# Patient Record
Sex: FEMALE | Race: WHITE | NOT HISPANIC OR LATINO | Employment: OTHER | URBAN - METROPOLITAN AREA
[De-identification: names, ages, dates, MRNs, and addresses within clinical notes are randomized per-mention and may not be internally consistent; named-entity substitution may affect disease eponyms.]

---

## 2017-01-20 ENCOUNTER — OFFICE VISIT (OUTPATIENT)
Dept: TRANSPLANT | Facility: CLINIC | Age: 67
End: 2017-01-20
Payer: MEDICARE

## 2017-01-20 VITALS
OXYGEN SATURATION: 98 % | DIASTOLIC BLOOD PRESSURE: 77 MMHG | HEIGHT: 65 IN | TEMPERATURE: 98 F | SYSTOLIC BLOOD PRESSURE: 152 MMHG | HEART RATE: 86 BPM | RESPIRATION RATE: 18 BRPM | WEIGHT: 227.5 LBS | BODY MASS INDEX: 37.9 KG/M2

## 2017-01-20 DIAGNOSIS — L98.9 LESION OF NECK: ICD-10-CM

## 2017-01-20 DIAGNOSIS — I12.9 HYPERTENSION, RENAL, STAGE 1-4 OR UNSPECIFIED CHRONIC KIDNEY DISEASE: ICD-10-CM

## 2017-01-20 DIAGNOSIS — E66.9 OBESITY, UNSPECIFIED OBESITY SEVERITY, UNSPECIFIED OBESITY TYPE: Chronic | ICD-10-CM

## 2017-01-20 DIAGNOSIS — Z94.0 S/P LIVING-DONOR KIDNEY TRANSPLANTATION: Primary | Chronic | ICD-10-CM

## 2017-01-20 DIAGNOSIS — N18.30 CKD (CHRONIC KIDNEY DISEASE) STAGE 3, GFR 30-59 ML/MIN: Chronic | ICD-10-CM

## 2017-01-20 DIAGNOSIS — Z29.89 NEED FOR PROPHYLACTIC IMMUNOTHERAPY: Chronic | ICD-10-CM

## 2017-01-20 PROCEDURE — 99215 OFFICE O/P EST HI 40 MIN: CPT | Mod: S$PBB,,, | Performed by: NURSE PRACTITIONER

## 2017-01-20 PROCEDURE — 99215 OFFICE O/P EST HI 40 MIN: CPT | Mod: PBBFAC | Performed by: NURSE PRACTITIONER

## 2017-01-20 PROCEDURE — 99999 PR PBB SHADOW E&M-EST. PATIENT-LVL V: CPT | Mod: PBBFAC,,, | Performed by: NURSE PRACTITIONER

## 2017-01-20 RX ORDER — SERTRALINE HYDROCHLORIDE 25 MG/1
25 TABLET, FILM COATED ORAL DAILY
COMMUNITY

## 2017-01-20 RX ORDER — PARICALCITOL 2 UG/1
2 CAPSULE, LIQUID FILLED ORAL DAILY
COMMUNITY

## 2017-01-20 RX ORDER — PANTOPRAZOLE SODIUM 40 MG/1
40 TABLET, DELAYED RELEASE ORAL DAILY
COMMUNITY
End: 2024-03-27

## 2017-01-20 RX ORDER — NIFEDIPINE 60 MG/1
60 TABLET, EXTENDED RELEASE ORAL DAILY
COMMUNITY

## 2017-01-20 NOTE — PROGRESS NOTES
Kidney Post-Transplant Assessment    Referring Physician: Jakub Bañuelos  Current Nephrologist: Christ Barnhart    ORGAN: RIGHT KIDNEY  Donor Type: living  PHS Increased Risk: no  Cold Ischemia: 34.2 mins  Induction Medications: steroids (prednisone,methylprednisolone,solumedrol,medrol,decadron), campath - alemtuzumab (anti-cd52)    Subjective:     CC:  Reassessment of renal allograft function and management of chronic immunosuppression.    HPI:  Ms. Philippe is a 66 y.o. year old White female who received a living kidney transplant on 12/13/11.  She has CKD stage 3 - GFR 30-59 and her baseline creatinine is between  0.94-1.3. She takes mycophenolate mofetil and tacrolimus for maintenance immunosuppression. Recent hospitalizations or ER visits since her previous clinic visit.    ED: Flu and pneumonia 12/2016--states she is better after 2 rounds of antibiotics but is still somewhat weak  Is followed by her PCP DR.Thomas Land    Bilateral cataract surgery 4/2016  Left knee replacement 10/2014    Sciatic nerve and back Pain--> is scheduled to have an epidural steroid injection next week   Uses a cane when walking long distances incase her sciatica acts up.      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    Amyloidosis, unspecified 1998     s/p chemotherapy x 4 occassions  (IV x3, po x1)    Benign hypertension with end-stage renal disease     Candida vaginitis 1/16/2015    Chronic asthma     CKD (chronic kidney disease) stage 3, GFR 30-59 ml/min 9/14/2012    GERD (gastroesophageal reflux disease)     Hyperlipidemia     Need for prophylactic immunotherapy     Obesity     Osteoarthritis of left knee s/p total knee replacement 1/16/2015     Surgery October 23, 2014    Renal disease due to hypertension 12/14/2012    Secondary hyperparathyroidism of renal origin     Vertigo        Review of Systems   Constitutional: Positive for fatigue. Negative for activity change, appetite  "change, chills, fever and unexpected weight change.        Fatigues easily since pneumonia   HENT: Negative for congestion, facial swelling, postnasal drip, rhinorrhea, sinus pressure, sore throat and trouble swallowing.    Eyes: Negative for pain, redness and visual disturbance.   Respiratory: Negative for cough, chest tightness, shortness of breath and wheezing.    Cardiovascular: Negative.  Negative for chest pain, palpitations and leg swelling.   Gastrointestinal: Negative for abdominal pain, diarrhea, nausea and vomiting.   Genitourinary: Negative for dysuria, flank pain and urgency.   Musculoskeletal: Negative for gait problem, neck pain and neck stiffness.   Skin: Negative for rash.        Concerned about a cyst to the mid anterior neck.  Sates the cyst appeared 1 1/2 years ago.   It was evaluated by her nephrologist and PCP with conflicting opinions about whether to remove it.    Allergic/Immunologic: Positive for immunocompromised state. Negative for environmental allergies and food allergies.   Neurological: Negative for dizziness, weakness, light-headedness and headaches.   Psychiatric/Behavioral: Negative for agitation and confusion. The patient is not nervous/anxious.        Objective:   Blood pressure (!) 152/77, pulse 86, temperature 97.8 °F (36.6 °C), temperature source Oral, resp. rate 18, height 5' 5" (1.651 m), weight 103.2 kg (227 lb 8.2 oz), SpO2 98 %.body mass index is 37.86 kg/(m^2).    Physical Exam   Constitutional: She is oriented to person, place, and time. She appears well-developed and well-nourished.   HENT:   Head: Normocephalic.       Mouth/Throat: Oropharynx is clear and moist. No oropharyngeal exudate.   Eyes: Conjunctivae and EOM are normal. Pupils are equal, round, and reactive to light. No scleral icterus.   Neck: Normal range of motion. Neck supple.   Cardiovascular: Normal rate, regular rhythm and normal heart sounds.    Pulmonary/Chest: Effort normal and breath sounds normal. "   Abdominal: Soft. Normal appearance and bowel sounds are normal. She exhibits no distension and no mass. There is no splenomegaly or hepatomegaly. There is no tenderness. There is no rebound, no guarding, no CVA tenderness, no tenderness at McBurney's point and negative Ballard's sign.       Musculoskeletal: Normal range of motion. She exhibits no edema.        Arms:  Lymphadenopathy:     She has no cervical adenopathy.   Neurological: She is alert and oriented to person, place, and time. She exhibits normal muscle tone. Coordination normal.   Skin: Skin is warm and dry.   Psychiatric: She has a normal mood and affect. Her behavior is normal.   Vitals reviewed.      Labs:      Lab Results   Component Value Date    EXTANC  12/09/2016      Comment:      repeat Prograf level, last not true trough    EXTWBC 10.82 11/30/2016    EXTSEGS 73.0 11/30/2016    EXTPLATELETS 197 11/30/2016    EXTHEMOGLOBI 13.0 11/30/2016    EXTHEMATOCRI 39.9 11/30/2016    EXTCREATININ 1.26 12/09/2016    EXTSODIUM 143 12/09/2016    EXTPOTASSIUM 4.2 12/09/2016    EXTBUN 16 12/09/2016    EXTCO2 30.7 12/09/2016    EXTCALCIUM 9.1 12/09/2016    EXTPHOSPHORU 2.5 12/28/2015    EXTGLUCOSE 106 12/09/2016    EXTALBUMIN 3.20 11/30/2016    EXTAST 19 11/30/2016    EXTALT 35 11/30/2016    EXTBILITOTAL 0.28 11/30/2016       Lab Results   Component Value Date    EXTTACROLVL 6.4 12/09/2016    EXTPROTCRE 0.40 12/09/2016    EXTPTHINTACT 153.3 11/10/2015    EXTPROTEINUA 1+ 11/30/2016    EXTWBCUA none seen 11/30/2016    EXTRBCUA none seen 11/30/2016       Labs were reviewed with the patient.    Assessment:     1. S/P living-donor kidney transplantation - 12/13/11    2. Chronic immunosuppression with Prograf and Cellcept    3. Hypertension, renal, stage 1-4 or unspecified chronic kidney disease    4. Obesity, unspecified obesity severity, unspecified obesity type    5. Lesion of neck    6. CKD (chronic kidney disease) stage 3, GFR 30-59 ml/min        Plan:        Ext.  Dermatology consult--lesion neck     Follow-up:   1. CKD stage: 3stable    2. Immunosuppression: last  Prograf Trough 6.4 from 12/9/2016  which is therapeutic--target 4-6. Continue Prograf 3/3,  mg BID    Will continue to monitor for drug toxicities.     3. Allograft Function: Stable.  Continue good po hydration .   Lab Results   Component Value Date    CREATININE 1.0 01/05/2012       4. Hypertension management: stable , advise low salt diet and home BP monitoring     continue nifedipine 60 mg QD, labetalol 200 mg BID and clonidine 0.2 mg at bed time as prescribed     5. Metabolic Bone Disease/Secondary Hyperparathyroidism:stable.    6. Electrolytes: Normal calcium. Bicarbonate is normal  EXTSODIUM 143 12/09/2016   EXTPOTASSIUM 4.2 12/09/2016   EXTBUN 16 12/09/2016   EXTCO2 30.7 12/09/2016   EXTCALCIUM 9.1 12/09/2016   EXTPHOSPHORU 2.5 12/28/2015     7. Anemia: stable. No need for intervention     11/30/2016  EXT WBC  10.82   EXT SEGS%  73.0   EXT Lymph%  17.3   EXT MONOCYTES%  8.9   EXT Eosinophil%  0.6   EXT Platelets  197   EXT Hemoglobin  13.0   EXT Hematocrit  39.9       8.  Cytopenias: no significant cytopenias will monitor as per our guidelines. Medicine list reviewed including potential causes of drug-induced cytopenias    9.Proteinuria: continue p/c ratio as per guidelines       EXTPROTCRE 0.40 12/09/2016       10. BK virus infection screening: will continue to monitor/ guidelines    11. Weight education: provided during the clinic visit   body mass index is 37.86 kg/(m^2).     12.Patient safety education regarding immunosuppression including prophylaxis posttransplant for CMV, PCP : Education provided about vaccination and prevention of infections       Follow-up:   Annual follow-up with kidney transplant clinic with repeat labs, including renal function panel with UA, urine protein/creatinine ratio, and drug trough level q3 months.  Patient also reminded to follow-up with general  nephrologist.    Sharona Navas NP       Education:   Material provided to the patient.  Patient reminded to call with any health changes since these can be early signs of significant complications.  Also, I advised the patient to be sure any new medications or changes of old medications are discussed with either a pharmacist or physician knowledgeable with transplant to avoid rejection/drug toxicity related to significant drug interactions.    UNOS Patient Status  Functional Status: 80% - Normal activity with effort: some symptoms of disease  Physical Capacity: No Limitations

## 2017-01-20 NOTE — MR AVS SNAPSHOT
[facility]  [address]  [facility] [address]  Phone: [phone]                  Jing Philippe   2017 10:30 AM   Office Visit    Description:  Female : 1950   Provider:  Sharona Navas NP   Department:  [facility]           Reason for Visit     Kidney Transplant Evaluation           Diagnoses this Visit        Comments    S/P living-donor kidney transplantation    -  Primary     Need for prophylactic immunotherapy         Hypertension, renal, stage 1-4 or unspecified chronic kidney disease         Obesity, unspecified obesity severity, unspecified obesity type         Lesion of neck                To Do List           Goals (5 Years of Data)     None      Follow-Up and Disposition     Return in about 1 year (around 2018).      [facility]     [facility] -  Assistance  Registered nurses in the [facility] provide clinical advisement, health education, appointment booking, and other advisory services.  Call for this free service at [phone].             Medications           Message regarding Medications     Verify the changes and/or additions to your medication regime listed below are the same as discussed with your clinician today.  If any of these changes or additions are incorrect, please notify your healthcare provider.             Verify that the below list of medications is an accurate representation of the medications you are currently taking.  If none reported, the list may be blank. If incorrect, please contact your healthcare provider. Carry this list with you in case of emergency.           Current Medications     albuterol (PROVENTIL HFA/VENTOLIN HFA) 200 puff inhaler Inhale 1 puff into the lungs PRN.    aspirin 81 MG chewable tablet Take 1 tablet by mouth Daily.    BIOTIN ORAL Take 1 tablet by mouth once daily.    calcium carbonate-vitamin D3 600 mg (1,500 mg)-800 unit Chew Take 1 tablet by mouth  "once daily.    cinacalcet (SENSIPAR) 30 MG tablet Take 1 tablet by mouth Daily. Total dose 90 mg    clonidine (CATAPRES) 0.2 MG tablet Take 1 tablet by mouth At bedtime.    docusate sodium (COLACE) 100 MG capsule Take 100 mg by mouth Twice daily as needed.    k phos di & mono-sod phos mono (PHOSPHA 250 NEUTRAL) 250 mg Tab Take 2 tablets by mouth Three times a day.    L.ACID/L.CASEI/B.BIF/B.TREVON/FOS (PROBIOTIC BLEND ORAL) Take 1 tablet by mouth once daily.    labetalol (NORMODYNE) 200 MG tablet Take 100 mg by mouth Twice daily.     lamivudine (EPIVIR) 100 MG Tab TAKE 1 TABLET DAILY.    meclizine (ANTIVERT) 25 mg tablet Take 1 tablet by mouth Daily.    multivitamin (THERAGRAN) per tablet Take 1 tablet by mouth Daily.    mycophenolate (CELLCEPT) 250 mg Cap TAKE (2) CAPSULES TWICE DAILY.    nifedipine (PROCARDIA-XL) 60 MG (OSM) 24 hr tablet Take 60 mg by mouth once daily.    pantoprazole (PROTONIX) 40 MG tablet Take 40 mg by mouth once daily.    paricalcitol (ZEMPLAR) 2 MCG capsule Take 2 mcg by mouth once daily.    pregabalin (LYRICA) 75 MG capsule Take 75 mg by mouth Three times a day.    PREMARIN vaginal cream     sertraline (ZOLOFT) 25 MG tablet Take 25 mg by mouth once daily.    tacrolimus (PROGRAF) 1 MG Cap TAKE 3 CAPSULES TWICE DAILY    tramadol (ULTRAM) 50 mg tablet Take 50 mg by mouth 2 (two) times daily.     atorvastatin (LIPITOR) 20 MG tablet Take 1 tablet by mouth At bedtime.    SENSIPAR 60 mg Tab TAKE 1 TABLET DAILY.    simethicone (MYLICON) 80 MG chewable tablet Take 80 mg by mouth.             Clinical Reference Information           Vital Signs - Last Recorded  Most recent update: 1/20/2017 10:25 AM by Adilson Livingston RN    BP Pulse Temp Resp Ht Wt    (!) 152/77 (BP Location: Left arm, Patient Position: Sitting, BP Method: Automatic) 86 97.8 °F (36.6 °C) (Oral) 18 5' 5" (1.651 m) 103.2 kg (227 lb 8.2 oz)    SpO2 BMI             98% 37.86 kg/m2         Blood Pressure          Most Recent Value    BP  " (!)  152/77      Allergies as of 1/20/2017     Codeine    Hydrocodone      Immunizations Administered on Date of Encounter - 1/20/2017     None      Orders Placed During Today's Visit      Normal Orders This Visit    Ambulatory consult to Dermatology       Maintenance Dialysis History     Patient has no recorded history of maintenance dialysis.      Transplant Information        Txp Date Organ Coordinator Care Team    12/13/2011 Kidney Cathy Killian RN Surgeon:  Christiano Cornejo MD   Referring Physician:  Jakub Bañuelos MD   Current Nephrologist:  Christ Barnhart MD         [facility] Sign-Up     Activating your [facility] account is as easy as 1-2-3!     1) Visit [email or web address], select Sign Up Now, enter this activation code and your date of birth, then select Next.  [email or web address]  Expires: 3/6/2017 11:07 AM      2) Create a username and password to use when you visit [facility] in the future and select a security question in case you lose your password and select Next.    3) Enter your e-mail address and click Sign Up!    Additional Information  If you have questions, please e-mail [email or web address] or call [phone] to talk to our [facility] staff. Remember, [facility] is NOT to be used for urgent needs. For medical emergencies, dial 911.

## 2017-01-20 NOTE — LETTER
January 20, 2017        Christ Barnhart  [address] [place] [place] [address]  Phone: [phone]  Fax: [phone]             [facility]  [address]  [facility] [address]  Phone: [phone]   Patient: Jing Philippe   MR Number: [id number]   YOB: 1950   Date of Visit: 1/20/2017       Dear Dr. Christ Barnhart    Thank you for referring Jing Philippe to me for evaluation. Attached you will find relevant portions of my assessment and plan of care.    If you have questions, please do not hesitate to call me. I look forward to following Jing Philippe along with you.    Sincerely,    Sharona Navas, NP    Enclosure    If you would like to receive this communication electronically, please contact [email or web address] or [phone] to request [employer] access.    [employer] Link is a tool which provides read-only access to select patient information with whom you have a relationship. Its easy to use and provides real time access to review your patients record including encounter summaries, notes, results, and demographic information.    If you feel you have received this communication in error or would no longer like to receive these types of communications, please e-mail [email or web address]

## 2017-11-02 ENCOUNTER — TELEPHONE (OUTPATIENT)
Dept: TRANSPLANT | Facility: CLINIC | Age: 67
End: 2017-11-02

## 2017-11-02 NOTE — TELEPHONE ENCOUNTER
Call placed to patient. Informed by  that while scheduling annual transplant appointment, patient reported that she has cancer and is on Chemo. Message left on patient voicemail, contact info provided.

## 2017-12-11 ENCOUNTER — DOCUMENTATION ONLY (OUTPATIENT)
Dept: TRANSPLANT | Facility: CLINIC | Age: 67
End: 2017-12-11

## 2017-12-11 LAB
EXT ALBUMIN: 3.2
EXT ALKALINE PHOSPHATASE: 111
EXT ALT: 22
EXT AST: 13
EXT BANDS%: 76.1
EXT BILIRUBIN DIRECT: <0.05 MG/DL
EXT BILIRUBIN TOTAL: 0.31
EXT BUN: 15
EXT CALCIUM: 8.6
EXT CHLORIDE: 105
EXT CO2: 29.1
EXT CREATININE: 1.05 MG/DL
EXT EOSINOPHIL%: 1.3
EXT GFR MDRD AF AMER: >60
EXT GLUCOSE: 119
EXT HEMATOCRIT: 37.5
EXT HEMOGLOBIN: 11.9
EXT LYMPH%: 13.3
EXT MONOCYTES%: 8.5
EXT PHOSPHORUS: 3.2
EXT PLATELETS: 211
EXT POTASSIUM: 4.4
EXT PROT/CREAT RATIO UR: 0.84
EXT PROTEIN TOTAL: 6
EXT SODIUM: 142 MMOL/L
EXT TACROLIMUS LVL: 4.5
EXT WBC: 7.99

## 2018-01-02 DIAGNOSIS — Z94.0 KIDNEY REPLACED BY TRANSPLANT: Primary | ICD-10-CM

## 2018-01-26 ENCOUNTER — OFFICE VISIT (OUTPATIENT)
Dept: TRANSPLANT | Facility: CLINIC | Age: 68
End: 2018-01-26
Payer: MEDICARE

## 2018-01-26 ENCOUNTER — LAB VISIT (OUTPATIENT)
Dept: LAB | Facility: HOSPITAL | Age: 68
End: 2018-01-26
Attending: INTERNAL MEDICINE
Payer: MEDICARE

## 2018-01-26 ENCOUNTER — TELEPHONE (OUTPATIENT)
Dept: TRANSPLANT | Facility: CLINIC | Age: 68
End: 2018-01-26

## 2018-01-26 VITALS
TEMPERATURE: 98 F | SYSTOLIC BLOOD PRESSURE: 137 MMHG | OXYGEN SATURATION: 98 % | DIASTOLIC BLOOD PRESSURE: 67 MMHG | WEIGHT: 240.31 LBS | RESPIRATION RATE: 20 BRPM | HEIGHT: 65 IN | BODY MASS INDEX: 40.04 KG/M2 | HEART RATE: 94 BPM

## 2018-01-26 DIAGNOSIS — I12.9 HYPERTENSION, RENAL: ICD-10-CM

## 2018-01-26 DIAGNOSIS — N18.30 CKD (CHRONIC KIDNEY DISEASE) STAGE 3, GFR 30-59 ML/MIN: Chronic | ICD-10-CM

## 2018-01-26 DIAGNOSIS — E83.39 HYPOPHOSPHATEMIA: Chronic | ICD-10-CM

## 2018-01-26 DIAGNOSIS — Z29.89 NEED FOR PROPHYLACTIC IMMUNOTHERAPY: Chronic | ICD-10-CM

## 2018-01-26 DIAGNOSIS — Z94.0 KIDNEY REPLACED BY TRANSPLANT: ICD-10-CM

## 2018-01-26 DIAGNOSIS — Z94.0 S/P LIVING-DONOR KIDNEY TRANSPLANTATION: Primary | Chronic | ICD-10-CM

## 2018-01-26 DIAGNOSIS — C90.00 MULTIPLE MYELOMA NOT HAVING ACHIEVED REMISSION: Chronic | ICD-10-CM

## 2018-01-26 LAB
ALBUMIN SERPL BCP-MCNC: 3.5 G/DL
ALBUMIN SERPL BCP-MCNC: 3.5 G/DL
ALP SERPL-CCNC: 81 U/L
ALT SERPL W/O P-5'-P-CCNC: 20 U/L
ANION GAP SERPL CALC-SCNC: 9 MMOL/L
AST SERPL-CCNC: 17 U/L
BASOPHILS # BLD AUTO: 0.02 K/UL
BASOPHILS NFR BLD: 0.2 %
BILIRUB DIRECT SERPL-MCNC: 0.1 MG/DL
BILIRUB SERPL-MCNC: 0.4 MG/DL
BUN SERPL-MCNC: 19 MG/DL
CALCIUM SERPL-MCNC: 8.6 MG/DL
CHLORIDE SERPL-SCNC: 105 MMOL/L
CO2 SERPL-SCNC: 27 MMOL/L
CREAT SERPL-MCNC: 1.2 MG/DL
DIFFERENTIAL METHOD: ABNORMAL
EOSINOPHIL # BLD AUTO: 0.1 K/UL
EOSINOPHIL NFR BLD: 0.7 %
ERYTHROCYTE [DISTWIDTH] IN BLOOD BY AUTOMATED COUNT: 13.7 %
EST. GFR  (AFRICAN AMERICAN): 53.3 ML/MIN/1.73 M^2
EST. GFR  (NON AFRICAN AMERICAN): 46.2 ML/MIN/1.73 M^2
GLUCOSE SERPL-MCNC: 113 MG/DL
HCT VFR BLD AUTO: 38.2 %
HGB BLD-MCNC: 12.3 G/DL
IMM GRANULOCYTES # BLD AUTO: 0.06 K/UL
IMM GRANULOCYTES NFR BLD AUTO: 0.6 %
LYMPHOCYTES # BLD AUTO: 1.5 K/UL
LYMPHOCYTES NFR BLD: 15.5 %
MAGNESIUM SERPL-MCNC: 1.4 MG/DL
MCH RBC QN AUTO: 29.2 PG
MCHC RBC AUTO-ENTMCNC: 32.2 G/DL
MCV RBC AUTO: 91 FL
MONOCYTES # BLD AUTO: 1.1 K/UL
MONOCYTES NFR BLD: 11.7 %
NEUTROPHILS # BLD AUTO: 6.7 K/UL
NEUTROPHILS NFR BLD: 71.3 %
NRBC BLD-RTO: 0 /100 WBC
PHOSPHATE SERPL-MCNC: 2.9 MG/DL
PLATELET # BLD AUTO: 176 K/UL
PMV BLD AUTO: 11.4 FL
POTASSIUM SERPL-SCNC: 4 MMOL/L
PROT SERPL-MCNC: 6.5 G/DL
RBC # BLD AUTO: 4.21 M/UL
SODIUM SERPL-SCNC: 141 MMOL/L
TACROLIMUS BLD-MCNC: 9.5 NG/ML
WBC # BLD AUTO: 9.44 K/UL

## 2018-01-26 PROCEDURE — 84075 ASSAY ALKALINE PHOSPHATASE: CPT

## 2018-01-26 PROCEDURE — 82247 BILIRUBIN TOTAL: CPT

## 2018-01-26 PROCEDURE — 85025 COMPLETE CBC W/AUTO DIFF WBC: CPT

## 2018-01-26 PROCEDURE — 80069 RENAL FUNCTION PANEL: CPT

## 2018-01-26 PROCEDURE — 36415 COLL VENOUS BLD VENIPUNCTURE: CPT

## 2018-01-26 PROCEDURE — 99999 PR PBB SHADOW E&M-EST. PATIENT-LVL V: CPT | Mod: PBBFAC,,, | Performed by: NURSE PRACTITIONER

## 2018-01-26 PROCEDURE — 80197 ASSAY OF TACROLIMUS: CPT

## 2018-01-26 PROCEDURE — 86352 CELL FUNCTION ASSAY W/STIM: CPT

## 2018-01-26 PROCEDURE — 83735 ASSAY OF MAGNESIUM: CPT

## 2018-01-26 PROCEDURE — 99215 OFFICE O/P EST HI 40 MIN: CPT | Mod: PBBFAC | Performed by: NURSE PRACTITIONER

## 2018-01-26 PROCEDURE — 99214 OFFICE O/P EST MOD 30 MIN: CPT | Mod: S$PBB,,, | Performed by: NURSE PRACTITIONER

## 2018-01-26 RX ORDER — ZOLPIDEM TARTRATE 5 MG/1
5 TABLET ORAL NIGHTLY PRN
COMMUNITY

## 2018-01-26 RX ORDER — PROCHLORPERAZINE MALEATE 10 MG
10 TABLET ORAL EVERY 6 HOURS PRN
COMMUNITY
End: 2024-03-27

## 2018-01-26 RX ORDER — DEXAMETHASONE 4 MG/1
20 TABLET ORAL SEE ADMIN INSTRUCTIONS
Status: ON HOLD | COMMUNITY
End: 2019-11-25

## 2018-01-26 NOTE — LETTER
January 26, 2018        Christ Barnhart  [address] [place] [address]  Phone: [phone]  Fax: [phone]             [facility]  [address]  [facility] [address]  Phone: [phone]   Patient: Jing Philippe   MR Number: [id number]   YOB: 1950   Date of Visit: 1/26/2018       Dear Dr. Christ Barnhart    Thank you for referring Jing Philippe to me for evaluation. Attached you will find relevant portions of my assessment and plan of care.    If you have questions, please do not hesitate to call me. I look forward to following Jing Philippe along with you.    Sincerely,    Sharona Navas, NP    Enclosure    If you would like to receive this communication electronically, please contact [email or web address] or [phone] to request [employer] access.    [employer] is a tool which provides read-only access to select patient information with whom you have a relationship. Its easy to use and provides real time access to review your patients record including encounter summaries, notes, results, and demographic information.    If you feel you have received this communication in error or would no longer like to receive these types of communications, please e-mail [email or web address]

## 2018-01-26 NOTE — PROGRESS NOTES
Please encourage patient magnesium rich meals, patient advise patient to follow up with her nephrologist about this magnesium level.

## 2018-01-26 NOTE — PROGRESS NOTES
Kidney Post-Transplant Assessment    Referring Physician: Jakub Bañuelos  Current Nephrologist: Christ Barnhart    ORGAN: RIGHT KIDNEY  Donor Type: living  PHS Increased Risk: no  Cold Ischemia: 34.2 mins  Induction Medications: steroids (prednisone,methylprednisolone,solumedrol,medrol,decadron), campath - alemtuzumab (anti-cd52)    Subjective:     CC:  Reassessment of renal allograft function and management of chronic immunosuppression.    HPI:  Ms. Philippe is a 67 y.o. 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  She has CKD stage 3 - GFR 30-59 and her baseline creatinine is between 0.94-1.3. She takes mycophenolate mofetil and tacrolimus for maintenance immunosuppression. She denies any recent hospitalizations or ER visits since her previous clinic visit.       Multiple myeloma diagnosed 3/2017. She underwent a bone marrow bx in 2/2017. She is currently taking Velcade SubQ nightly. She is also getting chemo tx every 3 weeks , and then off for 1 week. The patient did mention the possibility of needing a bone marrow txp. She is treated by Dr ELIZABETH Camp/ [facility] in [place], [place]   Overall feeling well, but reports weight gain since starting chemo txs.    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    Amyloidosis, unspecified 1998    s/p chemotherapy x 4 occassions  (IV x3, po x1)    Benign hypertension with end-stage renal disease     Candida vaginitis 1/16/2015    Chronic asthma     CKD (chronic kidney disease) stage 3, GFR 30-59 ml/min 9/14/2012    GERD (gastroesophageal reflux disease)     Hyperlipidemia     Need for prophylactic immunotherapy     Obesity     Osteoarthritis of left knee s/p total knee replacement 1/16/2015    Surgery October 23, 2014    Renal disease due to hypertension 12/14/2012    Secondary hyperparathyroidism of renal origin     Vertigo        Review of Systems  "  Constitutional: Positive for unexpected weight change. Negative for activity change, appetite change, chills, fatigue and fever.   HENT: Negative for congestion, facial swelling, postnasal drip, rhinorrhea, sinus pressure, sore throat and trouble swallowing.    Eyes: Negative for pain, redness and visual disturbance.   Respiratory: Negative for cough, chest tightness, shortness of breath and wheezing.    Cardiovascular: Negative.  Negative for chest pain, palpitations and leg swelling.   Gastrointestinal: Negative for abdominal pain, diarrhea, nausea and vomiting.        Obese abdomen    Genitourinary: Negative for dysuria, flank pain and urgency.   Musculoskeletal: Negative for gait problem, neck pain and neck stiffness.   Skin: Negative for rash.   Allergic/Immunologic: Positive for immunocompromised state. Negative for environmental allergies and food allergies.   Neurological: Negative for dizziness, weakness, light-headedness and headaches.   Psychiatric/Behavioral: Negative for agitation and confusion. The patient is not nervous/anxious.        Objective:   Blood pressure 137/67, pulse 94, temperature 98.4 °F (36.9 °C), temperature source Oral, resp. rate 20, height 5' 5" (1.651 m), weight 109 kg (240 lb 4.8 oz), SpO2 98 %.body mass index is 39.99 kg/m².    Physical Exam   Constitutional: She is oriented to person, place, and time. She appears well-developed and well-nourished.   HENT:   Head: Normocephalic.   Mouth/Throat: Oropharynx is clear and moist. No oropharyngeal exudate.   Eyes: Conjunctivae and EOM are normal. Pupils are equal, round, and reactive to light. No scleral icterus.   Neck: Normal range of motion. Neck supple.   Cardiovascular: Normal rate, regular rhythm and normal heart sounds.    Pulmonary/Chest: Effort normal and breath sounds normal.   Abdominal: Soft. Normal appearance and bowel sounds are normal. She exhibits no distension and no mass. There is no splenomegaly or hepatomegaly. " There is no tenderness. There is no rebound, no guarding, no CVA tenderness, no tenderness at McBurney's point and negative Ballard's sign.   Musculoskeletal: Normal range of motion. She exhibits no edema.        Arms:  Lymphadenopathy:     She has no cervical adenopathy.   Neurological: She is alert and oriented to person, place, and time. She exhibits normal muscle tone. Coordination normal.   Skin: Skin is warm and dry.   Psychiatric: She has a normal mood and affect. Her behavior is normal.   Vitals reviewed.      Labs:  Lab Results   Component Value Date    WBC 9.44 01/26/2018    HGB 12.3 01/26/2018    HCT 38.2 01/26/2018     01/26/2018    K 4.0 01/26/2018     01/26/2018    CO2 27 01/26/2018    BUN 19 01/26/2018    CREATININE 1.2 01/26/2018    EGFRNONAA 46.2 (A) 01/26/2018    CALCIUM 8.6 (L) 01/26/2018    PHOS 2.9 01/26/2018    MG 1.4 (L) 01/26/2018    ALBUMIN 3.5 01/26/2018    ALBUMIN 3.5 01/26/2018    AST 17 01/26/2018    ALT 20 01/26/2018       Lab Results   Component Value Date    EXTANC  12/09/2016      Comment:      repeat Prograf level, last not true trough    EXTWBC 7.99 12/04/2017    EXTSEGS 73.0 11/30/2016    EXTPLATELETS 211 12/04/2017    EXTHEMOGLOBI 11.9 12/04/2017    EXTHEMATOCRI 37.5 12/04/2017    EXTCREATININ 1.05 12/04/2017    EXTSODIUM 142 12/04/2017    EXTPOTASSIUM 4.4 12/04/2017    EXTBUN 15 12/04/2017    EXTCO2 29.1 12/04/2017    EXTCALCIUM 8.6 12/04/2017    EXTPHOSPHORU 3.2 12/04/2017    EXTGLUCOSE 119 12/04/2017    EXTALBUMIN 3.20 12/04/2017    EXTAST 13 12/04/2017    EXTALT 22 12/04/2017    EXTBILITOTAL 0.31 12/04/2017       Lab Results   Component Value Date    EXTTACROLVL 4.5 12/04/2017    EXTPROTCRE 0.84 12/04/2017    EXTPTHINTACT 153.3 11/10/2015    EXTPROTEINUA 1+ 11/30/2016    EXTWBCUA none seen 11/30/2016    EXTRBCUA none seen 11/30/2016       Labs were reviewed with the patient.    Assessment:     1. S/P living-donor kidney transplantation - 12/13/11    2. Chronic  immunosuppression with Prograf and Cellcept    3. CKD (chronic kidney disease) stage 3, GFR 30-59 ml/min    4. Hypertension, renal    5. Hypophosphatemia    6. Multiple myeloma not having achieved remission        Plan:   High Mg diet encouraged -->MGMT deferred to general nephrology  Stop MMF d/t active multiple myeloma    possibility of needing a bone marrow txp. She is treated by Dr ELIZABETH Camp/ [facility]  Patient expressed [facility] doctor may want to discuss treatment with one of the TXP nephrologist. If needed, Encouraged patient to have Dr Camp call the coordinator to set up a time for her to speak with a txp nephrologist.     Follow-up:   1. CKD stage: 3 stable    2. Immunosuppression:   Prograf trough 4.5, which is  therapeutic target 4-6. Continue Prograf 3/2    Will continue to monitor for drug toxicities. She continues Epivir prophylaxis     3. Allograft Function: Stable. Continue good po hydration.        Lab Results   Component Value Date    CREATININE 1.2 01/26/2018     EGFRNONAA 46.2 (A) 01/26/2018       4. Hypertension management: stable, advise low salt diet and home BP monitoring    Nifedipine 60 mg QD, Clonidine 0.2 mg QD,     5. Metabolic Bone Disease/Secondary Hyperparathyroidism:stable  Will monitor PTH, CA and Vit D/guidelines,    High Mg diet encouraged -->MGMT deferred to general nephrology    MG 1.4 (L) 01/26/2018     Lab Results   Component Value Date           CALCIUM 8.6 (L) 01/26/2018    CAION 1.49 (H) 12/16/2011    PHOS 2.9 01/26/2018   Sensipar and zemplar as prescribed , calcium carbonate as prescribed , KPN  500  mg TID  -->MGMT deferred to general nephrology    6. Electrolytes:  Will monitor /guidelines  Lab Results   Component Value Date     01/26/2018    K 4.0 01/26/2018     01/26/2018    CO2 27 01/26/2018       7. Anemia: stable. No need for intervention    Will monitor /guidelines  Lab Results   Component Value Date    WBC 9.44 01/26/2018    HGB 12.3 01/26/2018    HCT 38.2  01/26/2018    MCV 91 01/26/2018     01/26/2018       8.  Cytopenias: no significant cytopenias will monitor as per our guidelines. Medicine list reviewed including potential causes of drug-induced cytopenias    9.Proteinuria: continue p/c ratio as per guidelines    EXTPROTCRE 0.84 12/04/2017     10. BK virus infection screening:  will continue to monitor/ guidelines    11. Weight education: provided during the clinic visit   Body mass index is 39.99 kg/m².       12.Patient safety education regarding immunosuppression including prophylaxis posttransplant for CMV, PCP : Education provided about vaccination and prevention of infections       Follow-up:   Annual follow-up with kidney transplant clinic with repeat labs, including renal function panel with UA, urine protein/creatinine ratio, and drug trough level q3 months.  Patient also reminded to follow-up with general nephrologist.    Sharona Navas NP       Education:   Material provided to the patient.  Patient reminded to call with any health changes since these can be early signs of significant complications.  Also, I advised the patient to be sure any new medications or changes of old medications are discussed with either a pharmacist or physician knowledgeable with transplant to avoid rejection/drug toxicity related to significant drug interactions.    UNOS Patient Status  Functional Status: 80% - Normal activity with effort: some symptoms of disease  Physical Capacity: No Limitations

## 2018-01-26 NOTE — TELEPHONE ENCOUNTER
----- Message from Casimiro Null MD sent at 1/26/2018 10:35 AM CST -----  Please encourage patient magnesium rich meals, patient advise patient to follow up with her nephrologist about this magnesium level.

## 2018-01-29 LAB — IMMUNKNOW (STIMULATED): 404 NG/ML

## 2018-10-23 ENCOUNTER — TELEPHONE (OUTPATIENT)
Dept: TRANSPLANT | Facility: CLINIC | Age: 68
End: 2018-10-23

## 2018-10-23 NOTE — TELEPHONE ENCOUNTER
Annual transplant follow up due 12/2018. Patient declines appointment with . Follow up call placed to patient by Nurse Coordinator X3 unsuccessfully. Letter to be sent.

## 2018-10-24 ENCOUNTER — TELEPHONE (OUTPATIENT)
Dept: TRANSPLANT | Facility: CLINIC | Age: 68
End: 2018-10-24

## 2018-10-24 NOTE — TELEPHONE ENCOUNTER
Spoke to patient today regarding annual transplant follow up due in December 2018. Patient does not wish to make appointment today, wishes to get only labs done and come to [facility] in January 2019. Will schedule lab appointment.

## 2018-10-24 NOTE — TELEPHONE ENCOUNTER
----- Message from Peace Ryan sent at 10/24/2018  8:30 AM CDT -----  Contact: Patient  Needs Advice    Reason for call: Returning Cathy's call. Patient stated her phone cut off mid conversation        Communication Preference: [phone]    Additional Information: n/a

## 2018-12-06 ENCOUNTER — TELEPHONE (OUTPATIENT)
Dept: TRANSPLANT | Facility: CLINIC | Age: 68
End: 2018-12-06

## 2018-12-07 NOTE — TELEPHONE ENCOUNTER
Call received, critical lab from [facility]. Mag 1.1.  Patient called, inquiry related to Mag supplement reviewed. Patient reports chronic problem with low Mag level over last 6 months requiring Mag Sulfate infusions in addition to Mag Ox 800 tid and slow mag 2 tabs bid. Patient denies any cramping or abnormal symptoms. Call placed to local Hem/Onc Dr. Camp whom has been treating patient. Call returned from Nurse. Patient currently being managed for Amyloid and chronic low serum Mag level. Patient will be seen on 12/6/18 and will received IV Mag 3GM. Reviewed with Dr. Fatima, will defer to Hem/Onc to manage. Patient advised to take Mag and KPN 2hrs apart.

## 2019-01-14 ENCOUNTER — TELEPHONE (OUTPATIENT)
Dept: TRANSPLANT | Facility: CLINIC | Age: 69
End: 2019-01-14

## 2019-01-14 NOTE — TELEPHONE ENCOUNTER
I called pt and advised to go to local ER for critically low Mg level.  Pt declined, says she stays low on Mg and will be seeing Oncologist ( Dr Awan) tomorrow to get IV Mg.

## 2019-04-01 ENCOUNTER — OFFICE VISIT (OUTPATIENT)
Dept: FAMILY MEDICINE | Facility: CLINIC | Age: 69
End: 2019-04-01
Payer: MEDICARE

## 2019-04-01 VITALS
HEART RATE: 85 BPM | OXYGEN SATURATION: 99 % | WEIGHT: 216 LBS | HEIGHT: 65 IN | RESPIRATION RATE: 16 BRPM | TEMPERATURE: 96 F | SYSTOLIC BLOOD PRESSURE: 130 MMHG | DIASTOLIC BLOOD PRESSURE: 60 MMHG | BODY MASS INDEX: 35.99 KG/M2

## 2019-04-01 DIAGNOSIS — Z94.0 S/P LIVING-DONOR KIDNEY TRANSPLANTATION: Chronic | ICD-10-CM

## 2019-04-01 DIAGNOSIS — M86.179 ACUTE OSTEOMYELITIS OF ANKLE OR FOOT, UNSPECIFIED LATERALITY: Primary | ICD-10-CM

## 2019-04-01 DIAGNOSIS — C90.00 MULTIPLE MYELOMA NOT HAVING ACHIEVED REMISSION: Chronic | ICD-10-CM

## 2019-04-01 PROCEDURE — 99204 PR OFFICE/OUTPT VISIT, NEW, LEVL IV, 45-59 MIN: ICD-10-PCS | Mod: S$GLB,,, | Performed by: INTERNAL MEDICINE

## 2019-04-01 PROCEDURE — 99204 OFFICE O/P NEW MOD 45 MIN: CPT | Mod: S$GLB,,, | Performed by: INTERNAL MEDICINE

## 2019-04-01 RX ORDER — GABAPENTIN 100 MG/1
CAPSULE ORAL
COMMUNITY
Start: 2019-03-13 | End: 2024-03-27

## 2019-04-01 NOTE — ASSESSMENT & PLAN NOTE
On chronic immunosuppression which can make infection worse.  Need to watch renal function closely on antibiotics.

## 2019-04-01 NOTE — ASSESSMENT & PLAN NOTE
MRI shows evidence of osteomyelitis, also has erythema, pain, and elevated ESR.  IR declined to do bone biopsy so we don't have culture data.  Will plan to start with gram + coverage with daptomycin.  Needs PICC line ordered.  Will need to monitor renal function closely given history.  Will try and set up home infusion but may need to utilize ATC if insurance won't cover.

## 2019-04-01 NOTE — PROGRESS NOTES
Subjective:       Patient ID: Jing Philippe is a 69 y.o. female.    Chief Complaint: Osteomyelitis LLE (Lymphedema to L leg x4 mnths. Throbbing px at night mostly. Ref. NP No Ramsey. Pt given PICC order as she wants to bring it to [facility]. [facility])    Here for evaluation of chronic left foot pain and swelling.  Has been going on for 4 months.  Pain is worst at the lateral aspect of the left foot.  Described as throbbing, constant, 8/10.  Worse with walking, no alleviating factors, has tried cold therapy, heat therapy, elevation without improvement.  No associated fever/chills, no associated wound or drainage.  Has been seen by multiple physicians and was hospitalized recently for several days with IV antibiotics.  Mild improvement with those.  Had MRI done that showed suspected osteomyelitis.  Was supposed to get bone biopsy but IR declined.      Does have history of ESRD due to amyloidosis, received kidney from her sister back in 2011, has been doing well, saw nephrology last week.  Also has history of multiple myeloma and is on revlimid for that and reports she is doing well.      Review of Systems   Constitutional: Negative for chills and fever.   HENT: Negative for ear pain, rhinorrhea and sore throat.    Eyes: Negative for pain and visual disturbance.   Respiratory: Negative for cough, shortness of breath and wheezing.    Cardiovascular: Negative for chest pain and palpitations.   Gastrointestinal: Negative for abdominal pain, constipation, diarrhea, nausea and vomiting.   Endocrine: Negative for cold intolerance and heat intolerance.   Genitourinary: Negative for difficulty urinating, dysuria and hematuria.   Musculoskeletal: Positive for joint swelling. Negative for back pain and myalgias.   Skin: Positive for rash. Negative for wound.   Neurological: Negative for dizziness, weakness and headaches.   Psychiatric/Behavioral: Negative for agitation and confusion.       Objective:      Physical Exam    Constitutional: She is oriented to person, place, and time. She appears well-developed and well-nourished.   HENT:   Head: Normocephalic and atraumatic.   Eyes: Pupils are equal, round, and reactive to light. No scleral icterus.   Neck: Neck supple. No tracheal deviation present.   Cardiovascular: Normal rate, regular rhythm and normal heart sounds.   No murmur heard.  Pulmonary/Chest: Effort normal and breath sounds normal. No respiratory distress. She has no wheezes.   Abdominal: Soft. Bowel sounds are normal. She exhibits no distension. There is no tenderness. There is no guarding.   Musculoskeletal: She exhibits edema and tenderness.        Left foot: There is decreased range of motion.   Feet:   Left Foot:   Skin Integrity: Positive for erythema and warmth.   Neurological: She is alert and oriented to person, place, and time.   Skin: Skin is warm and dry. No rash noted. There is erythema.   Psychiatric: She has a normal mood and affect. Her behavior is normal.   Nursing note and vitals reviewed.      Assessment:       1. Acute osteomyelitis of ankle or foot, unspecified laterality    2. Multiple myeloma not having achieved remission    3. S/P living-donor kidney transplantation - 12/13/11        Plan:       *  Problem List Items Addressed This Visit        Renal/    S/P living-donor kidney transplantation - 12/13/11 (Chronic)     On chronic immunosuppression which can make infection worse.  Need to watch renal function closely on antibiotics.            ID    Acute osteomyelitis of ankle or foot - Primary     MRI shows evidence of osteomyelitis, also has erythema, pain, and elevated ESR.  IR declined to do bone biopsy so we don't have culture data.  Will plan to start with gram + coverage with daptomycin.  Needs PICC line ordered.  Will need to monitor renal function closely given history.  Will try and set up home infusion but may need to utilize ATC if insurance won't cover.         Relevant Orders     Outpatient PICC Insertion    X-Ray Chest 1 View for PICC_Central line    PICC catheter may be used for therapy after catheter placements and confirmed by CXR    Anesthesia US Guide Vascular Access       Oncology    Multiple myeloma not having achieved remission (Chronic)     Currently on revlimid, does cause some immunosuppression, continue follow up with hematology.             **

## 2019-04-05 ENCOUNTER — TELEPHONE (OUTPATIENT)
Dept: FAMILY MEDICINE | Facility: CLINIC | Age: 69
End: 2019-04-05

## 2019-04-05 NOTE — TELEPHONE ENCOUNTER
Per [facility] 2000 pt got 1st dose 4/3 7 they're ready to set up wkly labs/dressing changes. [facility] doesn't order IV dapto, so where can pt get med ordered?

## 2019-04-08 DIAGNOSIS — M86.179 ACUTE OSTEOMYELITIS OF ANKLE OR FOOT, UNSPECIFIED LATERALITY: Primary | ICD-10-CM

## 2019-04-22 ENCOUNTER — OFFICE VISIT (OUTPATIENT)
Dept: FAMILY MEDICINE | Facility: CLINIC | Age: 69
End: 2019-04-22
Payer: MEDICARE

## 2019-04-22 VITALS
HEART RATE: 78 BPM | SYSTOLIC BLOOD PRESSURE: 133 MMHG | HEIGHT: 63 IN | OXYGEN SATURATION: 99 % | BODY MASS INDEX: 37.74 KG/M2 | TEMPERATURE: 98 F | DIASTOLIC BLOOD PRESSURE: 81 MMHG | RESPIRATION RATE: 16 BRPM | WEIGHT: 213 LBS

## 2019-04-22 DIAGNOSIS — M86.172 ACUTE OSTEOMYELITIS OF LEFT ANKLE OR FOOT: Primary | ICD-10-CM

## 2019-04-22 DIAGNOSIS — N18.30 CKD (CHRONIC KIDNEY DISEASE) STAGE 3, GFR 30-59 ML/MIN: Chronic | ICD-10-CM

## 2019-04-22 PROCEDURE — 99213 OFFICE O/P EST LOW 20 MIN: CPT | Mod: S$GLB,,, | Performed by: INTERNAL MEDICINE

## 2019-04-22 PROCEDURE — 99213 PR OFFICE/OUTPT VISIT, EST, LEVL III, 20-29 MIN: ICD-10-PCS | Mod: S$GLB,,, | Performed by: INTERNAL MEDICINE

## 2019-04-23 NOTE — PROGRESS NOTES
Subjective:       Patient ID: Jing Philippe is a 69 y.o. female.    Chief Complaint: Acute Osteomyelitis (3wk f/u LLE)    Patient is here for follow up on IV antibiotics.  She reports she is doing well at this time, has noticed less swelling in her left foot.  Is having some wrinkling of the skin there as well.  Is trying to elevate it as much as possible.  She is tolerating antibiotics without complication, has been on them almost 2 weeks.  No issues with PICC line.  Labs are reviewed and discussed, show significant drop in ESR and CRP.  Kidney function is stable.    Review of Systems   Constitutional: Negative for chills and fever.   HENT: Negative for sore throat.    Respiratory: Negative for cough and shortness of breath.    Cardiovascular: Negative for chest pain and palpitations.   Gastrointestinal: Negative for diarrhea, nausea and vomiting.   Musculoskeletal: Positive for joint swelling (improved).   Skin: Positive for rash. Negative for wound.       Objective:      Physical Exam   Constitutional: She appears well-developed and well-nourished.   Cardiovascular: Normal rate and regular rhythm.   No murmur heard.  Pulmonary/Chest: Effort normal and breath sounds normal. No respiratory distress. She has no wheezes.   Abdominal: Soft. Bowel sounds are normal. There is no tenderness.   Musculoskeletal: She exhibits edema (appears decreased from last visit).   Skin: Skin is warm and dry. Rash noted.   Nursing note and vitals reviewed.      Assessment:       1. Acute osteomyelitis of left ankle or foot    2. CKD (chronic kidney disease) stage 3, GFR 30-59 ml/min        Plan:       Problem List Items Addressed This Visit        Renal/    CKD (chronic kidney disease) stage 3, GFR 30-59 ml/min (Chronic)     Stable, continue to watch renal function while on antibiotics.            ID    Acute osteomyelitis of ankle or foot - Primary     Improved based on exam findings and labs, continue current antibiotics,  reassess in 3 weeks.

## 2019-06-13 ENCOUNTER — TELEPHONE (OUTPATIENT)
Dept: TRANSPLANT | Facility: CLINIC | Age: 69
End: 2019-06-13

## 2019-06-13 NOTE — TELEPHONE ENCOUNTER
----- Message from Estelle Choe sent at 6/13/2019  1:08 PM CDT -----  Contact: pt daughter/  Please call  [phone]    Patient is having some serious medical problems to be discussed with nurse    Patient is currently in the nursing home    Thank you

## 2019-06-13 NOTE — TELEPHONE ENCOUNTER
Patient daughter Belinda Dhillon returned call.   Patient currently living in a Nursing Home- [facility], [place], [place]  Current Nephrologist Dr. Jessica in [place]. Patient has an appointment to scheduled 6/26. Will mail a lab slip to Ms. Dhillon to coordinate labs with Nephrologist labs.     Belinda Dhillon  [phone]  [place] [phone]

## 2019-06-25 ENCOUNTER — DOCUMENTATION ONLY (OUTPATIENT)
Dept: TRANSPLANT | Facility: CLINIC | Age: 69
End: 2019-06-25

## 2019-06-25 LAB
EXT ALBUMIN: 2.4 (ref 3.4–5)
EXT ALKALINE PHOSPHATASE: 98 (ref 46–116)
EXT ALT: 42 (ref 14–59)
EXT AST: 11 (ref 15–37)
EXT BILIRUBIN DIRECT: 0.06 MG/DL (ref 0–0.3)
EXT BILIRUBIN TOTAL: 0.31 (ref 0.2–1)
EXT BUN: 22 (ref 7–18)
EXT CALCIUM: 10.4 (ref 8.5–10.1)
EXT CHLORIDE: 105 (ref 98–107)
EXT CO2: 22.9 (ref 21–32)
EXT CREATININE: 1.03 MG/DL (ref 0.55–1.02)
EXT EOSINOPHIL%: 0.2 (ref 0–5)
EXT GFR MDRD NON AF AMER: 56
EXT HEMATOCRIT: 29.1 (ref 37–47)
EXT HEMOGLOBIN: 9.2 (ref 12–16)
EXT LYMPH%: 22.4 (ref 15–41)
EXT MAGNESIUM: 1.5 (ref 1.8–2.4)
EXT MONOCYTES%: 5.7 (ref 5–11)
EXT PHOSPHORUS: 2.1 (ref 2.5–4.9)
EXT PLATELETS: 150 (ref 130–400)
EXT POTASSIUM: 3.7 (ref 3.5–5.1)
EXT PROTEIN TOTAL: 5.7 (ref 6.4–8.2)
EXT SEGS%: 70.1 (ref 42–75)
EXT SODIUM: 141 MMOL/L (ref 136–145)
EXT TACROLIMUS LVL: 6.1
EXT WBC: 10.11 (ref 4.8–10.8)

## 2019-06-26 NOTE — PROGRESS NOTES
Her GFR is stable, p[rograf level looks accepatble she had several electrolytes abnormalities. Please send this reports to her nephrologist to address hypercalcemia hypophosphatemia and hypoalbuminemia. Please Order a pr/cr ratio. Does she have any acute illness or recent admission to a hospital.? I understand she has multiple myeloma and likely following with hematology. Please let me know. Thanks

## 2019-07-26 ENCOUNTER — OFFICE VISIT (OUTPATIENT)
Dept: TRANSPLANT | Facility: CLINIC | Age: 69
End: 2019-07-26
Payer: MEDICARE

## 2019-07-26 VITALS
BODY MASS INDEX: 31.48 KG/M2 | HEART RATE: 88 BPM | DIASTOLIC BLOOD PRESSURE: 86 MMHG | WEIGHT: 188.94 LBS | OXYGEN SATURATION: 99 % | TEMPERATURE: 98 F | SYSTOLIC BLOOD PRESSURE: 127 MMHG | HEIGHT: 65 IN | RESPIRATION RATE: 18 BRPM

## 2019-07-26 DIAGNOSIS — N25.81 SECONDARY HYPERPARATHYROIDISM OF RENAL ORIGIN: Chronic | ICD-10-CM

## 2019-07-26 DIAGNOSIS — E85.4 RENAL AMYLOIDOSIS: ICD-10-CM

## 2019-07-26 DIAGNOSIS — N18.30 CKD (CHRONIC KIDNEY DISEASE) STAGE 3, GFR 30-59 ML/MIN: Chronic | ICD-10-CM

## 2019-07-26 DIAGNOSIS — C90.00 MULTIPLE MYELOMA NOT HAVING ACHIEVED REMISSION: Chronic | ICD-10-CM

## 2019-07-26 DIAGNOSIS — Z94.0 S/P LIVING-DONOR KIDNEY TRANSPLANTATION: Primary | Chronic | ICD-10-CM

## 2019-07-26 DIAGNOSIS — N08 RENAL AMYLOIDOSIS: ICD-10-CM

## 2019-07-26 DIAGNOSIS — I12.9 HYPERTENSION, RENAL: ICD-10-CM

## 2019-07-26 DIAGNOSIS — Z29.89 NEED FOR PROPHYLACTIC IMMUNOTHERAPY: Chronic | ICD-10-CM

## 2019-07-26 LAB
CREAT UR-MCNC: 94 MG/DL (ref 15–325)
PROT UR-MCNC: 75 MG/DL (ref 0–15)
PROT/CREAT UR: 0.8 MG/G{CREAT} (ref 0–0.2)

## 2019-07-26 PROCEDURE — 3079F DIAST BP 80-89 MM HG: CPT | Mod: CPTII,S$GLB,, | Performed by: NURSE PRACTITIONER

## 2019-07-26 PROCEDURE — 3074F SYST BP LT 130 MM HG: CPT | Mod: CPTII,S$GLB,, | Performed by: NURSE PRACTITIONER

## 2019-07-26 PROCEDURE — 1101F PR PT FALLS ASSESS DOC 0-1 FALLS W/OUT INJ PAST YR: ICD-10-PCS | Mod: CPTII,S$GLB,, | Performed by: NURSE PRACTITIONER

## 2019-07-26 PROCEDURE — 3074F PR MOST RECENT SYSTOLIC BLOOD PRESSURE < 130 MM HG: ICD-10-PCS | Mod: CPTII,S$GLB,, | Performed by: NURSE PRACTITIONER

## 2019-07-26 PROCEDURE — 3079F PR MOST RECENT DIASTOLIC BLOOD PRESSURE 80-89 MM HG: ICD-10-PCS | Mod: CPTII,S$GLB,, | Performed by: NURSE PRACTITIONER

## 2019-07-26 PROCEDURE — 99999 PR PBB SHADOW E&M-EST. PATIENT-LVL V: ICD-10-PCS | Mod: PBBFAC,,, | Performed by: NURSE PRACTITIONER

## 2019-07-26 PROCEDURE — 99215 OFFICE O/P EST HI 40 MIN: CPT | Mod: S$GLB,,, | Performed by: NURSE PRACTITIONER

## 2019-07-26 PROCEDURE — 99215 PR OFFICE/OUTPT VISIT, EST, LEVL V, 40-54 MIN: ICD-10-PCS | Mod: S$GLB,,, | Performed by: NURSE PRACTITIONER

## 2019-07-26 PROCEDURE — 99999 PR PBB SHADOW E&M-EST. PATIENT-LVL V: CPT | Mod: PBBFAC,,, | Performed by: NURSE PRACTITIONER

## 2019-07-26 PROCEDURE — 82570 ASSAY OF URINE CREATININE: CPT

## 2019-07-26 PROCEDURE — 1101F PT FALLS ASSESS-DOCD LE1/YR: CPT | Mod: CPTII,S$GLB,, | Performed by: NURSE PRACTITIONER

## 2019-07-26 NOTE — PROGRESS NOTES
Kidney Post-Transplant Assessment    Referring Physician: Jakub Bañuelos  Current Nephrologist: Christ Barnhart    ORGAN: RIGHT KIDNEY  Donor Type: living  PHS Increased Risk: no  Cold Ischemia: 34.2 mins  Induction Medications: steroids (prednisone,methylprednisolone,solumedrol,medrol,decadron), campath - alemtuzumab (anti-cd52)    Subjective:     CC:  Reassessment of renal allograft function and management of chronic immunosuppression.    HPI:  Ms. Philippe is a 69 y.o. 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  She has CKD stage 3 - GFR 30-59 and her baseline creatinine is between 0.94-1.3. She takes mycophenolate mofetil and tacrolimus for maintenance immunosuppression. She denies any recent hospitalizations or ER visits since her previous clinic visit.     Interval HX:    Multiple myeloma diagnosed 3/2017. She underwent a bone marrow bx in 2/2017. She  Has been on  Velcade SubQ nightly with chemo tx every 3 weeks , and then off for 1 week.   Treated by Dr ELIZABETH Camp/ [facility] in [place], [place]. Treatments were stopped recently due to osteomyelitis infection of the foot. She has completed antibiotics and infection resolved. She reports becoming sick again and hospitalized at [place] [facility] in 5/2019, d/t  problems with the bladder emptying. A FTG was inserted.  She d/c'd from [facility] to a  nursing home, and  recently d/c'd home with home health. She continues to have a FTG and states it was changed out last week. The patient is unclear as to the problems causing the bladder emptying problem. She is being managed  by Dr Clayton / urologist, who would like to consult with the transplant team c/o her care. She also f/u with her nephrologist in [place], Dr Jessica.       Today she is good spirits and Kidney fx remains stable.     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  "   Amyloidosis, unspecified 1998    s/p chemotherapy x 4 occassions  (IV x3, po x1)    Benign hypertension with end-stage renal disease     Candida vaginitis 1/16/2015    Chronic asthma     CKD (chronic kidney disease) stage 3, GFR 30-59 ml/min 9/14/2012    GERD (gastroesophageal reflux disease)     Hyperlipidemia     Lymphedema     LLE    Need for prophylactic immunotherapy     Obesity     Osteoarthritis of left knee s/p total knee replacement 1/16/2015    Surgery October 23, 2014    Renal disease due to hypertension 12/14/2012    Secondary hyperparathyroidism of renal origin     Vertigo        Review of Systems   Constitutional: Negative for activity change, appetite change, chills, fatigue and fever.   HENT: Negative for congestion, facial swelling, postnasal drip, rhinorrhea, sinus pressure, sore throat and trouble swallowing.    Eyes: Negative for pain, redness and visual disturbance.   Respiratory: Negative for cough, chest tightness, shortness of breath and wheezing.    Cardiovascular: Positive for leg swelling. Negative for chest pain and palpitations.   Gastrointestinal: Negative for abdominal pain, diarrhea, nausea and vomiting.        Obese abdomen    Genitourinary: Negative for dysuria, flank pain and urgency.        FTG   Musculoskeletal: Negative for gait problem, neck pain and neck stiffness.   Skin: Negative for rash.   Allergic/Immunologic: Positive for immunocompromised state. Negative for environmental allergies and food allergies.   Neurological: Positive for weakness. Negative for dizziness, light-headedness and headaches.        Lower extremity    Psychiatric/Behavioral: Negative for agitation and confusion. The patient is not nervous/anxious.        Objective:   Blood pressure 127/86, pulse 88, temperature 98 °F (36.7 °C), temperature source Oral, resp. rate 18, height 5' 5" (1.651 m), weight 85.7 kg (188 lb 15 oz), SpO2 99 %.body mass index is 31.44 kg/m².    Physical Exam "   Constitutional: She is oriented to person, place, and time. She appears well-developed and well-nourished.   HENT:   Head: Normocephalic.   Mouth/Throat: Oropharynx is clear and moist. No oropharyngeal exudate.   Eyes: Pupils are equal, round, and reactive to light. Conjunctivae and EOM are normal. No scleral icterus.   Neck: Normal range of motion. Neck supple.   Cardiovascular: Normal rate, regular rhythm and normal heart sounds.   Pulmonary/Chest: Effort normal and breath sounds normal.   Abdominal: Soft. Normal appearance and bowel sounds are normal. She exhibits no distension and no mass. There is no splenomegaly or hepatomegaly. There is no tenderness. There is no rebound, no guarding, no CVA tenderness, no tenderness at McBurney's point and negative Ballard's sign.       Musculoskeletal: Normal range of motion. She exhibits edema.        Arms:  Bilateral peripheral edema +2    Lymphadenopathy:     She has no cervical adenopathy.   Neurological: She is alert and oriented to person, place, and time. She exhibits normal muscle tone. Coordination normal.   Skin: Skin is warm and dry.   Psychiatric: She has a normal mood and affect. Her behavior is normal.   Vitals reviewed.      Labs:  Lab Results   Component Value Date    WBC 9.44 01/26/2018    HGB 12.3 01/26/2018    HCT 38.2 01/26/2018     01/26/2018    K 4.0 01/26/2018     01/26/2018    CO2 27 01/26/2018    BUN 19 01/26/2018    CREATININE 1.2 01/26/2018    EGFRNONAA 46.2 (A) 01/26/2018    CALCIUM 8.6 (L) 01/26/2018    PHOS 2.9 01/26/2018    MG 1.4 (L) 01/26/2018    ALBUMIN 3.5 01/26/2018    ALBUMIN 3.5 01/26/2018    AST 17 01/26/2018    ALT 20 01/26/2018       Lab Results   Component Value Date    EXTANC  12/09/2016      Comment:      repeat Prograf level, last not true trough    EXTWBC 10.11 06/18/2019    EXTSEGS 70.1 06/18/2019    EXTPLATELETS 150 06/18/2019    EXTHEMOGLOBI 9.2 (A) 06/18/2019    EXTHEMATOCRI 29.1 (A) 06/18/2019    EXTCREATININ  1.03 (A) 06/18/2019    EXTSODIUM 141 06/18/2019    EXTPOTASSIUM 3.7 06/18/2019    EXTBUN 22 (A) 06/18/2019    EXTCO2 22.9 06/18/2019    EXTCALCIUM 10.4 (A) 06/18/2019    EXTPHOSPHORU 2.1 (A) 06/18/2019    EXTGLUCOSE 119 12/04/2017    EXTALBUMIN 2.40 (A) 06/18/2019    EXTAST 11 (A) 06/18/2019    EXTALT 42 06/18/2019    EXTBILITOTAL 0.31 06/18/2019       Lab Results   Component Value Date    EXTTACROLVL 4.5 12/04/2017    EXTPROTCRE 0.84 12/04/2017    EXTPTHINTACT 153.3 11/10/2015    EXTPROTEINUA 1+ 11/30/2016    EXTWBCUA none seen 11/30/2016    EXTRBCUA none seen 11/30/2016       Labs were reviewed with the patient.    Assessment:     1. S/P living-donor kidney transplantation - 12/13/11    2. Hypertension, renal    3. CKD (chronic kidney disease) stage 3, GFR 30-59 ml/min    4. Chronic immunosuppression with Prograf and Cellcept    5. Secondary hyperparathyroidism of renal origin    6. Renal amyloidosis    7. Multiple myeloma not having achieved remission        Plan:     F/u with hemon/ Dr Camp for mgmt Multiple myeloma [phone]   Nephrologist , Dr Jessica [phone]  Urologist , Dr Clayton [phone]    Pt was seen with Dr Young, with plans to reach out to the patient's urologist to get more information c/o bladder emptying issue and address any of his concerns with treating the patient. Due to travel distance-->If urology consult is needed at [facility],  Pt would like to have the appointment  in [place] if available.  Next closest option [place].   Get records from [facility] hospital stay and urologist , as well as diagnostic test results       Follow-up:   1. CKD stage: 3 stable    2. Immunosuppression:   Prograf trough 6.1, which is  therapeutic target 4-6. Continue Prograf 3/2    Will continue to monitor for drug toxicities. She continues Epivir prophylaxis     3. Allograft Function: Stable. Continue good po hydration.   6/18/2019             4. Hypertension management: stable, advise low salt diet  and home BP monitoring    Nifedipine 60 mg QD, Clonidine 0.2 mg QD,     5. Metabolic Bone Disease/Secondary Hyperparathyroidism:stable  Will monitor PTH, CA and Vit D/guidelines,    High Mg diet encouraged -->MGMT deferred to general nephrology   Sensipar and zemplar as prescribed , calcium carbonate as prescribed , KPN  500  mg TID  -->MGMT deferred to general nephrology  6/18/2019    6. Electrolytes:  Will monitor /guidelines   6/18/2019        7. Anemia: stable. No need for intervention    Will monitor /guidelines   6/18/2019      8.  Cytopenias: no significant cytopenias will monitor as per our guidelines. Medicine list reviewed including potential causes of drug-induced cytopenias    9.Proteinuria: continue p/c ratio as per guidelines     Add UPC today     10. BK virus infection screening:  will continue to monitor/ guidelines    11. Weight education: provided during the clinic visit   Body mass index is 31.44 kg/m².       12.Patient safety education regarding immunosuppression including prophylaxis posttransplant for CMV, PCP : Education provided about vaccination and prevention of infections       Follow-up:   Annual follow-up with kidney transplant clinic with repeat labs, including renal function panel with UA, urine protein/creatinine ratio, and drug trough level q3 months.  Patient also reminded to follow-up with general nephrologist.    Sharona Navas NP       Education:   Material provided to the patient.  Patient reminded to call with any health changes since these can be early signs of significant complications.  Also, I advised the patient to be sure any new medications or changes of old medications are discussed with either a pharmacist or physician knowledgeable with transplant to avoid rejection/drug toxicity related to significant drug interactions.    UNOS Patient Status  Functional Status: 80% - Normal activity with effort: some symptoms of disease  Physical Capacity: No Limitations

## 2019-07-26 NOTE — LETTER
July 26, 2019        Christ Barnhart  [address] [address]  Phone: [phone]  Fax: [phone]             [facility]  [address]  [facility] [address]  Phone: [phone]   Patient: Jing Philippe   MR Number: [id number]   YOB: 1950   Date of Visit: 7/26/2019       Dear Dr. Christ Barnhart    Thank you for referring Jing Philippe to me for evaluation. Attached you will find relevant portions of my assessment and plan of care.    If you have questions, please do not hesitate to call me. I look forward to following Jing Philippe along with you.    Sincerely,    Sharona Navas, NP    Enclosure    If you would like to receive this communication electronically, please contact [email or web address] or [phone] to request [employer] Link access.    [employer] Link is a tool which provides read-only access to select patient information with whom you have a relationship. Its easy to use and provides real time access to review your patients record including encounter summaries, notes, results, and demographic information.    If you feel you have received this communication in error or would no longer like to receive these types of communications, please e-mail [email or web address]

## 2019-07-30 ENCOUNTER — TELEPHONE (OUTPATIENT)
Dept: TRANSPLANT | Facility: CLINIC | Age: 69
End: 2019-07-30

## 2019-07-30 NOTE — TELEPHONE ENCOUNTER
I spoke with Dr. Clayton [phone], Urology, regarding Jing's urinary retention & chronic baeza use.  He noted that he saw her in clinic after a 3 week hospitalization in May at which time she was diagnosed with mild hydronephrosis and urinary retention with associated LUCIA.  She has had an indwelling Baeza since then.  It appears that is being changed regularly, but she has not yet had evaluation of bladder emptying.  Dr. Clayton has recommended she undergo video urodynamics, which he cannot do in his office.   She is reluctant to come to [place], and we discussed options for her to have urologist evaluate her closer to home.  Dr. Clayton will try to coordinate with a urologist closer to her home.  He is also willing to follow her in the event that she needs chronic catheterizations.  Please cc are recent clinic visit to his office:  Fax [phone].

## 2019-08-29 ENCOUNTER — OFFICE VISIT (OUTPATIENT)
Dept: FAMILY MEDICINE | Facility: CLINIC | Age: 69
End: 2019-08-29
Payer: MEDICARE

## 2019-08-29 VITALS
HEIGHT: 65 IN | WEIGHT: 240 LBS | OXYGEN SATURATION: 98 % | TEMPERATURE: 98 F | SYSTOLIC BLOOD PRESSURE: 122 MMHG | BODY MASS INDEX: 39.99 KG/M2 | RESPIRATION RATE: 16 BRPM | HEART RATE: 84 BPM | DIASTOLIC BLOOD PRESSURE: 61 MMHG

## 2019-08-29 DIAGNOSIS — L03.116 CELLULITIS OF LEFT LEG: Primary | ICD-10-CM

## 2019-08-29 PROCEDURE — 99213 OFFICE O/P EST LOW 20 MIN: CPT | Mod: S$GLB,,, | Performed by: INTERNAL MEDICINE

## 2019-08-29 PROCEDURE — 99213 PR OFFICE/OUTPT VISIT, EST, LEVL III, 20-29 MIN: ICD-10-PCS | Mod: S$GLB,,, | Performed by: INTERNAL MEDICINE

## 2019-08-29 NOTE — PROGRESS NOTES
Subjective:       Patient ID: Jing Philippe is a 69 y.o. female.    Chief Complaint: Leg Swelling (Pt states it's getting worst - can hardly walk on it.)    Patient is here for evaluation of pain and swelling to the left lower leg.  She has a complicated medical history and was previously treated for osteomyelitis of the left foot.  She reports that for the past 2-3 weeks she has been having worsening pain of the left foot along with edema.  This waxes and wanes, no known aggravating or alleviating factors.  No tenderness to palpation.  No abscess or wound.  No significant redness.  No associated fever or chills.    Review of Systems   Constitutional: Negative for chills and fever.   HENT: Negative for sore throat.    Respiratory: Negative for cough and shortness of breath.    Cardiovascular: Negative for chest pain and palpitations.   Gastrointestinal: Negative for diarrhea, nausea and vomiting.   Skin: Negative for rash and wound.       Objective:      Physical Exam   Constitutional: She appears well-developed and well-nourished.   Cardiovascular: Normal rate and regular rhythm.   No murmur heard.  Pulmonary/Chest: Effort normal and breath sounds normal. No respiratory distress. She has no wheezes.   Abdominal: Soft. Bowel sounds are normal. There is no tenderness.   Genitourinary:   Genitourinary Comments: Polo catheter in place   Musculoskeletal:   Has AV fistula in place in the right upper extremity, left lower leg with 1+ edema, no erythema, no tenderness to palpation   Skin: Skin is warm and dry. No rash noted.   Nursing note and vitals reviewed.      Assessment:       1. Cellulitis of left leg        Plan:       Problem List Items Addressed This Visit        ID    Cellulitis of left leg - Primary     Recurrent issue, will need to get lab work to ensure that this is not osteomyelitis.  If inflammatory markers are significantly elevated, will need to get imaging such as MRI.  Given that she does not have  fever, erythema, or increased warmth will not start empiric antibiotics at this time.    Inflammation may be secondary to other causes given multiple comorbidities, may also be other issues such as gout.

## 2019-08-29 NOTE — ASSESSMENT & PLAN NOTE
Recurrent issue, will need to get lab work to ensure that this is not osteomyelitis.  If inflammatory markers are significantly elevated, will need to get imaging such as MRI.  Given that she does not have fever, erythema, or increased warmth will not start empiric antibiotics at this time.    Inflammation may be secondary to other causes given multiple comorbidities, may also be other issues such as gout.

## 2019-09-30 ENCOUNTER — TELEPHONE (OUTPATIENT)
Dept: FAMILY MEDICINE | Facility: CLINIC | Age: 69
End: 2019-09-30

## 2019-09-30 NOTE — TELEPHONE ENCOUNTER
Spoke w/ STAT  (Bernadette) [phone] instructed them to draw CRP, ESR at  visit on tomorrow. Tried calling pt to keep her updated. Phone only rang.

## 2019-10-09 ENCOUNTER — TELEPHONE (OUTPATIENT)
Dept: TRANSPLANT | Facility: CLINIC | Age: 69
End: 2019-10-09

## 2019-10-09 DIAGNOSIS — R33.9 INCOMPLETE EMPTYING OF BLADDER: Primary | ICD-10-CM

## 2019-10-09 NOTE — TELEPHONE ENCOUNTER
Call returned to Dr. Clayton office. Patient unable to get video urodynamic studies locally, wishes to have completed here at [facility]. Call transferred to Urology for scheduling.

## 2019-10-11 ENCOUNTER — TELEPHONE (OUTPATIENT)
Dept: TRANSPLANT | Facility: CLINIC | Age: 69
End: 2019-10-11

## 2019-10-11 NOTE — TELEPHONE ENCOUNTER
----- Message from Yoana Payton sent at 10/11/2019  9:42 AM CDT -----  Contact:    Clarisse  [phone]  Option #2  [facility]  Dr. Clayton/    Pt  Has some urinary reflux due to kidney transplant,  Additional assistant is needed on the pt ..  Call back to further discuss ...

## 2019-10-17 ENCOUNTER — TELEPHONE (OUTPATIENT)
Dept: FAMILY MEDICINE | Facility: CLINIC | Age: 69
End: 2019-10-17

## 2019-10-21 ENCOUNTER — TELEPHONE (OUTPATIENT)
Dept: TRANSPLANT | Facility: CLINIC | Age: 69
End: 2019-10-21

## 2019-10-21 DIAGNOSIS — N31.9 BLADDER DYSFUNCTION: Primary | ICD-10-CM

## 2019-10-21 NOTE — TELEPHONE ENCOUNTER
----- Message from Vy Cabrera sent at 10/21/2019 11:25 AM CDT -----  Contact: Clarisse espinal/Dr Matilde Robb is calling to speak with Cathy about pt needed video ureodynamic done and there isnt a Dr in her area to have this procedure done and wants to know if she can come here to have the procedure    Clarisse [phone] press option 2 ask to speak with Clarisse

## 2019-10-24 ENCOUNTER — TELEPHONE (OUTPATIENT)
Dept: UROLOGY | Facility: CLINIC | Age: 69
End: 2019-10-24

## 2019-10-24 ENCOUNTER — OFFICE VISIT (OUTPATIENT)
Dept: UROLOGY | Facility: CLINIC | Age: 69
End: 2019-10-24
Payer: MEDICARE

## 2019-10-24 VITALS
HEIGHT: 65 IN | SYSTOLIC BLOOD PRESSURE: 117 MMHG | HEART RATE: 68 BPM | DIASTOLIC BLOOD PRESSURE: 65 MMHG | BODY MASS INDEX: 39.94 KG/M2

## 2019-10-24 DIAGNOSIS — R33.9 URINARY RETENTION: Primary | ICD-10-CM

## 2019-10-24 PROCEDURE — 99204 OFFICE O/P NEW MOD 45 MIN: CPT | Mod: S$GLB,,, | Performed by: UROLOGY

## 2019-10-24 PROCEDURE — 99999 PR PBB SHADOW E&M-EST. PATIENT-LVL III: ICD-10-PCS | Mod: PBBFAC,,, | Performed by: UROLOGY

## 2019-10-24 PROCEDURE — 1101F PT FALLS ASSESS-DOCD LE1/YR: CPT | Mod: CPTII,S$GLB,, | Performed by: UROLOGY

## 2019-10-24 PROCEDURE — 3074F PR MOST RECENT SYSTOLIC BLOOD PRESSURE < 130 MM HG: ICD-10-PCS | Mod: CPTII,S$GLB,, | Performed by: UROLOGY

## 2019-10-24 PROCEDURE — 99999 PR PBB SHADOW E&M-EST. PATIENT-LVL III: CPT | Mod: PBBFAC,,, | Performed by: UROLOGY

## 2019-10-24 PROCEDURE — 99204 PR OFFICE/OUTPT VISIT, NEW, LEVL IV, 45-59 MIN: ICD-10-PCS | Mod: S$GLB,,, | Performed by: UROLOGY

## 2019-10-24 PROCEDURE — 3078F DIAST BP <80 MM HG: CPT | Mod: CPTII,S$GLB,, | Performed by: UROLOGY

## 2019-10-24 PROCEDURE — 1101F PR PT FALLS ASSESS DOC 0-1 FALLS W/OUT INJ PAST YR: ICD-10-PCS | Mod: CPTII,S$GLB,, | Performed by: UROLOGY

## 2019-10-24 PROCEDURE — 3074F SYST BP LT 130 MM HG: CPT | Mod: CPTII,S$GLB,, | Performed by: UROLOGY

## 2019-10-24 PROCEDURE — 3078F PR MOST RECENT DIASTOLIC BLOOD PRESSURE < 80 MM HG: ICD-10-PCS | Mod: CPTII,S$GLB,, | Performed by: UROLOGY

## 2019-10-24 NOTE — TELEPHONE ENCOUNTER
----- Message from Karina Cabrera MA sent at 10/24/2019  1:39 PM CDT -----  Contact: patient  Ms Garlington called and said she couldn't remember what was told  to her to use for cleaning herself, can you give her a call please.  Thanks

## 2019-10-24 NOTE — LETTER
October 24, 2019      Beatrice Velazquez MD  [address]  [facility] [address]           [facility] - Urology 4th Floor  [address]  [facility] [address]  Phone: [phone]          Patient: Jing Philippe   MR Number: [id number]   YOB: 1950   Date of Visit: 10/24/2019       Dear Dr. Beatrice Velazquez:    Thank you for referring Jing Philippe to me for evaluation. Attached you will find relevant portions of my assessment and plan of care.    If you have questions, please do not hesitate to call me. I look forward to following Jing Philippe along with you.    Sincerely,    Misael Browning MD    Enclosure  CC:  No Recipients    If you would like to receive this communication electronically, please contact [email or web address] or [phone] to request more information on [employer] Link access.    For providers and/or their staff who would like to refer a patient to [facility], please contact us through our one-stop-shop provider referral line, [facility], at [phone].    If you feel you have received this communication in error or would no longer like to receive these types of communications, please e-mail [email or web address]

## 2019-10-24 NOTE — PROGRESS NOTES
[facility] Department of Urology      New Urinary Retention Note    10/24/2019    Referred by:  Beatrice Velazquez*    HPI: Jing Philippe is a very pleasant 69 y.o. female referred for evaluation of chronic urinary retention of 5 months duration. She currently has a chronic indwelling urethral catheter for 5 month(s). There is no clear underlying etiology. 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 She reports no neurologic symptoms or diagnoses. Prior to this episode she denies symptoms of voiding difficulty including decreased stream, hesitancy, intermittency, post void dribbling and sense of incomplete emptying. No PVR was obtained today; patient has an indwelling catheter. Prior to this episode, she denies symptoms of irritative voiding including dysuria, frequency, urgency and incontinence.     In May, she was hospitalized with infection.  She was suspected to have a urinary tract pathogen as an etiology.  She also has a history of osteomyelitis and her foot.  She had an indwelling catheter placed at that time.  She does report feeling at least 1 voiding trial subsequent to that.  An indwelling catheter has been in place since May.    A voiding trial was not performed today. She was taught clean intermittent catheterization and seems very capable of continuing this.     Relevant Medications:   No current medications that would be anticipated to impair voiding    Prior Therapies:   None    She does not report prior urodynamic evaluation. She does not reports prior cystoscopic evaluation.     A review of 10+ systems was conducted with pertinent positive and negative findings documented in HPI with all other systems reviewed and negative.    Past medical, family, surgical and social history reviewed as documented in chart with pertinent positive medical, family, surgical and social history detailed in HPI.    Exam Findings:    Const: no acute distress,  conversant and alert  Eyes: anicteric, extraocular muscles intact  ENMT: normocephalic, Nl oral membranes  Cardio: no cyanosis, nl cap refill  Pulm: no tachypnea; no resp distress  Abd: soft, no tenderness  Musc: no laceration, no tenderness  Neuro: alert; oriented x 3  Skin: warm, dry; no petichiae  Psych: no anxiety; normal speech          Assessment/Plan:    Chronic Urinary Retention (new, addt'l workup): The patient has evidence of chronic urinary retention requiring catheterization.  This is most likely associated at least partially with unknown. Given the possibility of anatomic obstruction versus hypocontractility as etiology, I have recommended urodynamic evaluation as well as possible cystourethroscopy. The need for catheterization was assessed and the patient is likely to benefit from a regimen of clean intermittent catheterization. The patient was instructed in clean intermittent catheterization today and appears comfortable with performing this alone. The etiology for urinary retention in this patient is thought to be uncertain etiology. The anticipated duration of need is estimated to be indefinite. The patient will not require insertion supplies. The patient will not require a coude-tip catheter. The patient will need to catheterize 5x daily and will require a total of 150 catheters per month.     1. Patient is scheduled for video urodynamic evaluation  2. Patient is scheduled for cystourethroscopy in the office  3. Patient has been prescribed a regimen of clean intermittent catheterization as above       Clinical tests reviewed/ordered today: urinalysis (10/24/2019)   Radiology ordered/reviewed: none   Medical tests ordered/reviewed: cystourethroscopy urodynamic evaluation   Radiology independently reviewed: none   Previous records: reviewed today and showing, in summary - Patient with transplant and urinary retention as above.

## 2019-10-28 ENCOUNTER — TELEPHONE (OUTPATIENT)
Dept: TRANSPLANT | Facility: CLINIC | Age: 69
End: 2019-10-28

## 2019-10-28 DIAGNOSIS — N13.729 REFLUX NEPHROPATHY: Primary | ICD-10-CM

## 2019-10-28 DIAGNOSIS — Z94.0 KIDNEY REPLACED BY TRANSPLANT: ICD-10-CM

## 2019-10-28 NOTE — TELEPHONE ENCOUNTER
----- Message from Grecia Treadwell sent at 10/28/2019  3:35 PM CDT -----  Contact: Clarisse With  office   Clarisse With  office called to speak with Cathy Killian have some questions   Callback#[id number] option 2 and ask to speak with clarisse   Thank You  KE Treadwell

## 2019-11-15 ENCOUNTER — TELEPHONE (OUTPATIENT)
Dept: UROLOGY | Facility: CLINIC | Age: 69
End: 2019-11-15

## 2019-11-25 ENCOUNTER — HOSPITAL ENCOUNTER (OUTPATIENT)
Facility: HOSPITAL | Age: 69
Discharge: HOME | End: 2019-11-25
Attending: UROLOGY | Admitting: UROLOGY
Payer: MEDICARE

## 2019-11-25 ENCOUNTER — OFFICE VISIT (OUTPATIENT)
Dept: UROLOGY | Facility: CLINIC | Age: 69
End: 2019-11-25
Payer: MEDICARE

## 2019-11-25 VITALS
TEMPERATURE: 98 F | DIASTOLIC BLOOD PRESSURE: 77 MMHG | HEART RATE: 66 BPM | SYSTOLIC BLOOD PRESSURE: 171 MMHG | OXYGEN SATURATION: 99 % | RESPIRATION RATE: 18 BRPM

## 2019-11-25 DIAGNOSIS — N39.41 URGENCY INCONTINENCE: Primary | ICD-10-CM

## 2019-11-25 DIAGNOSIS — N39.8 VOIDING DYSFUNCTION: ICD-10-CM

## 2019-11-25 PROBLEM — R33.9 URINARY RETENTION: Status: ACTIVE | Noted: 2019-11-25

## 2019-11-25 PROCEDURE — 99499 NO LOS: ICD-10-PCS | Mod: S$GLB,,, | Performed by: UROLOGY

## 2019-11-25 PROCEDURE — 87081 CULTURE SCREEN ONLY: CPT

## 2019-11-25 PROCEDURE — 99499 UNLISTED E&M SERVICE: CPT | Mod: S$GLB,,, | Performed by: UROLOGY

## 2019-11-25 NOTE — PROGRESS NOTES
Patient had nose swab and was given DERICK wipes.     We are proceeding with SNM staged testing of non-obstructive urinary retention. She continues to catheterize 4x daily.

## 2019-11-25 NOTE — H&P (VIEW-ONLY)
[facility]        New History and Physical     11/25/19     Referred by:  Beatrice Velazquez*     HPI: Jing Philippe is a very pleasant 69 y.o. female referred for evaluation of chronic urinary retention of 5 months duration. She currently has a chronic indwelling urethral catheter for 5 month(s). There is no clear underlying etiology. 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 She reports no neurologic symptoms or diagnoses. Prior to this episode she denies symptoms of voiding difficulty including decreased stream, hesitancy, intermittency, post void dribbling and sense of incomplete emptying. No PVR was obtained today; patient has an indwelling catheter. Prior to this episode, she denies symptoms of irritative voiding including dysuria, frequency, urgency and incontinence.      In May, she was hospitalized with infection.  She was suspected to have a urinary tract pathogen as an etiology.  She also has a history of osteomyelitis and her foot.  She had an indwelling catheter placed at that time.  She does report feeling at least 1 voiding trial subsequent to that.  An indwelling catheter has been in place since May.     Relevant Medications:  · No current medications that would be anticipated to impair voiding     Prior Therapies:  · None     She does not report prior urodynamic evaluation. She does not reports prior cystoscopic evaluation.      A review of 10+ systems was conducted with pertinent positive and negative findings documented in HPI with all other systems reviewed and negative.     Past medical, family, surgical and social history reviewed as documented in chart with pertinent positive medical, family, surgical and social history detailed in HPI.     Exam Findings:     Const: no acute distress, conversant and alert  Eyes: anicteric, extraocular muscles intact  ENMT: normocephalic, Nl oral membranes  Cardio: no cyanosis,  nl cap refill  Pulm: no tachypnea; no resp distress  Abd: soft, no tenderness  Musc: no laceration, no tenderness  Neuro: alert; oriented x 3  Skin: warm, dry; no petichiae  Psych: no anxiety; normal speech           Assessment/Plan:     Chronic Urinary Retention (new, addt'l workup): The patient has evidence of chronic urinary retention requiring catheterization.  This is most likely associated at least partially with unknown. Given the possibility of anatomic obstruction versus hypocontractility as etiology, I have recommended urodynamic evaluation as well as possible cystourethroscopy. The need for catheterization was assessed and the patient is likely to benefit from a regimen of clean intermittent catheterization. The patient was instructed in clean intermittent catheterization today and appears comfortable with performing this alone. The etiology for urinary retention in this patient is thought to be uncertain etiology. The anticipated duration of need is estimated to be indefinite. The patient will not require insertion supplies. The patient will not require a coude-tip catheter. The patient will need to catheterize 5x daily and will require a total of 150 catheters per month.      1. Proceed with urodynamic evaluation today

## 2019-11-26 ENCOUNTER — TELEPHONE (OUTPATIENT)
Dept: UROLOGY | Facility: CLINIC | Age: 69
End: 2019-11-26

## 2019-11-26 DIAGNOSIS — R33.9 URINARY RETENTION: Primary | ICD-10-CM

## 2019-11-27 LAB — MRSA SPEC QL CULT: NORMAL

## 2019-12-03 NOTE — PRE-PROCEDURE INSTRUCTIONS
MD Cristina Monteiro, ANA PAULA   Cc: Cathy Killian, RN             From the transplant point of view based on labs from June 2019 I do not see any contra-indication. We can get a new panel now.   Her albumin was low, but I will defer this to her nephrologist and PCP. Advise nutritional support.     Casimiro    Previous Messages      ----- Message -----   From: Cristina Gasca RN   Sent: 12/3/2019   3:05 PM CST   To: Casimiro Null MD, aBrb Hobson, RN, *     Pt. Is scheduled for insertion neurostimulator temporary sacral by Dr. Browning 12/10 and poss. Permanent in 2 weeks. Do you feel pt is optimal to proceed?         HILARIO Gasca RN BC   Pre-op anesthesia

## 2019-12-04 NOTE — ANESTHESIA PAT ROS NOTE
12/04/2019  Jing Philippe is a 69 y.o., female.      Pre-op Assessment         Review of Systems  Anesthesia Hx:  Hx of Anesthetic complications HX of PONV History of prior surgery of interest to airway management or planning: Previous anesthesia: General kidney transplant  2011 with general anesthesia.    Social:  Non-Smoker rare   Hematology/Oncology:  Hematology Normal      Oncology Comments: Multiple myelomas      EENT/Dental:EENT/Dental Normal   Cardiovascular:   Exercise tolerance: poor Hypertension hyperlipidemia    Pulmonary:   Asthma mild Uses inhaler instructed to bring   Renal/:   Chronic Renal Disease HX CKD3  Kidney transplant 2011  Urinary retention  Renal amyloidosis   Hepatic/GI:   GERD    Musculoskeletal:   Arthritis  Uses cane/ walker/ wheelchair  Acute osteomyelitis  Left leg swollen / ? Cellulitis  Total knee surgery   Neurological:   Peripheral Neuropathy    Endocrine:  Endocrine Normal    Psych:  Psychiatric Normal              Anesthesia Assessment: Preoperative EQUATION    Planned Procedure: Procedure(s) (LRB):  INSERTION, NEUROSTIMULATOR, TEMPORARY, SACRAL (N/A)  Requested Anesthesia Type:General  Surgeon: Misael Browning MD  Service: Urology  Known or anticipated Date of Surgery:12/10/2019    Surgeon notes: reviewed    Electronic QUestionnaire Assessment completed via nurse interview with patient.        Triage considerations:   PONV      Previous anesthesia records:Not available    Last PCP note: outside [facility]    Subspecialty notes: Kidney Transplant    Other important co-morbidities: GERD, HLD, HTN and CKD 3-- KIDNEY TRANSPLANT, chronic asthma, lymphedema, osteomyelitis      Tests already available:  No recent tests.             Instructions given. (See in Nurse's note)    Optimization:          Sub-specialist consult indicated:   Kidney Transplant     MD Cristina Monteiro, RN    Cc: Cathy Killian, RN               From the transplant point of view based on labs from June 2019 I do not see any contra-indication. We can get a new panel now.   Her albumin was low, but I will defer this to her nephrologist and PCP. Advise nutritional support.     Casimiro         Plan:    Testing:  per transplant   Pre-anesthesia  visit       Visit focus: none-- pt lives near [place] 4[place]5 hours away     Consultation:messaging pcp for any labs /notes      Navigation:              Consults scheduled.             Results will be tracked by Preop Clinic.

## 2019-12-09 ENCOUNTER — TELEPHONE (OUTPATIENT)
Dept: UROLOGY | Facility: CLINIC | Age: 69
End: 2019-12-09

## 2019-12-09 NOTE — TELEPHONE ENCOUNTER
----- Message from Karina Cabrera MA sent at 11/26/2019 10:55 AM CST -----  Regarding: nausea meds  Spoke with the patient to schedule her stage 1 interstim. She said that she's allergic to the anesthesia and she would need patches behind her ear and a shot for nausea. She's scheduled on 12-10

## 2019-12-09 NOTE — TELEPHONE ENCOUNTER
Called pt to confirm arrival time of 1045am for procedure on 12-10-19. Gave pt NPO instructions and gave pt opportunity to ask questions. Pt verbalized understanding.

## 2019-12-09 NOTE — TELEPHONE ENCOUNTER
Informed patient to notify the anesthesiologist regarding her concerns. Patient verbalized understanding.

## 2019-12-10 ENCOUNTER — HOSPITAL ENCOUNTER (OUTPATIENT)
Facility: HOSPITAL | Age: 69
Discharge: HOME | End: 2019-12-10
Attending: UROLOGY | Admitting: UROLOGY
Payer: MEDICARE

## 2019-12-10 ENCOUNTER — ANESTHESIA EVENT (OUTPATIENT)
Dept: SURGERY | Facility: HOSPITAL | Age: 69
End: 2019-12-10
Payer: MEDICARE

## 2019-12-10 ENCOUNTER — ANESTHESIA (OUTPATIENT)
Dept: SURGERY | Facility: HOSPITAL | Age: 69
End: 2019-12-10
Payer: MEDICARE

## 2019-12-10 DIAGNOSIS — R33.9 URINARY RETENTION: ICD-10-CM

## 2019-12-10 PROCEDURE — D9220A PRA ANESTHESIA: Mod: ANES,,, | Performed by: ANESTHESIOLOGY

## 2019-12-10 PROCEDURE — D9220A PRA ANESTHESIA: ICD-10-PCS | Mod: CRNA,,, | Performed by: NURSE ANESTHETIST, CERTIFIED REGISTERED

## 2019-12-10 PROCEDURE — C1894 INTRO/SHEATH, NON-LASER: HCPCS | Performed by: UROLOGY

## 2019-12-10 PROCEDURE — 36000709 HC OR TIME LEV III EA ADD 15 MIN: Performed by: UROLOGY

## 2019-12-10 PROCEDURE — 71000033 HC RECOVERY, INTIAL HOUR: Performed by: UROLOGY

## 2019-12-10 PROCEDURE — 25000003 PHARM REV CODE 250: Performed by: NURSE ANESTHETIST, CERTIFIED REGISTERED

## 2019-12-10 PROCEDURE — 64581 PR IMPLANTATION, NEUROSTIM ELECT ARRAY, OPEN, SACRAL NERVE: ICD-10-PCS | Mod: ,,, | Performed by: UROLOGY

## 2019-12-10 PROCEDURE — 36000708 HC OR TIME LEV III 1ST 15 MIN: Performed by: UROLOGY

## 2019-12-10 PROCEDURE — D9220A PRA ANESTHESIA: ICD-10-PCS | Mod: ANES,,, | Performed by: ANESTHESIOLOGY

## 2019-12-10 PROCEDURE — 95972 ALYS CPLX SP/PN NPGT W/PRGRM: CPT | Mod: 59,,, | Performed by: UROLOGY

## 2019-12-10 PROCEDURE — 27201423 OPTIME MED/SURG SUP & DEVICES STERILE SUPPLY: Performed by: UROLOGY

## 2019-12-10 PROCEDURE — 76000 FLUOROSCOPY <1 HR PHYS/QHP: CPT | Mod: 26,,, | Performed by: UROLOGY

## 2019-12-10 PROCEDURE — 25000003 PHARM REV CODE 250: Performed by: UROLOGY

## 2019-12-10 PROCEDURE — 64581 OPN IMPLTJ NEA SACRAL NERVE: CPT | Mod: ,,, | Performed by: UROLOGY

## 2019-12-10 PROCEDURE — 71000015 HC POSTOP RECOV 1ST HR: Performed by: UROLOGY

## 2019-12-10 PROCEDURE — 63600175 PHARM REV CODE 636 W HCPCS: Performed by: STUDENT IN AN ORGANIZED HEALTH CARE EDUCATION/TRAINING PROGRAM

## 2019-12-10 PROCEDURE — C1767 GENERATOR, NEURO NON-RECHARG: HCPCS | Performed by: UROLOGY

## 2019-12-10 PROCEDURE — 37000008 HC ANESTHESIA 1ST 15 MINUTES: Performed by: UROLOGY

## 2019-12-10 PROCEDURE — 37000009 HC ANESTHESIA EA ADD 15 MINS: Performed by: UROLOGY

## 2019-12-10 PROCEDURE — D9220A PRA ANESTHESIA: Mod: CRNA,,, | Performed by: NURSE ANESTHETIST, CERTIFIED REGISTERED

## 2019-12-10 PROCEDURE — 95972 PR PRG SPNL GEN CMPLX: ICD-10-PCS | Mod: 59,,, | Performed by: UROLOGY

## 2019-12-10 PROCEDURE — C1778 LEAD, NEUROSTIMULATOR: HCPCS | Performed by: UROLOGY

## 2019-12-10 PROCEDURE — 63600175 PHARM REV CODE 636 W HCPCS: Performed by: ANESTHESIOLOGY

## 2019-12-10 PROCEDURE — 76000 PR  FLUOROSCOPE EXAMINATION: ICD-10-PCS | Mod: 26,,, | Performed by: UROLOGY

## 2019-12-10 PROCEDURE — 63600175 PHARM REV CODE 636 W HCPCS: Performed by: NURSE ANESTHETIST, CERTIFIED REGISTERED

## 2019-12-10 DEVICE — LEAD KIT TINED: Type: IMPLANTABLE DEVICE | Site: SACRUM | Status: FUNCTIONAL

## 2019-12-10 RX ORDER — BUPIVACAINE HYDROCHLORIDE 2.5 MG/ML
INJECTION, SOLUTION EPIDURAL; INFILTRATION; INTRACAUDAL
Status: DISCONTINUED | OUTPATIENT
Start: 2019-12-10 | End: 2019-12-10 | Stop reason: HOSPADM

## 2019-12-10 RX ORDER — ONDANSETRON 4 MG/1
4 TABLET, ORALLY DISINTEGRATING ORAL EVERY 8 HOURS PRN
Qty: 1 TABLET | Refills: 0 | Status: SHIPPED | OUTPATIENT
Start: 2019-12-10 | End: 2024-03-27

## 2019-12-10 RX ORDER — TRAMADOL HYDROCHLORIDE 50 MG/1
50 TABLET ORAL EVERY 6 HOURS PRN
Qty: 5 TABLET | Refills: 0 | Status: SHIPPED | OUTPATIENT
Start: 2019-12-10 | End: 2024-03-27

## 2019-12-10 RX ORDER — MIDAZOLAM HYDROCHLORIDE 1 MG/ML
INJECTION, SOLUTION INTRAMUSCULAR; INTRAVENOUS
Status: DISCONTINUED | OUTPATIENT
Start: 2019-12-10 | End: 2019-12-10

## 2019-12-10 RX ORDER — PROPOFOL 10 MG/ML
VIAL (ML) INTRAVENOUS CONTINUOUS PRN
Status: DISCONTINUED | OUTPATIENT
Start: 2019-12-10 | End: 2019-12-10

## 2019-12-10 RX ORDER — SUCCINYLCHOLINE CHLORIDE 20 MG/ML
INJECTION INTRAMUSCULAR; INTRAVENOUS
Status: DISCONTINUED | OUTPATIENT
Start: 2019-12-10 | End: 2019-12-10

## 2019-12-10 RX ORDER — DEXAMETHASONE SODIUM PHOSPHATE 4 MG/ML
INJECTION, SOLUTION INTRA-ARTICULAR; INTRALESIONAL; INTRAMUSCULAR; INTRAVENOUS; SOFT TISSUE
Status: DISCONTINUED | OUTPATIENT
Start: 2019-12-10 | End: 2019-12-10

## 2019-12-10 RX ORDER — ONDANSETRON 2 MG/ML
INJECTION INTRAMUSCULAR; INTRAVENOUS
Status: DISCONTINUED | OUTPATIENT
Start: 2019-12-10 | End: 2019-12-10

## 2019-12-10 RX ORDER — ONDANSETRON 2 MG/ML
4 INJECTION INTRAMUSCULAR; INTRAVENOUS ONCE
Status: COMPLETED | OUTPATIENT
Start: 2019-12-10 | End: 2019-12-10

## 2019-12-10 RX ORDER — DEXMEDETOMIDINE HYDROCHLORIDE 100 UG/ML
INJECTION, SOLUTION INTRAVENOUS
Status: DISCONTINUED | OUTPATIENT
Start: 2019-12-10 | End: 2019-12-10

## 2019-12-10 RX ORDER — KETAMINE HCL IN 0.9 % NACL 50 MG/5 ML
SYRINGE (ML) INTRAVENOUS
Status: DISCONTINUED | OUTPATIENT
Start: 2019-12-10 | End: 2019-12-10

## 2019-12-10 RX ORDER — SCOLOPAMINE TRANSDERMAL SYSTEM 1 MG/1
1 PATCH, EXTENDED RELEASE TRANSDERMAL
Qty: 1 PATCH | Refills: 0 | Status: SHIPPED | OUTPATIENT
Start: 2019-12-10 | End: 2024-03-27

## 2019-12-10 RX ORDER — SCOLOPAMINE TRANSDERMAL SYSTEM 1 MG/1
PATCH, EXTENDED RELEASE TRANSDERMAL
Status: DISCONTINUED | OUTPATIENT
Start: 2019-12-10 | End: 2019-12-10

## 2019-12-10 RX ORDER — GLYCOPYRROLATE 0.2 MG/ML
INJECTION INTRAMUSCULAR; INTRAVENOUS
Status: DISCONTINUED | OUTPATIENT
Start: 2019-12-10 | End: 2019-12-10

## 2019-12-10 RX ORDER — VANCOMYCIN 1.75 GRAM/500 ML IN 0.9 % SODIUM CHLORIDE INTRAVENOUS
15
Status: COMPLETED | OUTPATIENT
Start: 2019-12-10 | End: 2019-12-10

## 2019-12-10 RX ORDER — HYDROCODONE BITARTRATE AND ACETAMINOPHEN 5; 325 MG/1; MG/1
1 TABLET ORAL EVERY 4 HOURS PRN
Status: DISCONTINUED | OUTPATIENT
Start: 2019-12-10 | End: 2019-12-10 | Stop reason: HOSPADM

## 2019-12-10 RX ORDER — SODIUM CHLORIDE 9 MG/ML
INJECTION, SOLUTION INTRAVENOUS CONTINUOUS
Status: DISCONTINUED | OUTPATIENT
Start: 2019-12-10 | End: 2019-12-10 | Stop reason: HOSPADM

## 2019-12-10 RX ORDER — LIDOCAINE HCL/PF 100 MG/5ML
SYRINGE (ML) INTRAVENOUS
Status: DISCONTINUED | OUTPATIENT
Start: 2019-12-10 | End: 2019-12-10

## 2019-12-10 RX ORDER — PROPOFOL 10 MG/ML
VIAL (ML) INTRAVENOUS
Status: DISCONTINUED | OUTPATIENT
Start: 2019-12-10 | End: 2019-12-10

## 2019-12-10 RX ORDER — DIPHENHYDRAMINE HYDROCHLORIDE 50 MG/ML
25 INJECTION INTRAMUSCULAR; INTRAVENOUS EVERY 6 HOURS PRN
Status: DISCONTINUED | OUTPATIENT
Start: 2019-12-10 | End: 2019-12-10 | Stop reason: HOSPADM

## 2019-12-10 RX ORDER — TRAMADOL HYDROCHLORIDE 50 MG/1
50 TABLET ORAL EVERY 4 HOURS PRN
Status: DISCONTINUED | OUTPATIENT
Start: 2019-12-10 | End: 2019-12-10 | Stop reason: HOSPADM

## 2019-12-10 RX ADMIN — SODIUM CHLORIDE: 0.9 INJECTION, SOLUTION INTRAVENOUS at 10:12

## 2019-12-10 RX ADMIN — DEXMEDETOMIDINE HYDROCHLORIDE 8 MCG: 100 INJECTION, SOLUTION, CONCENTRATE INTRAVENOUS at 01:12

## 2019-12-10 RX ADMIN — ONDANSETRON 4 MG: 2 INJECTION INTRAMUSCULAR; INTRAVENOUS at 01:12

## 2019-12-10 RX ADMIN — VANCOMYCIN HYDROCHLORIDE 1750 MG: 100 INJECTION, POWDER, LYOPHILIZED, FOR SOLUTION INTRAVENOUS at 11:12

## 2019-12-10 RX ADMIN — Medication 10 MG: at 01:12

## 2019-12-10 RX ADMIN — MIDAZOLAM HYDROCHLORIDE 2 MG: 1 INJECTION, SOLUTION INTRAMUSCULAR; INTRAVENOUS at 11:12

## 2019-12-10 RX ADMIN — Medication 20 MG: at 11:12

## 2019-12-10 RX ADMIN — GLYCOPYRROLATE 0.1 MG: 0.2 INJECTION, SOLUTION INTRAMUSCULAR; INTRAVENOUS at 12:12

## 2019-12-10 RX ADMIN — LIDOCAINE HYDROCHLORIDE 50 MG: 20 INJECTION, SOLUTION INTRAVENOUS at 11:12

## 2019-12-10 RX ADMIN — PROPOFOL 150 MCG/KG/MIN: 10 INJECTION, EMULSION INTRAVENOUS at 11:12

## 2019-12-10 RX ADMIN — ONDANSETRON 4 MG: 2 INJECTION INTRAMUSCULAR; INTRAVENOUS at 02:12

## 2019-12-10 RX ADMIN — SCOPALAMINE 1 PATCH: 1 PATCH, EXTENDED RELEASE TRANSDERMAL at 11:12

## 2019-12-10 RX ADMIN — SODIUM CHLORIDE, SODIUM GLUCONATE, SODIUM ACETATE, POTASSIUM CHLORIDE, MAGNESIUM CHLORIDE, SODIUM PHOSPHATE, DIBASIC, AND POTASSIUM PHOSPHATE: .53; .5; .37; .037; .03; .012; .00082 INJECTION, SOLUTION INTRAVENOUS at 12:12

## 2019-12-10 RX ADMIN — SUCCINYLCHOLINE CHLORIDE 120 MG: 20 INJECTION, SOLUTION INTRAMUSCULAR; INTRAVENOUS at 11:12

## 2019-12-10 RX ADMIN — Medication 10 MG: at 12:12

## 2019-12-10 RX ADMIN — PROPOFOL 150 MG: 10 INJECTION, EMULSION INTRAVENOUS at 11:12

## 2019-12-10 RX ADMIN — DEXMEDETOMIDINE HYDROCHLORIDE 8 MCG: 100 INJECTION, SOLUTION, CONCENTRATE INTRAVENOUS at 12:12

## 2019-12-10 RX ADMIN — DEXMEDETOMIDINE HYDROCHLORIDE 12 MCG: 100 INJECTION, SOLUTION, CONCENTRATE INTRAVENOUS at 11:12

## 2019-12-10 RX ADMIN — DEXAMETHASONE SODIUM PHOSPHATE 4 MG: 4 INJECTION, SOLUTION INTRAMUSCULAR; INTRAVENOUS at 12:12

## 2019-12-10 RX ADMIN — PROPOFOL 50 MG: 10 INJECTION, EMULSION INTRAVENOUS at 11:12

## 2019-12-10 NOTE — ANESTHESIA POSTPROCEDURE EVALUATION
Anesthesia Post Evaluation    Patient: Jing Philippe    Procedure(s) Performed: Procedure(s) (LRB):  INSERTION, NEUROSTIMULATOR, TEMPORARY, SACRAL (N/A)    Final Anesthesia Type: general    Patient location during evaluation: PACU  Patient participation: Yes- Able to Participate  Level of consciousness: awake and alert  Post-procedure vital signs: reviewed and stable  Pain management: adequate  Airway patency: patent    PONV status at discharge: No PONV  Anesthetic complications: no      Cardiovascular status: hemodynamically stable  Respiratory status: unassisted  Hydration status: euvolemic  Follow-up not needed.          Vitals Value Taken Time   /65 12/10/2019  2:47 PM   Temp 36.4 °C (97.6 °F) 12/10/2019  2:15 PM   Pulse 84 12/10/2019  2:51 PM   Resp 13 12/10/2019  2:33 PM   SpO2 100 % 12/10/2019  2:51 PM   Vitals shown include unvalidated device data.      Event Time     Out of Recovery 14:00:00          Pain/Jeyson Score: Jeyson Score: 10 (12/10/2019  2:35 PM)

## 2019-12-10 NOTE — PROGRESS NOTES
Patient assigned to this writer by charge nurse. The patient was  escorted to [facility] room 19. The patient is currently  changing into a hospital gown. This writer is completing a chart review and reviewing MD orders.

## 2019-12-10 NOTE — DISCHARGE SUMMARY
[facility]  Discharge Note  Short Stay    Admit Date: 12/10/2019    Discharge Date and Time: 12/10/2019 2:05 PM      Attending Physician: Misael Browning MD     Discharge Provider: Al Shook    Diagnoses:  Active Hospital Problems    Diagnosis  POA    *Urinary retention [R33.9]  Yes      Resolved Hospital Problems   No resolved problems to display.       Discharged Condition: good    Hospital Course: Patient was admitted for stage 1 Interstim and tolerated the procedure well with no complications. The patient was discharged home in good condition on the same day. She will follow up in 2 weeks for stage 2 Interstim or removal of leads, depending on how her symptoms respond following Stage 1 lead placement.      Final Diagnoses: Same as principal problem.    Disposition: Home or Self Care    Follow up/Patient Instructions:    Medications:  Reconciled Home Medications:   Current Discharge Medication List      START taking these medications    Details   ondansetron (ZOFRAN-ODT) 4 MG TbDL Take 1 tablet (4 mg total) by mouth every 8 (eight) hours as needed (nausea).  Qty: 1 tablet, Refills: 0      scopolamine (TRANSDERM-SCOP) 1.3-1.5 mg (1 mg over 3 days) Place 1 patch onto the skin every 72 hours.  Qty: 1 patch, Refills: 0      !! traMADol (ULTRAM) 50 mg tablet Take 1 tablet (50 mg total) by mouth every 6 (six) hours as needed for Pain.  Qty: 5 tablet, Refills: 0       !! - Potential duplicate medications found. Please discuss with provider.      CONTINUE these medications which have NOT CHANGED    Details   albuterol (PROVENTIL HFA/VENTOLIN HFA) 200 puff inhaler Inhale 1 puff into the lungs PRN.      aspirin 81 MG chewable tablet Take 1 tablet by mouth Daily.      BIOTIN ORAL Take 1 tablet by mouth once daily.      calcium carbonate-vitamin D3 600 mg (1,500 mg)-800 unit Chew Take 1 tablet by mouth once daily.      clonidine (CATAPRES) 0.2 MG tablet Take 1 tablet by mouth At bedtime.      gabapentin  (NEURONTIN) 100 MG capsule       k phos di & mono-sod phos mono (PHOSPHA 250 NEUTRAL) 250 mg Tab Take 2 tablets by mouth Three times a day.      multivitamin (THERAGRAN) per tablet Take 1 tablet by mouth Daily.      nifedipine (PROCARDIA-XL) 60 MG (OSM) 24 hr tablet Take 60 mg by mouth once daily.      paricalcitol (ZEMPLAR) 2 MCG capsule Take 2 mcg by mouth once daily.      tacrolimus (PROGRAF) 1 MG Cap TAKE 3 CAPSULES TWICE DAILY  Qty: 180 capsule, Refills: 11      !! tramadol (ULTRAM) 50 mg tablet Take 50 mg by mouth 2 (two) times daily.       lamivudine (EPIVIR) 100 MG Tab TAKE 1 TABLET DAILY.  Qty: 30 tablet, Refills: 11      meclizine (ANTIVERT) 25 mg tablet Take 1 tablet by mouth Daily.      pantoprazole (PROTONIX) 40 MG tablet Take 40 mg by mouth once daily.      pregabalin (LYRICA) 75 MG capsule Take 75 mg by mouth Three times a day.      prochlorperazine (COMPAZINE) 10 MG tablet Take 10 mg by mouth every 6 (six) hours as needed.      sertraline (ZOLOFT) 25 MG tablet Take 25 mg by mouth once daily.      zolpidem (AMBIEN) 5 MG Tab Take 5 mg by mouth nightly as needed.       !! - Potential duplicate medications found. Please discuss with provider.        Discharge Procedure Orders   Diet general     Call MD for:  extreme fatigue     Call MD for:  persistent dizziness or light-headedness     Call MD for:  hives     Call MD for:  redness, tenderness, or signs of infection (pain, swelling, redness, odor or green/yellow discharge around incision site)     Call MD for:  difficulty breathing, headache or visual disturbances     Call MD for:  severe uncontrolled pain     Call MD for:  persistent nausea and vomiting     Call MD for:  temperature >100.4

## 2019-12-10 NOTE — PLAN OF CARE
Patient and family state they are ready to be discharged. Instructions and prescription (delivered to bedside) given to patient and family. Both verbalize understanding. Patient tolerating po liquids with no difficulty. Patient states pain is at a tolerable level for them. Anesthesia consent and surgical consent in chart upon patient's discharge from [facility].

## 2019-12-10 NOTE — TRANSFER OF CARE
"Anesthesia Transfer of Care Note    Patient: Jing Philippe    Procedure(s) Performed: Procedure(s) (LRB):  INSERTION, NEUROSTIMULATOR, TEMPORARY, SACRAL (N/A)    Patient location: PACU    Anesthesia Type: general    Transport from OR: Transported from OR on 6-10 L/min O2 by face mask with adequate spontaneous ventilation    Post pain: adequate analgesia    Post assessment: no apparent anesthetic complications and tolerated procedure well    Post vital signs: stable    Level of consciousness: awake and responds to stimulation    Nausea/Vomiting: no nausea/vomiting    Complications: none    Transfer of care protocol was followed      Last vitals:   Visit Vitals  BP (!) 117/56 (BP Location: Left arm, Patient Position: Lying)   Pulse 87   Temp 36.3 °C (97.4 °F) (Temporal)   Resp 18   Ht 5' 5" (1.651 m)   Wt 108.9 kg (240 lb)   SpO2 99%   Breastfeeding? No   BMI 39.94 kg/m²     "

## 2019-12-10 NOTE — INTERVAL H&P NOTE
The patient has been examined and the H&P has been reviewed:    I concur with the findings and no changes have occurred since H&P was written.   Attempted Urodynamics aborted due to equipment malfunction.    Anesthesia/Surgery risks, benefits and alternative options discussed and understood by patient/family.          Active Hospital Problems    Diagnosis  POA    Urinary retention [R33.9]  Yes      Resolved Hospital Problems   No resolved problems to display.

## 2019-12-10 NOTE — PROGRESS NOTES
AA&Ox4 ,VSS,resp unlabored, denies c/o pain, neuro stimulator/belt intact, [employer] rep to bedside to program, gale po fluids,stable at present.

## 2019-12-10 NOTE — ANESTHESIA PROCEDURE NOTES
Intubation  Performed by: Celestina Ayala CRNA  Authorized by: J Carlos Reeves MD     Intubation:     Induction:  Intravenous    Intubated:  Postinduction    Mask Ventilation:  Easy mask    Attempts:  1    Attempted By:  CRNA    Method of Intubation:  Direct    Blade:  [place] 2    Laryngeal View Grade: Grade I - full view of chords      Difficult Airway Encountered?: No      Complications:  None    Airway Device:  Oral endotracheal tube    Airway Device Size:  7.0    Style/Cuff Inflation:  Cuffed    Inflation Amount (mL):  6    Tube secured:  21    Secured at:  The lips    Placement Verified By:  Capnometry    Complicating Factors:  None    Findings Post-Intubation:  BS equal bilateral

## 2019-12-10 NOTE — ANESTHESIA PREPROCEDURE EVALUATION
12/10/2019  Jing Philippe is a 69 y.o., female.  Patient Active Problem List   Diagnosis    S/P living-donor kidney transplantation - 12/13/11    Secondary hyperparathyroidism of renal origin    Hyperlipidemia    Neuropathy    GERD (gastroesophageal reflux disease)    Chronic asthma    Chronic immunosuppression with Prograf and Cellcept    Amyloidosis, unspecified    Obesity    Hypophosphatemia    Hypertension, renal    Renal amyloidosis    Osteoarthritis of left knee s/p total knee replacement    Candida vaginitis    CKD (chronic kidney disease) stage 3, GFR 30-59 ml/min    Multiple myeloma not having achieved remission    Acute osteomyelitis of ankle or foot    Cellulitis of left leg    Urinary retention         Anesthesia Evaluation         Review of Systems      Physical Exam  General:  Well nourished    Airway/Jaw/Neck:  Airway Findings: Mouth Opening: Normal Tongue: Normal  General Airway Assessment: Adult  Mallampati: II  Improves to II with phonation.  TM Distance: Normal, at least 6 cm      Dental:  Dental Findings: Periodontal disease, Mild    Chest/Lungs:  Chest/Lungs Findings: Clear to auscultation     Heart/Vascular:  Heart Findings: Rate: Normal  Rhythm: Regular Rhythm  Sounds: Normal        Mental Status:  Mental Status Findings:  Cooperative, Alert and Oriented         Anesthesia Plan  Type of Anesthesia, risks & benefits discussed:  Anesthesia Type:  general  Patient's Preference: General  Intra-op Monitoring Plan: standard ASA monitors  Intra-op Monitoring Plan Comments: Standard ASA monitors. TIVA for prevention of PONV  Post Op Pain Control Plan: per primary service following discharge from PACU  Post Op Pain Control Plan Comments: Per primary service.     Induction:   IV  Beta Blocker:  Patient is not currently on a Beta-Blocker (No further documentation required).        Informed Consent: Patient understands risks and agrees with Anesthesia plan.  Questions answered. Anesthesia consent signed with patient.  ASA Score: 3     Day of Surgery Review of History & Physical:    H&P update referred to the surgeon.     Anesthesia Plan Notes: Chart reviewed, patient interviewed and examined.  The plan for general anesthesia was explained.  Questions were answered and the consent was signed.  Trent YEUNG         Ready For Surgery From Anesthesia Perspective.

## 2019-12-10 NOTE — DISCHARGE INSTRUCTIONS
Do not shower for 48 hours. You may shower then but avoid bathing, swimming, sitting in hot tubs, or any way of submerging your incisions.     PATIENT INSTRUCTIONS  POST-ANESTHESIA    IMMEDIATELY FOLLOWING SURGERY:  Do not drive or operate machinery for the first twenty four hours after surgery.  Do not make any important decisions for twenty four hours after surgery or while taking narcotic pain medications or sedatives.  If you develop intractable nausea and vomiting or a severe headache please notify your doctor immediately.    FOLLOW-UP:  Please make an appointment with your surgeon as instructed. You do not need to follow up with anesthesia unless specifically instructed to do so.    WOUND CARE INSTRUCTIONS (if applicable):  Keep a dry clean dressing on the anesthesia/puncture wound site if there is drainage.  Once the wound has quit draining you may leave it open to air.  Generally you should leave the bandage intact for twenty four hours unless there is drainage.  If the epidural site drains for more than 36-48 hours please call the anesthesia department.    QUESTIONS?:  Please feel free to call your physician or the hospital  if you have any questions, and they will be happy to assist you.       [facility] Anesthesia Department  [address] [facility]  [phone]

## 2019-12-10 NOTE — OP NOTE
Neuromodulation Operative Note    Preoperative Diagnosis:   1. Non-obstructive chronic urinary retention    Postoperative Diagnosis:  1. Non-obstructive chronic urinary retention    Procedure:  1. Incision for implantation of neurostimulator electrode array; sacral nerve (01653)  2. Complex analysis and programming of implanted neurostimulator pulse generator (89342)  3. Fluoroscopic guidance of needle (79018-64)    Attending Surgeon: Misael Browning MD    Assistant Surgeon: Al Shook MD    Anesthesia: General Endotracheal anesthesia     EBL: <10 mL    Complications: None    Findings:   1. Lead Placement: right  2. Opening Thresholds: all less than equal to 2.0 V    Reason for Procedure: Jing Philippe is a very pleasant 69 y.o. female with a history of non-obstructive chronic urinary retention. More conservative therapies have either failed or have resulted in intolerable side effects.  After discussing risks and benefits in detail and answering all questions, the patient has been consented to proceed with staged testing of sacral neuromodulation.     Procedure in Detail: Our standard sacral neuromodulation infection protocol was utilized preoperatively, intraoperatively and postoperatively, including perioperative antibiotics in accordance with that protocol. After informed consent was obtained and documented in the chart, She was brought to the OR suite where general endotracheal anesthesia was undertaken by the OR staff. She was then gently rolled into a prone position with all pressure points padded and chest rolls used to facilitate ventilation. A formal timeout was performed. She was prepped and draped in accordance with our infection protocol.  A ground pad was placed on the bottom of the patient's foot and the proximal ends of the test stimulation cable were connected to the ground pad and the external neurostimulator (ENS).     The c-arm was moved into AP position and  AP images were  obtained of the sacrum. The medial borders of the S3 foramina were identified and marked on the skin. The c-arm was then moved into the lateral position to identify the S3 foramen.The 3.5 inch needle was used and inserted along the most upper and medial aspect of the S3 foramen bilaterally and appropriate needle depth was visualized utilizing fluoroscopy. Opening motor thresholds with the needle tip just at the anterior edge of the sacrum were found to be less than or equal to 2.0 V on all electrodes.  The right motor response was found to be superior and the decision was made to proceed on this side.     The foramen needle stylet was removed and a directional guide was placed through the needle using markers on the guide to assure appropriate depth. The foramen needle was removed by sliding over the directional guide. A small vertical incision was made inclusive of the directional guide through the skin. The lead introducer with dilator was placed over the directional guide and utilizing  fluoroscopic guidance, the lead introducer was advanced until the radiopaque brady was approximately midway through the foramen. The dilator sheath was removed along with the directional guide. Under fluoroscopic guidance, the 3889 lead, with the curved-tip stylet directed laterally was advanced such that the most proximal contact was situated at the anterior edge of the sacrum.    All four electrode contacts were tested, observing sarah and plantar flexion of the great toe utilizing the test stimulation cable and the external neurostimulator and Verify Controller. Opening thresholds with electrode contacts 1-4 less than or equal to 2.0 V on all electrodes. The introducer was retracted over the lead, deploying the tines. Retesting of all four electrodes confirmed appropriate opening responses.     The potential internal neurostimulator pocket site was identi ed below the iliac crest and lateral to the sacrum. Local anesthetic  was administered and an approximately 2 cm incision was made into the subcutaneous tissue creating a connection site. Blunt dissection was used to create a small pocket for the percutaneous extension connection with hemostasis achieved.    A tunneling tool with sheath was placed from the lead exit site subcutaneously to the small incised connection site. The tunneling tool was removed and the lead was fed through the sheath, exiting at the pocket connection site. The sheath was removed. The lead was cleaned and dried. A protective boot was placed over the lead; the lead was inserted into the temporary percutaneous extension with visual confirmation of blue tip advancement. The four setscrews were tightened with the torque wrench until audible clicks were heard. The boot was slid over the connection and two silk ties were used to seal the boot grooves on either side of connection.    Using the tunneling tool and sheath, a subcutaneous tunnel was created from the pocket site to the contralateral buttock and exited at a localized site. The percutaneous extension was placed through the sheath, the sheath removed, leaving the extension exiting the site.    The connection components were placed into the incision. The incisions and pocket were irrigated with sterile water and closed with 3-0 Monocryl subcutaneous suture. Incision sites were covered with Tegaderm dressing expcept for the exit site of the percutaneous extension exit which was covered with a Biopatch and Tegaderm.  The twist lock cable was then plugged into the external neuorstimulator and placed into the pouch on the patient belt.     The patient was transferred to the recovery room in stable condition. Using the Verify controller and external neurostimulator, the patient was programmed to the electrode of optimum sensation and provided utilization instructions prior to discharge. Patient will complete a voiding diary during testing period to help document  results of this test procedure.    As attending surgeon, I was present for all key portions of the procedure and immediately available for any non-key portions of the procedure including induction and extubation.

## 2019-12-11 ENCOUNTER — TELEPHONE (OUTPATIENT)
Dept: UROLOGY | Facility: CLINIC | Age: 69
End: 2019-12-11

## 2019-12-11 VITALS
DIASTOLIC BLOOD PRESSURE: 66 MMHG | SYSTOLIC BLOOD PRESSURE: 144 MMHG | TEMPERATURE: 98 F | HEART RATE: 85 BPM | OXYGEN SATURATION: 100 % | WEIGHT: 240 LBS | HEIGHT: 65 IN | RESPIRATION RATE: 18 BRPM | BODY MASS INDEX: 39.99 KG/M2

## 2019-12-11 DIAGNOSIS — R33.9 URINARY RETENTION: Primary | ICD-10-CM

## 2019-12-17 NOTE — ANESTHESIA PAT ROS NOTE
12/17/2019  Jing Philippe is a 69 y.o., female.      Pre-op Assessment         Review of Systems  Anesthesia Hx:  Hx of Anesthetic complications PONV History of prior surgery of interest to airway management or planning: nephrectomy. Previous anesthesia: General placement temporary neurostimulator 12/10 with general anesthesia.    Social:  Non-Smoker, No Alcohol Use    Hematology/Oncology:        Oncology Comments: Hx multiple myeloma   EENT/Dental:EENT/Dental Normal   Cardiovascular:   Exercise tolerance: poor Hypertension hyperlipidemia    Pulmonary:   Asthma asymptomatic    Renal/:   Chronic Renal Disease, ESRD Hx kidney transplant  Hx urinary retention   Hepatic/GI:   GERD    Musculoskeletal:   Arthritis  Hx of lympedema   Hx osteoarthritis   Neurological:   Peripheral Neuropathy    Endocrine:  Endocrine Normal    Dermatological:   Lymphedema lower extremity   Psych:   depression             Anesthesia Assessment: Preoperative EQUATION    Planned Procedure: Procedure(s) (LRB):  INSERTION, NEUROSTIMULATOR, PERMANENT, SACRAL (N/A)  Requested Anesthesia Type:General  Surgeon: Misael Browning MD  Service: Urology  Known or anticipated Date of Surgery:12/24/2019    Surgeon notes: reviewed    Electronic QUestionnaire Assessment completed via nurse interview with patient.        Triage considerations:     Previous anesthesia records:GETA  12/10    Last PCP note: within 3 months , outside [facility] Seen by Dr. Rick  Subspecialty notes: Kidney Transplant, Nephrology, Urology    Other important co-morbidities: GERD, HLD and HTN,       Tests already available:  none             Instructions given. (See in Nurse's note)    Optimization:  Recent anesthesia 12/10///  Messaged Dr. Null & transplant coordinator for  Suggestions on optimization    Plan:    Testing:  none   Pre-anesthesia  visit       Visit  focus: recent anesthesia     Consultation:messaged transplant regarding surgery   Navigation:  Straight Line to surgery.               No tests, anesthesia preop clinic visit, or consult required.

## 2019-12-17 NOTE — PRE-PROCEDURE INSTRUCTIONS
MD Cristina Lanza RN Hello,     Dr Null is on vacation. I think patient needs the set of labs prior to the procedure. please consider hydration during procedure.       Thank you   Shirley    Previous Messages      ----- Message -----   From: Cristina Gasca RN   Sent: 12/17/2019  11:23 AM CST   To: Casimiro Null MD, Cathy Killian RN, *     FYI, Pt. Is scheduled for insertion of permanent neurostimulator by Dr. Browning 12/24   Is there anything we need to optimize her for procedure  90 minutes under anesthesia         HILARIO GORMAN   Pre-op anesthesia

## 2019-12-18 NOTE — PRE-PROCEDURE INSTRUCTIONS
Have been unable to reach daughter to review meds, sending instructions based on previous procedure.

## 2019-12-19 ENCOUNTER — TELEPHONE (OUTPATIENT)
Dept: TRANSPLANT | Facility: HOSPITAL | Age: 69
End: 2019-12-19

## 2019-12-20 NOTE — TELEPHONE ENCOUNTER
----- Message from Cathy Killian RN sent at 12/19/2019  4:53 PM CST -----  Spoke with patient, can complete labs locally, but results won't be back before Tuesday. Should we have them completed her on Tuesday AM?  Cathy.    ----- Message -----  From: Honey Sands RN  Sent: 12/17/2019   3:55 PM CST  To: [facility]Kid[facility] Transplant Clinical        ----- Message -----  From: Cristina Gasca RN  Sent: 12/17/2019  11:23 AM CST  To: Casimiro Null MD, Cathy Killian RN, #    FYI, Pt. Is scheduled for insertion of permanent neurostimulator by Dr. Browning 12/24  Is there anything we need to optimize her for procedure  90 minutes under anesthesia        HILARIO Gasca RN BC  Pre-op anesthesia

## 2019-12-23 ENCOUNTER — TELEPHONE (OUTPATIENT)
Dept: UROLOGY | Facility: CLINIC | Age: 69
End: 2019-12-23

## 2019-12-23 ENCOUNTER — ANESTHESIA EVENT (OUTPATIENT)
Dept: SURGERY | Facility: HOSPITAL | Age: 69
End: 2019-12-23
Payer: MEDICARE

## 2019-12-23 NOTE — ANESTHESIA PREPROCEDURE EVALUATION
12/24/2019  Jing Philippe is a 69 y.o., female.      Pre-op Assessment         Review of Systems  Anesthesia Hx:  Hx of Anesthetic complications PONV History of prior surgery of interest to airway management or planning: nephrectomy. Previous anesthesia: General placement temporary neurostimulator 12/10 with general anesthesia.    Social:  Non-Smoker, No Alcohol Use    Hematology/Oncology:        Oncology Comments: Hx multiple myeloma   EENT/Dental:EENT/Dental Normal   Cardiovascular:   Exercise tolerance: poor Hypertension hyperlipidemia    Pulmonary:   Asthma asymptomatic    Renal/:   Chronic Renal Disease, ESRD Hx kidney transplant  Hx urinary retention   Hepatic/GI:   GERD    Musculoskeletal:   Arthritis  Hx of lympedema   Hx osteoarthritis   Neurological:   Peripheral Neuropathy    Endocrine:  Endocrine Normal    Dermatological:   Lymphedema lower extremity   Psych:   depression             Anesthesia Assessment: Preoperative EQUATION    Planned Procedure: Procedure(s) (LRB):  INSERTION, NEUROSTIMULATOR, PERMANENT, SACRAL (N/A)  Requested Anesthesia Type:General  Surgeon: Misael Browning MD  Service: Urology  Known or anticipated Date of Surgery:12/24/2019    Surgeon notes: reviewed    Electronic QUestionnaire Assessment completed via nurse interview with patient.        Triage considerations:     Previous anesthesia records:GETA  12/10    Last PCP note: within 3 months , outside [facility] Seen by Dr. Rick  Subspecialty notes: Kidney Transplant, Nephrology, Urology    Other important co-morbidities: GERD, HLD and HTN,       Tests already available:  none             Instructions given. (See in Nurse's note)    Optimization:  Recent anesthesia 12/10///  Messaged Dr. Null & transplant coordinator for  Suggestions on optimization    Plan:    Testing:  none   Pre-anesthesia  visit       Visit  focus: recent anesthesia     Consultation:messaged transplant regarding surgery   Navigation:  Straight Line to surgery.               No tests, anesthesia preop clinic visit, or consult required.[facility]  Anesthesia Pre-Operative Evaluation         Patient Name: Jing Philippe  YOB: 1950  MRN: [id number]    SUBJECTIVE:     Pre-operative evaluation for Procedure(s) (LRB):  INSERTION, NEUROSTIMULATOR, PERMANENT, SACRAL (N/A)     12/23/2019    Jing Philippe is a 69 y.o. 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     S/p temporary Sacral neurostimulator on 12/10/19.     Patient now presents for the above procedure(s).      LDA: None documented.       Prev airway:     Placement Date: 12/10/19; Placement Time: 1212 (created via procedure documentation); Method of Intubation: Direct laryngoscopy; Mask Ventilation: Easy; Intubated: Postinduction; Blade: [place] #2; Airway Device Size: 7.0; Placement Verified By: Capnometry; Complicating Factors: None; Intubation Findings: Bilateral breath sounds; Securment: Lips; Complications: None    Drips: None documented      Patient Active Problem List   Diagnosis    S/P living-donor kidney transplantation - 12/13/11    Secondary hyperparathyroidism of renal origin    Hyperlipidemia    Neuropathy    GERD (gastroesophageal reflux disease)    Chronic asthma    Chronic immunosuppression with Prograf and Cellcept    Amyloidosis, unspecified    Obesity    Hypophosphatemia    Hypertension, renal    Renal amyloidosis    Osteoarthritis of left knee s/p total knee replacement    Candida vaginitis    CKD (chronic kidney disease) stage 3, GFR 30-59 ml/min    Multiple myeloma not having achieved remission    Acute osteomyelitis of ankle or foot    Cellulitis of left leg    Urinary retention       Review of patient's allergies indicates:   Allergen  Reactions    Codeine      Other reaction(s): Vomiting  Other reaction(s): Nausea    Hydrocodone      Other reaction(s): Vomiting  Other reaction(s): Nausea       Current Inpatient Medications:      No current facility-administered medications on file prior to encounter.      Current Outpatient Medications on File Prior to Encounter   Medication Sig Dispense Refill    albuterol (PROVENTIL HFA/VENTOLIN HFA) 200 puff inhaler Inhale 1 puff into the lungs PRN.      aspirin 81 MG chewable tablet Take 1 tablet by mouth Daily.      BIOTIN ORAL Take 1 tablet by mouth once daily.      calcium carbonate-vitamin D3 600 mg (1,500 mg)-800 unit Chew Take 1 tablet by mouth once daily.      clonidine (CATAPRES) 0.2 MG tablet Take 1 tablet by mouth At bedtime.      gabapentin (NEURONTIN) 100 MG capsule       k phos di & mono-sod phos mono (PHOSPHA 250 NEUTRAL) 250 mg Tab Take 2 tablets by mouth Three times a day.      lamivudine (EPIVIR) 100 MG Tab TAKE 1 TABLET DAILY. 30 tablet 11    meclizine (ANTIVERT) 25 mg tablet Take 1 tablet by mouth Daily.      multivitamin (THERAGRAN) per tablet Take 1 tablet by mouth Daily.      nifedipine (PROCARDIA-XL) 60 MG (OSM) 24 hr tablet Take 60 mg by mouth once daily.      ondansetron (ZOFRAN-ODT) 4 MG TbDL Dissolve 1 tablet (4 mg total) by mouth every 8 (eight) hours as needed (nausea). 1 tablet 0    pantoprazole (PROTONIX) 40 MG tablet Take 40 mg by mouth once daily.      paricalcitol (ZEMPLAR) 2 MCG capsule Take 2 mcg by mouth once daily.      pregabalin (LYRICA) 75 MG capsule Take 75 mg by mouth Three times a day.      prochlorperazine (COMPAZINE) 10 MG tablet Take 10 mg by mouth every 6 (six) hours as needed.      scopolamine (TRANSDERM-SCOP) 1.3-1.5 mg (1 mg over 3 days) Place 1 patch onto the skin every 72 hours. 1 patch 0    sertraline (ZOLOFT) 25 MG tablet Take 25 mg by mouth once daily.      tacrolimus (PROGRAF) 1 MG Cap TAKE 3 CAPSULES TWICE DAILY (Patient taking  differently: TAKE 3mg in the AM and 2mg in the PM.) 180 capsule 11    tramadol (ULTRAM) 50 mg tablet Take 50 mg by mouth 2 (two) times daily.       traMADol (ULTRAM) 50 mg tablet Take 1 tablet (50 mg total) by mouth every 6 (six) hours as needed for Pain. 5 tablet 0    zolpidem (AMBIEN) 5 MG Tab Take 5 mg by mouth nightly as needed.         Past Surgical History:   Procedure Laterality Date    AV FISTULA PLACEMENT      CHOLECYSTECTOMY      HYSTERECTOMY      KIDNEY TRANSPLANT         Social History     Socioeconomic History    Marital status: Single     Spouse name: Not on file    Number of children: Not on file    Years of education: Not on file    Highest education level: Not on file   Occupational History    Not on file   Social Needs    Financial resource strain: Not on file    Food insecurity:     Worry: Not on file     Inability: Not on file    Transportation needs:     Medical: Not on file     Non-medical: Not on file   Tobacco Use    Smoking status: Never Smoker    Smokeless tobacco: Never Used   Substance and Sexual Activity    Alcohol use: Yes     Comment: rare margartia    Drug use: No    Sexual activity: Not on file   Lifestyle    Physical activity:     Days per week: Not on file     Minutes per session: Not on file    Stress: Not on file   Relationships    Social connections:     Talks on phone: Not on file     Gets together: Not on file     Attends Sikhism service: Not on file     Active member of club or organization: Not on file     Attends meetings of clubs or organizations: Not on file     Relationship status: Not on file   Other Topics Concern    Not on file   Social History Narrative    Inez used to work as , now retired for medical reasons.       OBJECTIVE:     Vital Signs Range (Last 24H):         Significant Labs:  Lab Results   Component Value Date    WBC 9.44 01/26/2018    HGB 12.3 01/26/2018    HCT 38.2 01/26/2018     01/26/2018    CHOL 104 (L)  11/29/2011    TRIG 140 11/29/2011    HDL 39 (L) 11/29/2011    ALT 20 01/26/2018    AST 17 01/26/2018     01/26/2018    K 4.0 01/26/2018     01/26/2018    CREATININE 1.2 01/26/2018    BUN 19 01/26/2018    CO2 27 01/26/2018    INR 0.9 11/29/2011       Diagnostic Studies: No relevant studies.    EKG:     No results found for this or any previous visit.      2D ECHO:  No results found for this or any previous visit.      ASSESSMENT/PLAN:       Anesthesia Evaluation    I have reviewed the Patient Summary Reports.    I have reviewed the Nursing Notes.   I have reviewed the Medications.     Review of Systems  Anesthesia Hx:  No problems with previous Anesthesia  History of prior surgery of interest to airway management or planning: Denies Family Hx of Anesthesia complications.   Denies Personal Hx of Anesthesia complications.   Hematology/Oncology:  Hematology Normal   Oncology Normal     EENT/Dental:EENT/Dental Normal   Cardiovascular:   Exercise tolerance: good Hypertension hyperlipidemia    Pulmonary:   Asthma mild    Renal/:   Chronic Renal Disease, CRI    Hepatic/GI:   GERD, well controlled    Musculoskeletal:   Arthritis     Neurological:  Neurology Normal    Endocrine:  Endocrine Normal    Dermatological:  Skin Normal    Psych:  Psychiatric Normal           Physical Exam  General:  Well nourished, Obesity    Airway/Jaw/Neck:  Airway Findings: Mouth Opening: Normal Tongue: Normal  General Airway Assessment: Adult  Mallampati: II  TM Distance: Normal, at least 6 cm        Eyes/Ears/Nose:  EYES/EARS/NOSE FINDINGS: Normal   Dental:  DENTAL FINDINGS: Normal   Chest/Lungs:  Chest/Lungs Clear    Heart/Vascular:  Heart Findings: Normal Heart murmur: negative Vascular Findings: Normal    Abdomen:  Abdomen Findings: Normal    Musculoskeletal:  Musculoskeletal Findings: Normal   Skin:  Skin Findings: Normal    Mental Status:  Mental Status Findings: Normal        Anesthesia Plan  Type of Anesthesia, risks &  benefits discussed:  Anesthesia Type:  general, MAC  Patient's Preference:   Intra-op Monitoring Plan: standard ASA monitors  Intra-op Monitoring Plan Comments:   Post Op Pain Control Plan: per primary service following discharge from PACU, IV/PO Opioids PRN and multimodal analgesia  Post Op Pain Control Plan Comments:   Induction:   IV  Beta Blocker:  Patient is not currently on a Beta-Blocker (No further documentation required).       Informed Consent: Patient understands risks and agrees with Anesthesia plan.  Questions answered. Anesthesia consent signed with patient.  ASA Score: 3     Day of Surgery Review of History & Physical:    H&P update referred to the surgeon.         Ready For Surgery From Anesthesia Perspective.

## 2019-12-23 NOTE — TELEPHONE ENCOUNTER
Progress discussed with patient. She notes that post-void residuals are consistently less than 100 mL when catheterizing. She would be able to cease catheterization at this rate, whereas she had been catheterizing 4x daily prior for PVR greater 200-250 mL.     Patient shows greater than 50% improvement and meets criteria for full implantation of IPG.

## 2019-12-23 NOTE — TELEPHONE ENCOUNTER
Called pt to confirm arrival time of 830am for procedure on 12-24-19. Gave pt NPO instructions and gave pt opportunity to ask questions. Pt verbalized understanding.

## 2019-12-24 ENCOUNTER — ANESTHESIA (OUTPATIENT)
Dept: SURGERY | Facility: HOSPITAL | Age: 69
End: 2019-12-24
Payer: MEDICARE

## 2019-12-24 ENCOUNTER — TELEPHONE (OUTPATIENT)
Dept: UROLOGY | Facility: CLINIC | Age: 69
End: 2019-12-24

## 2019-12-24 ENCOUNTER — HOSPITAL ENCOUNTER (OUTPATIENT)
Facility: HOSPITAL | Age: 69
Discharge: HOME | End: 2019-12-24
Attending: UROLOGY | Admitting: UROLOGY
Payer: MEDICARE

## 2019-12-24 VITALS
DIASTOLIC BLOOD PRESSURE: 72 MMHG | HEART RATE: 75 BPM | TEMPERATURE: 97 F | OXYGEN SATURATION: 100 % | RESPIRATION RATE: 16 BRPM | HEIGHT: 65 IN | BODY MASS INDEX: 31.65 KG/M2 | SYSTOLIC BLOOD PRESSURE: 157 MMHG | WEIGHT: 190 LBS

## 2019-12-24 DIAGNOSIS — R33.9 URINARY RETENTION: ICD-10-CM

## 2019-12-24 PROCEDURE — 63600175 PHARM REV CODE 636 W HCPCS: Performed by: STUDENT IN AN ORGANIZED HEALTH CARE EDUCATION/TRAINING PROGRAM

## 2019-12-24 PROCEDURE — 95972 ALYS CPLX SP/PN NPGT W/PRGRM: CPT | Mod: 59,,, | Performed by: UROLOGY

## 2019-12-24 PROCEDURE — C1787 PATIENT PROGR, NEUROSTIM: HCPCS | Performed by: UROLOGY

## 2019-12-24 PROCEDURE — 25000003 PHARM REV CODE 250: Performed by: UROLOGY

## 2019-12-24 PROCEDURE — 25000003 PHARM REV CODE 250: Performed by: STUDENT IN AN ORGANIZED HEALTH CARE EDUCATION/TRAINING PROGRAM

## 2019-12-24 PROCEDURE — 00300 ANES ALL PX INTEG H/N/PTRUNK: CPT | Performed by: UROLOGY

## 2019-12-24 PROCEDURE — 71000015 HC POSTOP RECOV 1ST HR: Performed by: UROLOGY

## 2019-12-24 PROCEDURE — 36000708 HC OR TIME LEV III 1ST 15 MIN: Performed by: UROLOGY

## 2019-12-24 PROCEDURE — 95972 PR PRG SPNL GEN CMPLX: ICD-10-PCS | Mod: 59,,, | Performed by: UROLOGY

## 2019-12-24 PROCEDURE — 71000044 HC DOSC ROUTINE RECOVERY FIRST HOUR: Performed by: UROLOGY

## 2019-12-24 PROCEDURE — 36000709 HC OR TIME LEV III EA ADD 15 MIN: Performed by: UROLOGY

## 2019-12-24 PROCEDURE — 37000009 HC ANESTHESIA EA ADD 15 MINS: Performed by: UROLOGY

## 2019-12-24 PROCEDURE — C1767 GENERATOR, NEURO NON-RECHARG: HCPCS | Performed by: UROLOGY

## 2019-12-24 PROCEDURE — 64590 PR IMPLANT PERIPH/GASTRIC NEUROSTIM/RECEIVER: ICD-10-PCS | Mod: 58,,, | Performed by: UROLOGY

## 2019-12-24 PROCEDURE — D9220A PRA ANESTHESIA: ICD-10-PCS | Mod: ,,, | Performed by: ANESTHESIOLOGY

## 2019-12-24 PROCEDURE — 64590 INS/RPL PRPH SAC/GSTR NPG/R: CPT | Mod: 58,,, | Performed by: UROLOGY

## 2019-12-24 PROCEDURE — 37000008 HC ANESTHESIA 1ST 15 MINUTES: Performed by: UROLOGY

## 2019-12-24 PROCEDURE — D9220A PRA ANESTHESIA: Mod: ,,, | Performed by: ANESTHESIOLOGY

## 2019-12-24 DEVICE — SYS INTERSTIM X RECHARGE FREE: Type: IMPLANTABLE DEVICE | Site: BACK | Status: FUNCTIONAL

## 2019-12-24 RX ORDER — SODIUM CHLORIDE 0.9 % (FLUSH) 0.9 %
10 SYRINGE (ML) INJECTION
Status: DISCONTINUED | OUTPATIENT
Start: 2019-12-24 | End: 2019-12-24 | Stop reason: HOSPADM

## 2019-12-24 RX ORDER — PROPOFOL 10 MG/ML
VIAL (ML) INTRAVENOUS CONTINUOUS PRN
Status: DISCONTINUED | OUTPATIENT
Start: 2019-12-24 | End: 2019-12-24

## 2019-12-24 RX ORDER — LIDOCAINE HYDROCHLORIDE 10 MG/ML
1 INJECTION INFILTRATION; PERINEURAL ONCE
Status: COMPLETED | OUTPATIENT
Start: 2019-12-24 | End: 2019-12-24

## 2019-12-24 RX ORDER — SODIUM CHLORIDE 9 MG/ML
INJECTION, SOLUTION INTRAVENOUS CONTINUOUS
Status: DISCONTINUED | OUTPATIENT
Start: 2019-12-24 | End: 2019-12-24 | Stop reason: HOSPADM

## 2019-12-24 RX ORDER — OXYCODONE AND ACETAMINOPHEN 5; 325 MG/1; MG/1
1 TABLET ORAL EVERY 4 HOURS PRN
Qty: 5 TABLET | Refills: 0 | Status: SHIPPED | OUTPATIENT
Start: 2019-12-24 | End: 2024-03-27

## 2019-12-24 RX ORDER — FENTANYL CITRATE 50 UG/ML
25 INJECTION, SOLUTION INTRAMUSCULAR; INTRAVENOUS EVERY 5 MIN PRN
Status: DISCONTINUED | OUTPATIENT
Start: 2019-12-24 | End: 2019-12-24 | Stop reason: HOSPADM

## 2019-12-24 RX ORDER — OXYCODONE AND ACETAMINOPHEN 5; 325 MG/1; MG/1
1 TABLET ORAL ONCE
Status: COMPLETED | OUTPATIENT
Start: 2019-12-24 | End: 2019-12-24

## 2019-12-24 RX ORDER — BUPIVACAINE HYDROCHLORIDE 2.5 MG/ML
INJECTION, SOLUTION EPIDURAL; INFILTRATION; INTRACAUDAL
Status: DISCONTINUED | OUTPATIENT
Start: 2019-12-24 | End: 2019-12-24 | Stop reason: HOSPADM

## 2019-12-24 RX ADMIN — PROPOFOL 100 MCG/KG/MIN: 10 INJECTION, EMULSION INTRAVENOUS at 09:12

## 2019-12-24 RX ADMIN — LIDOCAINE HYDROCHLORIDE 0.2 ML: 10 INJECTION, SOLUTION EPIDURAL; INFILTRATION; INTRACAUDAL; PERINEURAL at 07:12

## 2019-12-24 RX ADMIN — VANCOMYCIN HYDROCHLORIDE 1500 MG: 1.5 INJECTION, POWDER, LYOPHILIZED, FOR SOLUTION INTRAVENOUS at 07:12

## 2019-12-24 RX ADMIN — SODIUM CHLORIDE: 0.9 INJECTION, SOLUTION INTRAVENOUS at 07:12

## 2019-12-24 RX ADMIN — OXYCODONE HYDROCHLORIDE AND ACETAMINOPHEN 1 TABLET: 5; 325 TABLET ORAL at 10:12

## 2019-12-24 RX ADMIN — VANCOMYCIN HYDROCHLORIDE 1.5 G: 1.5 INJECTION, POWDER, LYOPHILIZED, FOR SOLUTION INTRAVENOUS at 09:12

## 2019-12-24 NOTE — OP NOTE
[facility]  Operative Note    Date: 12/24/2019    Pre-Op Diagnosis: urinary retention  Patient Active Problem List    Diagnosis Date Noted    Urinary retention 11/25/2019    Cellulitis of left leg 08/29/2019    Acute osteomyelitis of ankle or foot 04/01/2019    Multiple myeloma not having achieved remission 01/26/2018    CKD (chronic kidney disease) stage 3, GFR 30-59 ml/min 01/20/2017    Osteoarthritis of left knee s/p total knee replacement 01/16/2015    Candida vaginitis 01/16/2015    Renal amyloidosis 06/10/2014    Hypophosphatemia 06/07/2013    Hypertension, renal 06/07/2013    Chronic immunosuppression with Prograf and Cellcept     Amyloidosis, unspecified     Obesity     Secondary hyperparathyroidism of renal origin     Hyperlipidemia     Neuropathy     GERD (gastroesophageal reflux disease)     Chronic asthma     S/P living-donor kidney transplantation - 12/13/11 12/13/2011       Post-Op Diagnosis: same    Procedure(s) Performed:   1.  Insertion of peripheral neurostimulator pulse generator (07857)  2.  Electronic analysis of implanted neurostimulator pulse generator system with intraoperative or subsequent programming (19989)    Specimen(s): none    Staff Surgeon: Misael Browning MD    Assistant Surgeon: Al Shook MD    Anesthesia: Monitored Local Anesthesia with Sedation    Indications: Jing Philippe is a 69 y.o. female with non-obstructive urinary retention    Findings: IPG placed on right side    Estimated Blood Loss: minimal    Drains: none    Complications:  None    Procedure in Detail:  Informed consent was obtained by explaining all risks and benefits of the procedure to the patient in detail. She was given appropriate perioperative antibiotics within 30 minutes prior to beginning the procedure. She was brought to the OR suite, where monitored anesthesia care was administered by anesthesia staff in a prone position. She was prepped and draped in  "accordance with our standard sacral neuromodulation infection protocol, which was utilized preoperatively, intraoperatively, and postoperatively.      A #15 scalpel blade was used to open the percutaneous extension incision to 3.75 cm and the percutaneous extension was delivered. This was  from the lead, cut and delivered sterilely from the field. The quadripolar lead was then attached to the InterStim II implantable pulse generator, which was pre-placed within the pocket. The single setscrew was tightened until a single "click" was heard.  The pocket had been thoroughly irrigated with sterile water. After impedance testing demonstrated no abnormalities on all electrode pairs, the incision was closed in 2 layers, deeply with a 3-0 Vicryl and superficially with a running 3-0 Monocryl. The incision was covered with Dermabond. She tolerated the procedure well and was transferred in stable condition to the recovery room.      Once awake and alert, complex electronic analysis and programming of the SNM pulse generator was performed, including setting active contact groups, amplitude, pulse width, frequency, cycling, and lockout parameters.      Disposition:  The patient will follow up with Dr. Browning in 6 weeks.  Prescriptions were given for Percocet.  The patient will meet with the [employer] rep in the recovery room.      Al Shook MD  "

## 2019-12-24 NOTE — TRANSFER OF CARE
"Anesthesia Transfer of Care Note    Patient: Jing Philippe    Procedure(s) Performed: Procedure(s) (LRB):  INSERTION, NEUROSTIMULATOR, PERMANENT, SACRAL (N/A)    Patient location: PACU    Anesthesia Type: MAC    Transport from OR: Transported from OR on 6-10 L/min O2 by face mask with adequate spontaneous ventilation    Post pain: adequate analgesia    Post assessment: no apparent anesthetic complications    Post vital signs: stable    Level of consciousness: awake, alert and oriented    Nausea/Vomiting: no nausea/vomiting    Complications: none    Transfer of care protocol was followed      Last vitals:   Visit Vitals  BP (!) 152/69 (BP Location: Left arm, Patient Position: Lying)   Pulse 64   Temp 36.5 °C (97.7 °F) (Oral)   Resp 16   Ht 5' 5" (1.651 m)   Wt 86.2 kg (190 lb)   SpO2 100%   Breastfeeding? No   BMI 31.62 kg/m²     "

## 2019-12-24 NOTE — DISCHARGE INSTRUCTIONS
charge Instructions: After Your Surgery  Youve just had surgery. During surgery, you were given medicine called anesthesia to keep you relaxed and free of pain. After surgery, you may have some pain or nausea. This is common. Here are some tips for feeling better and getting well after surgery.     Stay on schedule with your medicine.   Going home  Your healthcare provider will show you how to take care of yourself when you go home. He or she will also answer your questions. Have an adult family member or friend drive you home. For the first 24 hours after your surgery:  · Do not drive or use heavy equipment.  · Do not make important decisions or sign legal papers.  · Do not drink alcohol.  · Have someone stay with you, if needed. He or she can watch for problems and help keep you safe.  Be sure to go to all follow-up visits with your healthcare provider. And rest after your surgery for as long as your healthcare provider tells you to.  Coping with pain  If you have pain after surgery, pain medicine will help you feel better. Take it as told, before pain becomes severe. Also, ask your healthcare provider or pharmacist about other ways to control pain. This might be with heat, ice, or relaxation. And follow any other instructions your surgeon or nurse gives you.  Tips for taking pain medicine  To get the best relief possible, remember these points:  · Pain medicines can upset your stomach. Taking them with a little food may help.  · Most pain relievers taken by mouth need at least 20 to 30 minutes to start to work.  · Taking medicine on a schedule can help you remember to take it. Try to time your medicine so that you can take it before starting an activity. This might be before you get dressed, go for a walk, or sit down for dinner.  · Constipation is a common side effect of pain medicines. Call your healthcare provider before taking any medicines such as laxatives or stool softeners to help ease constipation. Also  ask if you should skip any foods. Drinking lots of fluids and eating foods such as fruits and vegetables that are high in fiber can also help. Remember, do not take laxatives unless your surgeon has prescribed them.  · Drinking alcohol and taking pain medicine can cause dizziness and slow your breathing. It can even be deadly. Do not drink alcohol while taking pain medicine.  · Pain medicine can make you react more slowly to things. Do not drive or run machinery while taking pain medicine.  Your healthcare provider may tell you to take acetaminophen to help ease your pain. Ask him or her how much you are supposed to take each day. Acetaminophen or other pain relievers may interact with your prescription medicines or other over-the-counter (OTC) medicines. Some prescription medicines have acetaminophen and other ingredients. Using both prescription and OTC acetaminophen for pain can cause you to overdose. Read the labels on your OTC medicines with care. This will help you to clearly know the list of ingredients, how much to take, and any warnings. It may also help you not take too much acetaminophen. If you have questions or do not understand the information, ask your pharmacist or healthcare provider to explain it to you before you take the OTC medicine.  Managing nausea  Some people have an upset stomach after surgery. This is often because of anesthesia, pain, or pain medicine, or the stress of surgery. These tips will help you handle nausea and eat healthy foods as you get better. If you were on a special food plan before surgery, ask your healthcare provider if you should follow it while you get better. These tips may help:  · Do not push yourself to eat. Your body will tell you when to eat and how much.  · Start off with clear liquids and soup. They are easier to digest.  · Next try semi-solid foods, such as mashed potatoes, applesauce, and gelatin, as you feel ready.  · Slowly move to solid foods. Dont eat  fatty, rich, or spicy foods at first.  · Do not force yourself to have 3 large meals a day. Instead eat smaller amounts more often.  · Take pain medicines with a small amount of solid food, such as crackers or toast, to avoid nausea.     Call your surgeon if  · You still have pain an hour after taking medicine. The medicine may not be strong enough.  · You feel too sleepy, dizzy, or groggy. The medicine may be too strong.  · You have side effects like nausea, vomiting, or skin changes, such as rash, itching, or hives.       If you have obstructive sleep apnea  You were given anesthesia medicine during surgery to keep you comfortable and free of pain. After surgery, you may have more apnea spells because of this medicine and other medicines you were given. The spells may last longer than usual.   At home:  · Keep using the continuous positive airway pressure (CPAP) device when you sleep. Unless your healthcare provider tells you not to, use it when you sleep, day or night. CPAP is a common device used to treat obstructive sleep apnea.  · Talk with your provider before taking any pain medicine, muscle relaxants, or sedatives. Your provider will tell you about the possible dangers of taking these medicines.  Date Last Reviewed: 12/1/2016  © 8037-4660 [employer]. [address] [place] [address]. All rights reserved. This information is not intended as a substitute for professional medical care. Always follow your healthcare professional's instructions.          CHECK APPROPRIATE LEVEL:  BATHING:   Keep the operation site dry for ___24 hours_______________________________.   You may shower _24 hours.  No tub baths till site healed completely___.    DRESSING:   no bandage present.  Dermabond will dissolve on its own.  Do not scrub the site.     IACTIVITY LEVEL:  If you have received sedation or an anesthetic, you may feel sleepy for several hours. Rest until you are more  awake. Gradually resume  your normal activities.      SPECIAL RESTRICTIONS: __No lifting greater than 10lbs for 2 weeks.     DIET:  At home, continue with liquids, and if there is no nausea, you may eat a soft diet. Gradually resume your  normal diet.  MEDICATIONS:  You will receive instructions for any pain and/or antibiotic prescriptions. Pain medication should be taken only  if needed and as directed. Antibiotics should be taken as directed until the entire prescription is completed.    ADDITIONAL INFORMATION: ____________________________________________________________    WHEN TO CALL THE DOCTOR:   For any obvious bleeding (some dried blood over the incision is normal).   Redness or swelling around the incision.   Fever over 101ºF (38.4ºC).   Drainage (pus) from the wound.   Persistent pain not relieved by the pain medication.    RETURN APPOINTMENT: _______________________________________________________________    FOR EMERGENCIES:  If any unusual problems or difficulties occur, contact Dr. Gerardo________________________ or the resident at  [phone] (page ) or at the Clinic office, _____________________

## 2019-12-24 NOTE — INTERVAL H&P NOTE
The patient has been examined and the H&P has been reviewed:    I concur with the findings and no changes have occurred since H&P was written.   Patient presents today for stage 2 interstim. Now gets 1-2 oz when she does CIC.    Anesthesia/Surgery risks, benefits and alternative options discussed and understood by patient/family.          Active Hospital Problems    Diagnosis  POA    Urinary retention [R33.9]  Yes      Resolved Hospital Problems   No resolved problems to display.

## 2019-12-24 NOTE — DISCHARGE SUMMARY
[facility]  Discharge Note  Short Stay    Admit Date: 12/24/2019    Discharge Date and Time: 12/24/2019 10:33 AM      Attending Physician: Misael Browning MD     Discharge Provider: Al Shook    Diagnoses:  Active Hospital Problems    Diagnosis  POA    *Urinary retention [R33.9]  Yes      Resolved Hospital Problems   No resolved problems to display.       Discharged Condition: good    Hospital Course: Patient was admitted for Stage II Interstim and tolerated the procedure well with no complications. The patient was discharged home in good condition on the same day.       Final Diagnoses: Same as principal problem.    Disposition: Home or Self Care    Follow up/Patient Instructions:    Medications:  Reconciled Home Medications:   Current Discharge Medication List      START taking these medications    Details   oxyCODONE-acetaminophen (PERCOCET) 5-325 mg per tablet Take 1 tablet by mouth every 4 (four) hours as needed.  Qty: 5 tablet, Refills: 0         CONTINUE these medications which have NOT CHANGED    Details   albuterol (PROVENTIL HFA/VENTOLIN HFA) 200 puff inhaler Inhale 1 puff into the lungs PRN.      k phos di & mono-sod phos mono (PHOSPHA 250 NEUTRAL) 250 mg Tab Take 2 tablets by mouth Three times a day.      multivitamin (THERAGRAN) per tablet Take 1 tablet by mouth Daily.      prochlorperazine (COMPAZINE) 10 MG tablet Take 10 mg by mouth every 6 (six) hours as needed.      scopolamine (TRANSDERM-SCOP) 1.3-1.5 mg (1 mg over 3 days) Place 1 patch onto the skin every 72 hours.  Qty: 1 patch, Refills: 0      tacrolimus (PROGRAF) 1 MG Cap TAKE 3 CAPSULES TWICE DAILY  Qty: 180 capsule, Refills: 11      aspirin 81 MG chewable tablet Take 1 tablet by mouth Daily.      BIOTIN ORAL Take 1 tablet by mouth once daily.      calcium carbonate-vitamin D3 600 mg (1,500 mg)-800 unit Chew Take 1 tablet by mouth once daily.      clonidine (CATAPRES) 0.2 MG tablet Take 1 tablet by mouth At bedtime.       gabapentin (NEURONTIN) 100 MG capsule       lamivudine (EPIVIR) 100 MG Tab TAKE 1 TABLET DAILY.  Qty: 30 tablet, Refills: 11      meclizine (ANTIVERT) 25 mg tablet Take 1 tablet by mouth Daily.      nifedipine (PROCARDIA-XL) 60 MG (OSM) 24 hr tablet Take 60 mg by mouth once daily.      ondansetron (ZOFRAN-ODT) 4 MG TbDL Dissolve 1 tablet (4 mg total) by mouth every 8 (eight) hours as needed (nausea).  Qty: 1 tablet, Refills: 0      pantoprazole (PROTONIX) 40 MG tablet Take 40 mg by mouth once daily.      paricalcitol (ZEMPLAR) 2 MCG capsule Take 2 mcg by mouth once daily.      pregabalin (LYRICA) 75 MG capsule Take 75 mg by mouth Three times a day.      sertraline (ZOLOFT) 25 MG tablet Take 25 mg by mouth once daily.      !! tramadol (ULTRAM) 50 mg tablet Take 50 mg by mouth 2 (two) times daily.       !! traMADol (ULTRAM) 50 mg tablet Take 1 tablet (50 mg total) by mouth every 6 (six) hours as needed for Pain.  Qty: 5 tablet, Refills: 0      zolpidem (AMBIEN) 5 MG Tab Take 5 mg by mouth nightly as needed.       !! - Potential duplicate medications found. Please discuss with provider.        Discharge Procedure Orders   Diet general     Diet general     Call MD for:  extreme fatigue     Call MD for:  persistent dizziness or light-headedness     Call MD for:  hives     Call MD for:  redness, tenderness, or signs of infection (pain, swelling, redness, odor or green/yellow discharge around incision site)     Call MD for:  difficulty breathing, headache or visual disturbances     Call MD for:  severe uncontrolled pain     Call MD for:  persistent nausea and vomiting     Call MD for:  temperature >100.4     Call MD for:  extreme fatigue     Call MD for:  persistent dizziness or light-headedness     Call MD for:  hives     Call MD for:  redness, tenderness, or signs of infection (pain, swelling, redness, odor or green/yellow discharge around incision site)     Call MD for:  difficulty breathing, headache or visual  disturbances     Call MD for:  severe uncontrolled pain     Call MD for:  persistent nausea and vomiting     Call MD for:  temperature >100.4     Follow-up Information     Misael Browning MD In 6 weeks.    Specialty:  Urology  Contact information:  [address]  [facility] [address] [phone]

## 2019-12-24 NOTE — ANESTHESIA POSTPROCEDURE EVALUATION
Anesthesia Post Evaluation    Patient: Jing Philippe    Procedure(s) Performed: Procedure(s) (LRB):  INSERTION, NEUROSTIMULATOR, PERMANENT, SACRAL (N/A)    Final Anesthesia Type: MAC    Patient location during evaluation: PACU  Patient participation: Yes- Able to Participate  Level of consciousness: awake and alert and oriented  Post-procedure vital signs: reviewed and stable  Pain management: adequate  Airway patency: patent    PONV status at discharge: No PONV  Anesthetic complications: no      Cardiovascular status: blood pressure returned to baseline  Respiratory status: unassisted, room air and spontaneous ventilation  Hydration status: euvolemic  Follow-up not needed.          Vitals Value Taken Time   /72 12/24/2019 11:02 AM   Temp 36.3 °C (97.3 °F) 12/24/2019 11:30 AM   Pulse 76 12/24/2019 11:13 AM   Resp 33 12/24/2019 11:12 AM   SpO2 100 % 12/24/2019 11:13 AM   Vitals shown include unvalidated device data.      No case tracking events are documented in the log.      Pain/Jeyson Score: Pain Rating Prior to Med Admin: 6 (12/24/2019 10:43 AM)  Jeyson Score: 10 (12/24/2019 11:00 AM)

## 2019-12-24 NOTE — TELEPHONE ENCOUNTER
----- Message from Al Shook MD sent at 12/24/2019  9:18 AM CST -----  Hey, can you please set this patient up with an appointment with Dr. Browning in 6 weeks?    Thanks,  Al Shook

## 2020-01-13 ENCOUNTER — TELEPHONE (OUTPATIENT)
Dept: UROLOGY | Facility: CLINIC | Age: 70
End: 2020-01-13

## 2020-01-13 NOTE — TELEPHONE ENCOUNTER
----- Message from Karina Cabrera MA sent at 1/13/2020 10:43 AM CST -----  Contact: patient  Ms Philippe was wondering if the urine that she collects of 2oz a day was ok. She asked that if you could give her a call.  Thanks

## 2020-02-03 ENCOUNTER — OFFICE VISIT (OUTPATIENT)
Dept: UROLOGY | Facility: CLINIC | Age: 70
End: 2020-02-03
Payer: MEDICARE

## 2020-02-03 VITALS — SYSTOLIC BLOOD PRESSURE: 110 MMHG | HEART RATE: 70 BPM | DIASTOLIC BLOOD PRESSURE: 63 MMHG

## 2020-02-03 DIAGNOSIS — R33.9 URINARY RETENTION: Primary | ICD-10-CM

## 2020-02-03 PROCEDURE — 99999 PR PBB SHADOW E&M-EST. PATIENT-LVL III: CPT | Mod: PBBFAC,,, | Performed by: UROLOGY

## 2020-02-03 PROCEDURE — 99024 POSTOP FOLLOW-UP VISIT: CPT | Mod: S$GLB,,, | Performed by: UROLOGY

## 2020-02-03 PROCEDURE — 99999 PR PBB SHADOW E&M-EST. PATIENT-LVL III: ICD-10-PCS | Mod: PBBFAC,,, | Performed by: UROLOGY

## 2020-02-03 PROCEDURE — 99024 PR POST-OP FOLLOW-UP VISIT: ICD-10-PCS | Mod: S$GLB,,, | Performed by: UROLOGY

## 2020-02-03 NOTE — PROGRESS NOTES
[facility]      Sacral Neuromodulation Return Note    2/3/2020    Referred by:  No ref. provider found    HPI: Jing Philippe is a very pleasant 69 y.o. female referred for evaluation of chronic urinary retention of several years duration. She underwent staged testing of sacral neuromodulation and was implanted after seeing considerable improvement. She was able to stop intermittent catheterization. She now catheterizes about every 2 weeks to confirm for herself that she is emptying well.  She typically has no greater than 2 oz remaining with these catheterizations.  She also reports improvement in urinary flow. She has made an adjustment increasing Program 4 to 1.5 mA due to lack of sensation, not lack of efficacy.     A review of 10+ systems was conducted with pertinent positive and negative findings documented in HPI with all other systems reviewed and negative.    Past medical, family, surgical and social history reviewed as documented in chart with pertinent positive medical, family, surgical and social history detailed in HPI.    Exam Findings:    Const: no acute distress, conversant and alert  Eyes: anicteric, extraocular muscles intact  ENMT: normocephalic, Nl oral membranes  Cardio: no cyanosis, nl cap refill  Pulm: no tachypnea; no resp distress  Abd: soft, no tenderness  Musc: no laceration, no tenderness  Neuro: alert; oriented x 3  Skin: warm, dry; no petichiae  Psych: no anxiety; normal speech        Program Sensory Threshold (mA) Location (P/R/V/O)   1 1.4 V   2 1.4 P   3 0.9 P   4 1.4 R       Assessment/Plan:     Chronic Urinary Retention (estab,improved): She is doing well on her current settings with no need for catheterization. Her sensory thresholds have increased somewhat from implant, no unexpected. We discussed only making programming changes for lack of efficacy or discomfort.  Given the long distance which she travels, we will skip her 12 week follow-up in plan to see  her back in 6 months.

## 2020-05-28 ENCOUNTER — TELEPHONE (OUTPATIENT)
Dept: UROLOGY | Facility: CLINIC | Age: 70
End: 2020-05-28

## 2020-05-28 NOTE — TELEPHONE ENCOUNTER
----- Message from Karina Cabrera MA sent at 5/28/2020 10:05 AM CDT -----  Contact: Patient  Ms Philippe would like to cancel her procedure with Dr Browning, could you give her a call please.  Thanks

## 2020-10-14 ENCOUNTER — TELEPHONE (OUTPATIENT)
Dept: TRANSPLANT | Facility: CLINIC | Age: 70
End: 2020-10-14

## 2020-10-14 NOTE — TELEPHONE ENCOUNTER
Coordinator spoke with patient and she wanted to get a copy of the lab orders for 12/7/2020. Patient would like to schedule annual nephrology visit for June or July due to current treatment for cancer. Lab orders mailed per patient request. All questions answered and patient verbalized understanding.       ----- Message from Amari Byers sent at 10/14/2020  1:13 PM CDT -----  Contact: Pt @ [phone] or [phone]    ----- Message -----  From: Gracie Trevizo  Sent: 10/14/2020  12:00 PM CDT  To: [facility] Pre-Kidney Transplant Non-Clinical    Pt stated she needs to speak with her Coordinator Noam in regards to her appointment she is due for in December.     Call back: [phone] or [phone]

## 2020-10-15 ENCOUNTER — TELEPHONE (OUTPATIENT)
Dept: TRANSPLANT | Facility: CLINIC | Age: 70
End: 2020-10-15

## 2021-06-30 ENCOUNTER — DOCUMENTATION ONLY (OUTPATIENT)
Dept: TRANSPLANT | Facility: CLINIC | Age: 71
End: 2021-06-30

## 2021-07-27 NOTE — PROGRESS NOTES
Appt needed for evaluation and to determine a treatment plan.    Writer called patient and reviewed need for appt.    Patient verbalized understanding and in agreement with plan.    Per patient:  Symptoms started over weekend and was in [place], [place].  Had own supply of Macrobid-took two tablets Sunday and yesterday.  Had hematuria and urgency.      No available appts in clinic today.  Writer recommended [facility] for evaluation.  It is open [phone] today, no appts necessary.    Patient agreed to Urgent Care evaluation.    Patient requested to schedule ER follow up visit with PCP.  Appt scheduled on 8/4/21 with ALFREDITO Ayon CNP.  Appt date, time and location confirmed with patient.    FRAN Wood, RN  [facility]       Procedure unable to be performed d/t equipment malfunction. Urodynamics machine not working properly at this time, unable to reach technical support from [facility]. Procedure cancelled, Dr. Browning spoke to pt and family about next steps for pt.

## 2022-04-28 ENCOUNTER — TELEPHONE (OUTPATIENT)
Dept: UROLOGY | Facility: CLINIC | Age: 72
End: 2022-04-28
Payer: MEDICARE

## 2022-04-28 NOTE — TELEPHONE ENCOUNTER
----- Message from Peggy Hinton MA sent at 4/27/2022  4:20 PM CDT -----    ----- Message -----  From: Justice Serrano  Sent: 4/27/2022   2:48 PM CDT  To: [facility] Staff    Patient called to speak w/ someone in regards to rescheduling her appt for an earlier date. Requesting callback [phone]

## 2022-05-04 ENCOUNTER — OFFICE VISIT (OUTPATIENT)
Dept: UROLOGY | Facility: CLINIC | Age: 72
End: 2022-05-04
Payer: MEDICARE

## 2022-05-04 VITALS
DIASTOLIC BLOOD PRESSURE: 72 MMHG | HEIGHT: 65 IN | HEART RATE: 74 BPM | WEIGHT: 203.94 LBS | SYSTOLIC BLOOD PRESSURE: 131 MMHG | BODY MASS INDEX: 33.98 KG/M2

## 2022-05-04 DIAGNOSIS — R33.9 URINARY RETENTION: Primary | ICD-10-CM

## 2022-05-04 PROCEDURE — 3078F PR MOST RECENT DIASTOLIC BLOOD PRESSURE < 80 MM HG: ICD-10-PCS | Mod: CPTII,S$GLB,, | Performed by: UROLOGY

## 2022-05-04 PROCEDURE — 3008F BODY MASS INDEX DOCD: CPT | Mod: CPTII,S$GLB,, | Performed by: UROLOGY

## 2022-05-04 PROCEDURE — 99999 PR PBB SHADOW E&M-EST. PATIENT-LVL III: ICD-10-PCS | Mod: PBBFAC,,, | Performed by: UROLOGY

## 2022-05-04 PROCEDURE — 1125F PR PAIN SEVERITY QUANTIFIED, PAIN PRESENT: ICD-10-PCS | Mod: CPTII,S$GLB,, | Performed by: UROLOGY

## 2022-05-04 PROCEDURE — 3075F SYST BP GE 130 - 139MM HG: CPT | Mod: CPTII,S$GLB,, | Performed by: UROLOGY

## 2022-05-04 PROCEDURE — 3078F DIAST BP <80 MM HG: CPT | Mod: CPTII,S$GLB,, | Performed by: UROLOGY

## 2022-05-04 PROCEDURE — 99214 PR OFFICE/OUTPT VISIT, EST, LEVL IV, 30-39 MIN: ICD-10-PCS | Mod: S$GLB,,, | Performed by: UROLOGY

## 2022-05-04 PROCEDURE — 3075F PR MOST RECENT SYSTOLIC BLOOD PRESS GE 130-139MM HG: ICD-10-PCS | Mod: CPTII,S$GLB,, | Performed by: UROLOGY

## 2022-05-04 PROCEDURE — 3288F FALL RISK ASSESSMENT DOCD: CPT | Mod: CPTII,S$GLB,, | Performed by: UROLOGY

## 2022-05-04 PROCEDURE — 1101F PR PT FALLS ASSESS DOC 0-1 FALLS W/OUT INJ PAST YR: ICD-10-PCS | Mod: CPTII,S$GLB,, | Performed by: UROLOGY

## 2022-05-04 PROCEDURE — 1159F MED LIST DOCD IN RCRD: CPT | Mod: CPTII,S$GLB,, | Performed by: UROLOGY

## 2022-05-04 PROCEDURE — 3288F PR FALLS RISK ASSESSMENT DOCUMENTED: ICD-10-PCS | Mod: CPTII,S$GLB,, | Performed by: UROLOGY

## 2022-05-04 PROCEDURE — 99999 PR PBB SHADOW E&M-EST. PATIENT-LVL III: CPT | Mod: PBBFAC,,, | Performed by: UROLOGY

## 2022-05-04 PROCEDURE — 1125F AMNT PAIN NOTED PAIN PRSNT: CPT | Mod: CPTII,S$GLB,, | Performed by: UROLOGY

## 2022-05-04 PROCEDURE — 1159F PR MEDICATION LIST DOCUMENTED IN MEDICAL RECORD: ICD-10-PCS | Mod: CPTII,S$GLB,, | Performed by: UROLOGY

## 2022-05-04 PROCEDURE — 3008F PR BODY MASS INDEX (BMI) DOCUMENTED: ICD-10-PCS | Mod: CPTII,S$GLB,, | Performed by: UROLOGY

## 2022-05-04 PROCEDURE — 1101F PT FALLS ASSESS-DOCD LE1/YR: CPT | Mod: CPTII,S$GLB,, | Performed by: UROLOGY

## 2022-05-04 PROCEDURE — 99214 OFFICE O/P EST MOD 30 MIN: CPT | Mod: S$GLB,,, | Performed by: UROLOGY

## 2022-05-04 NOTE — PROGRESS NOTES
[facility]        Sacral Neuromodulation Return Note     2/3/2020     Referred by:  No ref. provider found     HPI: Jing Philippe is a very pleasant 72 y.o. female referred for evaluation of chronic urinary retention of several years duration. She underwent staged testing of sacral neuromodulation and was implanted after seeing considerable improvement about 2.5 years ago. She was able to stop intermittent catheterization. She now catheterizes about every 2 weeks to confirm for herself that she is emptying well.  She typically has no greater than 2 oz remaining with these catheterizations.  Today PVR is about 8 mL and again, catheterizations are never more than 1-2 oz.     She was recently treated for a sUTI. She noted urgency incontinence, which is a new finding.      A review of 10+ systems was conducted with pertinent positive and negative findings documented in HPI with all other systems reviewed and negative.     Past medical, family, surgical and social history reviewed as documented in chart with pertinent positive medical, family, surgical and social history detailed in HPI.     Exam Findings:     Const: no acute distress, conversant and alert  Eyes: anicteric, extraocular muscles intact  ENMT: normocephalic, Nl oral membranes  Cardio: no cyanosis, nl cap refill  Pulm: no tachypnea; no resp distress  Abd: soft, no tenderness  Musc: no laceration, no tenderness  Neuro: alert; oriented x 3  Skin: warm, dry; no petichiae  Psych: no anxiety; normal speech        Program Sensory Threshold (mA) Location (P/R/V/O)   1 1.9 P   2 1.4 R   **3 1.3 P   *4 1.7 P   5 1.5 R         Assessment/Plan:     · Chronic Urinary Retention (estab,improved): She continues to have an excellent response to SNM for NOUR with cath PVR (about every 2 weeks) never more than 60 mL. PVR today is 8 mL. However, she notes recent urgency incontinence, a new finding. This may be attributable to a recent sUTI. Will see how  she responds over the next 2 weeks. Urine without evidence of infection today and patient without symptoms of an infection (counseled that cloudy urine without other new LUTS is not a reliable indicator of infection). Will check in with audio visit in 2 weeks.

## 2022-06-01 ENCOUNTER — OFFICE VISIT (OUTPATIENT)
Dept: UROLOGY | Facility: CLINIC | Age: 72
End: 2022-06-01
Payer: MEDICARE

## 2022-06-01 DIAGNOSIS — R33.9 URINARY RETENTION: Primary | ICD-10-CM

## 2022-06-01 PROCEDURE — 1160F RVW MEDS BY RX/DR IN RCRD: CPT | Mod: CPTII,,, | Performed by: UROLOGY

## 2022-06-01 PROCEDURE — 1160F PR REVIEW ALL MEDS BY PRESCRIBER/CLIN PHARMACIST DOCUMENTED: ICD-10-PCS | Mod: CPTII,,, | Performed by: UROLOGY

## 2022-06-01 PROCEDURE — 99442 PR PHYSICIAN TELEPHONE EVALUATION 11-20 MIN: ICD-10-PCS | Mod: 95,,, | Performed by: UROLOGY

## 2022-06-01 PROCEDURE — 1159F MED LIST DOCD IN RCRD: CPT | Mod: CPTII,,, | Performed by: UROLOGY

## 2022-06-01 PROCEDURE — 99442 PR PHYSICIAN TELEPHONE EVALUATION 11-20 MIN: CPT | Mod: 95,,, | Performed by: UROLOGY

## 2022-06-01 PROCEDURE — 1159F PR MEDICATION LIST DOCUMENTED IN MEDICAL RECORD: ICD-10-PCS | Mod: CPTII,,, | Performed by: UROLOGY

## 2022-06-01 RX ORDER — OXYBUTYNIN CHLORIDE 5 MG/1
5 TABLET, EXTENDED RELEASE ORAL DAILY
Qty: 30 TABLET | Refills: 11 | Status: SHIPPED | OUTPATIENT
Start: 2022-06-01 | End: 2022-06-27

## 2022-06-01 NOTE — PROGRESS NOTES
Established Patient - Audio Only Telehealth Visit     The patient location is: Home  The chief complaint leading to consultation is: urgency incontinence  Visit type: Virtual visit with audio only (telephone)  Total time spent with patient: 12 minutes       The reason for the audio only service rather than synchronous audio and video virtual visit was related to technical difficulties or patient preference/necessity.     Each patient to whom I provide medical services by telemedicine is:  (1) informed of the relationship between the physician and patient and the respective role of any other health care provider with respect to management of the patient; and (2) notified that they may decline to receive medical services by telemedicine and may withdraw from such care at any time. Patient verbally consented to receive this service via voice-only telephone call.     [facility]        Sacral Neuromodulation Return Note     6/1/2022     Referred by:  No ref. provider found     HPI: Jing Philippe is a very pleasant 72 y.o. female referred for evaluation of chronic urinary retention of several years duration. She underwent staged testing of sacral neuromodulation and was implanted after seeing considerable improvement about 2.5 years ago. She was able to stop intermittent catheterization. She now catheterizes about every 2 weeks to confirm for herself that she is emptying well.  She typically has no greater than 2 oz remaining with these catheterizations. Last visit, PVR was about 8 mL and again, catheterizations are never more than 1-2 oz.      She was recently treated for a sUTI. She noted urgency incontinence, which is a new finding. However, this has not eliminated with resolution of the sUTI. Instead, she reports urgency incontinence many nights.      A review of 10+ systems was conducted with pertinent positive and negative findings documented in HPI with all other systems reviewed and  negative.     Past medical, family, surgical and social history reviewed as documented in chart with pertinent positive medical, family, surgical and social history detailed in HPI.     Exam Findings:     Const: no acute distress, conversant and alert  Eyes: anicteric, extraocular muscles intact  ENMT: normocephalic, Nl oral membranes  Cardio: no cyanosis, nl cap refill  Pulm: no tachypnea; no resp distress  Abd: soft, no tenderness  Musc: no laceration, no tenderness  Neuro: alert; oriented x 3  Skin: warm, dry; no petichiae  Psych: no anxiety; normal speech        Program Sensory Threshold (mA) Location (P/R/V/O)   1 1.9 P   2 1.4 R   **3 1.3 P   *4 1.7 P   5 1.5 R         Assessment/Plan:     · Chronic Urinary Retention (estab,improved): She continues to have an excellent response to SNM for NOUR with cath PVR (about every 2 weeks) never more than 60 mL. PVR today last visit was 8 mL. However, she notes recent urgency incontinence, a new finding. We will try adding a low-dose OAB medication at night to see if this will improve.                          This service was not originating from a related E/M service provided within the previous 7 days nor will  to an E/M service or procedure within the next 24 hours or my soonest available appointment.  Prevailing standard of care was able to be met in this audio-only visit.

## 2022-06-13 ENCOUNTER — TELEPHONE (OUTPATIENT)
Dept: UROLOGY | Facility: CLINIC | Age: 72
End: 2022-06-13
Payer: MEDICARE

## 2022-06-13 NOTE — TELEPHONE ENCOUNTER
Meds were delivered today. Pt is going to take 10 mg of Ditropan for a couple of nights, and see if she has better results.

## 2022-06-13 NOTE — TELEPHONE ENCOUNTER
----- Message from Vee Peck sent at 6/13/2022  8:09 AM CDT -----  Jing Garlington calling regarding oxybutynin (DITROPAN-XL) 5 MG TR24.  She state the medication is not helping.  She only have 1 pill left.  she stated she is willing to try to up the dosage or quantity , if that is recommended.  She will need the refill. call back [phone].       [facility] [address] [address]  Phone: [phone] Fax: [phone]

## 2022-06-27 ENCOUNTER — TELEPHONE (OUTPATIENT)
Dept: UROLOGY | Facility: CLINIC | Age: 72
End: 2022-06-27
Payer: MEDICARE

## 2022-06-27 RX ORDER — OXYBUTYNIN CHLORIDE 10 MG/1
10 TABLET, EXTENDED RELEASE ORAL DAILY
Qty: 30 TABLET | Refills: 11 | Status: SHIPPED | OUTPATIENT
Start: 2022-06-27 | End: 2023-06-27

## 2022-06-27 NOTE — TELEPHONE ENCOUNTER
----- Message from Toby Neff sent at 6/27/2022  8:03 AM CDT -----  Contact: Pt  Pt's calling to inform staff that she needs a new prescription for toxybutynin (DITROPAN-XL) 5 MG TR2... Pt spoke with Gale in reference to getting with  about taking rx twice a day..      [facility] - [address] [address]  Phone: [phone] Fax: [phone]

## 2022-07-15 ENCOUNTER — TELEPHONE (OUTPATIENT)
Dept: UROLOGY | Facility: CLINIC | Age: 72
End: 2022-07-15
Payer: MEDICARE

## 2022-07-15 NOTE — TELEPHONE ENCOUNTER
"Spoke with pt and her , concerned that she has had a uti, tx'd "3-4 days on amoxicillin, then  extended it for another 10 days. Is getting 4-6 oz when she catheterizes, vs usual 1-2 oz."  worried that she will become septic, and end up in the hospital. Has been adjusting her program on the interstim, but is asking for guidance with this. Message sent to Dr Browning, as he is off today.  "

## 2022-07-15 NOTE — TELEPHONE ENCOUNTER
----- Message from Karina Alvarez RN sent at 7/14/2022  5:24 PM CDT -----    ----- Message -----  From: Vee Peck  Sent: 7/14/2022   8:14 AM CDT  To: Nam Douglas Staff    Jing Garlington calling regarding Patient Advice (message) for 6 unit urine in cath and pt have a UTI.   call back [phone]

## 2022-07-15 NOTE — TELEPHONE ENCOUNTER
----- Message from Karina Alvarez RN sent at 7/14/2022  5:23 PM CDT -----  Regarding: FW: urgent call bk about a back-up with catheter    ----- Message -----  From: Nitesh Hernandez  Sent: 7/14/2022   1:43 PM CDT  To: Nam Douglas Staff  Subject: urgent call bk about a back-up with catheter         The Pt states that she has an urgent situation as her catheter about it backing up and the last time she ended up in the ED and went into the coma.    # [phone]

## 2024-03-12 DIAGNOSIS — E05.80 SECONDARY HYPERTHYROIDISM: ICD-10-CM

## 2024-03-12 DIAGNOSIS — N17.1 ACUTE RENAL FAILURE WITH ACUTE RENAL CORTICAL NECROSIS SUPERIMPOSED ON STAGE 3 CHRONIC KIDNEY DISEASE, UNSPECIFIED WHETHER STAGE 3A OR 3B CKD: ICD-10-CM

## 2024-03-12 DIAGNOSIS — N18.30 ACUTE RENAL FAILURE WITH ACUTE RENAL CORTICAL NECROSIS SUPERIMPOSED ON STAGE 3 CHRONIC KIDNEY DISEASE, UNSPECIFIED WHETHER STAGE 3A OR 3B CKD: ICD-10-CM

## 2024-03-12 DIAGNOSIS — R80.9 PROTEINURIA: Primary | ICD-10-CM

## 2024-03-20 DIAGNOSIS — N18.30 STAGE 3 CHRONIC KIDNEY DISEASE, UNSPECIFIED WHETHER STAGE 3A OR 3B CKD: Primary | ICD-10-CM

## 2024-03-27 ENCOUNTER — OFFICE VISIT (OUTPATIENT)
Dept: NEPHROLOGY | Facility: CLINIC | Age: 74
End: 2024-03-27
Payer: MEDICARE

## 2024-03-27 VITALS
HEIGHT: 65 IN | WEIGHT: 222 LBS | DIASTOLIC BLOOD PRESSURE: 54 MMHG | SYSTOLIC BLOOD PRESSURE: 146 MMHG | BODY MASS INDEX: 36.99 KG/M2 | OXYGEN SATURATION: 97 % | RESPIRATION RATE: 20 BRPM | HEART RATE: 68 BPM | TEMPERATURE: 98 F

## 2024-03-27 DIAGNOSIS — R80.9 PROTEINURIA: ICD-10-CM

## 2024-03-27 DIAGNOSIS — R80.9 PROTEINURIA, UNSPECIFIED TYPE: ICD-10-CM

## 2024-03-27 DIAGNOSIS — N18.30 STAGE 3 CHRONIC KIDNEY DISEASE, UNSPECIFIED WHETHER STAGE 3A OR 3B CKD: Primary | ICD-10-CM

## 2024-03-27 DIAGNOSIS — Z94.0 KIDNEY TRANSPLANTED: ICD-10-CM

## 2024-03-27 DIAGNOSIS — E05.80 SECONDARY HYPERTHYROIDISM: ICD-10-CM

## 2024-03-27 DIAGNOSIS — N17.1 ACUTE RENAL FAILURE WITH ACUTE RENAL CORTICAL NECROSIS SUPERIMPOSED ON STAGE 3 CHRONIC KIDNEY DISEASE, UNSPECIFIED WHETHER STAGE 3A OR 3B CKD: ICD-10-CM

## 2024-03-27 DIAGNOSIS — N18.30 ACUTE RENAL FAILURE WITH ACUTE RENAL CORTICAL NECROSIS SUPERIMPOSED ON STAGE 3 CHRONIC KIDNEY DISEASE, UNSPECIFIED WHETHER STAGE 3A OR 3B CKD: ICD-10-CM

## 2024-03-27 PROCEDURE — 99204 OFFICE O/P NEW MOD 45 MIN: CPT | Mod: S$GLB,,, | Performed by: INTERNAL MEDICINE

## 2024-03-27 PROCEDURE — 3288F FALL RISK ASSESSMENT DOCD: CPT | Mod: CPTII,S$GLB,, | Performed by: INTERNAL MEDICINE

## 2024-03-27 PROCEDURE — 1125F AMNT PAIN NOTED PAIN PRSNT: CPT | Mod: CPTII,S$GLB,, | Performed by: INTERNAL MEDICINE

## 2024-03-27 PROCEDURE — 99999 PR PBB SHADOW E&M-EST. PATIENT-LVL IV: CPT | Mod: PBBFAC,,, | Performed by: INTERNAL MEDICINE

## 2024-03-27 PROCEDURE — 3078F DIAST BP <80 MM HG: CPT | Mod: CPTII,S$GLB,, | Performed by: INTERNAL MEDICINE

## 2024-03-27 PROCEDURE — 1101F PT FALLS ASSESS-DOCD LE1/YR: CPT | Mod: CPTII,S$GLB,, | Performed by: INTERNAL MEDICINE

## 2024-03-27 PROCEDURE — 1159F MED LIST DOCD IN RCRD: CPT | Mod: CPTII,S$GLB,, | Performed by: INTERNAL MEDICINE

## 2024-03-27 PROCEDURE — 3077F SYST BP >= 140 MM HG: CPT | Mod: CPTII,S$GLB,, | Performed by: INTERNAL MEDICINE

## 2024-03-27 RX ORDER — ONDANSETRON 8 MG/1
TABLET, ORALLY DISINTEGRATING ORAL
COMMUNITY
Start: 2024-01-19

## 2024-03-27 RX ORDER — LINACLOTIDE 72 UG/1
72 CAPSULE, GELATIN COATED ORAL EVERY MORNING
COMMUNITY
Start: 2024-02-28

## 2024-03-27 RX ORDER — SULFAMETHOXAZOLE AND TRIMETHOPRIM 800; 160 MG/1; MG/1
60 TABLET ORAL
COMMUNITY

## 2024-03-27 RX ORDER — FLUTICASONE PROPIONATE 50 MCG
SPRAY, SUSPENSION (ML) NASAL
COMMUNITY
Start: 2024-02-06

## 2024-03-27 RX ORDER — LISINOPRIL 5 MG/1
5 TABLET ORAL
COMMUNITY

## 2024-03-27 RX ORDER — GABAPENTIN 300 MG/1
300 CAPSULE ORAL 3 TIMES DAILY
COMMUNITY
Start: 2024-03-04

## 2024-03-27 RX ORDER — CEPHALEXIN 250 MG/1
250 CAPSULE ORAL
COMMUNITY
Start: 2024-03-19

## 2024-03-27 RX ORDER — BUSPIRONE HYDROCHLORIDE 5 MG/1
5 TABLET ORAL 2 TIMES DAILY
COMMUNITY

## 2024-03-27 RX ORDER — NYSTATIN 100000 U/G
OINTMENT TOPICAL
COMMUNITY
Start: 2024-02-21

## 2024-03-27 RX ORDER — METOPROLOL TARTRATE 50 MG/1
50 TABLET ORAL 2 TIMES DAILY
COMMUNITY
Start: 2024-02-28

## 2024-03-27 RX ORDER — DICLOFENAC SODIUM 10 MG/G
GEL TOPICAL
COMMUNITY
Start: 2024-01-19

## 2024-03-27 RX ORDER — IBUPROFEN 100 MG/5ML
1000 SUSPENSION, ORAL (FINAL DOSE FORM) ORAL
COMMUNITY

## 2024-03-27 RX ORDER — ZINC GLUCONATE 50 MG
50 TABLET ORAL
COMMUNITY

## 2024-03-27 NOTE — PROGRESS NOTES
[facility] New Referral Office Note    HPI  Jing Philippe, 74 y.o. female, presents to office as a new patient . Patient with history of systemic amyloidosis status post renal transplant 2011. She has hepatitis-B she is maintained on Epivir. 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    Amyloidosis, unspecified 1998    s/p chemotherapy x 4 occassions  (IV x3, po x1)    Anticoagulant long-term use     Benign hypertension with end-stage renal disease     Candida vaginitis 1/16/2015    Chronic asthma     CKD (chronic kidney disease) stage 3, GFR 30-59 ml/min 9/14/2012    GERD (gastroesophageal reflux disease)     Hyperlipidemia     Lymphedema     LLE    Need for prophylactic immunotherapy     Obesity     Osteoarthritis of left knee s/p total knee replacement 1/16/2015    Surgery October 23, 2014    PONV (postoperative nausea and vomiting)     Renal disease due to hypertension 12/14/2012    Secondary hyperparathyroidism of renal origin     Vertigo      Patient Active Problem List   Diagnosis    S/P living-donor kidney transplantation - 12/13/11    Secondary hyperparathyroidism of renal origin    Hyperlipidemia    Neuropathy    GERD (gastroesophageal reflux disease)    Chronic asthma    Chronic immunosuppression with Prograf and Cellcept    Amyloidosis, unspecified    Obesity    Hypophosphatemia    Hypertension, renal    Renal amyloidosis    Osteoarthritis of left knee s/p  total knee replacement    Candida vaginitis    CKD (chronic kidney disease) stage 3, GFR 30-59 ml/min    Multiple myeloma not having achieved remission    Acute osteomyelitis of ankle or foot    Cellulitis of left leg    Urinary retention       Surgical History:   Past Surgical History:   Procedure Laterality Date    AV FISTULA PLACEMENT      CHOLECYSTECTOMY      HYSTERECTOMY      IMPLANTATION OF PERMANENT SACRAL NERVE STIMULATOR N/A 12/24/2019    Procedure: INSERTION, NEUROSTIMULATOR, PERMANENT, SACRAL;  Surgeon: Misael Browning MD;  Location: [facility] OR [address] [place] [address];  Service: Urology;  Laterality: N/A;  1hr  Ivania Oleg, [employer] rep confirmed    KIDNEY TRANSPLANT         Family History:   Family History   Problem Relation Age of Onset    Hypertension Mother     Diabetes Mother     Hypertension Father     Kidney disease Grandchild         recurrent infections, uncertain details       Social History:   Social History     Tobacco Use    Smoking status: Never    Smokeless tobacco: Never   Substance Use Topics    Alcohol use: Yes     Comment: rare margartia       Allergies:  Review of patient's allergies indicates:   Allergen Reactions    Codeine      Other reaction(s): Vomiting  Other reaction(s): Nausea    Hydrocodone      Other reaction(s): Vomiting  Other reaction(s): Nausea       Medications:    Current Outpatient Medications:     ARMOUR THYROID 60 mg Tab, Take 60 mg by mouth., Disp: , Rfl:     ascorbic acid, vitamin C, (VITAMIN C) 1000 MG tablet, Take 1,000 mg by mouth., Disp: , Rfl:     BIOTIN ORAL, Take 1 tablet by mouth once daily., Disp: , Rfl:     busPIRone (BUSPAR) 5 MG Tab, Take 5 mg by mouth 2 (two) times daily., Disp: , Rfl:     calcium carbonate-vitamin D3 600 mg (1,500 mg)-800 unit Chew, Take 1 tablet by mouth once daily., Disp: , Rfl:     cephALEXin (KEFLEX) 250 MG capsule, Take 250 mg by mouth., Disp: , Rfl:     diclofenac sodium (VOLTAREN) 1 % Gel, Apply topically., Disp: , Rfl:     fluticasone  propionate (FLONASE) 50 mcg/actuation nasal spray, by Each Nostril route., Disp: , Rfl:     gabapentin (NEURONTIN) 300 MG capsule, Take 300 mg by mouth 3 (three) times daily., Disp: , Rfl:     k phos di & mono-sod phos mono (K-PHOS-NEUTRAL) 250 mg Tab, Take 2 tablets by mouth Three times a day., Disp: , Rfl:     LINZESS 72 mcg Cap capsule, Take 72 mcg by mouth every morning., Disp: , Rfl:     lisinopriL (PRINIVIL,ZESTRIL) 5 MG tablet, Take 5 mg by mouth., Disp: , Rfl:     meclizine (ANTIVERT) 25 mg tablet, Take 1 tablet by mouth Daily., Disp: , Rfl:     metoprolol tartrate (LOPRESSOR) 50 MG tablet, Take 50 mg by mouth 2 (two) times daily., Disp: , Rfl:     nifedipine (PROCARDIA-XL) 60 MG (OSM) 24 hr tablet, Take 60 mg by mouth once daily., Disp: , Rfl:     nystatin (MYCOSTATIN) ointment, Apply topically., Disp: , Rfl:     ondansetron (ZOFRAN-ODT) 8 MG TbDL, Take by mouth., Disp: , Rfl:     tacrolimus (PROGRAF) 1 MG Cap, TAKE 3 CAPSULES TWICE DAILY, Disp: 180 capsule, Rfl: 11    tramadol (ULTRAM) 50 mg tablet, Take 50 mg by mouth 2 (two) times daily. , Disp: , Rfl:     zinc gluconate 50 mg tablet, Take 50 mg by mouth., Disp: , Rfl:     zolpidem (AMBIEN) 5 MG Tab, Take 5 mg by mouth nightly as needed., Disp: , Rfl:     albuterol (PROVENTIL HFA/VENTOLIN HFA) 200 puff inhaler, Inhale 1 puff into the lungs PRN., Disp: , Rfl:     aspirin 81 MG chewable tablet, Take 1 tablet by mouth Daily., Disp: , Rfl:     lamivudine (EPIVIR) 100 MG Tab, TAKE 1 TABLET DAILY. (Patient not taking: Reported on 3/27/2024), Disp: 30 tablet, Rfl: 11    multivitamin (THERAGRAN) per tablet, Take 1 tablet by mouth Daily., Disp: , Rfl:     oxybutynin (DITROPAN-XL) 10 MG 24 hr tablet, Take 1 tablet (10 mg total) by mouth once daily., Disp: 30 tablet, Rfl: 11    paricalcitol (ZEMPLAR) 2 MCG capsule, Take 2 mcg by mouth once daily., Disp: , Rfl:     sertraline (ZOLOFT) 25 MG tablet, Take 25 mg by mouth once daily., Disp: , Rfl:        Review of  "Systems:    Constitutional: Denies fever, fatigue, generalized weakness  Skin: Denies wounds, no rashes, no itching, no new skin lesions  Respiratory:  Denies cough, shortness of breath, or wheezing  Cardiovascular: Denies chest pain, palpitations,  swelling lower extremities more on the right  Gastrointestional: Denies abdominal pain, nausea, vomiting, diarrhea, or constipation  Genitourinary: Denies dysuria, hematuria, foamy urine, or incontinence; reports able to empty bladder  Musculoskeletal: Denies back or flank pain  Neurological: Denies headaches, dizziness, paresthesias, tremors or focal weakness      Vital Signs:  BP (!) 146/54 (BP Location: Left arm, Patient Position: Sitting, BP Method: Medium (Automatic))   Pulse 68   Temp 98.2 °F (36.8 °C) (Oral)   Resp 20   Ht 5' 5" (1.651 m)   Wt 100.7 kg (222 lb)   SpO2 97%   BMI 36.94 kg/m²   Body mass index is 36.94 kg/m².      Physical Exam:    General: no acute distress, awake, alert  Eyes: conjunctiva clear, eyelids without swelling  HENT: atraumatic, oropharynx and nasal mucosa patent  Neck: supple, trache midline, full ROM, no JVD  Respiratory: equal, unlabored, clear to auscultation A/P  Cardiovascular: RRR without murmur or rub  Edema: +2 bilateral lower extremities more on the right  Gastrointestinal: soft, non-tender, non-distended; positive bowel sounds; no masses to palpation; no ascites  Musculoskeletal: pain right ankle and swelling in bilateral lower extremities  Integumentary: warm, dry; no rashes,   Neurological: oriented x4, appropriate, no acute deficits; no asterixis   Right forearm AV fistula with pseudoaneurysms    Labs:        Component Value Date/Time     01/26/2018 0810     01/05/2012 0740    K 4.0 01/26/2018 0810    K 4.8 01/05/2012 0740     01/26/2018 0810     01/05/2012 0740    CO2 27 01/26/2018 0810    CO2 22 (L) 01/05/2012 0740    BUN 19 01/26/2018 0810    BUN 13 01/05/2012 0740    CREATININE 1.2 " 01/26/2018 0810    CREATININE 1.0 01/05/2012 0740    CREATININE 1.2 01/03/2012 0755    CREATININE 1.1 12/29/2011 0735    CALCIUM 8.6 (L) 01/26/2018 0810    CALCIUM 10.7 (H) 01/05/2012 0740    PHOS 2.9 01/26/2018 0810    PHOS 2.1 (L) 01/05/2012 0740    PTH 1,534 (H) 12/14/2011 0606           Component Value Date/Time    WBC 9.44 01/26/2018 0810    WBC 4.37 (L) 01/05/2012 0740    HGB 12.3 01/26/2018 0810    HGB 11.5 (L) 01/05/2012 0740    HCT 38.2 01/26/2018 0810    HCT 34.9 (L) 01/05/2012 0740     01/26/2018 0810     01/05/2012 0740         Imaging:  Retroperitoneal US:       Looking at labs from media, she has +2 protein in the urine potassium 4.7 creatinine 1.0 albumin 3.0 white count 7000 hematocrit    42. Urine spot protein creatinine suggestive of 1.5 g.  I do not see Prograf level done      Impression:   Amyloidosis   Hep B   Status post renal transplant   CKD 2 with proteinuria   Lymphedema of the lower extremities   Charcot foot   Bladder dysfunction    Plan:   Okay for surgery from renal   Okay to DC aspirin before and after the procedure   Low-sodium diet advised   Repeat labs with Prograf level and urine spot protein creatinine in 1 month   If stable labs and follow-up visit in 3 months    Patient to call our office with any concerns prior to next appointment.    Christ Barnhart      This note was created with the assistance of [employer] voice recognition software or phone dictation. There may be transcription errors as a result of using this technology however minimal. Effort has been made to assure accuracy of transcription but any obvious errors or omissions should be clarified with the author of the document.

## 2024-05-13 RX ORDER — TACROLIMUS 1 MG/1
3 CAPSULE ORAL EVERY 12 HOURS
Qty: 180 CAPSULE | Refills: 11 | Status: SHIPPED | OUTPATIENT
Start: 2024-05-13

## 2024-06-11 ENCOUNTER — TELEPHONE (OUTPATIENT)
Dept: NEPHROLOGY | Facility: CLINIC | Age: 74
End: 2024-06-11
Payer: MEDICARE

## 2024-06-11 NOTE — TELEPHONE ENCOUNTER
Informed patient that in May a script with 11 refills were sent to pharmacy. Informed patient to give pharmacy a call to have it refilled. Verbalized understanding.

## 2024-07-11 ENCOUNTER — OFFICE VISIT (OUTPATIENT)
Dept: NEPHROLOGY | Facility: CLINIC | Age: 74
End: 2024-07-11
Payer: MEDICARE

## 2024-07-11 VITALS
RESPIRATION RATE: 20 BRPM | SYSTOLIC BLOOD PRESSURE: 149 MMHG | BODY MASS INDEX: 36.15 KG/M2 | HEART RATE: 63 BPM | TEMPERATURE: 98 F | WEIGHT: 217 LBS | DIASTOLIC BLOOD PRESSURE: 64 MMHG | HEIGHT: 65 IN | OXYGEN SATURATION: 99 %

## 2024-07-11 DIAGNOSIS — R80.9 PROTEINURIA, UNSPECIFIED TYPE: ICD-10-CM

## 2024-07-11 DIAGNOSIS — Z94.0 KIDNEY TRANSPLANTED: ICD-10-CM

## 2024-07-11 DIAGNOSIS — N18.30 STAGE 3 CHRONIC KIDNEY DISEASE, UNSPECIFIED WHETHER STAGE 3A OR 3B CKD: Primary | ICD-10-CM

## 2024-07-11 DIAGNOSIS — N18.30 ACUTE RENAL FAILURE WITH ACUTE RENAL CORTICAL NECROSIS SUPERIMPOSED ON STAGE 3 CHRONIC KIDNEY DISEASE, UNSPECIFIED WHETHER STAGE 3A OR 3B CKD: ICD-10-CM

## 2024-07-11 DIAGNOSIS — N17.1 ACUTE RENAL FAILURE WITH ACUTE RENAL CORTICAL NECROSIS SUPERIMPOSED ON STAGE 3 CHRONIC KIDNEY DISEASE, UNSPECIFIED WHETHER STAGE 3A OR 3B CKD: ICD-10-CM

## 2024-07-11 DIAGNOSIS — E05.80 SECONDARY HYPERTHYROIDISM: ICD-10-CM

## 2024-07-11 PROCEDURE — 99999 PR PBB SHADOW E&M-EST. PATIENT-LVL IV: CPT | Mod: PBBFAC,,, | Performed by: INTERNAL MEDICINE

## 2024-07-11 PROCEDURE — 3078F DIAST BP <80 MM HG: CPT | Mod: CPTII,S$GLB,, | Performed by: INTERNAL MEDICINE

## 2024-07-11 PROCEDURE — 3077F SYST BP >= 140 MM HG: CPT | Mod: CPTII,S$GLB,, | Performed by: INTERNAL MEDICINE

## 2024-07-11 PROCEDURE — 1101F PT FALLS ASSESS-DOCD LE1/YR: CPT | Mod: CPTII,S$GLB,, | Performed by: INTERNAL MEDICINE

## 2024-07-11 PROCEDURE — 1159F MED LIST DOCD IN RCRD: CPT | Mod: CPTII,S$GLB,, | Performed by: INTERNAL MEDICINE

## 2024-07-11 PROCEDURE — 3288F FALL RISK ASSESSMENT DOCD: CPT | Mod: CPTII,S$GLB,, | Performed by: INTERNAL MEDICINE

## 2024-07-11 PROCEDURE — 99213 OFFICE O/P EST LOW 20 MIN: CPT | Mod: S$GLB,,, | Performed by: INTERNAL MEDICINE

## 2024-07-11 PROCEDURE — 1125F AMNT PAIN NOTED PAIN PRSNT: CPT | Mod: CPTII,S$GLB,, | Performed by: INTERNAL MEDICINE

## 2024-07-11 RX ORDER — TACROLIMUS 1 MG/1
CAPSULE ORAL
Qty: 180 CAPSULE | Refills: 11 | Status: SHIPPED | OUTPATIENT
Start: 2024-07-11

## 2024-07-11 RX ORDER — ERGOCALCIFEROL 1.25 MG/1
50000 CAPSULE ORAL
COMMUNITY

## 2024-07-11 RX ORDER — THIAMINE HCL 50 MG
50 TABLET ORAL DAILY
COMMUNITY

## 2024-07-11 NOTE — PROGRESS NOTES
[facility] Ambulatory Progress Note      HPI  Jing Philippe, 74 y.o. female, presents to office for a follow up visit.  Post renal transplant living related in 2011  Complaining of low back pain she sees a specialist in [place] where she is getting injection  Otherwise denies any major complaints  Prograf level less than 2 noted I did increase the dose to 1 mg in the a.m. and 2 mg in the p.m.    Patient denies taking NSAIDs or new antibiotics. Also denies recent episode of dehydration, diarrhea, vomiting, acute illness, hospitalization, recent angiograms or exposure to IV radiocontrast.        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    Amyloidosis, unspecified 1998    s/p chemotherapy x 4 occassions  (IV x3, po x1)    Anticoagulant long-term use     Benign hypertension with end-stage renal disease     Candida vaginitis 1/16/2015    Chronic asthma     CKD (chronic kidney disease) stage 3, GFR 30-59 ml/min 9/14/2012    GERD (gastroesophageal reflux disease)     Hyperlipidemia     Lymphedema     LLE    Need for prophylactic immunotherapy     Obesity     Osteoarthritis of left knee s/p total knee replacement 1/16/2015    Surgery October 23, 2014    PONV (postoperative nausea and vomiting)     Renal disease due to hypertension 12/14/2012    Secondary hyperparathyroidism of renal origin     Vertigo      Patient Active Problem List   Diagnosis    S/P living-donor kidney transplantation - 12/13/11    Secondary hyperparathyroidism of renal origin    Hyperlipidemia    Neuropathy    GERD (gastroesophageal reflux disease)    Chronic asthma    Chronic immunosuppression with Prograf and Cellcept    Amyloidosis, unspecified    Obesity    Hypophosphatemia    Hypertension, renal    Renal amyloidosis    Osteoarthritis of left knee s/p total knee replacement    Candida vaginitis    CKD (chronic kidney disease) stage 3, GFR 30-59  ml/min    Multiple myeloma not having achieved remission    Acute osteomyelitis of ankle or foot    Cellulitis of left leg    Urinary retention       Surgical History:   Past Surgical History:   Procedure Laterality Date    AV FISTULA PLACEMENT      CHOLECYSTECTOMY      HYSTERECTOMY      IMPLANTATION OF PERMANENT SACRAL NERVE STIMULATOR N/A 12/24/2019    Procedure: INSERTION, NEUROSTIMULATOR, PERMANENT, SACRAL;  Surgeon: Misael Browning MD;  Location: [facility] OR [address] [place] [address];  Service: Urology;  Laterality: N/A;  1hr  Ivania Oleg, [employer] rep confirmed    KIDNEY TRANSPLANT      REMOVAL OF ELECTRODE LEAD OF SACRAL NERVE STIMULATOR         Family History:   Family History   Problem Relation Name Age of Onset    Hypertension Mother      Diabetes Mother      Hypertension Father      Kidney disease Grandchild          recurrent infections, uncertain details       Social History:   Social History     Tobacco Use    Smoking status: Never     Passive exposure: Never    Smokeless tobacco: Never   Substance Use Topics    Alcohol use: Yes     Comment: rare margartia       Allergies:  Review of patient's allergies indicates:   Allergen Reactions    Codeine      Other reaction(s): Vomiting  Other reaction(s): Nausea    Hydrocodone      Other reaction(s): Vomiting  Other reaction(s): Nausea       Medications:  Outpatient Medications Marked as Taking for the 7/11/24 encounter (Office Visit) with Christ Barnhart MD   Medication Sig Dispense Refill    albuterol (PROVENTIL HFA/VENTOLIN HFA) 200 puff inhaler Inhale 1 puff into the lungs PRN.      ARMOUR THYROID 60 mg Tab Take 60 mg by mouth before breakfast.      ascorbic acid, vitamin C, (VITAMIN C) 1000 MG tablet Take 1,000 mg by mouth once daily.      busPIRone (BUSPAR) 5 MG Tab Take 5 mg by mouth 2 (two) times daily.      diclofenac sodium (VOLTAREN) 1 % Gel Apply topically.      ergocalciferol (VITAMIN D2) 50,000 unit Cap Take 50,000 Units by mouth every 7 days.      fluticasone  propionate (FLONASE) 50 mcg/actuation nasal spray by Each Nostril route.      gabapentin (NEURONTIN) 300 MG capsule Take 300 mg by mouth 3 (three) times daily.      k phos di & mono-sod phos mono (K-PHOS-NEUTRAL) 250 mg Tab Take 2 tablets by mouth Three times a day.      lamivudine (EPIVIR) 100 MG Tab TAKE 1 TABLET DAILY. 30 tablet 11    lisinopriL (PRINIVIL,ZESTRIL) 5 MG tablet Take 5 mg by mouth once daily.      meclizine (ANTIVERT) 25 mg tablet Take 1 tablet by mouth Daily.      MELATONIN ORAL Take by mouth every evening.      metoprolol tartrate (LOPRESSOR) 50 MG tablet Take 50 mg by mouth 2 (two) times daily.      nifedipine (PROCARDIA-XL) 60 MG (OSM) 24 hr tablet Take 60 mg by mouth once daily.      nystatin (MYCOSTATIN) ointment Apply topically.      ondansetron (ZOFRAN-ODT) 8 MG TbDL Take by mouth as needed.      oxybutynin (DITROPAN-XL) 10 MG 24 hr tablet Take 1 tablet (10 mg total) by mouth once daily. 30 tablet 11    thiamine (VITAMIN B-1) 50 MG tablet Take 50 mg by mouth once daily.      tramadol (ULTRAM) 50 mg tablet Take 50 mg by mouth as needed.      zinc gluconate 50 mg tablet Take 50 mg by mouth once daily.      [DISCONTINUED] tacrolimus (PROGRAF) 1 MG Cap Take 3 capsules (3 mg total) by mouth every 12 (twelve) hours. (Patient taking differently: Take 1 mg by mouth every 12 (twelve) hours.) 180 capsule 11          Review of Systems:    Constitutional: Denies fever, fatigue, generalized weakness  Skin: Denies wounds, no rashes, no itching, no new skin lesions  Respiratory:  Denies cough, shortness of breath, or wheezing  Cardiovascular: Denies chest pain, palpitations, chronic swelling more in the left lower extremity  Gastrointestional: Denies abdominal pain, nausea, vomiting, diarrhea, or constipation  Genitourinary: Denies dysuria, hematuria, foamy urine, or incontinence; reports able to empty bladder  Musculoskeletal:  Positive low back pain  Neurological: Denies headaches, dizziness,  "paresthesias, tremors or focal weakness      Vital Signs:  BP (!) 149/64 (BP Location: Left arm, Patient Position: Sitting)   Pulse 63   Temp 97.5 °F (36.4 °C) (Oral)   Resp 20   Ht 5' 5" (1.651 m)   Wt 98.4 kg (217 lb)   SpO2 99%   BMI 36.11 kg/m²   Body mass index is 36.11 kg/m².      Physical Exam:    General: no acute distress, awake, alert  Eyes: conjunctiva clear, eyelids without swelling  HENT: atraumatic, oropharynx and nasal mucosa patent  Neck: supple, trache midline, no JVD  Respiratory: equal, unlabored, clear to auscultation A/P  Cardiovascular: RRR without rub  Edema:  +1 more on the left lower extremity site of the Charcot foot  Gastrointestinal: soft, non-tender, non-distended; positive bowel sounds; no masses to palpation; no ascites  Genitourinary: no CVA tenderness upon palpation  Musculoskeletal:  Needs a cane for ambulation with a low back pain  Integumentary: warm, dry; no rashes, wounds, or skin lesions  Neurological:  Weakness in lower extremities with ambulates      Labs:        Component Value Date/Time     01/26/2018 0810     01/05/2012 0740    K 4.0 01/26/2018 0810    K 4.8 01/05/2012 0740     01/26/2018 0810     01/05/2012 0740    CO2 27 01/26/2018 0810    CO2 22 (L) 01/05/2012 0740    BUN 19 01/26/2018 0810    BUN 13 01/05/2012 0740    CREATININE 1.2 01/26/2018 0810    CREATININE 1.0 01/05/2012 0740    CREATININE 1.2 01/03/2012 0755    CREATININE 1.1 12/29/2011 0735    CALCIUM 8.6 (L) 01/26/2018 0810    CALCIUM 10.7 (H) 01/05/2012 0740    PHOS 2.9 01/26/2018 0810    PHOS 2.1 (L) 01/05/2012 0740    PTH 1,534 (H) 12/14/2011 0606           Component Value Date/Time    WBC 9.44 01/26/2018 0810    WBC 4.37 (L) 01/05/2012 0740    HGB 12.3 01/26/2018 0810    HGB 11.5 (L) 01/05/2012 0740    HCT 38.2 01/26/2018 0810    HCT 34.9 (L) 01/05/2012 0740     01/26/2018 0810     01/05/2012 0740       Creatinine, Urine   Date Value Ref Range Status "   07/26/2019 94.0 15.0 - 325.0 mg/dL Final     Comment:     The random urine reference ranges provided were established   for 24 hour urine collections.  No reference ranges exist for  random urine specimens.  Correlate clinically.     01/26/2018 104.0 15.0 - 325.0 mg/dL Final     Comment:     The random urine reference ranges provided were established   for 24 hour urine collections.  No reference ranges exist for  random urine specimens.  Correlate clinically.         Protein, Urine   Date Value Ref Range Status   10/21/2010 1187 (H) 0 - 10 mg/dl Final     Protein, Urine Random   Date Value Ref Range Status   07/26/2019 75 (H) 0 - 15 mg/dL Final     Comment:     The random urine reference ranges provided were established   for 24 hour urine collections.  No reference ranges exist for  random urine specimens.  Correlate clinically.     01/26/2018 16 (H) 0 - 15 mg/dL Final     Comment:     The random urine reference ranges provided were established   for 24 hour urine collections.  No reference ranges exist for  random urine specimens.  Correlate clinically.             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 There may be transcription errors as a result of using this technology however minimal. Effort has been made to assure accuracy of transcription but any obvious errors or omissions should be clarified with the author of the document.

## 2024-11-20 ENCOUNTER — OFFICE VISIT (OUTPATIENT)
Dept: NEPHROLOGY | Facility: CLINIC | Age: 74
End: 2024-11-20
Payer: MEDICARE

## 2024-11-20 VITALS
RESPIRATION RATE: 18 BRPM | BODY MASS INDEX: 36.52 KG/M2 | WEIGHT: 219.19 LBS | OXYGEN SATURATION: 98 % | SYSTOLIC BLOOD PRESSURE: 125 MMHG | HEIGHT: 65 IN | DIASTOLIC BLOOD PRESSURE: 68 MMHG | TEMPERATURE: 97 F | HEART RATE: 59 BPM

## 2024-11-20 DIAGNOSIS — E66.01 SEVERE OBESITY (BMI 35.0-39.9) WITH COMORBIDITY: ICD-10-CM

## 2024-11-20 DIAGNOSIS — N18.32 STAGE 3B CHRONIC KIDNEY DISEASE: ICD-10-CM

## 2024-11-20 DIAGNOSIS — R80.9 PROTEINURIA, UNSPECIFIED TYPE: Primary | ICD-10-CM

## 2024-11-20 DIAGNOSIS — Z94.0 KIDNEY TRANSPLANTED: ICD-10-CM

## 2024-11-20 PROCEDURE — 3074F SYST BP LT 130 MM HG: CPT | Mod: CPTII,S$GLB,, | Performed by: NURSE PRACTITIONER

## 2024-11-20 PROCEDURE — 1159F MED LIST DOCD IN RCRD: CPT | Mod: CPTII,S$GLB,, | Performed by: NURSE PRACTITIONER

## 2024-11-20 PROCEDURE — 3078F DIAST BP <80 MM HG: CPT | Mod: CPTII,S$GLB,, | Performed by: NURSE PRACTITIONER

## 2024-11-20 PROCEDURE — 3288F FALL RISK ASSESSMENT DOCD: CPT | Mod: CPTII,S$GLB,, | Performed by: NURSE PRACTITIONER

## 2024-11-20 PROCEDURE — 1100F PTFALLS ASSESS-DOCD GE2>/YR: CPT | Mod: CPTII,S$GLB,, | Performed by: NURSE PRACTITIONER

## 2024-11-20 PROCEDURE — 99214 OFFICE O/P EST MOD 30 MIN: CPT | Mod: S$GLB,,, | Performed by: NURSE PRACTITIONER

## 2024-11-20 PROCEDURE — 99999 PR PBB SHADOW E&M-EST. PATIENT-LVL V: CPT | Mod: PBBFAC,,, | Performed by: NURSE PRACTITIONER

## 2024-11-20 PROCEDURE — 1125F AMNT PAIN NOTED PAIN PRSNT: CPT | Mod: CPTII,S$GLB,, | Performed by: NURSE PRACTITIONER

## 2024-11-20 RX ORDER — CEPHALEXIN 250 MG/1
250 CAPSULE ORAL
COMMUNITY
Start: 2024-10-15

## 2024-11-20 RX ORDER — PANTOPRAZOLE SODIUM 40 MG/1
40 TABLET, DELAYED RELEASE ORAL CONTINUOUS PRN
COMMUNITY
Start: 2024-10-25

## 2024-11-20 RX ORDER — NITROFURANTOIN MACROCRYSTALS 50 MG/1
50 CAPSULE ORAL
COMMUNITY
Start: 2024-11-13

## 2024-11-20 NOTE — PROGRESS NOTES
[facility]      HPI  Jing Philippe, 74 y.o. female, presents to office for a follow up visit.  History of living related renal transplant in 2011.  She reports bladder stimulator was reimplanted recently.  She has been able to decrease her number of catheterizations from 3 times a day to 1.  She has no complaints and otherwise doing well.  She is still maintained on Prograf 1 mg in the a.m. and 2 mg at p.m.    Patient denies taking NSAIDs or new antibiotics. Also denies recent episode of dehydration, diarrhea, vomiting, acute illness, hospitalization, recent angiograms or exposure to IV radiocontrast.        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    Amyloidosis, unspecified 1998    s/p chemotherapy x 4 occassions  (IV x3, po x1)    Anticoagulant long-term use     Benign hypertension with end-stage renal disease     Candida vaginitis 1/16/2015    Chronic asthma     CKD (chronic kidney disease) stage 3, GFR 30-59 ml/min 9/14/2012    GERD (gastroesophageal reflux disease)     Hyperlipidemia     Lymphedema     LLE    Need for prophylactic immunotherapy     Obesity     Osteoarthritis of left knee s/p total knee replacement 1/16/2015    Surgery October 23, 2014    PONV (postoperative nausea and vomiting)     Renal disease due to hypertension 12/14/2012    Secondary hyperparathyroidism of renal origin     Vertigo      Patient Active Problem List   Diagnosis    S/P living-donor kidney transplantation - 12/13/11    Secondary hyperparathyroidism of renal origin    Hyperlipidemia    Neuropathy    GERD (gastroesophageal reflux disease)    Chronic asthma    Chronic immunosuppression with Prograf and Cellcept    Amyloidosis, unspecified    Obesity    Hypophosphatemia    Hypertension, renal    Renal amyloidosis    Osteoarthritis of left knee s/p total knee replacement    Candida vaginitis    CKD  (chronic kidney disease) stage 3, GFR 30-59 ml/min    Multiple myeloma not having achieved remission    Acute osteomyelitis of ankle or foot    Cellulitis of left leg    Urinary retention       Surgical History:   Past Surgical History:   Procedure Laterality Date    AV FISTULA PLACEMENT      CHOLECYSTECTOMY      HYSTERECTOMY      IMPLANTATION OF PERMANENT SACRAL NERVE STIMULATOR N/A 12/24/2019    Procedure: INSERTION, NEUROSTIMULATOR, PERMANENT, SACRAL;  Surgeon: Misael Browning MD;  Location: [facility] OR [address] [place] [address];  Service: Urology;  Laterality: N/A;  1hr  opal [employer] rep confirmed    KIDNEY TRANSPLANT      REMOVAL OF ELECTRODE LEAD OF SACRAL NERVE STIMULATOR         Family History:   Family History   Problem Relation Name Age of Onset    Hypertension Mother      Diabetes Mother      Hypertension Father      Kidney disease Grandchild          recurrent infections, uncertain details       Social History:   Social History     Tobacco Use    Smoking status: Never     Passive exposure: Never    Smokeless tobacco: Never   Substance Use Topics    Alcohol use: Yes     Comment: rare luceroia       Allergies:  Review of patient's allergies indicates:   Allergen Reactions    Codeine      Other reaction(s): Vomiting  Other reaction(s): Nausea    Hydrocodone      Other reaction(s): Vomiting  Other reaction(s): Nausea       Medications:  Outpatient Medications Marked as Taking for the 11/20/24 encounter (Office Visit) with Ronna Ocampo ACNP   Medication Sig Dispense Refill    albuterol (PROVENTIL HFA/VENTOLIN HFA) 200 puff inhaler Inhale 1 puff into the lungs PRN.      ARMOUR THYROID 60 mg Tab Take 60 mg by mouth before breakfast.      ascorbic acid, vitamin C, (VITAMIN C) 1000 MG tablet Take 1,000 mg by mouth once daily.      busPIRone (BUSPAR) 5 MG Tab Take 5 mg by mouth 2 (two) times daily.      diclofenac sodium (VOLTAREN) 1 % Gel Apply topically.      ergocalciferol (VITAMIN D2) 50,000 unit Cap Take  50,000 Units by mouth every 7 days.      fluticasone propionate (FLONASE) 50 mcg/actuation nasal spray by Each Nostril route.      gabapentin (NEURONTIN) 300 MG capsule Take 300 mg by mouth 3 (three) times daily.      k phos di & mono-sod phos mono (K-PHOS-NEUTRAL) 250 mg Tab Take 2 tablets by mouth Three times a day.      lamivudine (EPIVIR) 100 MG Tab TAKE 1 TABLET DAILY. 30 tablet 11    lisinopriL (PRINIVIL,ZESTRIL) 5 MG tablet Take 5 mg by mouth once daily.      meclizine (ANTIVERT) 25 mg tablet Take 1 tablet by mouth Daily.      MELATONIN ORAL Take by mouth every evening.      metoprolol tartrate (LOPRESSOR) 50 MG tablet Take 50 mg by mouth 2 (two) times daily.      nifedipine (PROCARDIA-XL) 60 MG (OSM) 24 hr tablet Take 60 mg by mouth once daily.      nystatin (MYCOSTATIN) ointment Apply topically.      ondansetron (ZOFRAN-ODT) 8 MG TbDL Take by mouth as needed.      tacrolimus (PROGRAF) 1 MG Cap Take 1 capsule (1 mg total) by mouth every morning AND 2 capsules (2 mg total) every evening. 180 capsule 11    thiamine (VITAMIN B-1) 50 MG tablet Take 50 mg by mouth once daily.      tramadol (ULTRAM) 50 mg tablet Take 50 mg by mouth as needed.      zinc gluconate 50 mg tablet Take 50 mg by mouth once daily.            Review of Systems:    Constitutional: Denies fever, fatigue, generalized weakness  Skin: Denies wounds, no rashes, no itching, no new skin lesions  Respiratory:  Denies cough, shortness of breath, or wheezing  Cardiovascular: Denies chest pain, palpitations, chronic swelling more in the left lower extremity  Gastrointestional: Denies abdominal pain, nausea, vomiting, diarrhea, or constipation  Genitourinary: Denies dysuria, hematuria, foamy urine, or incontinence; reports able to empty bladder  Musculoskeletal:  Positive low back pain  Neurological: Denies headaches, dizziness, paresthesias, tremors or focal weakness      Vital Signs:  /68 (BP Location: Left arm, Patient Position: Sitting)    "Pulse (!) 59   Temp 97.4 °F (36.3 °C) (Oral)   Resp 18   Ht 5' 5" (1.651 m)   Wt 99.4 kg (219 lb 3.2 oz)   SpO2 98%   BMI 36.48 kg/m²   Body mass index is 36.48 kg/m².      Physical Exam:    General: no acute distress, awake, alert  Eyes: conjunctiva clear, eyelids without swelling  HENT: atraumatic, oropharynx and nasal mucosa patent  Neck: supple, trache midline, no JVD  Respiratory: equal, unlabored, clear to auscultation A/P  Cardiovascular: RRR without rub  Edema:  +1 more on the left lower extremity site of the Charcot foot  Gastrointestinal: soft, non-tender, non-distended; positive bowel sounds; no masses to palpation; no ascites  Genitourinary: no CVA tenderness upon palpation  Musculoskeletal:  Needs a cane for ambulation with a low back pain  Integumentary: warm, dry; no rashes, wounds, or skin lesions  Neurological:  Weakness in lower extremities with ambulates      Labs:        Component Value Date/Time     01/26/2018 0810     01/05/2012 0740    K 4.0 01/26/2018 0810    K 4.8 01/05/2012 0740     01/26/2018 0810     01/05/2012 0740    CO2 27 01/26/2018 0810    CO2 22 (L) 01/05/2012 0740    BUN 19 01/26/2018 0810    BUN 13 01/05/2012 0740    CREATININE 1.2 01/26/2018 0810    CREATININE 1.0 01/05/2012 0740    CREATININE 1.2 01/03/2012 0755    CREATININE 1.1 12/29/2011 0735    CALCIUM 8.6 (L) 01/26/2018 0810    CALCIUM 10.7 (H) 01/05/2012 0740    PHOS 2.9 01/26/2018 0810    PHOS 2.1 (L) 01/05/2012 0740    PTH 1,534 (H) 12/14/2011 0606           Component Value Date/Time    WBC 9.44 01/26/2018 0810    WBC 4.37 (L) 01/05/2012 0740    HGB 12.3 01/26/2018 0810    HGB 11.5 (L) 01/05/2012 0740    HCT 38.2 01/26/2018 0810    HCT 34.9 (L) 01/05/2012 0740     01/26/2018 0810     01/05/2012 0740       Creatinine, Urine   Date Value Ref Range Status   07/26/2019 94.0 15.0 - 325.0 mg/dL Final     Comment:     The random urine reference ranges provided were established   for " 24 hour urine collections.  No reference ranges exist for  random urine specimens.  Correlate clinically.     01/26/2018 104.0 15.0 - 325.0 mg/dL Final     Comment:     The random urine reference ranges provided were established   for 24 hour urine collections.  No reference ranges exist for  random urine specimens.  Correlate clinically.         Protein, Urine   Date Value Ref Range Status   10/21/2010 1187 (H) 0 - 10 mg/dl Final     Protein, Urine Random   Date Value Ref Range Status   07/26/2019 75 (H) 0 - 15 mg/dL Final     Comment:     The random urine reference ranges provided were established   for 24 hour urine collections.  No reference ranges exist for  random urine specimens.  Correlate clinically.     01/26/2018 16 (H) 0 - 15 mg/dL Final     Comment:     The random urine reference ranges provided were established   for 24 hour urine collections.  No reference ranges exist for  random urine specimens.  Correlate clinically.             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   Low sodium diet.   Low K diet.   Continue current antihypertensive regimen.   Decrease Ergocalciferol to 50,000 every 2 weeks.   Repeat SPEP/JOSEPHINE; prograf level; and urine protein to creatinine ratio in 1 month.     if stable labs and follow-up visit in 3 months          Ronna Ocampo [facility]        This note was created with the assistance of [employer] voice recognition software or phone dictation. There may be transcription errors as a result of using this technology however minimal. Effort has been made to assure accuracy of transcription but any obvious errors or omissions should be clarified with the author of the document.

## 2025-02-10 ENCOUNTER — TELEPHONE (OUTPATIENT)
Dept: NEPHROLOGY | Facility: CLINIC | Age: 75
End: 2025-02-10

## 2025-02-26 RX ORDER — SODIUM CHLORIDE 9 MG/ML
INJECTION, SOLUTION INTRAVENOUS
COMMUNITY
Start: 2024-10-25

## 2025-02-26 RX ORDER — PREGABALIN 150 MG/1
150 CAPSULE ORAL 2 TIMES DAILY
COMMUNITY
Start: 2025-01-14

## 2025-02-26 RX ORDER — CIPROFLOXACIN 500 MG/1
500 TABLET ORAL 2 TIMES DAILY
COMMUNITY
Start: 2025-01-20

## 2025-02-26 RX ORDER — HEPARIN SODIUM,PORCINE/PF 10 UNIT/ML
SYRINGE (ML) INTRAVENOUS
COMMUNITY
Start: 2024-10-25

## 2025-02-27 ENCOUNTER — OFFICE VISIT (OUTPATIENT)
Dept: NEPHROLOGY | Facility: CLINIC | Age: 75
End: 2025-02-27
Payer: MEDICARE

## 2025-02-27 VITALS
RESPIRATION RATE: 18 BRPM | BODY MASS INDEX: 39.49 KG/M2 | OXYGEN SATURATION: 98 % | HEIGHT: 65 IN | SYSTOLIC BLOOD PRESSURE: 134 MMHG | HEART RATE: 63 BPM | DIASTOLIC BLOOD PRESSURE: 78 MMHG | WEIGHT: 237 LBS

## 2025-02-27 DIAGNOSIS — N18.32 STAGE 3B CHRONIC KIDNEY DISEASE: ICD-10-CM

## 2025-02-27 DIAGNOSIS — Z94.0 KIDNEY TRANSPLANTED: ICD-10-CM

## 2025-02-27 DIAGNOSIS — E66.01 SEVERE OBESITY (BMI 35.0-39.9) WITH COMORBIDITY: ICD-10-CM

## 2025-02-27 DIAGNOSIS — R80.9 PROTEINURIA, UNSPECIFIED TYPE: Primary | ICD-10-CM

## 2025-02-27 PROCEDURE — 99999 PR PBB SHADOW E&M-EST. PATIENT-LVL III: CPT | Mod: PBBFAC,,, | Performed by: INTERNAL MEDICINE

## 2025-02-27 PROCEDURE — 3078F DIAST BP <80 MM HG: CPT | Mod: CPTII,S$GLB,, | Performed by: INTERNAL MEDICINE

## 2025-02-27 PROCEDURE — 1101F PT FALLS ASSESS-DOCD LE1/YR: CPT | Mod: CPTII,S$GLB,, | Performed by: INTERNAL MEDICINE

## 2025-02-27 PROCEDURE — 3288F FALL RISK ASSESSMENT DOCD: CPT | Mod: CPTII,S$GLB,, | Performed by: INTERNAL MEDICINE

## 2025-02-27 PROCEDURE — 3075F SYST BP GE 130 - 139MM HG: CPT | Mod: CPTII,S$GLB,, | Performed by: INTERNAL MEDICINE

## 2025-02-27 PROCEDURE — 1159F MED LIST DOCD IN RCRD: CPT | Mod: CPTII,S$GLB,, | Performed by: INTERNAL MEDICINE

## 2025-02-27 PROCEDURE — 99214 OFFICE O/P EST MOD 30 MIN: CPT | Mod: S$GLB,,, | Performed by: INTERNAL MEDICINE

## 2025-02-27 RX ORDER — LISINOPRIL 5 MG/1
5 TABLET ORAL DAILY
Qty: 30 TABLET | Refills: 3 | Status: SHIPPED | OUTPATIENT
Start: 2025-02-27 | End: 2025-04-28

## 2025-02-27 RX ORDER — TORSEMIDE 20 MG/1
20 TABLET ORAL DAILY
Qty: 30 TABLET | Refills: 11 | Status: SHIPPED | OUTPATIENT
Start: 2025-02-27 | End: 2026-02-27

## 2025-02-27 NOTE — PROGRESS NOTES
[facility] Progress Note      HPI  Jing Philippe, 75 y.o. female, presents to office for a follow up visit for CKD 3 status post renal transplant  Patient also has amyloidosis monoclonal gammopathy  Recently she has been having worsening lower extremity edema  He is also being treated for UTI  Except for generalized weakness and  symptoms she has no major complaints    Patient denies taking NSAIDs or new antibiotics, recent episode of dehydration, diarrhea, vomiting, acute illness, hospitalization, recent angiograms or exposure to IV radiocontrast.        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    Amyloidosis, unspecified 1998    s/p chemotherapy x 4 occassions  (IV x3, po x1)    Anticoagulant long-term use     Benign hypertension with end-stage renal disease     Candida vaginitis 1/16/2015    Chronic asthma     CKD (chronic kidney disease) stage 3, GFR 30-59 ml/min 9/14/2012    GERD (gastroesophageal reflux disease)     Hyperlipidemia     Lymphedema     LLE    Need for prophylactic immunotherapy     Obesity     Osteoarthritis of left knee s/p total knee replacement 1/16/2015    Surgery October 23, 2014    PONV (postoperative nausea and vomiting)     Renal disease due to hypertension 12/14/2012    Secondary hyperparathyroidism of renal origin     Vertigo      Problem List[1]    Surgical History:   Past Surgical History:   Procedure Laterality Date    AV FISTULA PLACEMENT      CHOLECYSTECTOMY      HYSTERECTOMY      IMPLANTATION OF PERMANENT SACRAL NERVE STIMULATOR N/A 12/24/2019    Procedure: INSERTION, NEUROSTIMULATOR, PERMANENT, SACRAL;  Surgeon: Misael Browning MD;  Location: [facility] OR [address] [place] [address];  Service: Urology;  Laterality: N/A;  1hr  opal Tilley rep confirmed    KIDNEY TRANSPLANT      REMOVAL OF ELECTRODE LEAD OF SACRAL NERVE STIMULATOR         Family History:   Family History    Problem Relation Name Age of Onset    Hypertension Mother      Diabetes Mother      Hypertension Father      Kidney disease Grandchild          recurrent infections, uncertain details       Social History:   Social History     Tobacco Use    Smoking status: Never     Passive exposure: Never    Smokeless tobacco: Never   Substance Use Topics    Alcohol use: Yes     Comment: rare margartia       Allergies:  Review of patient's allergies indicates:   Allergen Reactions    Codeine      Other reaction(s): Vomiting  Other reaction(s): Nausea    Hydrocodone      Other reaction(s): Vomiting  Other reaction(s): Nausea       Medications:  Outpatient Medications Marked as Taking for the 2/27/25 encounter (Office Visit) with Christ Barnhart MD   Medication Sig Dispense Refill    0.9 % sodium chloride (0.9% NACL) solution       albuterol (PROVENTIL HFA/VENTOLIN HFA) 200 puff inhaler Inhale 1 puff into the lungs PRN.      ARMOUR THYROID 60 mg Tab Take 60 mg by mouth before breakfast.      ascorbic acid, vitamin C, (VITAMIN C) 1000 MG tablet Take 1,000 mg by mouth once daily.      busPIRone (BUSPAR) 5 MG Tab Take 5 mg by mouth 2 (two) times daily.      diclofenac sodium (VOLTAREN) 1 % Gel Apply topically.      ergocalciferol (VITAMIN D2) 50,000 unit Cap Take 50,000 Units by mouth every 7 days.      fluticasone propionate (FLONASE) 50 mcg/actuation nasal spray by Each Nostril route.      heparin, porcine, PF, 10 unit/mL Syrg       k phos di & mono-sod phos mono (K-PHOS-NEUTRAL) 250 mg Tab Take 2 tablets by mouth Three times a day.      lamivudine (EPIVIR) 100 MG Tab TAKE 1 TABLET DAILY. 30 tablet 11    meclizine (ANTIVERT) 25 mg tablet Take 1 tablet by mouth Daily.      metoprolol tartrate (LOPRESSOR) 50 MG tablet Take 50 mg by mouth 2 (two) times daily.      nifedipine (PROCARDIA-XL) 60 MG (OSM) 24 hr tablet Take 60 mg by mouth once daily.      oxybutynin (DITROPAN-XL) 10 MG 24 hr tablet Take 1 tablet (10 mg total) by mouth once  "daily. 30 tablet 11    pantoprazole (PROTONIX) 40 MG tablet Take 40 mg by mouth continuous prn.      pregabalin (LYRICA) 150 MG capsule Take 150 mg by mouth 2 (two) times daily.      tacrolimus (PROGRAF) 1 MG Cap Take 1 capsule (1 mg total) by mouth every morning AND 2 capsules (2 mg total) every evening. 180 capsule 11    thiamine (VITAMIN B-1) 50 MG tablet Take 50 mg by mouth once daily.      tramadol (ULTRAM) 50 mg tablet Take 50 mg by mouth as needed.      zinc gluconate 50 mg tablet Take 50 mg by mouth once daily.      [DISCONTINUED] lisinopriL (PRINIVIL,ZESTRIL) 5 MG tablet Take 5 mg by mouth once daily.            Review of Systems:    Constitutional: Denies fever, fatigue, had fever few days earlier  Skin: Denies wounds, no rashes, no itching, no new skin lesions  Respiratory:  Denies cough, shortness of breath, or wheezing  Cardiovascular: Denies chest pain, palpitations, positive worsening swelling lower extremities  Gastrointestional: Denies abdominal pain, nausea, vomiting, diarrhea, or constipation  Genitourinary:  Recent dysuria and foul-smelling urine  Musculoskeletal: Denies back or flank pain  Neurological: Denies headaches, dizziness, paresthesias, tremors or focal weakness      Vital Signs:  /78 (BP Location: Left arm, Patient Position: Sitting)   Pulse 63   Resp 18   Ht 5' 5" (1.651 m)   Wt 107.5 kg (237 lb)   SpO2 98%   BMI 39.44 kg/m²   Body mass index is 39.44 kg/m².      Physical Exam:    General: no acute distress, awake, alert  Eyes: conjunctiva clear, eyelids without swelling  HENT: atraumatic, oropharynx and nasal mucosa patent  Neck: supple, trache midline, no JVD  Respiratory: equal, unlabored, clear to auscultation A/P  Cardiovascular: RRR without  rub  Edema:  +2 lower extremity edema  Gastrointestinal: soft, non-tender, non-distended; positive bowel sounds; no masses to palpation; no ascites  Genitourinary: no CVA tenderness upon palpation  Musculoskeletal: ROM without " new limitation or discomfort  Integumentary: warm, dry; no rashes, wounds, or skin lesions  Neurological: oriented x4, appropriate, no acute deficits; no asterixis      Labs:        Component Value Date/Time     01/26/2018 0810     01/05/2012 0740    K 4.0 01/26/2018 0810    K 4.8 01/05/2012 0740     01/26/2018 0810     01/05/2012 0740    CO2 27 01/26/2018 0810    CO2 22 (L) 01/05/2012 0740    BUN 19 01/26/2018 0810    BUN 13 01/05/2012 0740    CREATININE 1.2 01/26/2018 0810    CREATININE 1.0 01/05/2012 0740    CREATININE 1.2 01/03/2012 0755    CREATININE 1.1 12/29/2011 0735    CALCIUM 8.6 (L) 01/26/2018 0810    CALCIUM 10.7 (H) 01/05/2012 0740    PHOS 2.9 01/26/2018 0810    PHOS 2.1 (L) 01/05/2012 0740    PTH 1,534 (H) 12/14/2011 0606           Component Value Date/Time    WBC 9.44 01/26/2018 0810    WBC 4.37 (L) 01/05/2012 0740    HGB 12.3 01/26/2018 0810    HGB 11.5 (L) 01/05/2012 0740    HCT 38.2 01/26/2018 0810    HCT 34.9 (L) 01/05/2012 0740     01/26/2018 0810     01/05/2012 0740       Creatinine, Urine   Date Value Ref Range Status   07/26/2019 94.0 15.0 - 325.0 mg/dL Final     Comment:     The random urine reference ranges provided were established   for 24 hour urine collections.  No reference ranges exist for  random urine specimens.  Correlate clinically.     01/26/2018 104.0 15.0 - 325.0 mg/dL Final     Comment:     The random urine reference ranges provided were established   for 24 hour urine collections.  No reference ranges exist for  random urine specimens.  Correlate clinically.         Protein, Urine   Date Value Ref Range Status   10/21/2010 1187 (H) 0 - 10 mg/dl Final     Protein, Urine Random   Date Value Ref Range Status   07/26/2019 75 (H) 0 - 15 mg/dL Final     Comment:     The random urine reference ranges provided were established   for 24 hour urine collections.  No reference ranges exist for  random urine specimens.  Correlate clinically.      01/26/2018 16 (H) 0 - 15 mg/dL Final     Comment:     The random urine reference ranges provided were established   for 24 hour urine collections.  No reference ranges exist for  random urine specimens.  Correlate clinically.             Imaging:  Retroperitoneal Ultrasound:      Impression:    1. Proteinuria, unspecified type  Comprehensive Metabolic Panel    Magnesium      2. Severe obesity (BMI 35.0-39.9) with comorbidity        3. Stage 3b chronic kidney disease  Comprehensive Metabolic Panel    Magnesium      4. 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  I will not be surprised if that is also affecting her transplanted kidney  Renal function relatively stable  Worsening edema  Recent UTI    Plan:    Torsemide 20 mg p.o. daily  Labs in 2 weeks if stable labs and follow-up visit in 2 months  Patient informed to call me if torsemide not helping for her edema this week we can see her sooner          Christ Barnhart      This note was created with the assistance of [employer] voice recognition software or phone dictation. There may be transcription errors as a result of using this technology however minimal. Effort has been made to assure accuracy of transcription but any obvious errors or omissions should be clarified with the author of the document.          [1]   Patient Active Problem List  Diagnosis    S/P living-donor kidney transplantation - 12/13/11    Secondary hyperparathyroidism of renal origin    Hyperlipidemia    Neuropathy    GERD (gastroesophageal reflux disease)    Chronic asthma    Chronic immunosuppression with Prograf and Cellcept    Amyloidosis, unspecified    Obesity    Hypophosphatemia    Hypertension, renal    Renal amyloidosis    Osteoarthritis of left knee s/p total knee replacement    Candida vaginitis    CKD (chronic kidney disease) stage 3, GFR 30-59 ml/min    Multiple myeloma not having achieved remission    Acute osteomyelitis of  ankle or foot    Cellulitis of left leg    Urinary retention

## 2025-03-24 ENCOUNTER — TELEPHONE (OUTPATIENT)
Dept: NEPHROLOGY | Facility: CLINIC | Age: 75
End: 2025-03-24
Payer: MEDICARE

## 2025-03-24 DIAGNOSIS — N18.32 STAGE 3B CHRONIC KIDNEY DISEASE: Primary | ICD-10-CM

## 2025-03-24 NOTE — TELEPHONE ENCOUNTER
Pt was in the hospital all weekend and wants to schedule an appointment. She states he creatinine is high.

## 2025-04-08 ENCOUNTER — TELEPHONE (OUTPATIENT)
Dept: NEPHROLOGY | Facility: CLINIC | Age: 75
End: 2025-04-08
Payer: MEDICARE

## 2025-04-08 NOTE — TELEPHONE ENCOUNTER
Called pt in regards of missing lab work (P, UA and Tacro) but no answer. Phone continuously rings. Will try again later in the afternoon. Sent orders to [facility].

## 2025-04-09 ENCOUNTER — OFFICE VISIT (OUTPATIENT)
Dept: NEPHROLOGY | Facility: CLINIC | Age: 75
End: 2025-04-09
Payer: MEDICARE

## 2025-04-09 ENCOUNTER — LAB VISIT (OUTPATIENT)
Dept: LAB | Facility: HOSPITAL | Age: 75
End: 2025-04-09
Attending: INTERNAL MEDICINE
Payer: MEDICARE

## 2025-04-09 ENCOUNTER — RESULTS FOLLOW-UP (OUTPATIENT)
Dept: NEPHROLOGY | Facility: CLINIC | Age: 75
End: 2025-04-09

## 2025-04-09 VITALS
BODY MASS INDEX: 37.72 KG/M2 | TEMPERATURE: 98 F | WEIGHT: 226.38 LBS | SYSTOLIC BLOOD PRESSURE: 138 MMHG | DIASTOLIC BLOOD PRESSURE: 76 MMHG | HEART RATE: 86 BPM | RESPIRATION RATE: 20 BRPM | OXYGEN SATURATION: 98 % | HEIGHT: 65 IN

## 2025-04-09 DIAGNOSIS — N18.32 STAGE 3B CHRONIC KIDNEY DISEASE: ICD-10-CM

## 2025-04-09 DIAGNOSIS — N18.32 STAGE 3B CHRONIC KIDNEY DISEASE: Primary | ICD-10-CM

## 2025-04-09 DIAGNOSIS — R80.9 PROTEINURIA, UNSPECIFIED TYPE: ICD-10-CM

## 2025-04-09 DIAGNOSIS — Z94.0 KIDNEY TRANSPLANTED: ICD-10-CM

## 2025-04-09 DIAGNOSIS — E66.01 SEVERE OBESITY (BMI 35.0-39.9) WITH COMORBIDITY: ICD-10-CM

## 2025-04-09 DIAGNOSIS — N18.32 STAGE 3B CHRONIC KIDNEY DISEASE: Primary | Chronic | ICD-10-CM

## 2025-04-09 LAB
ALBUMIN SERPL-MCNC: 3.1 G/DL (ref 3.4–4.8)
ALBUMIN/GLOB SERPL: 1 RATIO (ref 1.1–2)
ALP SERPL-CCNC: 103 UNIT/L (ref 40–150)
ALT SERPL-CCNC: 10 UNIT/L (ref 0–55)
ANION GAP SERPL CALC-SCNC: 8 MEQ/L
AST SERPL-CCNC: 13 UNIT/L (ref 11–45)
BACTERIA #/AREA URNS AUTO: ABNORMAL /HPF
BASOPHILS # BLD AUTO: 0.02 X10(3)/MCL
BASOPHILS NFR BLD AUTO: 0.2 %
BILIRUB SERPL-MCNC: 0.3 MG/DL
BILIRUB UR QL STRIP.AUTO: NEGATIVE
BUN SERPL-MCNC: 20.6 MG/DL (ref 9.8–20.1)
CALCIUM SERPL-MCNC: 9.7 MG/DL (ref 8.4–10.2)
CHLORIDE SERPL-SCNC: 117 MMOL/L (ref 98–107)
CLARITY UR: CLEAR
CO2 SERPL-SCNC: 18 MMOL/L (ref 23–31)
COLOR UR AUTO: ABNORMAL
CREAT SERPL-MCNC: 1.48 MG/DL (ref 0.55–1.02)
CREAT UR-MCNC: 74.8 MG/DL (ref 45–106)
CREAT/UREA NIT SERPL: 14
EOSINOPHIL # BLD AUTO: 0.06 X10(3)/MCL (ref 0–0.9)
EOSINOPHIL NFR BLD AUTO: 0.7 %
ERYTHROCYTE [DISTWIDTH] IN BLOOD BY AUTOMATED COUNT: 13.7 % (ref 11.5–17)
GFR SERPLBLD CREATININE-BSD FMLA CKD-EPI: 36 ML/MIN/1.73/M2
GLOBULIN SER-MCNC: 3.1 GM/DL (ref 2.4–3.5)
GLUCOSE SERPL-MCNC: 134 MG/DL (ref 82–115)
GLUCOSE UR QL STRIP: NEGATIVE
HCT VFR BLD AUTO: 34.5 % (ref 37–47)
HGB BLD-MCNC: 10.9 G/DL (ref 12–16)
HGB UR QL STRIP: ABNORMAL
IMM GRANULOCYTES # BLD AUTO: 0.02 X10(3)/MCL (ref 0–0.04)
IMM GRANULOCYTES NFR BLD AUTO: 0.2 %
KETONES UR QL STRIP: NEGATIVE
LEUKOCYTE ESTERASE UR QL STRIP: ABNORMAL
LYMPHOCYTES # BLD AUTO: 1.33 X10(3)/MCL (ref 0.6–4.6)
LYMPHOCYTES NFR BLD AUTO: 15.4 %
MCH RBC QN AUTO: 29.5 PG (ref 27–31)
MCHC RBC AUTO-ENTMCNC: 31.6 G/DL (ref 33–36)
MCV RBC AUTO: 93.2 FL (ref 80–94)
MONOCYTES # BLD AUTO: 0.55 X10(3)/MCL (ref 0.1–1.3)
MONOCYTES NFR BLD AUTO: 6.4 %
NEUTROPHILS # BLD AUTO: 6.63 X10(3)/MCL (ref 2.1–9.2)
NEUTROPHILS NFR BLD AUTO: 77.1 %
NITRITE UR QL STRIP: NEGATIVE
NRBC BLD AUTO-RTO: 0 %
PH UR STRIP: 6 [PH]
PHOSPHATE SERPL-MCNC: 3.2 MG/DL (ref 2.3–4.7)
PLATELET # BLD AUTO: 107 X10(3)/MCL (ref 130–400)
PMV BLD AUTO: 11.8 FL (ref 7.4–10.4)
POTASSIUM SERPL-SCNC: 5.5 MMOL/L (ref 3.5–5.1)
PROT SERPL-MCNC: 6.2 GM/DL (ref 5.8–7.6)
PROT UR QL STRIP: ABNORMAL
PROT UR STRIP-MCNC: 194.1 MG/DL
PTH-INTACT SERPL-MCNC: 249.5 PG/ML (ref 8.7–77)
RBC # BLD AUTO: 3.7 X10(6)/MCL (ref 4.2–5.4)
RBC #/AREA URNS AUTO: ABNORMAL /HPF
SODIUM SERPL-SCNC: 143 MMOL/L (ref 136–145)
SP GR UR STRIP.AUTO: 1.02 (ref 1–1.03)
SQUAMOUS #/AREA URNS LPF: ABNORMAL /HPF
URINE PROTEIN/CREATININE RATIO (OLG): 2.6
UROBILINOGEN UR STRIP-ACNC: 0.2
WBC # BLD AUTO: 8.61 X10(3)/MCL (ref 4.5–11.5)
WBC #/AREA URNS AUTO: ABNORMAL /HPF

## 2025-04-09 PROCEDURE — 1100F PTFALLS ASSESS-DOCD GE2>/YR: CPT | Mod: CPTII,S$GLB,,

## 2025-04-09 PROCEDURE — 81015 MICROSCOPIC EXAM OF URINE: CPT

## 2025-04-09 PROCEDURE — 36415 COLL VENOUS BLD VENIPUNCTURE: CPT

## 2025-04-09 PROCEDURE — 80197 ASSAY OF TACROLIMUS: CPT

## 2025-04-09 PROCEDURE — 87186 SC STD MICRODIL/AGAR DIL: CPT

## 2025-04-09 PROCEDURE — 84100 ASSAY OF PHOSPHORUS: CPT

## 2025-04-09 PROCEDURE — 1159F MED LIST DOCD IN RCRD: CPT | Mod: CPTII,S$GLB,,

## 2025-04-09 PROCEDURE — 83970 ASSAY OF PARATHORMONE: CPT

## 2025-04-09 PROCEDURE — 99999 PR PBB SHADOW E&M-EST. PATIENT-LVL V: CPT | Mod: PBBFAC,,,

## 2025-04-09 PROCEDURE — 80053 COMPREHEN METABOLIC PANEL: CPT

## 2025-04-09 PROCEDURE — 3078F DIAST BP <80 MM HG: CPT | Mod: CPTII,S$GLB,,

## 2025-04-09 PROCEDURE — 82570 ASSAY OF URINE CREATININE: CPT

## 2025-04-09 PROCEDURE — 99214 OFFICE O/P EST MOD 30 MIN: CPT | Mod: S$GLB,,,

## 2025-04-09 PROCEDURE — 1125F AMNT PAIN NOTED PAIN PRSNT: CPT | Mod: CPTII,S$GLB,,

## 2025-04-09 PROCEDURE — 85025 COMPLETE CBC W/AUTO DIFF WBC: CPT

## 2025-04-09 PROCEDURE — 3288F FALL RISK ASSESSMENT DOCD: CPT | Mod: CPTII,S$GLB,,

## 2025-04-09 PROCEDURE — 3075F SYST BP GE 130 - 139MM HG: CPT | Mod: CPTII,S$GLB,,

## 2025-04-09 RX ORDER — ACETAMINOPHEN 325 MG/1
650 TABLET ORAL EVERY 6 HOURS PRN
COMMUNITY
Start: 2025-03-22

## 2025-04-09 RX ORDER — NIFEDIPINE 60 MG/1
60 TABLET, EXTENDED RELEASE ORAL DAILY
COMMUNITY

## 2025-04-09 RX ORDER — SODIUM BICARBONATE 650 MG/1
1300 TABLET ORAL 2 TIMES DAILY
Qty: 180 TABLET | Refills: 3 | Status: SHIPPED | OUTPATIENT
Start: 2025-04-09 | End: 2026-04-09

## 2025-04-09 RX ORDER — TACROLIMUS 5 MG/1
5 CAPSULE ORAL NIGHTLY
COMMUNITY
Start: 2025-03-22 | End: 2025-04-21

## 2025-04-09 RX ORDER — ALBUTEROL SULFATE 90 UG/1
1 INHALANT RESPIRATORY (INHALATION) EVERY 4 HOURS PRN
COMMUNITY

## 2025-04-09 NOTE — PROGRESS NOTES
I already spoke with Mr. Philippe regarding lab work.    Potassium is elevated.  I sent Kayexalate to the pharmacy  Acid level was also elevated I sent sodium bicarbonate to the pharmacy    Discussed with Dr. Barnhart and agree for patient to decrease tacrolimus to 2 mg in the morning and 2 mg in the evening.  Patient states that she has not been taking 5 mg b.i.d. but only 5 mg once daily.  Again reiterated only taking 2 mg b.i.d. 12 hours apart as this medication needs to be taken every 12 hours.    Patient is to repeat lab work on Monday to include a CMP and a Prograf level in a urine protein creatinine ratio.    The orders are in the system.  Can you please print them in fax them to the [facility] for them to have the orders.

## 2025-04-09 NOTE — PROGRESS NOTES
[facility] Progress Note      HPI  Jing Philippe, 75 y.o. female, presents to office for a follow up visit for CKD 3 status post renal transplant  Patient also has amyloidosis monoclonal gammopathy  Since our last visit she was hospitalized for LUCIA and UTI as well as memory loss    Today noted to have worsening lower extremity swelling.  Has been states that she stopped taking the torsemide when she went to the hospital.  He also states that the hospital increased her Prograf from 1 mg in the a.m. and 2 mg in the p.m. to 5 mg b.i.d.      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    Amyloidosis, unspecified 1998    s/p chemotherapy x 4 occassions  (IV x3, po x1)    Anticoagulant long-term use     Benign hypertension with end-stage renal disease     Candida vaginitis 1/16/2015    Chronic asthma     CKD (chronic kidney disease) stage 3, GFR 30-59 ml/min 9/14/2012    GERD (gastroesophageal reflux disease)     Hyperlipidemia     Lymphedema     LLE    Need for prophylactic immunotherapy     Obesity     Osteoarthritis of left knee s/p total knee replacement 1/16/2015    Surgery October 23, 2014    PONV (postoperative nausea and vomiting)     Renal disease due to hypertension 12/14/2012    Secondary hyperparathyroidism of renal origin     Vertigo      Problem List[1]    Surgical History:   Past Surgical History:   Procedure Laterality Date    AV FISTULA PLACEMENT      CHOLECYSTECTOMY      HYSTERECTOMY      IMPLANTATION OF PERMANENT SACRAL NERVE STIMULATOR N/A 12/24/2019    Procedure: INSERTION, NEUROSTIMULATOR, PERMANENT, SACRAL;  Surgeon: Misael Browning MD;  Location: [facility] OR [address] [place] [address];  Service: Urology;  Laterality: N/A;  1hr  opal Tilley rep confirmed    KIDNEY TRANSPLANT      REMOVAL OF ELECTRODE LEAD OF SACRAL NERVE STIMULATOR         Family History:   Family History   Problem Relation  Name Age of Onset    Hypertension Mother      Diabetes Mother      Hypertension Father      Kidney disease Grandchild          recurrent infections, uncertain details       Social History:   Social History     Tobacco Use    Smoking status: Never     Passive exposure: Never    Smokeless tobacco: Never   Substance Use Topics    Alcohol use: Yes     Comment: rare margartia       Allergies:  Review of patient's allergies indicates:   Allergen Reactions    Codeine      Other reaction(s): Vomiting  Other reaction(s): Nausea    Hydrocodone      Other reaction(s): Vomiting  Other reaction(s): Nausea       Medications:  Outpatient Medications Marked as Taking for the 4/9/25 encounter (Office Visit) with Eben Payton FNP   Medication Sig Dispense Refill    acetaminophen (TYLENOL) 325 MG tablet Take 650 mg by mouth every 6 (six) hours as needed.      albuterol (PROVENTIL HFA/VENTOLIN HFA) 200 puff inhaler Inhale 1 puff into the lungs PRN.      albuterol (PROVENTIL/VENTOLIN HFA) 90 mcg/actuation inhaler Inhale 1 Inhaler into the lungs every 4 (four) hours as needed.      ARMOUR THYROID 60 mg Tab Take 60 mg by mouth before breakfast.      ascorbic acid, vitamin C, (VITAMIN C) 1000 MG tablet Take 1,000 mg by mouth once daily.      busPIRone (BUSPAR) 5 MG Tab Take 5 mg by mouth 2 (two) times daily.      ciprofloxacin HCl (CIPRO) 500 MG tablet Take 500 mg by mouth 2 (two) times daily.      diclofenac sodium (VOLTAREN) 1 % Gel Apply topically.      ergocalciferol (VITAMIN D2) 50,000 unit Cap Take 50,000 Units by mouth every 7 days.      fluticasone propionate (FLONASE) 50 mcg/actuation nasal spray by Each Nostril route.      lamivudine (EPIVIR) 100 MG Tab TAKE 1 TABLET DAILY. 30 tablet 11    lisinopriL (PRINIVIL,ZESTRIL) 5 MG tablet Take 1 tablet (5 mg total) by mouth once daily. 30 tablet 3    meclizine (ANTIVERT) 25 mg tablet Take 1 tablet by mouth Daily.      metoprolol tartrate (LOPRESSOR) 50 MG tablet Take 50 mg by mouth  "2 (two) times daily.      nifedipine (PROCARDIA-XL) 60 MG (OSM) 24 hr tablet Take 60 mg by mouth once daily.      pantoprazole (PROTONIX) 40 MG tablet Take 40 mg by mouth continuous prn.      pregabalin (LYRICA) 150 MG capsule Take 150 mg by mouth 2 (two) times daily.      tacrolimus (PROGRAF) 5 MG Cap Take 5 mg by mouth every evening.      thiamine (VITAMIN B-1) 50 MG tablet Take 50 mg by mouth once daily.      torsemide (DEMADEX) 20 MG Tab Take 1 tablet (20 mg total) by mouth once daily. 30 tablet 11    tramadol (ULTRAM) 50 mg tablet Take 50 mg by mouth as needed.      zinc gluconate 50 mg tablet Take 50 mg by mouth once daily.            Review of Systems:    Constitutional: Denies fever, fatigue, had fever few days earlier  Skin: Denies wounds, no rashes, no itching, no new skin lesions  Respiratory:  Denies cough, shortness of breath, or wheezing  Cardiovascular: Denies chest pain, palpitations, +++ worsening swelling lower extremities  Gastrointestional: Denies abdominal pain, nausea, vomiting, diarrhea, or constipation  Genitourinary:  Denies dysuria.  Has completed antibiotics  Musculoskeletal: Denies back or flank pain  Neurological: ++ memory loss      Vital Signs:  /76 (BP Location: Right arm, Patient Position: Sitting)   Pulse 86   Temp 97.9 °F (36.6 °C) (Temporal)   Resp 20   Ht 5' 5" (1.651 m)   Wt 102.7 kg (226 lb 6.4 oz)   SpO2 98%   BMI 37.67 kg/m²   Body mass index is 37.67 kg/m².      Physical Exam:    General: no acute distress, awake, alert  Eyes: conjunctiva clear, eyelids without swelling  HENT: atraumatic, oropharynx and nasal mucosa patent  Neck: supple, trache midline, no JVD  Respiratory: equal, unlabored, clear to auscultation A/P  Cardiovascular: RRR without  rub  Edema:  +2 +3 bilateral lower extremity  Gastrointestinal: soft, non-tender, non-distended  Musculoskeletal: ROM without new limitation or discomfort  Integumentary: warm, dry; no rashes, wounds, or skin " lesions  Neurological: ++ memory loss over the last 2-3 weeks      Labs:    See media tab  Creatinine 1.5  Urine protein creatinine ratio 1.6 g/G    Impression:    1. Stage 3b chronic kidney disease        2. Proteinuria, unspecified type        3. Severe obesity (BMI 35.0-39.9) with comorbidity        4. 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  Potentially also affecting her transplanted kidney    Recent UTI requiring hospitalization; LUCIA resolved creatinine down to 1.5 mg/dL.  However fluid status is worse since patient has stopped taking torsemide once she went to the hospital    Proteinuria up to 1.6 g/G ???  Progression versus recent LUCIA and UTI      the hospital increased her Prograf from 1 mg in the a.m. and 2 mg in the p.m. to 5 mg b.i.d.  I do not see a Prograf level completed during hospitalization    No Prograf level for us completed by lab    Plan:  Restart torsemide 20 mg 3 times a week    Get labs now to include tacrolimus, CMP, repeat urine protein creatinine ratio    Follow up 4 weeks    We will call patient with the results if repeat lab work is needed before follow up       Eben MOSQUEDA      This note was created with the assistance of [employer] voice recognition software or phone dictation. There may be transcription errors as a result of using this technology however minimal. Effort has been made to assure accuracy of transcription but any obvious errors or omissions should be clarified with the author of the document.            [1]   Patient Active Problem List  Diagnosis    S/P living-donor kidney transplantation - 12/13/11    Secondary hyperparathyroidism of renal origin    Hyperlipidemia    Neuropathy    GERD (gastroesophageal reflux disease)    Chronic asthma    Chronic immunosuppression with Prograf and Cellcept    Amyloidosis, unspecified    Obesity    Hypophosphatemia    Hypertension, renal    Renal amyloidosis     Osteoarthritis of left knee s/p total knee replacement    Candida vaginitis    CKD (chronic kidney disease) stage 3, GFR 30-59 ml/min    Multiple myeloma not having achieved remission    Acute osteomyelitis of ankle or foot    Cellulitis of left leg    Urinary retention

## 2025-04-09 NOTE — PATIENT INSTRUCTIONS
Go across the street to give lab and urine now    We will call you to decide on when to repeat blood work and urine    For now schedule a 4 week follow up    Restart torsemide 20 mg 3 times a week

## 2025-04-10 DIAGNOSIS — N18.32 STAGE 3B CHRONIC KIDNEY DISEASE: Primary | ICD-10-CM

## 2025-04-10 DIAGNOSIS — N17.1 ACUTE RENAL FAILURE WITH ACUTE RENAL CORTICAL NECROSIS SUPERIMPOSED ON STAGE 3 CHRONIC KIDNEY DISEASE, UNSPECIFIED WHETHER STAGE 3A OR 3B CKD: ICD-10-CM

## 2025-04-10 DIAGNOSIS — E87.5 HYPERKALEMIA: ICD-10-CM

## 2025-04-10 DIAGNOSIS — Z94.0 KIDNEY TRANSPLANTED: ICD-10-CM

## 2025-04-10 DIAGNOSIS — N18.30 ACUTE RENAL FAILURE WITH ACUTE RENAL CORTICAL NECROSIS SUPERIMPOSED ON STAGE 3 CHRONIC KIDNEY DISEASE, UNSPECIFIED WHETHER STAGE 3A OR 3B CKD: ICD-10-CM

## 2025-04-10 DIAGNOSIS — R80.9 PROTEINURIA, UNSPECIFIED TYPE: ICD-10-CM

## 2025-04-10 DIAGNOSIS — E05.80 SECONDARY HYPERTHYROIDISM: ICD-10-CM

## 2025-04-10 LAB — TACROLIMUS TROUGH BLD-MCNC: 4 NG/ML

## 2025-04-10 RX ORDER — AMOXICILLIN AND CLAVULANATE POTASSIUM 500; 125 MG/1; MG/1
1 TABLET, FILM COATED ORAL 2 TIMES DAILY
Qty: 20 TABLET | Refills: 0 | Status: SHIPPED | OUTPATIENT
Start: 2025-04-10 | End: 2025-04-20

## 2025-04-11 ENCOUNTER — TELEPHONE (OUTPATIENT)
Dept: NEPHROLOGY | Facility: CLINIC | Age: 75
End: 2025-04-11
Payer: MEDICARE

## 2025-04-11 NOTE — TELEPHONE ENCOUNTER
----- Message from JAYLEN Ricks sent at 4/11/2025  8:47 AM CDT -----  Prograf level came back and is low.    Please tell her  to increase prograf dose to 2 mg in the morning and 3 mg in the evening 12 hours later.    Let me know if he needs refills.    Thank you  ----- Message -----  From: Lab, Background User  Sent: 4/9/2025  11:44 AM CDT  To: JAYLEN Rossi

## 2025-04-12 LAB
BACTERIA UR CULT: ABNORMAL
BACTERIA UR CULT: ABNORMAL

## 2025-04-29 ENCOUNTER — TELEPHONE (OUTPATIENT)
Dept: NEPHROLOGY | Facility: CLINIC | Age: 75
End: 2025-04-29
Payer: MEDICARE

## 2025-04-29 NOTE — TELEPHONE ENCOUNTER
Nella from [facility] states that patient labs on 04/25_BUN 15 Breat 0.94  GFR 63  Torsemide 20 mg daily  Patient Swelling in left arm both legs swelling for 1-2 days nurse was wondering if she could increase torsemide 20 mg twice daily for about 5 days    Nella  [phone]

## 2025-04-30 NOTE — TELEPHONE ENCOUNTER
Notified Nella at [facility] that it is ok to increase torsemide 40 mg daily for 5 days  Nella states that patient was admitted to hosp on yesterday for DVT left upper extremities.

## 2025-05-08 ENCOUNTER — TELEPHONE (OUTPATIENT)
Dept: NEPHROLOGY | Facility: CLINIC | Age: 75
End: 2025-05-08
Payer: MEDICARE

## 2025-05-08 DIAGNOSIS — N18.32 STAGE 3B CHRONIC KIDNEY DISEASE: Primary | ICD-10-CM

## 2025-05-08 DIAGNOSIS — E05.80 SECONDARY HYPERTHYROIDISM: ICD-10-CM

## 2025-05-08 DIAGNOSIS — R80.9 PROTEINURIA, UNSPECIFIED TYPE: ICD-10-CM

## 2025-05-08 DIAGNOSIS — E87.5 HYPERKALEMIA: ICD-10-CM

## 2025-05-08 NOTE — TELEPHONE ENCOUNTER
Called Nella, advised that it is ok to stop lokelma and to repeat CMP next week and send results to us. Faxed order to [facility]. Verbalized understanding.

## 2025-05-08 NOTE — TELEPHONE ENCOUNTER
Nurse Carmen at facility with patient states that K is a 3, was wondering if she could reduce or stop lokelma. Creatinine is 1.6. Also concerned about iron levels, but she will have lab results faxed to us. I will hand result to you when I receive them.

## 2025-05-12 ENCOUNTER — TELEPHONE (OUTPATIENT)
Dept: NEPHROLOGY | Facility: CLINIC | Age: 75
End: 2025-05-12
Payer: MEDICARE

## 2025-05-12 NOTE — TELEPHONE ENCOUNTER
Dr. Barnhart spoke with the hospitalist who is caring for the patient.  She has been treated for a fall, UTI, LUCIA.  All cultures are pending.  S  She was unfortunately started on broad-spectrum antibiotics in the ED including vancomycin which has now been changed.  Per hospitalist patient was recently diagnosed with dementia and is currently nonverbal.  Reportedly initial CT scan of the head was negative.  Hospitalist was considering MRI however there was some report of implantable device per family.  Recommended if concern for transplant deterioration they consider transfer referring her to \transplant center in [place]

## 2025-05-12 NOTE — TELEPHONE ENCOUNTER
Pt is currently admitted into hospital; Her nurse Sun would like a callback regarding pt condition. She states pt has an acute UTI and is not talking. Faxed over last note.    Sun [phone]    Please advise.

## 2025-05-21 NOTE — PROVIDER TRANSFER
Outside Transfer Acceptance Note / Regional Referral Center    Referring facility: [facility]   Referring provider: LIU DON  Accepting facility: [facility]  Accepting provider: BARBRA ESTRADA  Admitting provider: FRANKLIN ESTRADA  Reason for transfer: Higher Level of Care   Transfer diagnosis: CKD  Transfer specialty requested: Transplant Kidney  Transfer specialty notified: Yes  Transfer level: NUMBER 1-5: 2  Bed type requested: TELE  Isolation status: No active isolations   Admission class or status: IP- Inpatient    Narrative     Ms. Philippe is a 76yo lady who received a living kidney transplant from her sister on 12/13/11 due to renal amyloidosis, now with CKD 3b.  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    She was last seen by Nephrology on 4/9/25, NP Eben Payton, at [facility] for follow up from a prior hospitalization for LUCIA, UTI, and rapid memory loss.  He noted that the hospital increased her Prograf from 1 mg in the a.m. and 2 mg in the p.m. to 5 mg b.i.d. and held her Torsemide due to LUCIA, which was then restarted after her Cr returned to baseline of 1.5 per NP.  Dr. Barnhart in [place] is the patient's Nephrologist, but [facility]  declined and said the patient needs [facility] center instead.    She was recently admitted to [facility] on 5/11 due to AMS from UTI.  She had an MRI brain that was normal.  She was started on Abx.. Urine culture grew Klebsiella pneumonia (sensitive to everything except ampicillin). Currently on ceftriaxone.     Her Cr has been gradually creeping up over the past 10 days though, now up to 2.2 with baseline SCr 1.5 which is what is prompting the Tx. She has received levaquin and cefepime (5/11-14) and zosyn and zyvox (5/14-5/21). Today her BUN  35 and SCr 2.0. She appears  overloaded and is on torsemide 20 mg daily. She is on prograf only.,     Current VS: 0320- 153/74 RR 16 P 65 T 98.0 O2 96% RA       Objective     Recent Labs:   5/21 WBC 7.1 H/h 8.2/25 PLT 88     Allergies:   Review of patient's allergies indicates:   Allergen Reactions    Anesthetics - jen type- parabens      Pt allergic to parabens- per pharmacist    Antihistamines - ethylenediamine     Codeine      Other reaction(s): Vomiting  Other reaction(s): Nausea    Ethanol (ethyl alcohol)     Hydrocodone      Other reaction(s): Vomiting  Other reaction(s): Nausea    Opioids - morphine analogues     Propylene glycol     Saccharin     Tylenol [acetaminophen]     Yellow dye       NPO: No    Anticoagulation:   Anticoagulants       None             Instructions      Darren Brito-  Admit to Hospital Medicine  Upon patient arrival to floor, please send SecureChat to [facility] or call extension [phone] (if no answer, do NOT leave a callback number after the beep, rather please send a SecureChat to [facility]), for Hospital Medicine admit team assignment and for additional admit orders for the patient.  Do not page the attending physician associated with the patient on arrival (this physician may not be on duty at the time of arrival).  Rather, always send a SecureChat to [facility] or call [phone] to reach the triage physician for orders and team assignment.

## 2025-05-22 ENCOUNTER — HOSPITAL ENCOUNTER (INPATIENT)
Facility: HOSPITAL | Age: 75
LOS: 6 days | Discharge: ANOTHER HOSPITAL | DRG: 698 | End: 2025-05-28
Attending: INTERNAL MEDICINE
Payer: MEDICARE

## 2025-05-22 DIAGNOSIS — Z94.0 S/P LIVING-DONOR KIDNEY TRANSPLANTATION: Primary | Chronic | ICD-10-CM

## 2025-05-22 DIAGNOSIS — N17.9 AKI (ACUTE KIDNEY INJURY): ICD-10-CM

## 2025-05-22 DIAGNOSIS — E87.70 VOLUME OVERLOAD: ICD-10-CM

## 2025-05-22 DIAGNOSIS — R07.9 CHEST PAIN: ICD-10-CM

## 2025-05-22 PROBLEM — F03.918 DEMENTIA WITH BEHAVIORAL DISTURBANCE: Status: ACTIVE | Noted: 2025-05-22

## 2025-05-22 PROBLEM — N10 ACUTE PYELONEPHRITIS: Status: ACTIVE | Noted: 2025-05-22

## 2025-05-22 PROBLEM — N18.32 ACUTE RENAL FAILURE SUPERIMPOSED ON STAGE 3B CHRONIC KIDNEY DISEASE: Status: ACTIVE | Noted: 2025-05-22

## 2025-05-22 LAB
ABSOLUTE EOSINOPHIL (OHS): 0.07 K/UL
ABSOLUTE MONOCYTE (OHS): 0.71 K/UL (ref 0.3–1)
ABSOLUTE NEUTROPHIL COUNT (OHS): 6.09 K/UL (ref 1.8–7.7)
ALBUMIN SERPL BCP-MCNC: 1.7 G/DL (ref 3.5–5.2)
ALBUMIN/CREAT UR: 630.2 UG/MG
ALP SERPL-CCNC: 71 UNIT/L (ref 40–150)
ALT SERPL W/O P-5'-P-CCNC: 21 UNIT/L (ref 10–44)
ANION GAP (OHS): 8 MMOL/L (ref 8–16)
AST SERPL-CCNC: 12 UNIT/L (ref 11–45)
BACTERIA #/AREA URNS AUTO: ABNORMAL /HPF
BASOPHILS # BLD AUTO: 0.02 K/UL
BASOPHILS NFR BLD AUTO: 0.2 %
BILIRUB SERPL-MCNC: 0.3 MG/DL (ref 0.1–1)
BILIRUB UR QL STRIP.AUTO: NEGATIVE
BUN SERPL-MCNC: 32 MG/DL (ref 8–23)
CALCIUM SERPL-MCNC: 10.2 MG/DL (ref 8.7–10.5)
CHLORIDE SERPL-SCNC: 117 MMOL/L (ref 95–110)
CLARITY UR: CLEAR
CO2 SERPL-SCNC: 21 MMOL/L (ref 23–29)
COLOR UR AUTO: YELLOW
CREAT SERPL-MCNC: 1.8 MG/DL (ref 0.5–1.4)
CREAT UR-MCNC: 53 MG/DL (ref 15–325)
CREAT UR-MCNC: 56 MG/DL (ref 15–325)
ERYTHROCYTE [DISTWIDTH] IN BLOOD BY AUTOMATED COUNT: 19.1 % (ref 11.5–14.5)
FERRITIN SERPL-MCNC: 872 NG/ML (ref 20–300)
GFR SERPLBLD CREATININE-BSD FMLA CKD-EPI: 29 ML/MIN/1.73/M2
GLUCOSE SERPL-MCNC: 99 MG/DL (ref 70–110)
GLUCOSE UR QL STRIP: NEGATIVE
HCT VFR BLD AUTO: 24.2 % (ref 37–48.5)
HGB BLD-MCNC: 7.3 GM/DL (ref 12–16)
HGB UR QL STRIP: ABNORMAL
HYALINE CASTS UR QL AUTO: 0 /LPF (ref 0–1)
IMM GRANULOCYTES # BLD AUTO: 0.17 K/UL (ref 0–0.04)
IMM GRANULOCYTES NFR BLD AUTO: 2 % (ref 0–0.5)
IRON SATN MFR SERPL: 40 % (ref 20–50)
IRON SERPL-MCNC: 73 UG/DL (ref 30–160)
KETONES UR QL STRIP: NEGATIVE
LEUKOCYTE ESTERASE UR QL STRIP: ABNORMAL
LYMPHOCYTES # BLD AUTO: 1.44 K/UL (ref 1–4.8)
MCH RBC QN AUTO: 25.8 PG (ref 27–31)
MCHC RBC AUTO-ENTMCNC: 30.2 G/DL (ref 32–36)
MCV RBC AUTO: 86 FL (ref 82–98)
MICROALBUMIN UR-MCNC: 334 UG/ML (ref ?–5000)
MICROSCOPIC COMMENT: ABNORMAL
NITRITE UR QL STRIP: NEGATIVE
NUCLEATED RBC (/100WBC) (OHS): 0 /100 WBC
PH UR STRIP: 7 [PH]
PLATELET # BLD AUTO: 89 K/UL (ref 150–450)
PMV BLD AUTO: 12.9 FL (ref 9.2–12.9)
POTASSIUM SERPL-SCNC: 5.4 MMOL/L (ref 3.5–5.1)
PROT SERPL-MCNC: 5.5 GM/DL (ref 6–8.4)
PROT UR QL STRIP: ABNORMAL
PROT UR-MCNC: 108 MG/DL
PROT/CREAT UR: 1.93 MG/G{CREAT}
RBC # BLD AUTO: 2.83 M/UL (ref 4–5.4)
RBC #/AREA URNS AUTO: 7 /HPF (ref 0–4)
RELATIVE EOSINOPHIL (OHS): 0.8 %
RELATIVE LYMPHOCYTE (OHS): 16.9 % (ref 18–48)
RELATIVE MONOCYTE (OHS): 8.4 % (ref 4–15)
RELATIVE NEUTROPHIL (OHS): 71.7 % (ref 38–73)
SODIUM SERPL-SCNC: 146 MMOL/L (ref 136–145)
SP GR UR STRIP: 1.01
SQUAMOUS #/AREA URNS AUTO: 1 /HPF
TIBC SERPL-MCNC: 182 UG/DL (ref 250–450)
TRANSFERRIN SERPL-MCNC: 123 MG/DL (ref 200–375)
UROBILINOGEN UR STRIP-ACNC: NEGATIVE EU/DL
WBC # BLD AUTO: 8.5 K/UL (ref 3.9–12.7)
WBC #/AREA URNS AUTO: 18 /HPF (ref 0–5)
YEAST UR QL AUTO: ABNORMAL /HPF

## 2025-05-22 PROCEDURE — 63600175 PHARM REV CODE 636 W HCPCS

## 2025-05-22 PROCEDURE — 25000003 PHARM REV CODE 250

## 2025-05-22 PROCEDURE — 82728 ASSAY OF FERRITIN: CPT | Performed by: STUDENT IN AN ORGANIZED HEALTH CARE EDUCATION/TRAINING PROGRAM

## 2025-05-22 PROCEDURE — 82570 ASSAY OF URINE CREATININE: CPT | Performed by: STUDENT IN AN ORGANIZED HEALTH CARE EDUCATION/TRAINING PROGRAM

## 2025-05-22 PROCEDURE — 81001 URINALYSIS AUTO W/SCOPE: CPT

## 2025-05-22 PROCEDURE — 36415 COLL VENOUS BLD VENIPUNCTURE: CPT

## 2025-05-22 PROCEDURE — 11000001 HC ACUTE MED/SURG PRIVATE ROOM

## 2025-05-22 PROCEDURE — 84466 ASSAY OF TRANSFERRIN: CPT | Performed by: STUDENT IN AN ORGANIZED HEALTH CARE EDUCATION/TRAINING PROGRAM

## 2025-05-22 PROCEDURE — 80053 COMPREHEN METABOLIC PANEL: CPT

## 2025-05-22 PROCEDURE — 99223 1ST HOSP IP/OBS HIGH 75: CPT | Mod: GC,,, | Performed by: INTERNAL MEDICINE

## 2025-05-22 PROCEDURE — 21400001 HC TELEMETRY ROOM

## 2025-05-22 PROCEDURE — 85025 COMPLETE CBC W/AUTO DIFF WBC: CPT

## 2025-05-22 RX ORDER — METOPROLOL TARTRATE 50 MG/1
50 TABLET ORAL 2 TIMES DAILY
Status: DISCONTINUED | OUTPATIENT
Start: 2025-05-22 | End: 2025-05-28 | Stop reason: HOSPADM

## 2025-05-22 RX ORDER — NIFEDIPINE 60 MG/1
60 TABLET, EXTENDED RELEASE ORAL DAILY
Status: DISCONTINUED | OUTPATIENT
Start: 2025-05-22 | End: 2025-05-28 | Stop reason: HOSPADM

## 2025-05-22 RX ORDER — THYROID 60 MG/1
60 TABLET ORAL
Status: DISCONTINUED | OUTPATIENT
Start: 2025-05-23 | End: 2025-05-28 | Stop reason: HOSPADM

## 2025-05-22 RX ORDER — PANTOPRAZOLE SODIUM 40 MG/1
40 TABLET, DELAYED RELEASE ORAL DAILY
Status: DISCONTINUED | OUTPATIENT
Start: 2025-05-22 | End: 2025-05-28 | Stop reason: HOSPADM

## 2025-05-22 RX ORDER — TORSEMIDE 20 MG/1
20 TABLET ORAL DAILY
Status: DISCONTINUED | OUTPATIENT
Start: 2025-05-22 | End: 2025-05-22

## 2025-05-22 RX ORDER — ALBUTEROL SULFATE 90 UG/1
1 INHALANT RESPIRATORY (INHALATION) EVERY 6 HOURS PRN
Status: DISCONTINUED | OUTPATIENT
Start: 2025-05-22 | End: 2025-05-28 | Stop reason: HOSPADM

## 2025-05-22 RX ORDER — IPRATROPIUM BROMIDE AND ALBUTEROL SULFATE 2.5; .5 MG/3ML; MG/3ML
3 SOLUTION RESPIRATORY (INHALATION) EVERY 4 HOURS PRN
Status: DISCONTINUED | OUTPATIENT
Start: 2025-05-22 | End: 2025-05-28 | Stop reason: HOSPADM

## 2025-05-22 RX ORDER — ONDANSETRON 8 MG/1
8 TABLET, ORALLY DISINTEGRATING ORAL EVERY 8 HOURS PRN
Status: DISCONTINUED | OUTPATIENT
Start: 2025-05-22 | End: 2025-05-28 | Stop reason: HOSPADM

## 2025-05-22 RX ORDER — PROMETHAZINE HYDROCHLORIDE 25 MG/1
25 TABLET ORAL EVERY 6 HOURS PRN
Status: DISCONTINUED | OUTPATIENT
Start: 2025-05-22 | End: 2025-05-28 | Stop reason: HOSPADM

## 2025-05-22 RX ORDER — TALC
6 POWDER (GRAM) TOPICAL NIGHTLY PRN
Status: DISCONTINUED | OUTPATIENT
Start: 2025-05-22 | End: 2025-05-28 | Stop reason: HOSPADM

## 2025-05-22 RX ORDER — NALOXONE HCL 0.4 MG/ML
0.02 VIAL (ML) INJECTION
Status: DISCONTINUED | OUTPATIENT
Start: 2025-05-22 | End: 2025-05-28 | Stop reason: HOSPADM

## 2025-05-22 RX ORDER — MUPIROCIN 20 MG/G
OINTMENT TOPICAL 2 TIMES DAILY
Status: COMPLETED | OUTPATIENT
Start: 2025-05-22 | End: 2025-05-27

## 2025-05-22 RX ORDER — IBUPROFEN 200 MG
24 TABLET ORAL
Status: DISCONTINUED | OUTPATIENT
Start: 2025-05-22 | End: 2025-05-28 | Stop reason: HOSPADM

## 2025-05-22 RX ORDER — GLUCAGON 1 MG
1 KIT INJECTION
Status: DISCONTINUED | OUTPATIENT
Start: 2025-05-22 | End: 2025-05-28 | Stop reason: HOSPADM

## 2025-05-22 RX ORDER — LAMIVUDINE 100 MG/1
100 TABLET, FILM COATED ORAL DAILY
Status: DISCONTINUED | OUTPATIENT
Start: 2025-05-23 | End: 2025-05-28 | Stop reason: HOSPADM

## 2025-05-22 RX ORDER — TACROLIMUS 5 MG/1
5 CAPSULE ORAL 2 TIMES DAILY
Status: DISCONTINUED | OUTPATIENT
Start: 2025-05-22 | End: 2025-05-23

## 2025-05-22 RX ORDER — PREGABALIN 150 MG/1
150 CAPSULE ORAL 2 TIMES DAILY
Status: DISCONTINUED | OUTPATIENT
Start: 2025-05-22 | End: 2025-05-22

## 2025-05-22 RX ORDER — HEPARIN SODIUM 5000 [USP'U]/ML
5000 INJECTION, SOLUTION INTRAVENOUS; SUBCUTANEOUS EVERY 8 HOURS
Status: DISCONTINUED | OUTPATIENT
Start: 2025-05-22 | End: 2025-05-28 | Stop reason: HOSPADM

## 2025-05-22 RX ORDER — SODIUM CHLORIDE 0.9 % (FLUSH) 0.9 %
10 SYRINGE (ML) INJECTION EVERY 12 HOURS PRN
Status: DISCONTINUED | OUTPATIENT
Start: 2025-05-22 | End: 2025-05-28 | Stop reason: HOSPADM

## 2025-05-22 RX ORDER — BISACODYL 10 MG/1
10 SUPPOSITORY RECTAL DAILY PRN
Status: DISCONTINUED | OUTPATIENT
Start: 2025-05-22 | End: 2025-05-28 | Stop reason: HOSPADM

## 2025-05-22 RX ORDER — IBUPROFEN 200 MG
16 TABLET ORAL
Status: DISCONTINUED | OUTPATIENT
Start: 2025-05-22 | End: 2025-05-28 | Stop reason: HOSPADM

## 2025-05-22 RX ORDER — POLYETHYLENE GLYCOL 3350 17 G/17G
17 POWDER, FOR SOLUTION ORAL DAILY PRN
Status: DISCONTINUED | OUTPATIENT
Start: 2025-05-22 | End: 2025-05-28 | Stop reason: HOSPADM

## 2025-05-22 RX ORDER — BUSPIRONE HYDROCHLORIDE 5 MG/1
5 TABLET ORAL 2 TIMES DAILY
Status: DISCONTINUED | OUTPATIENT
Start: 2025-05-22 | End: 2025-05-28 | Stop reason: HOSPADM

## 2025-05-22 RX ADMIN — METOPROLOL TARTRATE 50 MG: 50 TABLET, FILM COATED ORAL at 09:05

## 2025-05-22 RX ADMIN — MUPIROCIN: 20 OINTMENT TOPICAL at 09:05

## 2025-05-22 RX ADMIN — HEPARIN SODIUM 5000 UNITS: 5000 INJECTION INTRAVENOUS; SUBCUTANEOUS at 10:05

## 2025-05-22 RX ADMIN — TACROLIMUS 5 MG: 5 CAPSULE ORAL at 06:05

## 2025-05-22 RX ADMIN — BUSPIRONE HYDROCHLORIDE 5 MG: 5 TABLET ORAL at 09:05

## 2025-05-22 RX ADMIN — HEPARIN SODIUM 5000 UNITS: 5000 INJECTION INTRAVENOUS; SUBCUTANEOUS at 01:05

## 2025-05-22 NOTE — HPI
Jing Philippe is a 75 y.o. female with PMHx of renal transplant (12/13/2011) due to renal amyloidosis now CKD 3b on immunosuppression, HTN, HLD, asthma, GERD, obesity, multiple myeloma, dementia transferred from [facility] to [facility] due to ARF and acute pyelo in transplanted kidney.     Last seen by Nephro in April 2025 at [facility] as f/u from prior admission. At that time, prograf was increased and torsemide held due to LUCIA, restarted once Cr returned to normal. Admitted to [facility] 5/11 due to AMS iso UTI. MRI brain at that time normal. Started on antibiotics, urine clx grew Klebsiella. Over past 10 days, Cr trending upwards to 2.2 (bl 1.5).     Per Handoff -- VSSAF. No white count. Cr 2.0, BUN 35. Received levaquin + cefepime (5/11-5/14), zosyn + zyvox 5/14-5/21). Appears overloaded, received torsemide 20mg daily. Continued on prograf.

## 2025-05-22 NOTE — CONSULTS
Name: Jing Philippe  Medical Record Number: [id number]  Date of Service: 05/22/2025  Note By: Kristy Dumont MD    RENAL TRANSPLANT INITIAL CONSULTATION NOTE    Reason for Consultation: immunosuppression management in patient with kidney transplant  Referring Provider: J Carlos Chavez MD    History of Present Illness:  Jing Philippe is a 75 y.o. 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     Patient was tired and unable to answer many questions, so history was obtained from , Mr. Malagon, at bedside.     Patient was last at her [facility] in early March 2025. She was admitted to [facility] in [facility] for ~3 days for UTI and LUCIA. She followed up with her OP nephrologist on 4/9, who noted worsening swelling after her torsemide was discontinued during hospitalization. Patient was then admitted to hospital in  [place] in April for recurrent UTI. She stayed at the hospital for about 2 weeks and was discharged to rehab for PT, but she has not been able to participate much with PT due to weakness/fatigue per . Then on ~5/10, patient was noted to be altered and difficult to wake at the rehab center so she was sent to [facility] for evaluation, where she was found to have bilateral PNA and UTI (per her , do not have records from this). She required IV Abx and was in ICU for sometime (unclear why) but was stepped down. Her  states that she has not been eating or drinking much during this hospitalization. She was given some IVFs at [place], but they were stopped due to worsening edema. She was then given IV diuretics and was noted to have worsening renal function (Cr peaked at 2.2) so she was transferred to [facility] for evaluation and management of LUCIA in transplanted kidney.     Of note, patient was noted  to have significant proteinuria on OP labs in April 2025, with 2.6g of protein. Her serum albumin has dropped since then, 3.1 in 04/2024 -> 1.7 today.     Past Medical History:  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    Amyloidosis, unspecified 1998    s/p chemotherapy x 4 occassions  (IV x3, po x1)    Anticoagulant long-term use     Benign hypertension with end-stage renal disease     Candida vaginitis 1/16/2015    Chronic asthma     CKD (chronic kidney disease) stage 3, GFR 30-59 ml/min 9/14/2012    GERD (gastroesophageal reflux disease)     Hyperlipidemia     Lymphedema     LLE    Need for prophylactic immunotherapy     Obesity     Osteoarthritis of left knee s/p total knee replacement 1/16/2015    Surgery October 23, 2014    PONV (postoperative nausea and vomiting)     Renal disease due to hypertension 12/14/2012    Secondary hyperparathyroidism of renal origin     Vertigo      Past Surgical History:  Past Surgical History:   Procedure Laterality Date    AV FISTULA PLACEMENT      CHOLECYSTECTOMY      HYSTERECTOMY      IMPLANTATION OF PERMANENT SACRAL NERVE STIMULATOR N/A 12/24/2019    Procedure: INSERTION, NEUROSTIMULATOR, PERMANENT, SACRAL;  Surgeon: Misael Browning MD;  Location: [facility] OR [address] [place] [address];  Service: Urology;  Laterality: N/A;  1hr  Ivania Dejesus, medtronic rep confirmed    KIDNEY TRANSPLANT      REMOVAL OF ELECTRODE LEAD OF SACRAL NERVE STIMULATOR       Family History:  Family History   Problem Relation Name Age of Onset    Hypertension Mother      Diabetes Mother      Hypertension Father      Kidney disease Grandchild          recurrent infections, uncertain details     Social History:  Social History[1]    Home Medications: (per paperwork sent from [facility],  unable to confirm all of these medications)  Amlodipine 10mg   Eliquis 5mg BID  Buspirone 5mg BID  Diclofenac topical  Famotidine 20mg  Hydralazine  50mg TID  Lamivudine 100mg daily  Losartan 100mg   Meclizine 25mg q8 PRN  Lopressor 50mg BID  Protonix daily (but also on pepcid?)  Kcl 20mEq daily  Rivastigmine 4.6mg patch  Sodium bicarb 650mg BID  Lokelma 10g daily  Prograf 2mg qAM and 3mg qPM  [place] thyroid 60mg daily  Torsemide 20mg daily  Tramadol 50mg TID PRN  Zinc oxide topical    Inpatient Medications:  Scheduled Meds:   busPIRone  5 mg Oral BID    heparin (porcine)  5,000 Units Subcutaneous Q8H    [START ON 5/23/2025] lamiVUDine  100 mg Oral Daily    metoprolol tartrate  50 mg Oral BID    NIFEdipine  60 mg Oral Daily    pantoprazole  40 mg Oral Daily    tacrolimus  5 mg Oral BID    [START ON 5/23/2025] thyroid (pork)  60 mg Oral Before breakfast    torsemide  20 mg Oral Daily     Continuous Infusions:    PRN Meds:.  Current Facility-Administered Medications:     albuterol, 1 puff, Inhalation, Q6H PRN    albuterol-ipratropium, 3 mL, Nebulization, Q4H PRN    bisacodyL, 10 mg, Rectal, Daily PRN    dextrose 50%, 12.5 g, Intravenous, PRN    dextrose 50%, 25 g, Intravenous, PRN    glucagon (human recombinant), 1 mg, Intramuscular, PRN    glucose, 16 g, Oral, PRN    glucose, 24 g, Oral, PRN    melatonin, 6 mg, Oral, Nightly PRN    naloxone, 0.02 mg, Intravenous, PRN    ondansetron, 8 mg, Oral, Q8H PRN    polyethylene glycol, 17 g, Oral, Daily PRN    promethazine, 25 mg, Oral, Q6H PRN    sodium chloride 0.9%, 10 mL, Intravenous, Q12H PRN    Allergies:  Anesthetics - jen type- parabens, Antihistamines - ethylenediamine, Codeine, Ethanol (ethyl alcohol), Hydrocodone, Opioids - morphine analogues, Propylene glycol, Saccharin, Tylenol [acetaminophen], and Yellow dye    Review of Systems:  Reviewed. Pertinent positives and negatives included in HPI.    Physical Exam:  Vitals:  Vitals:    05/22/25 1047   BP: (!) 156/71   Pulse: 77   Resp: 18   Temp: 97.6 °F (36.4 °C)     Temp:  [97.6 °F (36.4 °C)] 97.6 °F (36.4 °C)  Pulse:  [68-77] 77  Resp:  [18-22] 18  SpO2:  [95  %-96 %] 96 %  BP: (135-156)/(71-83) 156/71      Intake/Output Summary (Last 24 hours) at 5/22/2025 1346  Last data filed at 5/22/2025 1254  Gross per 24 hour   Intake --   Output 425 ml   Net -425 ml     Exam  General: tired, arousable to voice but drifts back to sleep  HEENT: moist mucus dry  Respiratory: CTAB, no crackles or wheezes, breathing comfortably on RA  Cardiovacular: RRR   Gastrointestinal: Soft, non-tender   Renal allograft exam: No tenderness   Extremities: pitting edema in BUE and BLE    Labs:  Lab Results   Component Value Date    WBC 8.50 05/22/2025    HGB 7.3 (L) 05/22/2025    HCT 24.2 (L) 05/22/2025     (H) 05/22/2025    K 5.4 (H) 05/22/2025     (H) 05/22/2025    CO2 21 (L) 05/22/2025    BUN 32 (H) 05/22/2025    CREATININE 1.8 (H) 05/22/2025    EGFRNONAA 46.2 (A) 01/26/2018    CALCIUM 10.2 05/22/2025    PHOS 3.2 04/09/2025    MG 1.4 (L) 01/26/2018    ALBUMIN 1.7 (L) 05/22/2025    AST 12 05/22/2025    ALT 21 05/22/2025       Lab Results   Component Value Date    EXTANC  12/09/2016      Comment:      repeat Prograf level, last not true trough    EXTWBC 10.11 06/18/2019    EXTSEGS 70.1 06/18/2019    EXTPLATELETS 150 06/18/2019    EXTHEMOGLOBI 9.2 (A) 06/18/2019    EXTHEMATOCRI 29.1 (A) 06/18/2019    EXTCREATININ 1.03 (A) 06/18/2019    EXTSODIUM 141 06/18/2019    EXTPOTASSIUM 3.7 06/18/2019    EXTBUN 22 (A) 06/18/2019    EXTCO2 22.9 06/18/2019    EXTCALCIUM 10.4 (A) 06/18/2019    EXTPHOSPHORU 2.1 (A) 06/18/2019    EXTGLUCOSE 119 12/04/2017    EXTALBUMIN 2.40 (A) 06/18/2019    EXTAST 11 (A) 06/18/2019    EXTALT 42 06/18/2019    EXTBILITOTAL 0.31 06/18/2019     Lab Results   Component Value Date    EXTTACROLVL 6.1 06/18/2019    EXTPROTCRE 0.84 12/04/2017    EXTPTHINTACT 153.3 11/10/2015    EXTPROTEINUA 1+ 11/30/2016    EXTWBCUA none seen 11/30/2016    EXTRBCUA none seen 11/30/2016     Imaging Studies:  Reviewed.     Assessment/Plan:  75 y.o. female with ESRD 2/2 renal amyloidosis s/p LRKT  (12/13/2011) on prograf, post-Tx CKD3b, HTN, dementia, who was transferred from [facility] to [facility] on 5/22/2025 for worsening renal function.     ESRD 2/2 Renal Amyloidosis s/p LRKT (12/13/2011)   LUCIA on CKD 3b  Hypernatremia  Diffuse anasarca  Hypoalbuminemia  - previous baseline Cr ~1.1-1.2 but with recurrent AKIs in last few months, lowest Cr has been lately ~1.5  - spoke to patient's OP nephrologist (Dr. Fajardo) who believes patient has some degree of chronic rejection and light chain deposition  - Cr 1.5 on presentation to [facility] but trended up during hospitalization -> peaked at 2.2 on 5/21 at OSH after patient received IV diuretics  - patient's volume status is hard to discern: seems intravascularly depleted with free water deficiency (hypernatremic to 146) but with diffuse anasarca (likely 2/2 severe hypoalbuminemia)  - recommend more objective investigation of volume status like TTE  - agree with renal US and repeat urine studies  - ordered SPEP and FLC to assess monoclonal gammopathy burden  - hold torsemide for now given unclear volume status    Immunosuppression Management  - per , patient is normally on prograf 2mg qAM and 3mg qPM  - prograf dose has been changed at OSH during hospitalization, currently 5mg BID  - continue prograf 5mg BID for now, will adjust based as needed  - goal FK-506 trough level is 4-6  - continue prednisone 5mg daily   - continue to monitor for toxicities from immunosuppressive medications    Anemia  - Hb currently ~7, was up to ~11 in 04/2025  - unclear etiology of sudden drop in Hb  - ordered iron studies and hemolysis labs    HTN  - BP above goal  - continue nifedipine 60mg   - will titrate antihypertensives as needed for BP  - continue to monitor      We will continue to monitor    Kristy Dumont MD  [facility] Nephrology Fellow, PGY-4  Kidney Transplant Medicine         [1]   Social History  Socioeconomic History    Marital status: Single   Tobacco Use     Smoking status: Never     Passive exposure: Never    Smokeless tobacco: Never   Substance and Sexual Activity    Alcohol use: Yes     Comment: rare margartia    Drug use: No   Social History Narrative    Inez used to work as , now retired for medical reasons.     Social Drivers of Health     Financial Resource Strain: Low Risk  (3/19/2025)    [facility]    Overall Financial Resource Strain (CARDIA)     Difficulty of Paying Living Expenses: Not very hard   Food Insecurity: No Food Insecurity (3/19/2025)    [facility]    Hunger Vital Sign     Worried About Running Out of Food in the Last Year: Never true     Ran Out of Food in the Last Year: Never true   Transportation Needs: No Transportation Needs (3/19/2025)    Received from [facility]    PRAPARE - Transportation     In the past 12 months, has lack of transportation kept you from medical appointments or from getting medications?: No   Housing Stability: Low Risk  (3/19/2025)    Received from [facility]    Housing Stability Vital Sign     At any time in the past 12 months, were you homeless or living in a shelter (including now)?: No

## 2025-05-22 NOTE — CARE UPDATE
Unit [facility] Care Support Interaction      I have reviewed the chart of Jing Philippe who is hospitalized for Acute renal failure superimposed on stage 3b chronic kidney disease. The patient is currently located in the following unit: MSU        I have assisted the primary physician in management of the following:      MRSA Decolonization - Mupirocin ordered and CHG ordered        Kimberly Ramirez PA-C  Unit Based [facility]

## 2025-05-22 NOTE — H&P
[facility] Medicine  History & Physical    Patient Name: Jing Philippe  MRN: [id number]  Patient Class: IP- Inpatient  Admission Date: 5/22/2025  Attending Physician: J Carlos Chavez DO   Primary Care Provider: Fito Land Jr., MD         Patient information was obtained from patient and ER records.     Subjective:     Principal Problem:Acute renal failure superimposed on stage 3b chronic kidney disease    Chief Complaint: No chief complaint on file.       HPI: Jing Philippe is a 75 y.o. female with PMHx of renal transplant (12/13/2011) due to renal amyloidosis now CKD 3b on immunosuppression, HTN, HLD, asthma, GERD, obesity, multiple myeloma, dementia transferred from [facility] to [facility] due to ARF and acute pyelo in transplanted kidney.     Last seen by Nephro in April 2025 at [facility] as f/u from prior admission. At that time, prograf was increased and torsemide held due to LUCIA, restarted once Cr returned to normal. Admitted to [facility] 5/11 due to AMS iso UTI. MRI brain at that time normal. Started on antibiotics, urine clx grew Klebsiella. Over past 10 days, Cr trending upwards to 2.2 (bl 1.5).     Per Handoff -- VSSAF. No white count. Cr 2.0, BUN 35. Received levaquin + cefepime (5/11-5/14), zosyn + zyvox 5/14-5/21). Appears overloaded, received torsemide 20mg daily. Continued on prograf.     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    Amyloidosis, unspecified 1998    s/p chemotherapy x 4 occassions  (IV x3, po x1)    Anticoagulant long-term use     Benign hypertension with end-stage renal disease     Candida vaginitis 1/16/2015    Chronic asthma     CKD (chronic kidney disease) stage 3, GFR 30-59 ml/min 9/14/2012    GERD (gastroesophageal reflux disease)     Hyperlipidemia     Lymphedema     LLE    Need for prophylactic immunotherapy     Obesity     Osteoarthritis of left  knee s/p total knee replacement 1/16/2015    Surgery October 23, 2014    PONV (postoperative nausea and vomiting)     Renal disease due to hypertension 12/14/2012    Secondary hyperparathyroidism of renal origin     Vertigo        Past Surgical History:   Procedure Laterality Date    AV FISTULA PLACEMENT      CHOLECYSTECTOMY      HYSTERECTOMY      IMPLANTATION OF PERMANENT SACRAL NERVE STIMULATOR N/A 12/24/2019    Procedure: INSERTION, NEUROSTIMULATOR, PERMANENT, SACRAL;  Surgeon: Misael Browning MD;  Location: [facility] OR [address] [place] [address];  Service: Urology;  Laterality: N/A;  1hr  Ivania Dejesus medtronic rep confirmed    KIDNEY TRANSPLANT      REMOVAL OF ELECTRODE LEAD OF SACRAL NERVE STIMULATOR         Review of patient's allergies indicates:   Allergen Reactions    Anesthetics - jen type- parabens      Pt allergic to parabens- per pharmacist    Antihistamines - ethylenediamine     Codeine      Other reaction(s): Vomiting  Other reaction(s): Nausea    Ethanol (ethyl alcohol)     Hydrocodone      Other reaction(s): Vomiting  Other reaction(s): Nausea    Opioids - morphine analogues     Propylene glycol     Saccharin     Tylenol [acetaminophen]     Yellow dye        No current facility-administered medications on file prior to encounter.     Current Outpatient Medications on File Prior to Encounter   Medication Sig    0.9 % sodium chloride (0.9% NACL) solution  (Patient not taking: Reported on 4/9/2025)    acetaminophen (TYLENOL) 325 MG tablet Take 650 mg by mouth every 6 (six) hours as needed.    albuterol (PROVENTIL HFA/VENTOLIN HFA) 200 puff inhaler Inhale 1 puff into the lungs PRN.    albuterol (PROVENTIL/VENTOLIN HFA) 90 mcg/actuation inhaler Inhale 1 Inhaler into the lungs every 4 (four) hours as needed.    ARMOUR THYROID 60 mg Tab Take 60 mg by mouth before breakfast.    ascorbic acid, vitamin C, (VITAMIN C) 1000 MG tablet Take 1,000 mg by mouth once daily.    busPIRone (BUSPAR) 5 MG Tab Take 5 mg by mouth 2  (two) times daily.    ciprofloxacin HCl (CIPRO) 500 MG tablet Take 500 mg by mouth 2 (two) times daily.    diclofenac sodium (VOLTAREN) 1 % Gel Apply topically.    ergocalciferol (VITAMIN D2) 50,000 unit Cap Take 50,000 Units by mouth every 7 days.    fluticasone propionate (FLONASE) 50 mcg/actuation nasal spray by Each Nostril route.    gabapentin (NEURONTIN) 300 MG capsule Take 300 mg by mouth 3 (three) times daily. (Patient not taking: Reported on 4/9/2025)    heparin, porcine, PF, 10 unit/mL Syrg  (Patient not taking: Reported on 4/9/2025)    k phos di & mono-sod phos mono (K-PHOS-NEUTRAL) 250 mg Tab Take 2 tablets by mouth Three times a day. (Patient not taking: Reported on 4/9/2025)    lamivudine (EPIVIR) 100 MG Tab TAKE 1 TABLET DAILY.    lisinopriL (PRINIVIL,ZESTRIL) 5 MG tablet Take 1 tablet (5 mg total) by mouth once daily.    meclizine (ANTIVERT) 25 mg tablet Take 1 tablet by mouth Daily.    MELATONIN ORAL Take by mouth every evening. (Patient not taking: Reported on 4/9/2025)    metoprolol tartrate (LOPRESSOR) 50 MG tablet Take 50 mg by mouth 2 (two) times daily.    NIFEdipine (ADALAT CC) 60 MG TbSR Take 60 mg by mouth once daily. (Patient not taking: Reported on 4/9/2025)    nifedipine (PROCARDIA-XL) 60 MG (OSM) 24 hr tablet Take 60 mg by mouth once daily.    nitrofurantoin (MACRODANTIN) 50 MG capsule Take 50 mg by mouth. (Patient not taking: Reported on 4/9/2025)    nystatin (MYCOSTATIN) ointment Apply topically. (Patient not taking: Reported on 4/9/2025)    ondansetron (ZOFRAN-ODT) 8 MG TbDL Take by mouth as needed. (Patient not taking: Reported on 4/9/2025)    oxybutynin (DITROPAN-XL) 10 MG 24 hr tablet Take 1 tablet (10 mg total) by mouth once daily. (Patient not taking: Reported on 4/9/2025)    pantoprazole (PROTONIX) 40 MG tablet Take 40 mg by mouth continuous prn.    pregabalin (LYRICA) 150 MG capsule Take 150 mg by mouth 2 (two) times daily.    sodium bicarbonate 650 MG tablet Take 2 tablets  (1,300 mg total) by mouth 2 (two) times daily.    tacrolimus (PROGRAF) 1 MG Cap Take 1 capsule (1 mg total) by mouth every morning AND 2 capsules (2 mg total) every evening. (Patient not taking: Reported on 4/9/2025)    thiamine (VITAMIN B-1) 50 MG tablet Take 50 mg by mouth once daily.    torsemide (DEMADEX) 20 MG Tab Take 1 tablet (20 mg total) by mouth once daily.    tramadol (ULTRAM) 50 mg tablet Take 50 mg by mouth as needed.    zinc gluconate 50 mg tablet Take 50 mg by mouth once daily.     Family History       Problem Relation (Age of Onset)    Diabetes Mother    Hypertension Mother, Father    Kidney disease Grandchild          Tobacco Use    Smoking status: Never     Passive exposure: Never    Smokeless tobacco: Never   Substance and Sexual Activity    Alcohol use: Yes     Comment: rare margartia    Drug use: No    Sexual activity: Not on file     Review of Systems   Unable to perform ROS: Dementia     Objective:     Vital Signs (Most Recent):  Temp: 97.6 °F (36.4 °C) (05/22/25 1047)  Pulse: 77 (05/22/25 1047)  Resp: 18 (05/22/25 1047)  BP: (!) 156/71 (05/22/25 1047)  SpO2: 96 % (05/22/25 1047) Vital Signs (24h Range):  Temp:  [97.6 °F (36.4 °C)] 97.6 °F (36.4 °C)  Pulse:  [68-77] 77  Resp:  [18-22] 18  SpO2:  [95 %-96 %] 96 %  BP: (135-156)/(71-83) 156/71        There is no height or weight on file to calculate BMI.     Physical Exam  Vitals and nursing note reviewed.   Constitutional:       General: She is not in acute distress.     Appearance: She is well-developed.   HENT:      Head: Normocephalic and atraumatic.      Mouth/Throat:      Pharynx: No oropharyngeal exudate.   Eyes:      Conjunctiva/sclera: Conjunctivae normal.      Pupils: Pupils are equal, round, and reactive to light.   Cardiovascular:      Rate and Rhythm: Normal rate and regular rhythm.      Heart sounds: Normal heart sounds.   Pulmonary:      Effort: Pulmonary effort is normal. No respiratory distress.      Breath sounds: Normal breath  "sounds. No wheezing.   Abdominal:      General: Bowel sounds are normal. There is no distension.      Palpations: Abdomen is soft.      Tenderness: There is no abdominal tenderness.   Musculoskeletal:         General: No tenderness. Normal range of motion.      Cervical back: Normal range of motion and neck supple.   Lymphadenopathy:      Cervical: No cervical adenopathy.   Skin:     General: Skin is warm and dry.      Capillary Refill: Capillary refill takes less than 2 seconds.      Findings: No rash.   Neurological:      Mental Status: She is alert and oriented to person, place, and time.      Cranial Nerves: No cranial nerve deficit.      Sensory: No sensory deficit.      Coordination: Coordination normal.   Psychiatric:         Behavior: Behavior normal.         Thought Content: Thought content normal.         Judgment: Judgment normal.              CRANIAL NERVES     CN III, IV, VI   Pupils are equal, round, and reactive to light.       Significant Labs: All pertinent labs within the past 24 hours have been reviewed.    Significant Imaging: I have reviewed all pertinent imaging results/findings within the past 24 hours.  Assessment/Plan:     Assessment & Plan  Acute renal failure superimposed on stage 3b chronic kidney disease  LUCIA is likely due to hypervolemia, pyelo/infection. Baseline creatinine is 1.5. Most recent creatinine and eGFR are listed below.  No results for input(s): "CREATININE", "EGFRNORACEVR" in the last 72 hours.    Report from Transfer:  VSSAF. No white count. Cr 2.0, BUN 35. Received levaquin + cefepime (5/11-5/14), zosyn + zyvox 5/14-5/21). Appears overloaded, received torsemide 20mg daily. Continued on prograf.      Plan  - LUCIA is improving - Cr 2.2 >> 2.0 (bl 1.5)  - treat pyelo, s/p abx x 11 days  - KTM consulted  - Avoid nephrotoxins and renally dose meds for GFR listed above  - Monitor urine output, serial BMP, and adjust therapy as needed  Acute pyelonephritis  Reported - Acute Pyelo " in Transplant Kidney w/ associated ARF. Urine clx grew klebsiella. Received levaquin + cefepime (5/11-5/14), zosyn + zyvox 5/14-5/21).     - consider continuing abx pending KTM recs  - repeat UA with clx  S/P living-donor kidney transplantation - 12/13/11  - noted, see ARF in Renal Transplant  Secondary hyperparathyroidism of renal origin  - cont home meds  Hyperlipidemia  - not on statin  GERD (gastroesophageal reflux disease)  - protonix  Chronic asthma  - no acute issues, prn nebs  Chronic immunosuppression with Prograf and Cellcept  - hold cellcept, cont prograf  - KTM consulted  Class 2 obesity in adult  There is no height or weight on file to calculate BMI. Morbid obesity complicates all aspects of disease management from diagnostic modalities to treatment. Weight loss encouraged and health benefits explained to patient.    - BMI 37.7, obesity class II  Hypertension, renal  - cont lopressor 50 bid, nifedipine 60 daily  CKD (chronic kidney disease) stage 3, GFR 30-59 ml/min  See ARF in Renal Transplant.   Multiple myeloma not having achieved remission  - no longer on Revlimid  Dementia with behavioral disturbance  Noted, delirium precautions in place.  VTE Risk Mitigation (From admission, onward)           Ordered     heparin (porcine) injection 5,000 Units  Every 8 hours         05/22/25 1142     IP VTE HIGH RISK PATIENT  Once         05/22/25 1142     Place sequential compression device  Until discontinued         05/22/25 1142                                    Bakari Mijares PA-C  Department of Hospital Medicine  [facility] - Med Surg

## 2025-05-22 NOTE — ASSESSMENT & PLAN NOTE
Reported - Acute Pyelo in Transplant Kidney w/ associated ARF. Urine clx grew klebsiella. Received levaquin + cefepime (5/11-5/14), zosyn + zyvox 5/14-5/21).     - consider continuing abx pending KTM recs  - repeat UA with clx

## 2025-05-22 NOTE — ASSESSMENT & PLAN NOTE
"LUCIA is likely due to hypervolemia, pyelo/infection. Baseline creatinine is 1.5. Most recent creatinine and eGFR are listed below.  No results for input(s): "CREATININE", "EGFRNORACEVR" in the last 72 hours.    Report from Transfer:  VSSAF. No white count. Cr 2.0, BUN 35. Received levaquin + cefepime (5/11-5/14), zosyn + zyvox 5/14-5/21). Appears overloaded, received torsemide 20mg daily. Continued on prograf.      Plan  - LUCIA is improving - Cr 2.2 >> 2.0 (bl 1.5)  - treat pyelo, s/p abx x 11 days  - KTM consulted  - Avoid nephrotoxins and renally dose meds for GFR listed above  - Monitor urine output, serial BMP, and adjust therapy as needed  "

## 2025-05-22 NOTE — PLAN OF CARE
MEKA attempted to complete discharge assessment. Nephrology Dr was in the room at the time. MEKA will follow up.    NIR Olivares, LMSW  [phone]  [facility]  Case Management

## 2025-05-22 NOTE — ASSESSMENT & PLAN NOTE
There is no height or weight on file to calculate BMI. Morbid obesity complicates all aspects of disease management from diagnostic modalities to treatment. Weight loss encouraged and health benefits explained to patient.    - BMI 37.7, obesity class II

## 2025-05-22 NOTE — SUBJECTIVE & OBJECTIVE
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    Amyloidosis, unspecified 1998    s/p chemotherapy x 4 occassions  (IV x3, po x1)    Anticoagulant long-term use     Benign hypertension with end-stage renal disease     Candida vaginitis 1/16/2015    Chronic asthma     CKD (chronic kidney disease) stage 3, GFR 30-59 ml/min 9/14/2012    GERD (gastroesophageal reflux disease)     Hyperlipidemia     Lymphedema     LLE    Need for prophylactic immunotherapy     Obesity     Osteoarthritis of left knee s/p total knee replacement 1/16/2015    Surgery October 23, 2014    PONV (postoperative nausea and vomiting)     Renal disease due to hypertension 12/14/2012    Secondary hyperparathyroidism of renal origin     Vertigo        Past Surgical History:   Procedure Laterality Date    AV FISTULA PLACEMENT      CHOLECYSTECTOMY      HYSTERECTOMY      IMPLANTATION OF PERMANENT SACRAL NERVE STIMULATOR N/A 12/24/2019    Procedure: INSERTION, NEUROSTIMULATOR, PERMANENT, SACRAL;  Surgeon: Misael Browning MD;  Location: [facility] OR [address] [place] [address];  Service: Urology;  Laterality: N/A;  1hr  Ivania Dejesus, medtronic rep confirmed    KIDNEY TRANSPLANT      REMOVAL OF ELECTRODE LEAD OF SACRAL NERVE STIMULATOR         Review of patient's allergies indicates:   Allergen Reactions    Anesthetics - jen type- parabens      Pt allergic to parabens- per pharmacist    Antihistamines - ethylenediamine     Codeine      Other reaction(s): Vomiting  Other reaction(s): Nausea    Ethanol (ethyl alcohol)     Hydrocodone      Other reaction(s): Vomiting  Other reaction(s): Nausea    Opioids - morphine analogues     Propylene glycol     Saccharin     Tylenol [acetaminophen]     Yellow dye        No current facility-administered medications on file prior to encounter.     Current Outpatient Medications on File Prior to Encounter   Medication Sig    0.9 % sodium chloride (0.9% NACL) solution   (Patient not taking: Reported on 4/9/2025)    acetaminophen (TYLENOL) 325 MG tablet Take 650 mg by mouth every 6 (six) hours as needed.    albuterol (PROVENTIL HFA/VENTOLIN HFA) 200 puff inhaler Inhale 1 puff into the lungs PRN.    albuterol (PROVENTIL/VENTOLIN HFA) 90 mcg/actuation inhaler Inhale 1 Inhaler into the lungs every 4 (four) hours as needed.    ARMOUR THYROID 60 mg Tab Take 60 mg by mouth before breakfast.    ascorbic acid, vitamin C, (VITAMIN C) 1000 MG tablet Take 1,000 mg by mouth once daily.    busPIRone (BUSPAR) 5 MG Tab Take 5 mg by mouth 2 (two) times daily.    ciprofloxacin HCl (CIPRO) 500 MG tablet Take 500 mg by mouth 2 (two) times daily.    diclofenac sodium (VOLTAREN) 1 % Gel Apply topically.    ergocalciferol (VITAMIN D2) 50,000 unit Cap Take 50,000 Units by mouth every 7 days.    fluticasone propionate (FLONASE) 50 mcg/actuation nasal spray by Each Nostril route.    gabapentin (NEURONTIN) 300 MG capsule Take 300 mg by mouth 3 (three) times daily. (Patient not taking: Reported on 4/9/2025)    heparin, porcine, PF, 10 unit/mL Syrg  (Patient not taking: Reported on 4/9/2025)    k phos di & mono-sod phos mono (K-PHOS-NEUTRAL) 250 mg Tab Take 2 tablets by mouth Three times a day. (Patient not taking: Reported on 4/9/2025)    lamivudine (EPIVIR) 100 MG Tab TAKE 1 TABLET DAILY.    lisinopriL (PRINIVIL,ZESTRIL) 5 MG tablet Take 1 tablet (5 mg total) by mouth once daily.    meclizine (ANTIVERT) 25 mg tablet Take 1 tablet by mouth Daily.    MELATONIN ORAL Take by mouth every evening. (Patient not taking: Reported on 4/9/2025)    metoprolol tartrate (LOPRESSOR) 50 MG tablet Take 50 mg by mouth 2 (two) times daily.    NIFEdipine (ADALAT CC) 60 MG TbSR Take 60 mg by mouth once daily. (Patient not taking: Reported on 4/9/2025)    nifedipine (PROCARDIA-XL) 60 MG (OSM) 24 hr tablet Take 60 mg by mouth once daily.    nitrofurantoin (MACRODANTIN) 50 MG capsule Take 50 mg by mouth. (Patient not taking:  Reported on 4/9/2025)    nystatin (MYCOSTATIN) ointment Apply topically. (Patient not taking: Reported on 4/9/2025)    ondansetron (ZOFRAN-ODT) 8 MG TbDL Take by mouth as needed. (Patient not taking: Reported on 4/9/2025)    oxybutynin (DITROPAN-XL) 10 MG 24 hr tablet Take 1 tablet (10 mg total) by mouth once daily. (Patient not taking: Reported on 4/9/2025)    pantoprazole (PROTONIX) 40 MG tablet Take 40 mg by mouth continuous prn.    pregabalin (LYRICA) 150 MG capsule Take 150 mg by mouth 2 (two) times daily.    sodium bicarbonate 650 MG tablet Take 2 tablets (1,300 mg total) by mouth 2 (two) times daily.    tacrolimus (PROGRAF) 1 MG Cap Take 1 capsule (1 mg total) by mouth every morning AND 2 capsules (2 mg total) every evening. (Patient not taking: Reported on 4/9/2025)    thiamine (VITAMIN B-1) 50 MG tablet Take 50 mg by mouth once daily.    torsemide (DEMADEX) 20 MG Tab Take 1 tablet (20 mg total) by mouth once daily.    tramadol (ULTRAM) 50 mg tablet Take 50 mg by mouth as needed.    zinc gluconate 50 mg tablet Take 50 mg by mouth once daily.     Family History       Problem Relation (Age of Onset)    Diabetes Mother    Hypertension Mother, Father    Kidney disease Grandchild          Tobacco Use    Smoking status: Never     Passive exposure: Never    Smokeless tobacco: Never   Substance and Sexual Activity    Alcohol use: Yes     Comment: rare margartia    Drug use: No    Sexual activity: Not on file     Review of Systems   Unable to perform ROS: Dementia     Objective:     Vital Signs (Most Recent):  Temp: 97.6 °F (36.4 °C) (05/22/25 1047)  Pulse: 77 (05/22/25 1047)  Resp: 18 (05/22/25 1047)  BP: (!) 156/71 (05/22/25 1047)  SpO2: 96 % (05/22/25 1047) Vital Signs (24h Range):  Temp:  [97.6 °F (36.4 °C)] 97.6 °F (36.4 °C)  Pulse:  [68-77] 77  Resp:  [18-22] 18  SpO2:  [95 %-96 %] 96 %  BP: (135-156)/(71-83) 156/71        There is no height or weight on file to calculate BMI.     Physical Exam  Vitals and  nursing note reviewed.   Constitutional:       General: She is not in acute distress.     Appearance: She is well-developed.   HENT:      Head: Normocephalic and atraumatic.      Mouth/Throat:      Pharynx: No oropharyngeal exudate.   Eyes:      Conjunctiva/sclera: Conjunctivae normal.      Pupils: Pupils are equal, round, and reactive to light.   Cardiovascular:      Rate and Rhythm: Normal rate and regular rhythm.      Heart sounds: Normal heart sounds.   Pulmonary:      Effort: Pulmonary effort is normal. No respiratory distress.      Breath sounds: Normal breath sounds. No wheezing.   Abdominal:      General: Bowel sounds are normal. There is no distension.      Palpations: Abdomen is soft.      Tenderness: There is no abdominal tenderness.   Musculoskeletal:         General: No tenderness. Normal range of motion.      Cervical back: Normal range of motion and neck supple.   Lymphadenopathy:      Cervical: No cervical adenopathy.   Skin:     General: Skin is warm and dry.      Capillary Refill: Capillary refill takes less than 2 seconds.      Findings: No rash.   Neurological:      Mental Status: She is alert and oriented to person, place, and time.      Cranial Nerves: No cranial nerve deficit.      Sensory: No sensory deficit.      Coordination: Coordination normal.   Psychiatric:         Behavior: Behavior normal.         Thought Content: Thought content normal.         Judgment: Judgment normal.              CRANIAL NERVES     CN III, IV, VI   Pupils are equal, round, and reactive to light.       Significant Labs: All pertinent labs within the past 24 hours have been reviewed.    Significant Imaging: I have reviewed all pertinent imaging results/findings within the past 24 hours.

## 2025-05-23 PROBLEM — E87.5 HYPERKALEMIA: Status: ACTIVE | Noted: 2025-05-23

## 2025-05-23 PROBLEM — D64.9 ANEMIA: Status: ACTIVE | Noted: 2025-05-23

## 2025-05-23 PROBLEM — E87.0 HYPERNATREMIA: Status: ACTIVE | Noted: 2025-05-23

## 2025-05-23 PROBLEM — D69.6 THROMBOCYTOPENIA: Status: ACTIVE | Noted: 2025-05-23

## 2025-05-23 PROBLEM — R76.8 ELEVATED SERUM IMMUNOGLOBULIN FREE LIGHT CHAINS: Status: ACTIVE | Noted: 2025-05-23

## 2025-05-23 LAB
ABSOLUTE EOSINOPHIL (OHS): 0.04 K/UL
ABSOLUTE MONOCYTE (OHS): 0.66 K/UL (ref 0.3–1)
ABSOLUTE NEUTROPHIL COUNT (OHS): 6.18 K/UL (ref 1.8–7.7)
ALBUMIN SERPL BCP-MCNC: 1.8 G/DL (ref 3.5–5.2)
ALBUMIN, SPE (OHS): 2.14 G/DL (ref 3.35–5.55)
ALP SERPL-CCNC: 75 UNIT/L (ref 40–150)
ALPHA 1 GLOB (OHS): 0.57 GM/DL (ref 0.17–0.41)
ALPHA 2 GLOB (OHS): 0.86 GM/DL (ref 0.43–0.99)
ALT SERPL W/O P-5'-P-CCNC: 20 UNIT/L (ref 10–44)
ANION GAP (OHS): 8 MMOL/L (ref 8–16)
AORTIC SIZE INDEX (SOV): 1.4 CM/M2
AORTIC SIZE INDEX: 1.3 CM/M2
ASCENDING AORTA: 2.8 CM
AST SERPL-CCNC: 9 UNIT/L (ref 11–45)
AV AREA BY CONTINUOUS VTI: 1.8 CM2
AV INDEX (PROSTH): 0.49
AV LVOT MEAN GRADIENT: 3 MMHG
AV LVOT PEAK GRADIENT: 6 MMHG
AV MEAN GRADIENT: 12 MMHG
AV PEAK GRADIENT: 25 MMHG
AV VALVE AREA BY VELOCITY RATIO: 1.8 CM²
AV VALVE AREA: 1.9 CM2
AV VELOCITY RATIO: 0.48
BASOPHILS # BLD AUTO: 0.03 K/UL
BASOPHILS NFR BLD AUTO: 0.4 %
BETA GLOB (OHS): 0.67 GM/DL (ref 0.5–1.1)
BILIRUB SERPL-MCNC: 0.3 MG/DL (ref 0.1–1)
BSA FOR ECHO PROCEDURE: 2.22 M2
BUN SERPL-MCNC: 29 MG/DL (ref 8–23)
CALCIUM SERPL-MCNC: 10.3 MG/DL (ref 8.7–10.5)
CHLORIDE SERPL-SCNC: 112 MMOL/L (ref 95–110)
CO2 SERPL-SCNC: 23 MMOL/L (ref 23–29)
CREAT SERPL-MCNC: 1.5 MG/DL (ref 0.5–1.4)
CV ECHO LV RWT: 0.38 CM
DOP CALC AO PEAK VEL: 2.5 M/S
DOP CALC AO VTI: 59.7 CM
DOP CALC LVOT AREA: 3.8 CM2
DOP CALC LVOT DIAMETER: 2.2 CM
DOP CALC LVOT PEAK VEL: 1.2 M/S
DOP CALC LVOT STROKE VOLUME: 111.7 CM3
DOP CALCLVOT PEAK VEL VTI: 29.4 CM
E WAVE DECELERATION TIME: 181 MS
E/A RATIO: 0.99
E/E' RATIO: 12 M/S
ECHO EF ESTIMATED: 58 %
ECHO LV POSTERIOR WALL: 0.9 CM (ref 0.6–1.1)
ERYTHROCYTE [DISTWIDTH] IN BLOOD BY AUTOMATED COUNT: 19.2 % (ref 11.5–14.5)
FIBRINOGEN PPP-MCNC: 504 MG/DL (ref 182–400)
FRACTIONAL SHORTENING: 31.9 % (ref 28–44)
GAMMA GLOBULIN (OHS): 0.96 GM/DL (ref 0.67–1.58)
GFR SERPLBLD CREATININE-BSD FMLA CKD-EPI: 36 ML/MIN/1.73/M2
GLUCOSE SERPL-MCNC: 97 MG/DL (ref 70–110)
HAPTOGLOB SERPL-MCNC: 320 MG/DL (ref 30–250)
HCT VFR BLD AUTO: 26.7 % (ref 37–48.5)
HGB BLD-MCNC: 8.1 GM/DL (ref 12–16)
IMM GRANULOCYTES # BLD AUTO: 0.1 K/UL (ref 0–0.04)
IMM GRANULOCYTES NFR BLD AUTO: 1.2 % (ref 0–0.5)
INTERVENTRICULAR SEPTUM: 1.1 CM (ref 0.6–1.1)
KAPPA LC FREE SER-MCNC: 0.09 MG/L (ref 0.26–1.65)
KAPPA LC FREE/LAMBDA FREE SER: 8.2 MG/DL (ref 0.33–1.94)
LA MAJOR: 5.8 CM
LA MINOR: 5.9 CM
LA WIDTH: 4.6 CM
LAMBDA LC FREE SERPL-MCNC: 91.32 MG/DL (ref 0.57–2.63)
LDH SERPL-CCNC: 167 U/L (ref 110–260)
LEFT ATRIUM SIZE: 3.4 CM
LEFT ATRIUM VOLUME INDEX MOD: 44 ML/M2
LEFT ATRIUM VOLUME INDEX: 37 ML/M2
LEFT ATRIUM VOLUME MOD: 94 ML
LEFT ATRIUM VOLUME: 78 CM3
LEFT INTERNAL DIMENSION IN SYSTOLE: 3.2 CM (ref 2.1–4)
LEFT VENTRICLE DIASTOLIC VOLUME INDEX: 47.17 ML/M2
LEFT VENTRICLE DIASTOLIC VOLUME: 100 ML
LEFT VENTRICLE MASS INDEX: 77.6 G/M2
LEFT VENTRICLE SYSTOLIC VOLUME INDEX: 19.8 ML/M2
LEFT VENTRICLE SYSTOLIC VOLUME: 42 ML
LEFT VENTRICULAR INTERNAL DIMENSION IN DIASTOLE: 4.7 CM (ref 3.5–6)
LEFT VENTRICULAR MASS: 164.5 G
LV LATERAL E/E' RATIO: 12.3
LV SEPTAL E/E' RATIO: 12.3
LYMPHOCYTES # BLD AUTO: 1.07 K/UL (ref 1–4.8)
MAGNESIUM SERPL-MCNC: 1.6 MG/DL (ref 1.6–2.6)
MCH RBC QN AUTO: 26.4 PG (ref 27–31)
MCHC RBC AUTO-ENTMCNC: 30.3 G/DL (ref 32–36)
MCV RBC AUTO: 87 FL (ref 82–98)
MV PEAK A VEL: 0.99 M/S
MV PEAK E VEL: 0.98 M/S
NUCLEATED RBC (/100WBC) (OHS): 0 /100 WBC
OHS CV RV/LV RATIO: 0.7 CM
PHOSPHATE SERPL-MCNC: 3.3 MG/DL (ref 2.7–4.5)
PISA TR MAX VEL: 2.3 M/S
PLATELET # BLD AUTO: 107 K/UL (ref 150–450)
PMV BLD AUTO: 12 FL (ref 9.2–12.9)
POCT GLUCOSE: 101 MG/DL (ref 70–110)
POCT GLUCOSE: 104 MG/DL (ref 70–110)
POTASSIUM SERPL-SCNC: 4.9 MMOL/L (ref 3.5–5.1)
PROT SERPL-MCNC: 5.2 GM/DL (ref 6–8.4)
PROT SERPL-MCNC: 5.9 GM/DL (ref 6–8.4)
RA MAJOR: 5.24 CM
RA PRESSURE ESTIMATED: 3 MMHG
RA WIDTH: 3.64 CM
RBC # BLD AUTO: 3.07 M/UL (ref 4–5.4)
RELATIVE EOSINOPHIL (OHS): 0.5 %
RELATIVE LYMPHOCYTE (OHS): 13.2 % (ref 18–48)
RELATIVE MONOCYTE (OHS): 8.2 % (ref 4–15)
RELATIVE NEUTROPHIL (OHS): 76.5 % (ref 38–73)
RIGHT VENTRICLE DIASTOLIC BASEL DIMENSION: 3.3 CM
RV TB RVSP: 5 MMHG
SINUS: 2.96 CM
SODIUM SERPL-SCNC: 143 MMOL/L (ref 136–145)
STJ: 2.5 CM
TACROLIMUS BLD-MCNC: 20.6 NG/ML (ref 5–15)
TDI LATERAL: 0.08 M/S
TDI SEPTAL: 0.08 M/S
TDI: 0.08 M/S
TRICUSPID ANNULAR PLANE SYSTOLIC EXCURSION: 1.7 CM
TV PEAK GRADIENT: 21 MMHG
TV REST PULMONARY ARTERY PRESSURE: 24 MMHG
WBC # BLD AUTO: 8.08 K/UL (ref 3.9–12.7)
Z-SCORE OF LEFT VENTRICULAR DIMENSION IN END DIASTOLE: -3.54
Z-SCORE OF LEFT VENTRICULAR DIMENSION IN END SYSTOLE: -1.94

## 2025-05-23 PROCEDURE — 63600175 PHARM REV CODE 636 W HCPCS

## 2025-05-23 PROCEDURE — 99232 SBSQ HOSP IP/OBS MODERATE 35: CPT | Mod: GC,,, | Performed by: INTERNAL MEDICINE

## 2025-05-23 PROCEDURE — 84165 PROTEIN E-PHORESIS SERUM: CPT | Performed by: STUDENT IN AN ORGANIZED HEALTH CARE EDUCATION/TRAINING PROGRAM

## 2025-05-23 PROCEDURE — 85384 FIBRINOGEN ACTIVITY: CPT | Performed by: STUDENT IN AN ORGANIZED HEALTH CARE EDUCATION/TRAINING PROGRAM

## 2025-05-23 PROCEDURE — 85025 COMPLETE CBC W/AUTO DIFF WBC: CPT

## 2025-05-23 PROCEDURE — 25000003 PHARM REV CODE 250

## 2025-05-23 PROCEDURE — 83010 ASSAY OF HAPTOGLOBIN QUANT: CPT | Performed by: STUDENT IN AN ORGANIZED HEALTH CARE EDUCATION/TRAINING PROGRAM

## 2025-05-23 PROCEDURE — 92610 EVALUATE SWALLOWING FUNCTION: CPT

## 2025-05-23 PROCEDURE — 86334 IMMUNOFIX E-PHORESIS SERUM: CPT | Performed by: STUDENT IN AN ORGANIZED HEALTH CARE EDUCATION/TRAINING PROGRAM

## 2025-05-23 PROCEDURE — 11000001 HC ACUTE MED/SURG PRIVATE ROOM

## 2025-05-23 PROCEDURE — 99232 SBSQ HOSP IP/OBS MODERATE 35: CPT | Mod: ,,, | Performed by: INTERNAL MEDICINE

## 2025-05-23 PROCEDURE — 80053 COMPREHEN METABOLIC PANEL: CPT

## 2025-05-23 PROCEDURE — 21400001 HC TELEMETRY ROOM

## 2025-05-23 PROCEDURE — 83735 ASSAY OF MAGNESIUM: CPT

## 2025-05-23 PROCEDURE — 83521 IG LIGHT CHAINS FREE EACH: CPT | Performed by: STUDENT IN AN ORGANIZED HEALTH CARE EDUCATION/TRAINING PROGRAM

## 2025-05-23 PROCEDURE — 25000003 PHARM REV CODE 250: Performed by: NURSE PRACTITIONER

## 2025-05-23 PROCEDURE — 80197 ASSAY OF TACROLIMUS: CPT | Performed by: STUDENT IN AN ORGANIZED HEALTH CARE EDUCATION/TRAINING PROGRAM

## 2025-05-23 PROCEDURE — 84100 ASSAY OF PHOSPHORUS: CPT

## 2025-05-23 PROCEDURE — 83615 LACTATE (LD) (LDH) ENZYME: CPT | Performed by: STUDENT IN AN ORGANIZED HEALTH CARE EDUCATION/TRAINING PROGRAM

## 2025-05-23 RX ORDER — CEFTRIAXONE 1 G/1
1 INJECTION, POWDER, FOR SOLUTION INTRAMUSCULAR; INTRAVENOUS
Status: DISCONTINUED | OUTPATIENT
Start: 2025-05-23 | End: 2025-05-28 | Stop reason: HOSPADM

## 2025-05-23 RX ORDER — MICONAZOLE NITRATE 2 G/100G
POWDER TOPICAL 2 TIMES DAILY
Status: DISCONTINUED | OUTPATIENT
Start: 2025-05-23 | End: 2025-05-28 | Stop reason: HOSPADM

## 2025-05-23 RX ADMIN — PANTOPRAZOLE SODIUM 40 MG: 40 TABLET, DELAYED RELEASE ORAL at 08:05

## 2025-05-23 RX ADMIN — MUPIROCIN: 20 OINTMENT TOPICAL at 08:05

## 2025-05-23 RX ADMIN — HEPARIN SODIUM 5000 UNITS: 5000 INJECTION INTRAVENOUS; SUBCUTANEOUS at 10:05

## 2025-05-23 RX ADMIN — HEPARIN SODIUM 5000 UNITS: 5000 INJECTION INTRAVENOUS; SUBCUTANEOUS at 06:05

## 2025-05-23 RX ADMIN — NIFEDIPINE 60 MG: 60 TABLET, FILM COATED, EXTENDED RELEASE ORAL at 08:05

## 2025-05-23 RX ADMIN — MUPIROCIN: 20 OINTMENT TOPICAL at 10:05

## 2025-05-23 RX ADMIN — ZOLEDRONIC ACID 3 MG: 4 INJECTION, SOLUTION, CONCENTRATE INTRAVENOUS at 06:05

## 2025-05-23 RX ADMIN — THYROID, PORCINE 60 MG: 60 TABLET ORAL at 06:05

## 2025-05-23 RX ADMIN — CEFTRIAXONE 1 G: 1 INJECTION, POWDER, FOR SOLUTION INTRAMUSCULAR; INTRAVENOUS at 12:05

## 2025-05-23 RX ADMIN — METOPROLOL TARTRATE 50 MG: 50 TABLET, FILM COATED ORAL at 08:05

## 2025-05-23 RX ADMIN — TACROLIMUS 5 MG: 5 CAPSULE ORAL at 08:05

## 2025-05-23 RX ADMIN — HEPARIN SODIUM 5000 UNITS: 5000 INJECTION INTRAVENOUS; SUBCUTANEOUS at 02:05

## 2025-05-23 RX ADMIN — METOPROLOL TARTRATE 50 MG: 50 TABLET, FILM COATED ORAL at 10:05

## 2025-05-23 RX ADMIN — LAMIVUDINE 100 MG: 100 TABLET, FILM COATED ORAL at 08:05

## 2025-05-23 RX ADMIN — BUSPIRONE HYDROCHLORIDE 5 MG: 5 TABLET ORAL at 08:05

## 2025-05-23 RX ADMIN — BUSPIRONE HYDROCHLORIDE 5 MG: 5 TABLET ORAL at 10:05

## 2025-05-23 RX ADMIN — MICONAZOLE NITRATE 2 % TOPICAL POWDER: at 10:05

## 2025-05-23 NOTE — SUBJECTIVE & OBJECTIVE
Interval History: NAEO, minimal interactions on exam today. Still overall edematous on exam. Echo and renal us pending    Review of Systems   Unable to perform ROS: Mental status change     Objective:     Vital Signs (Most Recent):  Temp: 97.7 °F (36.5 °C) (05/23/25 1132)  Pulse: 72 (05/23/25 1132)  Resp: 18 (05/23/25 1132)  BP: (!) 161/74 (05/23/25 1132)  SpO2: 95 % (05/23/25 1132) Vital Signs (24h Range):  Temp:  [97.2 °F (36.2 °C)-98.2 °F (36.8 °C)] 97.7 °F (36.5 °C)  Pulse:  [68-80] 72  Resp:  [18-20] 18  SpO2:  [95 %-97 %] 95 %  BP: (132-176)/(60-74) 161/74     Weight: 107 kg (235 lb 14.3 oz)  Body mass index is 39.25 kg/m².    Intake/Output Summary (Last 24 hours) at 5/23/2025 1423  Last data filed at 5/23/2025 0900  Gross per 24 hour   Intake --   Output 1300 ml   Net -1300 ml         Physical Exam  Constitutional:       Appearance: Normal appearance.   HENT:      Head: Normocephalic and atraumatic.   Cardiovascular:      Rate and Rhythm: Normal rate.      Pulses: Normal pulses.      Heart sounds: No murmur heard.  Pulmonary:      Effort: Pulmonary effort is normal. No respiratory distress.   Abdominal:      General: Abdomen is flat. Bowel sounds are normal. There is no distension.      Tenderness: There is no abdominal tenderness.   Musculoskeletal:      Right lower leg: Edema present.      Left lower leg: Edema present.   Skin:     General: Skin is warm.               Significant Labs: All pertinent labs within the past 24 hours have been reviewed.    Significant Imaging: I have reviewed all pertinent imaging results/findings within the past 24 hours.

## 2025-05-23 NOTE — ASSESSMENT & PLAN NOTE
Body mass index is 39.25 kg/m². Morbid obesity complicates all aspects of disease management from diagnostic modalities to treatment. Weight loss encouraged and health benefits explained to patient.    - BMI 37.7, obesity class II

## 2025-05-23 NOTE — ASSESSMENT & PLAN NOTE
LUCIA is likely due to hypervolemia, pyelo/infection. Baseline creatinine is 1.5. Most recent creatinine and eGFR are listed below.  Recent Labs     05/22/25  1220 05/23/25  0510   CREATININE 1.8* 1.5*   EGFRNORACEVR 29* 36*       Report from Transfer:  VSSAF. No white count. Cr 2.0, BUN 35. Received levaquin + cefepime (5/11-5/14), zosyn + zyvox 5/14-5/21). Appears overloaded, received torsemide 20mg daily. Continued on prograf.      Plan  - LUCIA is improving - Cr 2.2 >> 2.0 (bl 1.5)  - treat pyelo, s/p abx x 11 days  - KTM consulted  - holding diuretics as patient is likely intravascularly depleted but edema is due to albumin loss and proteinuria  - Cr trending back to baseline

## 2025-05-23 NOTE — ASSESSMENT & PLAN NOTE
Hyperkalemia is likely due to LUCIA.The patients most recent potassium results are listed below.  Recent Labs     05/22/25  1220 05/23/25  0510   K 5.4* 4.9     Plan  - Monitor for arrhythmias with EKG and/or continuous telemetry.   - Treat the hyperkalemia with renal diet  - Monitor potassium: Daily  - The patient's hyperkalemia is improving

## 2025-05-23 NOTE — PROGRESS NOTES
[facility] Medicine  Progress Note    Patient Name: Jing Philippe  MRN: [id number]  Patient Class: IP- Inpatient   Admission Date: 5/22/2025  Length of Stay: 1 days  Attending Physician: J Carlos Chavez DO  Primary Care Provider: Fito Land Jr., MD        Subjective     Principal Problem:Acute renal failure superimposed on stage 3b chronic kidney disease        HPI:  Jing Philippe is a 75 y.o. female with PMHx of renal transplant (12/13/2011) due to renal amyloidosis now CKD 3b on immunosuppression, HTN, HLD, asthma, GERD, obesity, multiple myeloma, dementia transferred from [facility] to [facility] due to ARF and acute pyelo in transplanted kidney.     Last seen by Nephro in April 2025 at [facility] as f/u from prior admission. At that time, prograf was increased and torsemide held due to LUCIA, restarted once Cr returned to normal. Admitted to [facility] 5/11 due to AMS iso UTI. MRI brain at that time normal. Started on antibiotics, urine clx grew Klebsiella. Over past 10 days, Cr trending upwards to 2.2 (bl 1.5).     Per Handoff -- VSSAF. No white count. Cr 2.0, BUN 35. Received levaquin + cefepime (5/11-5/14), zosyn + zyvox 5/14-5/21). Appears overloaded, received torsemide 20mg daily. Continued on prograf.     Overview/Hospital Course:  Patient transferred due to worsening renal function in transplanted kidney. Needed to be evaluated by renal transplant team    Diuresis was held due to LUCIA, although patient appears edematous on exam. Echo showed likely intravascular volume depletion. Edema likely due to proteinuria and low albumin state.     Patient also with elevated light chain with a history of amyloidosis (s/p chemo) in remission. Could be contributing to presentation and ongoing debility and proteinuria. Oncology consulted for evaluation and recommendation for work up or management.     Interval History: NAEO, minimal interactions on exam today. Still  overall edematous on exam. Echo and renal us pending    Review of Systems   Unable to perform ROS: Mental status change     Objective:     Vital Signs (Most Recent):  Temp: 97.7 °F (36.5 °C) (05/23/25 1132)  Pulse: 72 (05/23/25 1132)  Resp: 18 (05/23/25 1132)  BP: (!) 161/74 (05/23/25 1132)  SpO2: 95 % (05/23/25 1132) Vital Signs (24h Range):  Temp:  [97.2 °F (36.2 °C)-98.2 °F (36.8 °C)] 97.7 °F (36.5 °C)  Pulse:  [68-80] 72  Resp:  [18-20] 18  SpO2:  [95 %-97 %] 95 %  BP: (132-176)/(60-74) 161/74     Weight: 107 kg (235 lb 14.3 oz)  Body mass index is 39.25 kg/m².    Intake/Output Summary (Last 24 hours) at 5/23/2025 1423  Last data filed at 5/23/2025 0900  Gross per 24 hour   Intake --   Output 1300 ml   Net -1300 ml         Physical Exam  Constitutional:       Appearance: Normal appearance.   HENT:      Head: Normocephalic and atraumatic.   Cardiovascular:      Rate and Rhythm: Normal rate.      Pulses: Normal pulses.      Heart sounds: No murmur heard.  Pulmonary:      Effort: Pulmonary effort is normal. No respiratory distress.   Abdominal:      General: Abdomen is flat. Bowel sounds are normal. There is no distension.      Tenderness: There is no abdominal tenderness.   Musculoskeletal:      Right lower leg: Edema present.      Left lower leg: Edema present.   Skin:     General: Skin is warm.               Significant Labs: All pertinent labs within the past 24 hours have been reviewed.    Significant Imaging: I have reviewed all pertinent imaging results/findings within the past 24 hours.      Assessment & Plan  Acute renal failure superimposed on stage 3b chronic kidney disease  LUCIA is likely due to hypervolemia, pyelo/infection. Baseline creatinine is 1.5. Most recent creatinine and eGFR are listed below.  Recent Labs     05/22/25  1220 05/23/25  0510   CREATININE 1.8* 1.5*   EGFRNORACEVR 29* 36*       Report from Transfer:  VSSAF. No white count. Cr 2.0, BUN 35. Received levaquin + cefepime (5/11-5/14),  zosyn + zyvox 5/14-5/21). Appears overloaded, received torsemide 20mg daily. Continued on prograf.      Plan  - LUCIA is improving - Cr 2.2 >> 2.0 (bl 1.5)  - treat pyelo, s/p abx x 11 days  - KTM consulted  - holding diuretics as patient is likely intravascularly depleted but edema is due to albumin loss and proteinuria  - Cr trending back to baseline  Elevated serum immunoglobulin free light chains  - history of MM and amyloidosis, not currently on treatment  - labs with elevated light chains  - concerns that worsening amyloidosis is contributing to her current presentation with proteinuria and anasarca as well as anemia.  - oncology consulted for recommendations and treatment options    Acute pyelonephritis  Reported - Acute Pyelo in Transplant Kidney w/ associated ARF. Urine clx grew klebsiella. Received levaquin + cefepime (5/11-5/14), zosyn + zyvox 5/14-5/21).     - consider continuing abx pending KTM recs  - repeat UA with clx pending  - continue on rocephin for now. Will given 5 days. No culture data available but seems to be adequately treated at outside facility  S/P living-donor kidney transplantation - 12/13/11  - noted, see ARF in Renal Transplant  Secondary hyperparathyroidism of renal origin  - cont home meds  Hyperlipidemia  - not on statin  GERD (gastroesophageal reflux disease)  - protonix  Chronic asthma  - no acute issues, prn nebs  Chronic immunosuppression with Prograf and Cellcept  - hold cellcept, cont prograf  - KTM consulted  Class 2 obesity in adult  Body mass index is 39.25 kg/m². Morbid obesity complicates all aspects of disease management from diagnostic modalities to treatment. Weight loss encouraged and health benefits explained to patient.    - BMI 37.7, obesity class II  Hypertension, renal  - cont lopressor 50 bid, nifedipine 60 daily  CKD (chronic kidney disease) stage 3, GFR 30-59 ml/min  See ARF in Renal Transplant.   Multiple myeloma not having achieved remission  - no longer on  Revlimid  Dementia with behavioral disturbance  Noted, delirium precautions in place.  Anemia  Anemia is likely due to chronic disease due to Chronic Kidney Disease. Most recent hemoglobin and hematocrit are listed below.  Recent Labs     05/22/25  1220 05/23/25  0510   HGB 7.3* 8.1*   HCT 24.2* 26.7*     Plan  - Monitor serial CBC: Daily  - Transfuse PRBC if patient becomes hemodynamically unstable, symptomatic or H/H drops below 7/21.  - Patient has received 1 units of PRBCs on unknown, documented at outside hospital  - Patient's anemia is currently stable  - concern it could be related to amyloid or MM  Hyperkalemia  Hyperkalemia is likely due to LUCIA.The patients most recent potassium results are listed below.  Recent Labs     05/22/25  1220 05/23/25  0510   K 5.4* 4.9     Plan  - Monitor for arrhythmias with EKG and/or continuous telemetry.   - Treat the hyperkalemia with renal diet  - Monitor potassium: Daily  - The patient's hyperkalemia is improving          Hypernatremia  Hypernatremia is likely due to Dehydration. The patient's most recent sodium results are listed below.  Recent Labs     05/22/25  1220 05/23/25  0510   * 143     Plan  - Aim to correct the sodium by 8-10mEq in 24 hours.   - Will plan to trend the patient's sodium: Daily  - The patient's hypernatremia is improving    Thrombocytopenia  The likely etiology of thrombocytopenia is sepsis. The patients 3 most recent labs are listed below.  Recent Labs     05/22/25  1220 05/23/25  0510   PLT 89* 107*     Plan  - Will transfuse if platelet count is <50k (if undergoing surgical procedure or have active bleeding).      VTE Risk Mitigation (From admission, onward)           Ordered     heparin (porcine) injection 5,000 Units  Every 8 hours         05/22/25 1142     IP VTE HIGH RISK PATIENT  Once         05/22/25 1142     Place sequential compression device  Until discontinued         05/22/25 1142                    Discharge Planning   LESTER:  5/25/2025     Code Status: Full Code   Medical Readiness for Discharge Date:   Discharge Plan A: Skilled Nursing Facility                        J Carlos Chavez DO  Department of Hospital Medicine   [facility]

## 2025-05-23 NOTE — CONSULTS
Hematology Oncology Consult Note    Inpatient consult to Hematology/Oncology  Consult performed by: Edgar Gutierrez DO  Consult ordered by: J Carlos Chavez DO        SUBJECTIVE:     History of Present Illness:  Patient is a 75 y.o. female with HX ESRD secondary to  renal amyloidosis, who received a living kidney transplant on 12/13/11. Multiple myeloma was diagnosed 03/2017. Per chart review, she was on Velcade SubQ every 3 weeks. Treated by Dr ELIZABETH Camp/ [facility] in [place], [place]. Treatment reportedly stopped due to osteomyelitis of the foot.     Has been in and out of hospitals and nursing homes for several months with recurrent UTI causing AMS and weakness.      She was transferred to the hospital a week and a half ago for worsening mental status and sleeping all day. Concern for infection. Was treated with antibiotics at outside hospital for several days.     Now admitted with pyelonephritis and LUCIA. WBC 8, Hgb 8.1 (10.9 6 weeks ago), plt 107, sCr 1.5 (b/l 1-1.2, 1.8 on trasnfer 5/22), hapto 320, fibrinogen 504, ferritin 872, KFLC 8.2, LFLC 91.32, FLC ratio 0.09. BMT consulted due to elevated light chains.       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    Amyloidosis, unspecified 1998    s/p chemotherapy x 4 occassions  (IV x3, po x1)    Anticoagulant long-term use     Benign hypertension with end-stage renal disease     Candida vaginitis 1/16/2015    Chronic asthma     CKD  (chronic kidney disease) stage 3, GFR 30-59 ml/min 9/14/2012    GERD (gastroesophageal reflux disease)     Hyperlipidemia     Lymphedema     LLE    Need for prophylactic immunotherapy     Obesity     Osteoarthritis of left knee s/p total knee replacement 1/16/2015    Surgery October 23, 2014    PONV (postoperative nausea and vomiting)     Renal disease due to hypertension 12/14/2012    Secondary hyperparathyroidism of renal origin     Vertigo      Past Surgical History:   Procedure Laterality Date    AV FISTULA PLACEMENT      CHOLECYSTECTOMY      HYSTERECTOMY      IMPLANTATION OF PERMANENT SACRAL NERVE STIMULATOR N/A 12/24/2019    Procedure: INSERTION, NEUROSTIMULATOR, PERMANENT, SACRAL;  Surgeon: Misael Browning MD;  Location: [facility] OR [address] [place] [address];  Service: Urology;  Laterality: N/A;  1hr  Ivania Dejesus, [employer] rep confirmed    KIDNEY TRANSPLANT      REMOVAL OF ELECTRODE LEAD OF SACRAL NERVE STIMULATOR       Family History   Problem Relation Name Age of Onset    Hypertension Mother      Diabetes Mother      Hypertension Father      Kidney disease Grandchild          recurrent infections, uncertain details     Social History[1]  Review of Systems   Constitutional:  Negative for chills and fever.   HENT:  Negative for congestion and nosebleeds.    Eyes:  Negative for blurred vision and double vision.   Respiratory:  Negative for cough, hemoptysis and shortness of breath.    Cardiovascular:  Negative for chest pain and palpitations.   Gastrointestinal:  Negative for nausea and vomiting.   Genitourinary:  Negative for dysuria and hematuria.   Musculoskeletal:  Negative for myalgias and neck pain.   Neurological:  Negative for dizziness and headaches.   Psychiatric/Behavioral:  Negative for memory loss. The patient does not have insomnia.      OBJECTIVE:     Vital Signs:  Temp:  [97.7 °F (36.5 °C)-97.9 °F (36.6 °C)]   Pulse:  [71-80]   Resp:  [18-20]   BP: (132-161)/(68-74)   SpO2:  [95 %-96 %]     Physical  Exam  Constitutional:       Appearance: Normal appearance.   HENT:      Head: Normocephalic and atraumatic.   Eyes:      Extraocular Movements: Extraocular movements intact.      Pupils: Pupils are equal, round, and reactive to light.   Cardiovascular:      Rate and Rhythm: Normal rate and regular rhythm.   Pulmonary:      Effort: Pulmonary effort is normal.      Breath sounds: Normal breath sounds.   Abdominal:      General: Abdomen is flat.      Palpations: Abdomen is soft.   Musculoskeletal:         General: No swelling. Normal range of motion.      Cervical back: Normal range of motion. No rigidity.   Skin:     General: Skin is warm and dry.   Neurological:      General: No focal deficit present.      Mental Status: She is alert and oriented to person, place, and time.   Psychiatric:         Mood and Affect: Mood normal.         Behavior: Behavior normal.       Laboratory:  CBC:   Recent Labs   Lab 05/23/25  0510   WBC 8.08   RBC 3.07*   HGB 8.1*   HCT 26.7*   *   MCV 87   MCH 26.4*   MCHC 30.3*     CMP:   Recent Labs   Lab 05/23/25  0510   GLU 97   CALCIUM 10.3   ALBUMIN 1.8*   PROT 5.2*  5.9*      K 4.9   CO2 23   *   BUN 29*   CREATININE 1.5*   ALKPHOS 75   ALT 20   AST 9*   BILITOT 0.3       ASSESSMENT/PLAN:     #Elevated FLC  Patient is a 75 y.o. female with HX ESRD secondary to  renal amyloidosis, who received a living kidney transplant on 12/13/11. Multiple myeloma was diagnosed 03/2017. Treated by Dr ELIZABETH Camp/ [facility] in [place], [place]. Treatment reportedly stopped due to osteomyelitis of the foot.     Admitted with pyelonephritis and LUCIA. sCr has been improving, unlikely to be due to involvement by myeloma.     Has oncologist in [facility] ([place]). Has reportedly been off myeloma treatment for 2-3 years.      Recommendations:   - Send SPEP, UPEP, quant immunoglobulins  - 1 dose of Zometa for hypercalcemia  - Outpatient follow up with her oncologist      Discussed with Dr. Perez.  We  will continue to follow. Let us know if any questions.      Edgar Gutierrez DO  Hematology/Oncology Fellow, PGY-IV   [facility] [place]         [1]   Social History  Tobacco Use    Smoking status: Never     Passive exposure: Never    Smokeless tobacco: Never   Substance Use Topics    Alcohol use: Yes     Comment: rare margartia    Drug use: No

## 2025-05-23 NOTE — ASSESSMENT & PLAN NOTE
- history of MM and amyloidosis, not currently on treatment  - labs with elevated light chains  - concerns that worsening amyloidosis is contributing to her current presentation with proteinuria and anasarca as well as anemia.  - oncology consulted for recommendations and treatment options

## 2025-05-23 NOTE — ASSESSMENT & PLAN NOTE
Reported - Acute Pyelo in Transplant Kidney w/ associated ARF. Urine clx grew klebsiella. Received levaquin + cefepime (5/11-5/14), zosyn + zyvox 5/14-5/21).     - consider continuing abx pending KTM recs  - repeat UA with clx pending  - continue on rocephin for now. Will given 5 days. No culture data available but seems to be adequately treated at outside facility

## 2025-05-23 NOTE — PROGRESS NOTES
Name: Jing Philippe  Medical Record Number: [id number]  Date of Service: 05/23/2025  Note By: Kristy Dumont MD    RENAL TRANSPLANT PROGRESS NOTE      ORGAN: RIGHT KIDNEY  Donor Type: living  PHS Increased Risk: no  Cold Ischemia: 34.2 mins  Induction Medications: Camputh    Reason for Follow-up: Reassessment of kidney transplant recipient and management of immunosuppression.    Summary:  ESRD 2/2 renal amyloidosis s/p LRKT (12/13/2011) on prograf, post-Tx CKD3b, HTN, dementia, who was transferred from [facility] to [facility] on 5/22/2025 for worsening renal function. Initially presented for AMS, found to have PNA and UTI, s/p Abx. Noted to be very anasarcic, so was given IV diuretics, which resulted in worsening of renal function.     Interval History: Patient overall more awake this morning. But reports feeling generally unwell today. Could not elaborate why. Had SLP eval this morning, per .     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    Amyloidosis, unspecified 1998    s/p chemotherapy x 4 occassions  (IV x3, po x1)    Anticoagulant long-term use     Benign hypertension with end-stage renal disease     Candida vaginitis 1/16/2015    Chronic asthma     CKD (chronic kidney disease) stage 3, GFR 30-59 ml/min 9/14/2012    GERD (gastroesophageal reflux disease)     Hyperlipidemia     Lymphedema     LLE    Need for prophylactic immunotherapy     Obesity     Osteoarthritis of left knee s/p total knee replacement 1/16/2015    Surgery October 23, 2014    PONV (postoperative nausea and vomiting)     Renal disease due to hypertension 12/14/2012    Secondary hyperparathyroidism of renal origin     Vertigo      Medications:   Scheduled Meds:   busPIRone  5 mg Oral BID    cefTRIAXone (Rocephin) IV (PEDS and ADULTS)  1 g Intravenous Q24H    heparin (porcine)  5,000 Units Subcutaneous Q8H    lamiVUDine  100 mg Oral Daily    metoprolol  tartrate  50 mg Oral BID    miconazole NITRATE 2 %   Topical (Top) BID    mupirocin   Nasal BID    NIFEdipine  60 mg Oral Daily    pantoprazole  40 mg Oral Daily    thyroid (pork)  60 mg Oral Before breakfast     Continuous Infusions:    PRN Meds:.  Current Facility-Administered Medications:     albuterol, 1 puff, Inhalation, Q6H PRN    albuterol-ipratropium, 3 mL, Nebulization, Q4H PRN    bisacodyL, 10 mg, Rectal, Daily PRN    dextrose 50%, 12.5 g, Intravenous, PRN    dextrose 50%, 25 g, Intravenous, PRN    glucagon (human recombinant), 1 mg, Intramuscular, PRN    glucose, 16 g, Oral, PRN    glucose, 24 g, Oral, PRN    melatonin, 6 mg, Oral, Nightly PRN    naloxone, 0.02 mg, Intravenous, PRN    ondansetron, 8 mg, Oral, Q8H PRN    polyethylene glycol, 17 g, Oral, Daily PRN    promethazine, 25 mg, Oral, Q6H PRN    sodium chloride 0.9%, 10 mL, Intravenous, Q12H PRN    Physical Exam:  Vitals:  Vitals:    05/23/25 1132   BP: (!) 161/74   Pulse: 72   Resp: 18   Temp: 97.7 °F (36.5 °C)       Temp:  [97.2 °F (36.2 °C)-98.2 °F (36.8 °C)] 97.7 °F (36.5 °C)  Pulse:  [68-80] 72  Resp:  [18-20] 18  SpO2:  [95 %-97 %] 95 %  BP: (132-176)/(60-74) 161/74      Intake/Output Summary (Last 24 hours) at 5/23/2025 1220  Last data filed at 5/23/2025 0900  Gross per 24 hour   Intake --   Output 1725 ml   Net -1725 ml        Exam  General: alert, answering some questions, NAD  HEENT: moist mucus dry  Respiratory: CTAB, no crackles or wheezes, breathing comfortably on RA  Cardiovacular: RRR   Gastrointestinal: Soft, non-tender   Renal allograft exam: No tenderness   Extremities: pitting edema in BUE and BLE     Labs:  Lab Results   Component Value Date    WBC 8.08 05/23/2025    HGB 8.1 (L) 05/23/2025    HCT 26.7 (L) 05/23/2025     05/23/2025    K 4.9 05/23/2025     (H) 05/23/2025    CO2 23 05/23/2025    BUN 29 (H) 05/23/2025    CREATININE 1.5 (H) 05/23/2025    EGFRNONAA 46.2 (A) 01/26/2018    CALCIUM 10.3 05/23/2025    PHOS 3.3  05/23/2025    MG 1.6 05/23/2025    ALBUMIN 1.8 (L) 05/23/2025    AST 9 (L) 05/23/2025    ALT 20 05/23/2025     Lab Results   Component Value Date    EXTANC  12/09/2016      Comment:      repeat Prograf level, last not true trough    EXTWBC 10.11 06/18/2019    EXTSEGS 70.1 06/18/2019    EXTPLATELETS 150 06/18/2019    EXTHEMOGLOBI 9.2 (A) 06/18/2019    EXTHEMATOCRI 29.1 (A) 06/18/2019    EXTCREATININ 1.03 (A) 06/18/2019    EXTSODIUM 141 06/18/2019    EXTPOTASSIUM 3.7 06/18/2019    EXTBUN 22 (A) 06/18/2019    EXTCO2 22.9 06/18/2019    EXTCALCIUM 10.4 (A) 06/18/2019    EXTPHOSPHORU 2.1 (A) 06/18/2019    EXTGLUCOSE 119 12/04/2017    EXTALBUMIN 2.40 (A) 06/18/2019    EXTAST 11 (A) 06/18/2019    EXTALT 42 06/18/2019    EXTBILITOTAL 0.31 06/18/2019     Lab Results   Component Value Date    EXTTACROLVL 6.1 06/18/2019    EXTPROTCRE 0.84 12/04/2017    EXTPTHINTACT 153.3 11/10/2015    EXTPROTEINUA 1+ 11/30/2016    EXTWBCUA none seen 11/30/2016    EXTRBCUA none seen 11/30/2016     Imaging Studies:  Reviewed. ?    Assessment/Plan:  75 y.o. female with ESRD 2/2 renal amyloidosis s/p LRKT (12/13/2011) on prograf, post-Tx CKD3b, HTN, dementia, who was transferred from [facility] to [facility] on 5/22/2025 for worsening renal function.      ESRD 2/2 Renal Amyloidosis s/p LRKT (12/13/2011)   LUCIA on CKD 3b  Diffuse anasarca  Severe hypoalbuminemia  Failure to thrive  - previous baseline Cr ~1.1-1.2 but with recurrent AKIs in last few months, lowest Cr has been lately ~1.5  - spoke to patient's OP nephrologist (Dr. Fajardo) who believes patient has some degree of chronic rejection and light chain deposition  - Cr 1.5 on presentation to [facility] but trended up during hospitalization -> peaked at 2.2 on 5/21 at OSH after patient received IV diuretics  - patient's volume status is hard to discern: seems intravascularly depleted but with diffuse anasarca (likely 2/2 severe hypoalbuminemia)  - pending TTE to help with volume  assessment  - Cr trended down to 1.5 this morning after holding diuretics -> suspect pre-renal LUCIA, further supporting idea that patient is intravascularly depleted  - K/L ratio 0.09 (significantly elevated lambda FLCs) suggests free light chain nephropathy but given patient's failure to thrive, unlikely to have much benefit from renal biopsy  - recommend high protein diet to help with hypoalbuminemia  - will follow up TTE and will consider albumin+lasix to help with anasarca     Immunosuppression Management  - per , patient is normally on prograf 2mg qAM and 3mg qPM  - prograf dose has been changed at OSH during hospitalization, currently 5mg BID  - goal [id number] trough level is 4-6  - prograf trough ~21 this morning, holding prograf for now  - will continue to adjust prograf as needed  - continue prednisone 5mg daily   - continue to monitor for toxicities from immunosuppressive medications     Anemia  - Hb currently ~7, was up to ~11 in 04/2025  - unclear etiology of sudden drop in Hb  - iron studies consistent with AoCD  - no evidence of hemolysis  - 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     HTN  - BP above goal  - continue nifedipine 60mg   - will titrate antihypertensives as needed for BP  - continue to monitor        We will continue to monitor    Kristy Dumont MD  [facility] Nephrology Fellow, PGY-4  Kidney Transplant Medicine

## 2025-05-23 NOTE — PLAN OF CARE
Problem: Adult Inpatient Plan of Care  Goal: Plan of Care Review  Outcome: Progressing  Goal: Patient-Specific Goal (Individualized)  Outcome: Progressing  Goal: Absence of Hospital-Acquired Illness or Injury  Outcome: Progressing  Goal: Optimal Comfort and Wellbeing  Outcome: Progressing  Goal: Readiness for Transition of Care  Outcome: Progressing     Problem: Acute Kidney Injury/Impairment  Goal: Fluid and Electrolyte Balance  Outcome: Progressing  Goal: Improved Oral Intake  Outcome: Progressing  Goal: Effective Renal Function  Outcome: Progressing     Problem: Infection  Goal: Absence of Infection Signs and Symptoms  Outcome: Progressing     Problem: Skin Injury Risk Increased  Goal: Skin Health and Integrity  Outcome: Progressing     Problem: Fall Injury Risk  Goal: Absence of Fall and Fall-Related Injury  Outcome: Progressing     Problem: Wound  Goal: Optimal Coping  Outcome: Progressing  Goal: Optimal Functional Ability  Outcome: Progressing  Goal: Absence of Infection Signs and Symptoms  Outcome: Progressing  Goal: Improved Oral Intake  Outcome: Progressing  Goal: Optimal Pain Control and Function  Outcome: Progressing  Goal: Skin Health and Integrity  Outcome: Progressing  Goal: Optimal Wound Healing  Outcome: Progressing

## 2025-05-23 NOTE — CARE UPDATE
Unit [facility] Care Support Interaction      I have reviewed the chart of Jing Philippe who is hospitalized for Acute renal failure superimposed on stage 3b chronic kidney disease. The patient is currently located in the following unit: MSU        I have assisted the primary physician in management of the following:      Polo - Present on admission and not exchanged due to pending clearance by urology given hx of urinary retention and bladder spasms  Central Line - Present on admission to the unit - ordered blood cultures ; reached out to MD regarding removal of RIJ central line and he plans to remove it on 5/23     I have seen and examined the patient and provided the following support:     Skin - Integrity assessment and wound care consulted       Kimberly Ramirez PA-C  Unit Based [facility]

## 2025-05-23 NOTE — PLAN OF CARE
Discussed with oncology, recs for zoledronic acid given her hypercalcemia. Discussed with KTM, and family at bedside regarding risks/benefits. Family okay to proceed with treatment     - double checked dosing with pharmacy, will give 3 mg dose for renal adjustment        Evaluated CVC, patient does not have adequate PIV and looks to be difficult stick. Will get CXR to confirm CVC placement, will work on additional access in the next day or two before removal of CVC        ANTONIO Chavez

## 2025-05-23 NOTE — PLAN OF CARE
[facility] - Med Surg  Initial Discharge Assessment       Primary Care Provider: Fito Land Jr., MD    Admission Diagnosis: LUCIA (acute kidney injury) [N17.9]    Admission Date: 5/22/2025  Expected Discharge Date: 5/25/2025    Transition of Care Barriers: None    Payor: HUMANA MANAGED MEDICARE / Plan: HUMANA SNP HMO PPO SPECIAL NEEDS / Product Type: Medicare Advantage /     Extended Emergency Contact Information  Primary Emergency Contact: BarrettfloresitaManas glover  Mobile Phone: [phone]  Relation: Spouse   needed? No  Secondary Emergency Contact: Belinda Dhillon   [place]  Mobile Phone: [phone]  Relation: Daughter    Discharge Plan A: Skilled Nursing Facility  Discharge Plan B: Rehab      [employer] [facility] [facility] [address]  [address] [address]  Phone: [phone] Fax: [phone]    [facility] [place] [facility] [address] [place] [address]  Phone: [phone] Fax: [phone]      Sw met with pt and pt's spouse Manas Philippe [phone] at bedside to complete dc planning assessment. Sw was unable to assess pt and received assessment info from spouse. Spouse reported that pt has not been home the past month as she has been in and out of the hospital. [facility] the week after Easter for 2 weeks. Pt's spouse reported that he wanted spouse to attend [facility] but facility would not accept insurance for SNF only skilled nursing. Pt's spouse reported that patient has not walked the past month and he is interested in services to improve pt's mobility if possible. Per spouse, pt previously had [facility] services where she began to walk again but was soon hospitalized. Ford spoke with pt's daughter  via telephone.  expressed that if pt is not able to return back to rehab/snf, she would be willing to move in and assist with caring for her mother. Pt's daughter stated that pt has a supportive  family. Sw provided a list of SNF/REHAB choices via EPIC portable and printed out patient choice list for pt's spouse.     Sw will continue to follow pt for dc planning.    Discharge Plan A and Plan B have been determined by review of patient's clinical status, future medical and therapeutic needs, and coverage/benefits for post-acute care in coordination with multidisciplinary team members.         Initial Assessment (most recent)       Adult Discharge Assessment - 05/23/25 1326          Discharge Assessment    Assessment Type Discharge Planning Assessment     Confirmed/corrected address, phone number and insurance Yes     Confirmed Demographics Correct on Facesheet     Source of Information family     Communicated LESTER with patient/caregiver Date not available/Unable to determine     People in Home spouse     Name(s) of People in Home Manas Philippe [phone] (Spouse)     Facility Arrived From: [facility]     Do you expect to return to your current living situation? Yes     Do you have help at home or someone to help you manage your care at home? No     Prior to hospitilization cognitive status: Unable to Assess     Current cognitive status: Unable to Assess     Walking or Climbing Stairs Difficulty yes     Walking or Climbing Stairs transferring difficulty, dependent;stair climbing difficulty, dependent;ambulation difficulty, dependent     Mobility Management rollator, 3 straight canes     Dressing/Bathing Difficulty yes     Dressing/Bathing dressing difficulty, dependent;bathing difficulty, dependent     Dressing/Bathing Management shower chair     Equipment Currently Used at Home cane, straight;shower chair;rollator     Readmission within 30 days? No     Patient currently being followed by outpatient case management? No     Do you currently have service(s) that help you manage your care at home? No     Do you take prescription medications? Yes     Do you have prescription coverage? Yes     Do you have  any problems affording any of your prescribed medications? No     Is the patient taking medications as prescribed? yes     Who is going to help you get home at discharge? Manas Philippe [phone] (Spouse)     How do you get to doctors appointments? family or friend will provide     Are you on dialysis? No     Do you take coumadin? No     Discharge Plan A Skilled Nursing Facility     Discharge Plan B Rehab     DME Needed Upon Discharge  none     Discharge Plan discussed with: Spouse/sig other     Name(s) and Number(s) Manas Philippe [phone] (Spouse)     Transition of Care Barriers None        Physical Activity    On average, how many days per week do you engage in moderate to strenuous exercise (like a brisk walk)? 0 days     On average, how many minutes do you engage in exercise at this level? 0 min        Financial Resource Strain    How hard is it for you to pay for the very basics like food, housing, medical care, and heating? Not very hard        Housing Stability    In the last 12 months, was there a time when you were not able to pay the mortgage or rent on time? No     At any time in the past 12 months, were you homeless or living in a shelter (including now)? No        Transportation Needs    In the past 12 months, has lack of transportation kept you from medical appointments or from getting medications? No     In the past 12 months, has lack of transportation kept you from meetings, work, or from getting things needed for daily living? No        Food Insecurity    Within the past 12 months, you worried that your food would run out before you got the money to buy more. Never true     Within the past 12 months, the food you bought just didn't last and you didn't have money to get more. Never true        Stress    Do you feel stress - tense, restless, nervous, or anxious, or unable to sleep at night because your mind is troubled all the time - these days? Not at all        Social Isolation    How  often do you feel lonely or isolated from those around you?  Never        Alcohol Use    Q1: How often do you have a drink containing alcohol? Never     Q2: How many drinks containing alcohol do you have on a typical day when you are drinking? Patient does not drink     Q3: How often do you have six or more drinks on one occasion? Never        Utilities    In the past 12 months has the electric, gas, oil, or water company threatened to shut off services in your home? No        Health Literacy    How often do you need to have someone help you when you read instructions, pamphlets, or other written material from your doctor or pharmacy? Always                        MEKA Samaniego  Case Management  [phone]

## 2025-05-23 NOTE — PLAN OF CARE
Problem: SLP  Goal: SLP Goal  Description: Speech Language Pathology Goals  Goals expected to be met by 6/6:    1. The patient will tolerate a least restrictive diet without displaying overt signs of airway threat.   2. The family participate in education on safe swallow precautions and will implement these precautions with PO intake.     Outcome: Progressing       Bedside swallow evaluation completed. SLP recommends a minced/moist diet with thin liquids given 1:1 assist with meals, HOB elevation with meals, small bites/sips, alternate bites/sips, and frequent oral care. SLP will follow up.

## 2025-05-23 NOTE — CONSULTS
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    Amyloidosis, unspecified 1998    s/p chemotherapy x 4 occassions  (IV x3, po x1)    Anticoagulant long-term use     Benign hypertension with end-stage renal disease     Candida vaginitis 1/16/2015    Chronic asthma     CKD (chronic kidney disease) stage 3, GFR 30-59 ml/min 9/14/2012    GERD (gastroesophageal reflux disease)     Hyperlipidemia     Lymphedema     LLE    Need for prophylactic immunotherapy     Obesity     Osteoarthritis of left knee s/p total knee replacement 1/16/2015    Surgery October 23, 2014    PONV (postoperative nausea and vomiting)     Renal disease due to hypertension 12/14/2012    Secondary hyperparathyroidism of renal origin     Vertigo        Social History[1]    Precautions:     Allergies as of 05/21/2025 - Reviewed 04/10/2025   Allergen Reaction Noted    Anesthetics - jen type- parabens  04/10/2025    Antihistamines - ethylenediamine  04/10/2025    Codeine  06/14/2012    Ethanol (ethyl alcohol)  04/10/2025    Hydrocodone  06/14/2012    Opioids - morphine analogues  04/10/2025    Propylene glycol  04/10/2025    Saccharin  04/10/2025    Tylenol [acetaminophen]  04/10/2025    Yellow dye  04/10/2025       [facility] Assessment Details/Treatment   Patient seen for wound care consultation.  Chart reviewed for this encounter.  See flow sheet for findings.    Pt in bed and agreeable to care. Family at the bedside. Intact blister to the left arm; left open to air. Pressure injury to the sacrum. Aquacel ag applied to assist with moisture management, moist wound healing, antimicrobial properties, and autolytic debridement. The skin folds are pink with fungal appearance. The heels are intact,  pink, dry and blanchable. Pt and pt's family educated on the wound care and the importance of turning every 2 hours.     Recommendations:  - Sacrum: cleanse with vashe, pat dry, apply aquacel ag to the wound bed, and secure with a mepilex bordered foam dressing every 2 days and prn soilage    - Skin folds (breast/abdomen): cleanse with bath wipes, pat dry and apply miconazole powder bid/prn    - Left arm: open to air;   once ruptured - cleanse with vashe, pat dry, apply xeroform to the wound bed and secure with a mepilex bordered foam dressing every 2 days     - Turning every 2 hours  - Heel lift boots  - Immerse mattress      05/23/25 0951        Wound 05/22/25 1100 Pressure Injury Sacral spine   Date First Assessed/Time First Assessed: 05/22/25 1100   Present on Original Admission: Yes  Primary Wound Type: Pressure Injury  Location: Sacral spine  Is this injury device related?: No   Wound Image    Pressure Injury Stage 3   Dressing Appearance Intact;Moist drainage   Drainage Amount Moderate   Drainage Characteristics/Odor Serous   Appearance Pink;Moist;Yellow   Tissue loss description Full thickness   Periwound Area Intact;Dry   Care Cleansed with:;Sterile normal saline   Dressing Applied;Hydrofiber;Silver;Changed;Foam        Wound 05/22/25 1100 Moisture associated dermatitis Right lower Chest   Date First Assessed/Time First Assessed: 05/22/25 1100   Present on Original Admission: Yes  Primary Wound Type: Moisture associated dermatitis  Side: Right  Orientation: lower  Location: (c) Chest  Is this injury device related?: No   Wound Image    Dressing Appearance Open to air   Drainage Amount None   Appearance [place]        Wound 05/22/25 1100 Moisture associated dermatitis Left lower Chest   Date First Assessed/Time First Assessed: 05/22/25 1100   Present on Original Admission: Yes  Primary Wound Type: Moisture associated dermatitis  Side: Left  Orientation: lower  Location: (c) Chest  Is this injury device related?:  No   Wound Image    Dressing Appearance Open to air   Drainage Amount None   Appearance [place]        Wound 05/22/25 1100 Moisture associated dermatitis medial Lower quadrant   Date First Assessed/Time First Assessed: 05/22/25 1100   Present on Original Admission: Yes  Primary Wound Type: Moisture associated dermatitis  Orientation: medial  Location: (c) Lower quadrant  Is this injury device related?: No   Wound Image    Dressing Appearance Open to air   Drainage Amount None   Appearance Pink        Wound Contusion Left anterior Arm   No Date First Assessed or Time First Assessed found.   Primary Wound Type: Contusion  Side: Left  Orientation: anterior  Location: Arm   Wound Image    Dressing Appearance Open to air   Drainage Amount None   Appearance Intact;Dry;Purple;Blistered   Periwound Area Intact;Dry     Recommendations made to primary team for above plan via secure chat. Wound care will follow-up as needed.     05/23/2025         [1]   Social History  Socioeconomic History    Marital status: Single   Tobacco Use    Smoking status: Never     Passive exposure: Never    Smokeless tobacco: Never   Substance and Sexual Activity    Alcohol use: Yes     Comment: rare margartia    Drug use: No   Social History Narrative    Inez used to work as , now retired for medical reasons.     Social Drivers of Health     Financial Resource Strain: Low Risk  (3/19/2025)    Received from [facility]    Overall Financial Resource Strain (CARDIA)     Difficulty of Paying Living Expenses: Not very hard   Food Insecurity: No Food Insecurity (3/19/2025)    Received from [facility]    Hunger Vital Sign     Worried About Running Out of Food in the Last Year: Never true     Ran Out of Food in the Last Year: Never true   Transportation Needs: No Transportation Needs (3/19/2025)    [facility]    PRAPARE - Transportation     In the past 12 months, has lack of transportation kept you from medical  appointments or from getting medications?: No   Housing Stability: Low Risk  (3/19/2025)    Received from [facility]    Housing Stability Vital Sign     At any time in the past 12 months, were you homeless or living in a shelter (including now)?: No

## 2025-05-23 NOTE — PT/OT/SLP EVAL
Speech Language Pathology Evaluation  Bedside Swallow    Patient Name:  Jing Philippe   MRN:  [id number]  Admitting Diagnosis: Acute renal failure superimposed on stage 3b chronic kidney disease    Recommendations:                 General Recommendations:  Dysphagia therapy  Diet recommendations:  Minced & Moist Diet - IDDSI Level 5, Thin liquids - IDDSI Level 0   Aspiration Precautions: 1 bite/sip at a time, Alternating bites/sips, Assistance with meals, Eliminate distractions, Feed only when awake/alert, Frequent oral care, HOB to 90 degrees, Meds crushed in puree, Remain upright 30 minutes post meal, and Small bites/sips   General Precautions: Standard, aspiration, fall  Communication strategies:  yes/no questions only, provide increased time to answer, and go to room if call light pushed    Assessment:     Jing Philippe is a 75 y.o. female with an SLP diagnosis of Dysphagia.      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    Amyloidosis, unspecified 1998    s/p chemotherapy x 4 occassions  (IV x3, po x1)    Anticoagulant long-term use     Benign hypertension with end-stage renal disease     Candida vaginitis 1/16/2015    Chronic asthma     CKD (chronic kidney disease) stage 3, GFR 30-59 ml/min 9/14/2012    GERD (gastroesophageal reflux disease)     Hyperlipidemia     Lymphedema     LLE    Need for prophylactic immunotherapy     Obesity     Osteoarthritis of left knee s/p total knee replacement 1/16/2015    Surgery October 23, 2014    PONV (postoperative nausea and vomiting)     Renal disease due to hypertension 12/14/2012    Secondary hyperparathyroidism of renal origin     Vertigo        Past Surgical History:   Procedure Laterality Date    AV FISTULA PLACEMENT      CHOLECYSTECTOMY      HYSTERECTOMY      IMPLANTATION OF PERMANENT SACRAL NERVE STIMULATOR N/A 12/24/2019    Procedure: INSERTION, NEUROSTIMULATOR, PERMANENT,  "SACRAL;  Surgeon: Misael Browning MD;  Location: [facility] OR [address] [place] [address];  Service: Urology;  Laterality: N/A;  1hr  Ivania Mocarrieon, medjillian rep confirmed    KIDNEY TRANSPLANT      REMOVAL OF ELECTRODE LEAD OF SACRAL NERVE STIMULATOR       HPI: "Patient is a 75 y.o. female with PMHx of renal transplant (12/13/2011) due to renal amyloidosis now CKD 3b on immunosuppression, HTN, HLD, asthma, GERD, obesity, multiple myeloma, dementia transferred from [facility] to [facility] due to ARF and acute pyelo in transplanted kidney.      Last seen by Nephro in April 2025 at [facility] as f/u from prior admission. At that time, prograf was increased and torsemide held due to LUCIA, restarted once Cr returned to normal. Admitted to [facility] 5/11 due to AMS iso UTI. MRI brain at that time normal. Started on antibiotics, urine clx grew Klebsiella. Over past 10 days, Cr trending upwards to 2.2 (bl 1.5).      Per Handoff -- VSSAF. No white count. Cr 2.0, BUN 35. Received levaquin + cefepime (5/11-5/14), zosyn + zyvox 5/14-5/21). Appears overloaded, received torsemide 20mg daily. Continued on prograf."    Prior Intubation HX:  None this admission.     Modified Barium Swallow: None on file.     Chest X-Rays: None this admission.     Prior diet: Per spouse report, pt placed on a puree/thickened liquid diet in hospital prior to transfer to [facility]. Unable to view previous diet in epic. Per spouse, pt with no prior history of dysphagia. He reports pt ate a regular/thin pta. She primarily drinks liquids from the straw.     Subjective     RN cleared pt for bedside swallow evaluation.     Pt awake/alert and cooperative. HOB elevated. Spouse present at bedside.     Pain/Comfort:  Pain Rating 1: 0/10  Pain Rating Post-Intervention 1: 0/10    Respiratory Status: Room air    Objective:     Oral Musculature Evaluation  Oral Musculature: WFL  Dentition: present and adequate  Secretion Management: adequate  Mucosal Quality: " dry  Mandibular Strength and Mobility: WNL  Oral Labial Strength and Mobility: functional retraction, functional coordination (mildly reduced seal)  Lingual Strength and Mobility: functional lateral movement, functional protrusion  Volitional Cough: Unable to elicit 2/2 poor command following  Volitional Swallow: Unable to elicit 2/2 poor command following  Voice Prior to PO Intake: Clear voice with adequate vocal intensity, unintelligible speech noted    Bedside Swallow Eval:   Consistencies Assessed:  Thin liquids : ~6 oz of water via straw  Puree : tsp bites of pudding x3  Solids : small bites of a tushar cracker x2     Oral Phase:   Decreased sustained attention noted; required cues for redirection  Xerostomia noted  Mildly reduced labial seal around straw resulting in anterior spillage of liquids x1  Prolonged mastication of regular solids  Mild lingual residue observed, adequate clearance achieved with liquid wash x1    Pharyngeal Phase:   no overt clinical signs/symptoms of aspiration  no overt clinical signs/symptoms of pharyngeal dysphagia    Compensatory Strategies  None    Treatment: Education provided re: role of SLP, diet recs, swallow precs, HOB elevated with all PO intake and 30 minutes post PO intake, 1:1 assist, eliminate distractions, small bites/sips, alternate bites/sips, frequent oral care, s/s aspiration and POC.  Pt and spouse verbalized understanding and agreement. Pt with all needs met and no report of pain prior to SLP exit. MD and RN notified on overall impressions and recommendations.       Goals:   Multidisciplinary Problems       SLP Goals          Problem: SLP    Goal Priority Disciplines Outcome   SLP Goal     SLP Progressing   Description: Speech Language Pathology Goals  Goals expected to be met by 6/6:    1. The patient will tolerate a least restrictive diet without displaying overt signs of airway threat.   2. The family participate in education on safe swallow precautions and  will implement these precautions with PO intake.                                Plan:     Patient to be seen:  4 x/week   Plan of Care expires:  06/20/25  Plan of Care reviewed with:  patient, spouse   SLP Follow-Up:  Yes       Discharge recommendations:   (tbd)   Barriers to Discharge:  None    Time Tracking:     SLP Treatment Date:   05/23/25  Speech Start Time:  1044  Speech Stop Time:  1101     Speech Total Time (min):  17 min    Billable Minutes: Eval Swallow and Oral Function 17    05/23/2025

## 2025-05-23 NOTE — HOSPITAL COURSE
Patient transferred due to worsening renal function in transplanted kidney. Needed to be evaluated by renal transplant team    Diuresis was held due to LUCIA, although patient appears edematous on exam. Echo showed likely intravascular volume depletion. Edema likely due to proteinuria and low albumin state. Will try adding IV lasix to see if that helps    Patient also with elevated light chain with a history of amyloidosis (s/p chemo) in remission. Elevated light chain on labs. Oncology consulted, work up ongoing. No plans for inpatient management at this time. Can follow up with outpatient oncology.     Was given zoledronic acid for elevated calcium 5/23.. risks and benefits discussed with  prior to dose being given.       Plan to complete course of Rocephin, last dose 5/30 for UTI (7 days total, 4 days after baeza removed).   Monitor for urinary retention with bladder scans, patient with a history of urinary retention and high likelihood of needing a baeza again at some point  Need to monitor Ca levels (corrected or ionized levels), re dose Zoledronic acid as needed (last dose 5/23), doses need to be renally adjusted  Continue with wound care for sacral wound  Work with PT/OT/SLP for continued therapy, likely needs placement with SNF  KTM recs:  - can continue PO lasix 40mg BID as patient appears to have decent UOP with this dose  - will continue prograf 1mg BID for now, will adjust depending on trough level  - continue prednisone 5mg daily

## 2025-05-23 NOTE — ASSESSMENT & PLAN NOTE
The likely etiology of thrombocytopenia is sepsis. The patients 3 most recent labs are listed below.  Recent Labs     05/22/25  1220 05/23/25  0510   PLT 89* 107*     Plan  - Will transfuse if platelet count is <50k (if undergoing surgical procedure or have active bleeding).

## 2025-05-23 NOTE — ASSESSMENT & PLAN NOTE
Anemia is likely due to chronic disease due to Chronic Kidney Disease. Most recent hemoglobin and hematocrit are listed below.  Recent Labs     05/22/25  1220 05/23/25  0510   HGB 7.3* 8.1*   HCT 24.2* 26.7*     Plan  - Monitor serial CBC: Daily  - Transfuse PRBC if patient becomes hemodynamically unstable, symptomatic or H/H drops below 7/21.  - Patient has received 1 units of PRBCs on unknown, documented at outside hospital  - Patient's anemia is currently stable  - concern it could be related to amyloid or MM

## 2025-05-23 NOTE — ASSESSMENT & PLAN NOTE
Hypernatremia is likely due to Dehydration. The patient's most recent sodium results are listed below.  Recent Labs     05/22/25  1220 05/23/25  0510   * 143     Plan  - Aim to correct the sodium by 8-10mEq in 24 hours.   - Will plan to trend the patient's sodium: Daily  - The patient's hypernatremia is improving

## 2025-05-24 PROBLEM — G93.41 ENCEPHALOPATHY, METABOLIC: Status: ACTIVE | Noted: 2025-05-24

## 2025-05-24 LAB
ABSOLUTE EOSINOPHIL (OHS): 0.04 K/UL
ABSOLUTE MONOCYTE (OHS): 0.68 K/UL (ref 0.3–1)
ABSOLUTE NEUTROPHIL COUNT (OHS): 5.67 K/UL (ref 1.8–7.7)
ALBUMIN SERPL BCP-MCNC: 1.9 G/DL (ref 3.5–5.2)
ALP SERPL-CCNC: 78 UNIT/L (ref 40–150)
ALT SERPL W/O P-5'-P-CCNC: 17 UNIT/L (ref 10–44)
ANION GAP (OHS): 7 MMOL/L (ref 8–16)
AST SERPL-CCNC: 11 UNIT/L (ref 11–45)
BASOPHILS # BLD AUTO: 0.02 K/UL
BASOPHILS NFR BLD AUTO: 0.3 %
BILIRUB SERPL-MCNC: 0.3 MG/DL (ref 0.1–1)
BUN SERPL-MCNC: 26 MG/DL (ref 8–23)
CALCIUM SERPL-MCNC: 10.6 MG/DL (ref 8.7–10.5)
CHLORIDE SERPL-SCNC: 116 MMOL/L (ref 95–110)
CO2 SERPL-SCNC: 23 MMOL/L (ref 23–29)
CREAT SERPL-MCNC: 1.4 MG/DL (ref 0.5–1.4)
ERYTHROCYTE [DISTWIDTH] IN BLOOD BY AUTOMATED COUNT: 19.2 % (ref 11.5–14.5)
GFR SERPLBLD CREATININE-BSD FMLA CKD-EPI: 39 ML/MIN/1.73/M2
GLUCOSE SERPL-MCNC: 106 MG/DL (ref 70–110)
HCT VFR BLD AUTO: 25.1 % (ref 37–48.5)
HGB BLD-MCNC: 7.5 GM/DL (ref 12–16)
IGA SERPL-MCNC: 241 MG/DL (ref 40–350)
IGG SERPL-MCNC: 1078 MG/DL (ref 650–1600)
IGM SERPL-MCNC: 46 MG/DL (ref 50–300)
IMM GRANULOCYTES # BLD AUTO: 0.07 K/UL (ref 0–0.04)
IMM GRANULOCYTES NFR BLD AUTO: 0.9 % (ref 0–0.5)
LYMPHOCYTES # BLD AUTO: 1.06 K/UL (ref 1–4.8)
MAGNESIUM SERPL-MCNC: 1.5 MG/DL (ref 1.6–2.6)
MCH RBC QN AUTO: 25.7 PG (ref 27–31)
MCHC RBC AUTO-ENTMCNC: 29.9 G/DL (ref 32–36)
MCV RBC AUTO: 86 FL (ref 82–98)
NUCLEATED RBC (/100WBC) (OHS): 0 /100 WBC
PHOSPHATE SERPL-MCNC: 2.9 MG/DL (ref 2.7–4.5)
PLATELET # BLD AUTO: 96 K/UL (ref 150–450)
PMV BLD AUTO: 11.5 FL (ref 9.2–12.9)
POTASSIUM SERPL-SCNC: 4.7 MMOL/L (ref 3.5–5.1)
PROT SERPL-MCNC: 5.8 GM/DL (ref 6–8.4)
RBC # BLD AUTO: 2.92 M/UL (ref 4–5.4)
RELATIVE EOSINOPHIL (OHS): 0.5 %
RELATIVE LYMPHOCYTE (OHS): 14.1 % (ref 18–48)
RELATIVE MONOCYTE (OHS): 9 % (ref 4–15)
RELATIVE NEUTROPHIL (OHS): 75.2 % (ref 38–73)
SODIUM SERPL-SCNC: 146 MMOL/L (ref 136–145)
T4 FREE SERPL-MCNC: 0.78 NG/DL (ref 0.71–1.51)
T4 SERPL-MCNC: 5.7 UG/DL (ref 4.5–11.5)
TSH SERPL-ACNC: 7.42 UIU/ML (ref 0.4–4)
WBC # BLD AUTO: 7.54 K/UL (ref 3.9–12.7)

## 2025-05-24 PROCEDURE — 25000003 PHARM REV CODE 250

## 2025-05-24 PROCEDURE — 84439 ASSAY OF FREE THYROXINE: CPT

## 2025-05-24 PROCEDURE — 84436 ASSAY OF TOTAL THYROXINE: CPT

## 2025-05-24 PROCEDURE — 63600175 PHARM REV CODE 636 W HCPCS

## 2025-05-24 PROCEDURE — 83735 ASSAY OF MAGNESIUM: CPT

## 2025-05-24 PROCEDURE — 21400001 HC TELEMETRY ROOM

## 2025-05-24 PROCEDURE — 84100 ASSAY OF PHOSPHORUS: CPT

## 2025-05-24 PROCEDURE — 97166 OT EVAL MOD COMPLEX 45 MIN: CPT

## 2025-05-24 PROCEDURE — 80053 COMPREHEN METABOLIC PANEL: CPT

## 2025-05-24 PROCEDURE — 85025 COMPLETE CBC W/AUTO DIFF WBC: CPT

## 2025-05-24 PROCEDURE — 97530 THERAPEUTIC ACTIVITIES: CPT

## 2025-05-24 PROCEDURE — 11000001 HC ACUTE MED/SURG PRIVATE ROOM

## 2025-05-24 PROCEDURE — 82784 ASSAY IGA/IGD/IGG/IGM EACH: CPT

## 2025-05-24 PROCEDURE — 63600175 PHARM REV CODE 636 W HCPCS: Performed by: INTERNAL MEDICINE

## 2025-05-24 PROCEDURE — 84443 ASSAY THYROID STIM HORMONE: CPT

## 2025-05-24 RX ORDER — TACROLIMUS 1 MG/1
1 CAPSULE ORAL ONCE
Status: COMPLETED | OUTPATIENT
Start: 2025-05-24 | End: 2025-05-24

## 2025-05-24 RX ADMIN — TACROLIMUS 1 MG: 1 CAPSULE ORAL at 05:05

## 2025-05-24 RX ADMIN — METOPROLOL TARTRATE 50 MG: 50 TABLET, FILM COATED ORAL at 11:05

## 2025-05-24 RX ADMIN — NIFEDIPINE 60 MG: 60 TABLET, FILM COATED, EXTENDED RELEASE ORAL at 11:05

## 2025-05-24 RX ADMIN — PANTOPRAZOLE SODIUM 40 MG: 40 TABLET, DELAYED RELEASE ORAL at 11:05

## 2025-05-24 RX ADMIN — MUPIROCIN: 20 OINTMENT TOPICAL at 09:05

## 2025-05-24 RX ADMIN — Medication 6 MG: at 10:05

## 2025-05-24 RX ADMIN — LAMIVUDINE 100 MG: 100 TABLET, FILM COATED ORAL at 11:05

## 2025-05-24 RX ADMIN — THYROID, PORCINE 60 MG: 60 TABLET ORAL at 05:05

## 2025-05-24 RX ADMIN — HEPARIN SODIUM 5000 UNITS: 5000 INJECTION INTRAVENOUS; SUBCUTANEOUS at 05:05

## 2025-05-24 RX ADMIN — MUPIROCIN: 20 OINTMENT TOPICAL at 11:05

## 2025-05-24 RX ADMIN — HEPARIN SODIUM 5000 UNITS: 5000 INJECTION INTRAVENOUS; SUBCUTANEOUS at 09:05

## 2025-05-24 RX ADMIN — MICONAZOLE NITRATE 2 % TOPICAL POWDER: at 11:05

## 2025-05-24 RX ADMIN — BUSPIRONE HYDROCHLORIDE 5 MG: 5 TABLET ORAL at 09:05

## 2025-05-24 RX ADMIN — CEFTRIAXONE 1 G: 1 INJECTION, POWDER, FOR SOLUTION INTRAMUSCULAR; INTRAVENOUS at 12:05

## 2025-05-24 RX ADMIN — BUSPIRONE HYDROCHLORIDE 5 MG: 5 TABLET ORAL at 11:05

## 2025-05-24 RX ADMIN — MICONAZOLE NITRATE 2 % TOPICAL POWDER: at 09:05

## 2025-05-24 RX ADMIN — METOPROLOL TARTRATE 50 MG: 50 TABLET, FILM COATED ORAL at 09:05

## 2025-05-24 NOTE — ASSESSMENT & PLAN NOTE
Anemia is likely due to chronic disease due to Chronic Kidney Disease. Most recent hemoglobin and hematocrit are listed below.  Recent Labs     05/22/25  1220 05/23/25  0510 05/24/25  0515   HGB 7.3* 8.1* 7.5*   HCT 24.2* 26.7* 25.1*     Plan  - Monitor serial CBC: Daily  - Transfuse PRBC if patient becomes hemodynamically unstable, symptomatic or H/H drops below 7/21.  - Patient has received 1 units of PRBCs on unknown, documented at outside hospital  - Patient's anemia is currently stable  - concern it could be related to amyloid or MM

## 2025-05-24 NOTE — CONSULTS
[facility] VASCULAR ACCESS NOTE       Bed:610/610 A    20G x 2.5IN [facility].    Indication: PVA  Attempts: 1    Mis Kilgore RN

## 2025-05-24 NOTE — SUBJECTIVE & OBJECTIVE
Interval History: NAEO. Resting in bed. More interactive on exam today. Kidney function remains stable.     Review of Systems   Constitutional:  Negative for fatigue and fever.   Respiratory:  Negative for cough and shortness of breath.    Cardiovascular:  Negative for chest pain.   Gastrointestinal:  Negative for abdominal pain, constipation, diarrhea, nausea and vomiting.     Objective:     Vital Signs (Most Recent):  Temp: 96.3 °F (35.7 °C) (05/24/25 1452)  Pulse: 68 (05/24/25 1452)  Resp: 18 (05/24/25 1452)  BP: 136/61 (05/24/25 1452)  SpO2: 96 % (05/24/25 1452) Vital Signs (24h Range):  Temp:  [96.3 °F (35.7 °C)-97.9 °F (36.6 °C)] 96.3 °F (35.7 °C)  Pulse:  [68-83] 68  Resp:  [17-20] 18  SpO2:  [95 %-97 %] 96 %  BP: (134-161)/(61-83) 136/61     Weight: 107 kg (235 lb 14.3 oz)  Body mass index is 39.25 kg/m².    Intake/Output Summary (Last 24 hours) at 5/24/2025 1554  Last data filed at 5/24/2025 1525  Gross per 24 hour   Intake --   Output 1750 ml   Net -1750 ml         Physical Exam  HENT:      Head: Normocephalic and atraumatic.   Cardiovascular:      Rate and Rhythm: Normal rate.      Pulses: Normal pulses.      Heart sounds: No murmur heard.  Pulmonary:      Effort: Pulmonary effort is normal. No respiratory distress.   Abdominal:      General: Abdomen is flat. Bowel sounds are normal. There is no distension.   Musculoskeletal:      Right lower leg: Edema present.      Left lower leg: Edema present.      Comments: Anasarca noted   Skin:     General: Skin is warm and dry.   Neurological:      Mental Status: She is alert.               Significant Labs: All pertinent labs within the past 24 hours have been reviewed.    Significant Imaging: I have reviewed all pertinent imaging results/findings within the past 24 hours.

## 2025-05-24 NOTE — ASSESSMENT & PLAN NOTE
The likely etiology of thrombocytopenia is sepsis. The patients 3 most recent labs are listed below.  Recent Labs     05/22/25  1220 05/23/25  0510 05/24/25  0515   PLT 89* 107* 96*     Plan  - Will transfuse if platelet count is <50k (if undergoing surgical procedure or have active bleeding).

## 2025-05-24 NOTE — ASSESSMENT & PLAN NOTE
- history of dementia and delirium noted    - MRI at outside facility negative for structural causes  - Ca elevated, being treated  - renal function at baseline  - suspected multifactorial in the setting of infection, elevated Ca, renal failure and general medical condition with dementia and delirium.     - delirium precautions. Continuing to monitor

## 2025-05-24 NOTE — PT/OT/SLP PROGRESS
Physical Therapy      Patient Name:  Jing Philippe   MRN:  [id number]    Patient not seen today secondary to Other (Comment). OT completing session upon PT arrival. Per OT, pt not following commands and resistant to mobility, requiring dependent assist of 2 persons for bed level activity. Pt not appropriate for PT evaluation at time of attempt. Will follow-up as able and appropriate to complete PT evaluation.

## 2025-05-24 NOTE — PROGRESS NOTES
Kidney Transplant Medicine Service Chart Check Note:    Medications:   busPIRone  5 mg Oral BID    cefTRIAXone (Rocephin) IV (PEDS and ADULTS)  1 g Intravenous Q24H    heparin (porcine)  5,000 Units Subcutaneous Q8H    lamiVUDine  100 mg Oral Daily    metoprolol tartrate  50 mg Oral BID    miconazole NITRATE 2 %   Topical (Top) BID    mupirocin   Nasal BID    NIFEdipine  60 mg Oral Daily    pantoprazole  40 mg Oral Daily    tacrolimus  1 mg Oral Once    thyroid (pork)  60 mg Oral Before breakfast       Vitals:  Temp:  [96.3 °F (35.7 °C)-97.9 °F (36.6 °C)] 96.3 °F (35.7 °C)  Pulse:  [74-83] 81  Resp:  [17-20] 17  SpO2:  [95 %-97 %] 96 %  BP: (134-161)/(61-83) 142/63    I/Os:  I/O last 3 completed shifts:  In: -   Out: 2500 [Urine:2500]    Labs reviewed  Hb 7.5 g/dl continue monitoring  Na 146 / K 4.7 / bicarb 23 / BUN 26 / Cr 1.4         Recommendations:  - encourage fluids intake as tolerate  - High Protein diet   - continue BP medications    Kal Painter MD  Renal Transplant Attending

## 2025-05-24 NOTE — ASSESSMENT & PLAN NOTE
- history of MM and amyloidosis, not currently on treatment  - labs with elevated light chains  - concerns that worsening amyloidosis is contributing to her current presentation with proteinuria and anasarca as well as anemia.  - oncology consulted for recommendations and treatment options  - work up ongoing

## 2025-05-24 NOTE — PLAN OF CARE
Problem: Occupational Therapy  Goal: Occupational Therapy Goal  Description: Goals to be met by: 6/23/25     Patient will increase functional independence with ADLs by performing:    UE Dressing with Max Assistance.  Grooming while seated with Moderate Assistance.  Toileting from bedside commode with Maximum Assistance for hygiene and clothing management.   Sitting at edge of bed x15 minutes with Moderate Assistance.  Rolling to Bilateral with Moderate Assistance.   Supine to sit with Maximum Assistance.  Pt will follow 2/4 one step verbal commands.    Outcome: Progressing     OT eval completed.

## 2025-05-24 NOTE — ASSESSMENT & PLAN NOTE
Hyperkalemia is likely due to LUCIA.The patients most recent potassium results are listed below.  Recent Labs     05/22/25  1220 05/23/25  0510 05/24/25  0515   K 5.4* 4.9 4.7     Plan  - Monitor for arrhythmias with EKG and/or continuous telemetry.   - Treat the hyperkalemia with renal diet  - Monitor potassium: Daily  - The patient's hyperkalemia is improving

## 2025-05-24 NOTE — ASSESSMENT & PLAN NOTE
Hypernatremia is likely due to Dehydration. The patient's most recent sodium results are listed below.  Recent Labs     05/22/25  1220 05/23/25  0510 05/24/25  0515   * 143 146*     Plan  - Aim to correct the sodium by 8-10mEq in 24 hours.   - Will plan to trend the patient's sodium: Daily  - The patient's hypernatremia is improving

## 2025-05-24 NOTE — PROGRESS NOTES
[facility] Medicine  Progress Note    Patient Name: Jing Philippe  MRN: [id number]  Patient Class: IP- Inpatient   Admission Date: 5/22/2025  Length of Stay: 2 days  Attending Physician: J Carlos Chavez DO  Primary Care Provider: Fito Land Jr., MD        Subjective     Principal Problem:Acute renal failure superimposed on stage 3b chronic kidney disease        HPI:  Jing Philippe is a 75 y.o. female with PMHx of renal transplant (12/13/2011) due to renal amyloidosis now CKD 3b on immunosuppression, HTN, HLD, asthma, GERD, obesity, multiple myeloma, dementia transferred from [facility] due to ARF and acute pyelo in transplanted kidney.     Last seen by Nephro in April 2025 at [facility] as f/u from prior admission. At that time, prograf was increased and torsemide held due to LUCIA, restarted once Cr returned to normal. Admitted to [facility] 5/11 due to AMS iso UTI. MRI brain at that time normal. Started on antibiotics, urine clx grew Klebsiella. Over past 10 days, Cr trending upwards to 2.2 (bl 1.5).     Per Handoff -- VSSAF. No white count. Cr 2.0, BUN 35. Received levaquin + cefepime (5/11-5/14), zosyn + zyvox 5/14-5/21). Appears overloaded, received torsemide 20mg daily. Continued on prograf.     Overview/Hospital Course:  Patient transferred due to worsening renal function in transplanted kidney. Needed to be evaluated by renal transplant team    Diuresis was held due to LUCIA, although patient appears edematous on exam. Echo showed likely intravascular volume depletion. Edema likely due to proteinuria and low albumin state.     Patient also with elevated light chain with a history of amyloidosis (s/p chemo) in remission. Elevated light chain on labs. Oncology consulted, work up ongoing.     Was given zoledronic acid for elevated calcium.. risks and benefits discussed with  prior to dose being given.     Interval History: NAEO. Resting in  bed. More interactive on exam today. Kidney function remains stable.     Review of Systems   Constitutional:  Negative for fatigue and fever.   Respiratory:  Negative for cough and shortness of breath.    Cardiovascular:  Negative for chest pain.   Gastrointestinal:  Negative for abdominal pain, constipation, diarrhea, nausea and vomiting.     Objective:     Vital Signs (Most Recent):  Temp: 96.3 °F (35.7 °C) (05/24/25 1452)  Pulse: 68 (05/24/25 1452)  Resp: 18 (05/24/25 1452)  BP: 136/61 (05/24/25 1452)  SpO2: 96 % (05/24/25 1452) Vital Signs (24h Range):  Temp:  [96.3 °F (35.7 °C)-97.9 °F (36.6 °C)] 96.3 °F (35.7 °C)  Pulse:  [68-83] 68  Resp:  [17-20] 18  SpO2:  [95 %-97 %] 96 %  BP: (134-161)/(61-83) 136/61     Weight: 107 kg (235 lb 14.3 oz)  Body mass index is 39.25 kg/m².    Intake/Output Summary (Last 24 hours) at 5/24/2025 1554  Last data filed at 5/24/2025 1525  Gross per 24 hour   Intake --   Output 1750 ml   Net -1750 ml         Physical Exam  HENT:      Head: Normocephalic and atraumatic.   Cardiovascular:      Rate and Rhythm: Normal rate.      Pulses: Normal pulses.      Heart sounds: No murmur heard.  Pulmonary:      Effort: Pulmonary effort is normal. No respiratory distress.   Abdominal:      General: Abdomen is flat. Bowel sounds are normal. There is no distension.   Musculoskeletal:      Right lower leg: Edema present.      Left lower leg: Edema present.      Comments: Anasarca noted   Skin:     General: Skin is warm and dry.   Neurological:      Mental Status: She is alert.               Significant Labs: All pertinent labs within the past 24 hours have been reviewed.    Significant Imaging: I have reviewed all pertinent imaging results/findings within the past 24 hours.      Assessment & Plan  Acute renal failure superimposed on stage 3b chronic kidney disease  LUCIA is likely due to hypervolemia, pyelo/infection. Baseline creatinine is 1.5. Most recent creatinine and eGFR are listed  below.  Recent Labs     05/22/25  1220 05/23/25  0510 05/24/25  0515   CREATININE 1.8* 1.5* 1.4   EGFRNORACEVR 29* 36* 39*       Report from Transfer:  VSSAF. No white count. Cr 2.0, BUN 35. Received levaquin + cefepime (5/11-5/14), zosyn + zyvox 5/14-5/21). Appears overloaded, received torsemide 20mg daily. Continued on prograf.      Plan  - LUCIA is improving - Cr 2.2 >> 2.0 (bl 1.5)  - treat pyelo, s/p abx x 11 days  - KTM consulted  - holding diuretics as patient is likely intravascularly depleted but edema is due to albumin loss and proteinuria  - Cr back at baseline  Elevated serum immunoglobulin free light chains  - history of MM and amyloidosis, not currently on treatment  - labs with elevated light chains  - concerns that worsening amyloidosis is contributing to her current presentation with proteinuria and anasarca as well as anemia.  - oncology consulted for recommendations and treatment options  - work up ongoing    Acute pyelonephritis  Reported - Acute Pyelo in Transplant Kidney w/ associated ARF. Urine clx grew klebsiella. Received levaquin + cefepime (5/11-5/14), zosyn + zyvox 5/14-5/21).     - consider continuing abx pending KTM recs  - repeat UA with clx pending  - continue on rocephin for now. Will given 5 days. No culture data available but seems to be adequately treated at outside facility  S/P living-donor kidney transplantation - 12/13/11  - noted, see ARF in Renal Transplant  Secondary hyperparathyroidism of renal origin  - cont home meds  Hyperlipidemia  - not on statin  GERD (gastroesophageal reflux disease)  - protonix  Chronic asthma  - no acute issues, prn nebs  Chronic immunosuppression with Prograf and Cellcept  - hold cellcept, cont prograf  - KTM consulted  Class 2 obesity in adult  Body mass index is 39.25 kg/m². Morbid obesity complicates all aspects of disease management from diagnostic modalities to treatment. Weight loss encouraged and health benefits explained to patient.    -  BMI 37.7, obesity class II  Hypertension, renal  - cont lopressor 50 bid, nifedipine 60 daily  CKD (chronic kidney disease) stage 3, GFR 30-59 ml/min  See ARF in Renal Transplant.   Multiple myeloma not having achieved remission  - no longer on Revlimid  Dementia with behavioral disturbance  Noted, delirium precautions in place.  Anemia  Anemia is likely due to chronic disease due to Chronic Kidney Disease. Most recent hemoglobin and hematocrit are listed below.  Recent Labs     05/22/25  1220 05/23/25  0510 05/24/25  0515   HGB 7.3* 8.1* 7.5*   HCT 24.2* 26.7* 25.1*     Plan  - Monitor serial CBC: Daily  - Transfuse PRBC if patient becomes hemodynamically unstable, symptomatic or H/H drops below 7/21.  - Patient has received 1 units of PRBCs on unknown, documented at outside hospital  - Patient's anemia is currently stable  - concern it could be related to amyloid or MM  Hyperkalemia  Hyperkalemia is likely due to LUCIA.The patients most recent potassium results are listed below.  Recent Labs     05/22/25  1220 05/23/25  0510 05/24/25  0515   K 5.4* 4.9 4.7     Plan  - Monitor for arrhythmias with EKG and/or continuous telemetry.   - Treat the hyperkalemia with renal diet  - Monitor potassium: Daily  - The patient's hyperkalemia is improving          Hypernatremia  Hypernatremia is likely due to Dehydration. The patient's most recent sodium results are listed below.  Recent Labs     05/22/25  1220 05/23/25  0510 05/24/25  0515   * 143 146*     Plan  - Aim to correct the sodium by 8-10mEq in 24 hours.   - Will plan to trend the patient's sodium: Daily  - The patient's hypernatremia is improving    Thrombocytopenia  The likely etiology of thrombocytopenia is sepsis. The patients 3 most recent labs are listed below.  Recent Labs     05/22/25  1220 05/23/25  0510 05/24/25  0515   PLT 89* 107* 96*     Plan  - Will transfuse if platelet count is <50k (if undergoing surgical procedure or have active  bleeding).      Encephalopathy, metabolic  - history of dementia and delirium noted    - MRI at outside facility negative for structural causes  - Ca elevated, being treated  - renal function at baseline  - suspected multifactorial in the setting of infection, elevated Ca, renal failure and general medical condition with dementia and delirium.     - delirium precautions. Continuing to monitor    VTE Risk Mitigation (From admission, onward)           Ordered     heparin (porcine) injection 5,000 Units  Every 8 hours         05/22/25 1142     IP VTE HIGH RISK PATIENT  Once         05/22/25 1142     Place sequential compression device  Until discontinued         05/22/25 1142                    Discharge Planning   LESTER: 5/30/2025     Code Status: Full Code   Medical Readiness for Discharge Date:   Discharge Plan A: Skilled Nursing Facility                Please place Justification for DME        J Carlos Chavez DO  Department of Hospital Medicine   [facility] Surg

## 2025-05-24 NOTE — PT/OT/SLP EVAL
Occupational Therapy   Evaluation    Name: Jing Philippe  MRN: [id number]  Admitting Diagnosis: Acute renal failure superimposed on stage 3b chronic kidney disease  Recent Surgery: * No surgery found *      Recommendations:     Discharge Recommendations: Moderate Intensity Therapy  Discharge Equipment Recommendations:  hospital bed, lift device  Barriers to discharge:   (increased (A) required)    Assessment:     Jing Philippe is a 75 y.o. female with a medical diagnosis of Acute renal failure superimposed on stage 3b chronic kidney disease.  She presents with decreased independence with ADL's. Performance deficits affecting function: weakness, impaired endurance, impaired self care skills, impaired functional mobility, impaired balance, decreased safety awareness, decreased upper extremity function, decreased lower extremity function, impaired cognition.      Rehab Prognosis: Fair; patient would benefit from acute skilled OT services to address these deficits and reach maximum level of function.       Plan:     Patient to be seen 3 x/week to address the above listed problems via self-care/home management, therapeutic activities, therapeutic exercises, neuromuscular re-education, cognitive retraining  Plan of Care Expires: 06/23/25  Plan of Care Reviewed with: patient, spouse    Subjective     Chief Complaint: none stated  Patient/Family Comments/goals: Pt fully disoriented and confused during session. Pt's spouse reported that pt was independent around 8 weeks ago.    Occupational Profile:  Living Environment: Pt resides with spouse in 1 story house with 4 steps to enter & rails.  Pt was modified independent with ADL's & ambulated with rollator ~ 7-8 weeks ago however has since been in hospital or SNF with fluctuating mentation and medical status. While at  SNF pt was dependent with ADL's from bed level and used lift device to get to chair for therapy. Therapy worked on BUE & BLE therex & was just starting  to work on standing in bars.  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 Pt enjoys doing activities around the house. Pt has a walk-in shower for bathing.  Equipment Used at Home: rollator, cane, straight, shower chair, wheelchair, bedside commode (hand held shower hose)  Assistance upon Discharge: spouse (recent knee surgery)    Pain/Comfort:  Pain Rating 1: 0/10  Pain Rating Post-Intervention 1: 0/10    Patients cultural, spiritual, Taoism conflicts given the current situation: no    Objective:     Communicated with: RN prior to session.  Patient found supine with telemetry (spouse present during session) upon OT entry to room.    General Precautions: Standard, fall  Orthopedic Precautions: N/A  Braces: N/A  Respiratory Status: Room air    Occupational Performance:    Bed Mobility:    Patient completed Rolling/Turning to Left with  dependent and 2 persons x 2 trials  Patient completed Rolling/Turning to Right with dependent and 2 persons x 2 trials  Patient completed Scooting/Bridging with dependent and 2 persons up HOB while supine    Activities of Daily Living:  Grooming: total assistance washing face while supine with pt displaying avoidance behaviors  Toileting: dependence while supine due to BM incontinence    Cognitive/Visual Perceptual:  Cognitive/Psychosocial Skills:     -       Oriented to: disoriented to all including own name   -       Follows Commands/attention:followed no commands (0/5 direct 1 step commands followed)  -       Safety awareness/insight to disability: impaired     Physical Exam:  Dominant hand:    -       right  Upper Extremity Range of Motion:  pt with resistance throughout ROM, noted to actively move from elbows distal on RUE WFL  Upper Extremity Strength: WFL distally based on pt resistance   Strength: BUE WFL    AMPAC 6 Click ADL:  AMPAC Total Score: 6    Treatment & Education:  Pt with noted resistance with all mobility and followed no commands during session.   Provided education to pt & pt's spouse on need for 2 person (A) for all activities. Provided education regarding role of OT, POC, & discharge recommendations with pt's spouse verbalizing understanding.  Pt's spouse had no further questions & when asked whether there were any concerns pt's spouse reported none.      Patient left supine with all lines intact, call button in reach, RN notified, and spouse & PCT present    GOALS:   Multidisciplinary Problems       Occupational Therapy Goals          Problem: Occupational Therapy    Goal Priority Disciplines Outcome Interventions   Occupational Therapy Goal     OT, PT/OT Progressing    Description: Goals to be met by: 6/23/25     Patient will increase functional independence with ADLs by performing:    UE Dressing with Max Assistance.  Grooming while seated with Moderate Assistance.  Toileting from bedside commode with Maximum Assistance for hygiene and clothing management.   Sitting at edge of bed x15 minutes with Moderate Assistance.  Rolling to Bilateral with Moderate Assistance.   Supine to sit with Maximum Assistance.  Pt will follow 2/4 one step verbal commands.                         DME Justifications:  Jing requires a hospital bed due to her requiring positioning of the body in ways not feasible with an ordinary bed due to limited ability and cannot independently make changes in body position without the use of the bed.The positioning of the body cannot be sufficiently resolved by the use of pillows and wedges.    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    Amyloidosis, unspecified 1998    s/p chemotherapy x 4 occassions  (IV x3, po x1)    Anticoagulant long-term use     Benign hypertension with end-stage renal disease     Candida vaginitis 1/16/2015    Chronic asthma     CKD (chronic kidney disease) stage 3, GFR 30-59 ml/min 9/14/2012    GERD (gastroesophageal reflux  disease)     Hyperlipidemia     Lymphedema     LLE    Need for prophylactic immunotherapy     Obesity     Osteoarthritis of left knee s/p total knee replacement 1/16/2015    Surgery October 23, 2014    PONV (postoperative nausea and vomiting)     Renal disease due to hypertension 12/14/2012    Secondary hyperparathyroidism of renal origin     Vertigo          Past Surgical History:   Procedure Laterality Date    AV FISTULA PLACEMENT      CHOLECYSTECTOMY      HYSTERECTOMY      IMPLANTATION OF PERMANENT SACRAL NERVE STIMULATOR N/A 12/24/2019    Procedure: INSERTION, NEUROSTIMULATOR, PERMANENT, SACRAL;  Surgeon: Misael Browning MD;  Location: [facility] OR [address] [place] [address];  Service: Urology;  Laterality: N/A;  1hr  Ivania Dejesus, medjillian rep confirmed    KIDNEY TRANSPLANT      REMOVAL OF ELECTRODE LEAD OF SACRAL NERVE STIMULATOR         Time Tracking:     OT Date of Treatment: 05/24/25  OT Start Time: 0907  OT Stop Time: 0942  OT Total Time (min): 35 min    Billable Minutes:Evaluation 15  Therapeutic Activity 20 5/24/2025

## 2025-05-24 NOTE — PLAN OF CARE
R IJ CVC removed at bedside.PIV placed earlier in the day. 4 sutures removed. CVC removed with gentle traction, appeared grossly intact. Pressure held to stop bleeding. No immediate complications. Patient tolerated the procedure well.         Dr Whitman [facility] Medicine

## 2025-05-24 NOTE — ASSESSMENT & PLAN NOTE
LUCIA is likely due to hypervolemia, pyelo/infection. Baseline creatinine is 1.5. Most recent creatinine and eGFR are listed below.  Recent Labs     05/22/25  1220 05/23/25  0510 05/24/25  0515   CREATININE 1.8* 1.5* 1.4   EGFRNORACEVR 29* 36* 39*       Report from Transfer:  VSSAF. No white count. Cr 2.0, BUN 35. Received levaquin + cefepime (5/11-5/14), zosyn + zyvox 5/14-5/21). Appears overloaded, received torsemide 20mg daily. Continued on prograf.      Plan  - LUCIA is improving - Cr 2.2 >> 2.0 (bl 1.5)  - treat pyelo, s/p abx x 11 days  - KTM consulted  - holding diuretics as patient is likely intravascularly depleted but edema is due to albumin loss and proteinuria  - Cr back at baseline

## 2025-05-24 NOTE — ASSESSMENT & PLAN NOTE
- not on statin   Patient requests all Lab, Cardiology, and Radiology Results on their Discharge Instructions

## 2025-05-25 LAB
ABSOLUTE EOSINOPHIL (OHS): 0.04 K/UL
ABSOLUTE EOSINOPHIL (OHS): 0.05 K/UL
ABSOLUTE EOSINOPHIL (OHS): 0.06 K/UL
ABSOLUTE MONOCYTE (OHS): 0.53 K/UL (ref 0.3–1)
ABSOLUTE MONOCYTE (OHS): 0.61 K/UL (ref 0.3–1)
ABSOLUTE MONOCYTE (OHS): 0.68 K/UL (ref 0.3–1)
ABSOLUTE NEUTROPHIL COUNT (OHS): 3.99 K/UL (ref 1.8–7.7)
ABSOLUTE NEUTROPHIL COUNT (OHS): 4.49 K/UL (ref 1.8–7.7)
ABSOLUTE NEUTROPHIL COUNT (OHS): 4.71 K/UL (ref 1.8–7.7)
ALBUMIN SERPL BCP-MCNC: 1.8 G/DL (ref 3.5–5.2)
ALBUMIN SERPL BCP-MCNC: 1.8 G/DL (ref 3.5–5.2)
ALLENS TEST: ABNORMAL
ALP SERPL-CCNC: 71 UNIT/L (ref 40–150)
ALP SERPL-CCNC: 72 UNIT/L (ref 40–150)
ALT SERPL W/O P-5'-P-CCNC: 13 UNIT/L (ref 10–44)
ALT SERPL W/O P-5'-P-CCNC: 13 UNIT/L (ref 10–44)
ANION GAP (OHS): 6 MMOL/L (ref 8–16)
ANION GAP (OHS): 8 MMOL/L (ref 8–16)
AST SERPL-CCNC: 12 UNIT/L (ref 11–45)
AST SERPL-CCNC: 12 UNIT/L (ref 11–45)
BASOPHILS # BLD AUTO: 0.02 K/UL
BASOPHILS # BLD AUTO: 0.03 K/UL
BASOPHILS # BLD AUTO: 0.03 K/UL
BASOPHILS NFR BLD AUTO: 0.3 %
BASOPHILS NFR BLD AUTO: 0.5 %
BASOPHILS NFR BLD AUTO: 0.5 %
BILIRUB SERPL-MCNC: 0.3 MG/DL (ref 0.1–1)
BILIRUB SERPL-MCNC: 0.3 MG/DL (ref 0.1–1)
BUN SERPL-MCNC: 20 MG/DL (ref 8–23)
BUN SERPL-MCNC: 20 MG/DL (ref 8–23)
CALCIUM SERPL-MCNC: 9.6 MG/DL (ref 8.7–10.5)
CALCIUM SERPL-MCNC: 9.7 MG/DL (ref 8.7–10.5)
CHLORIDE SERPL-SCNC: 116 MMOL/L (ref 95–110)
CHLORIDE SERPL-SCNC: 117 MMOL/L (ref 95–110)
CO2 SERPL-SCNC: 19 MMOL/L (ref 23–29)
CO2 SERPL-SCNC: 21 MMOL/L (ref 23–29)
CREAT SERPL-MCNC: 1.3 MG/DL (ref 0.5–1.4)
CREAT SERPL-MCNC: 1.3 MG/DL (ref 0.5–1.4)
DELSYS: ABNORMAL
ERYTHROCYTE [DISTWIDTH] IN BLOOD BY AUTOMATED COUNT: 19 % (ref 11.5–14.5)
ERYTHROCYTE [DISTWIDTH] IN BLOOD BY AUTOMATED COUNT: 19 % (ref 11.5–14.5)
ERYTHROCYTE [DISTWIDTH] IN BLOOD BY AUTOMATED COUNT: 19.2 % (ref 11.5–14.5)
GFR SERPLBLD CREATININE-BSD FMLA CKD-EPI: 42 ML/MIN/1.73/M2
GFR SERPLBLD CREATININE-BSD FMLA CKD-EPI: 42 ML/MIN/1.73/M2
GLUCOSE SERPL-MCNC: 100 MG/DL (ref 70–110)
GLUCOSE SERPL-MCNC: 104 MG/DL (ref 70–110)
HCO3 UR-SCNC: 23.5 MMOL/L (ref 24–28)
HCT VFR BLD AUTO: 23.2 % (ref 37–48.5)
HCT VFR BLD AUTO: 23.9 % (ref 37–48.5)
HCT VFR BLD AUTO: 24.6 % (ref 37–48.5)
HCT VFR BLD CALC: 20 %PCV (ref 36–54)
HGB BLD-MCNC: 7 GM/DL (ref 12–16)
HGB BLD-MCNC: 7.2 GM/DL (ref 12–16)
HGB BLD-MCNC: 7.2 GM/DL (ref 12–16)
IMM GRANULOCYTES # BLD AUTO: 0.05 K/UL (ref 0–0.04)
IMM GRANULOCYTES # BLD AUTO: 0.05 K/UL (ref 0–0.04)
IMM GRANULOCYTES # BLD AUTO: 0.07 K/UL (ref 0–0.04)
IMM GRANULOCYTES NFR BLD AUTO: 0.8 % (ref 0–0.5)
IMM GRANULOCYTES NFR BLD AUTO: 0.9 % (ref 0–0.5)
IMM GRANULOCYTES NFR BLD AUTO: 1.1 % (ref 0–0.5)
LYMPHOCYTES # BLD AUTO: 0.76 K/UL (ref 1–4.8)
LYMPHOCYTES # BLD AUTO: 0.8 K/UL (ref 1–4.8)
LYMPHOCYTES # BLD AUTO: 0.8 K/UL (ref 1–4.8)
MAGNESIUM SERPL-MCNC: 1.3 MG/DL (ref 1.6–2.6)
MCH RBC QN AUTO: 25.2 PG (ref 27–31)
MCH RBC QN AUTO: 25.9 PG (ref 27–31)
MCH RBC QN AUTO: 25.9 PG (ref 27–31)
MCHC RBC AUTO-ENTMCNC: 29.3 G/DL (ref 32–36)
MCHC RBC AUTO-ENTMCNC: 30.1 G/DL (ref 32–36)
MCHC RBC AUTO-ENTMCNC: 30.2 G/DL (ref 32–36)
MCV RBC AUTO: 86 FL (ref 82–98)
MODE: ABNORMAL
NUCLEATED RBC (/100WBC) (OHS): 0 /100 WBC
PCO2 BLDA: 35.7 MMHG (ref 35–45)
PH SMN: 7.43 [PH] (ref 7.35–7.45)
PHOSPHATE SERPL-MCNC: 2.5 MG/DL (ref 2.7–4.5)
PLATELET # BLD AUTO: 104 K/UL (ref 150–450)
PLATELET # BLD AUTO: 86 K/UL (ref 150–450)
PLATELET # BLD AUTO: 95 K/UL (ref 150–450)
PMV BLD AUTO: 11 FL (ref 9.2–12.9)
PMV BLD AUTO: 11.7 FL (ref 9.2–12.9)
PMV BLD AUTO: 11.8 FL (ref 9.2–12.9)
PO2 BLDA: 46 MMHG (ref 40–60)
POC BE: -1 MMOL/L (ref -2–2)
POC IONIZED CALCIUM: 1.46 MMOL/L (ref 1.06–1.42)
POC SATURATED O2: 83 % (ref 95–100)
POC TCO2: 25 MMOL/L (ref 24–29)
POCT GLUCOSE: 115 MG/DL (ref 70–110)
POTASSIUM BLD-SCNC: 4.3 MMOL/L (ref 3.5–5.1)
POTASSIUM SERPL-SCNC: 4.5 MMOL/L (ref 3.5–5.1)
POTASSIUM SERPL-SCNC: 4.6 MMOL/L (ref 3.5–5.1)
PROT SERPL-MCNC: 5.6 GM/DL (ref 6–8.4)
PROT SERPL-MCNC: 5.7 GM/DL (ref 6–8.4)
RBC # BLD AUTO: 2.7 M/UL (ref 4–5.4)
RBC # BLD AUTO: 2.78 M/UL (ref 4–5.4)
RBC # BLD AUTO: 2.86 M/UL (ref 4–5.4)
RELATIVE EOSINOPHIL (OHS): 0.6 %
RELATIVE EOSINOPHIL (OHS): 0.8 %
RELATIVE EOSINOPHIL (OHS): 1.1 %
RELATIVE LYMPHOCYTE (OHS): 12.1 % (ref 18–48)
RELATIVE LYMPHOCYTE (OHS): 13.3 % (ref 18–48)
RELATIVE LYMPHOCYTE (OHS): 14.7 % (ref 18–48)
RELATIVE MONOCYTE (OHS): 10.1 % (ref 4–15)
RELATIVE MONOCYTE (OHS): 10.8 % (ref 4–15)
RELATIVE MONOCYTE (OHS): 9.7 % (ref 4–15)
RELATIVE NEUTROPHIL (OHS): 73.1 % (ref 38–73)
RELATIVE NEUTROPHIL (OHS): 74.7 % (ref 38–73)
RELATIVE NEUTROPHIL (OHS): 74.9 % (ref 38–73)
SAMPLE: ABNORMAL
SITE: ABNORMAL
SODIUM BLD-SCNC: 145 MMOL/L (ref 136–145)
SODIUM SERPL-SCNC: 143 MMOL/L (ref 136–145)
SODIUM SERPL-SCNC: 144 MMOL/L (ref 136–145)
WBC # BLD AUTO: 5.46 K/UL (ref 3.9–12.7)
WBC # BLD AUTO: 6.02 K/UL (ref 3.9–12.7)
WBC # BLD AUTO: 6.29 K/UL (ref 3.9–12.7)

## 2025-05-25 PROCEDURE — 36415 COLL VENOUS BLD VENIPUNCTURE: CPT

## 2025-05-25 PROCEDURE — 97530 THERAPEUTIC ACTIVITIES: CPT

## 2025-05-25 PROCEDURE — 99900035 HC TECH TIME PER 15 MIN (STAT)

## 2025-05-25 PROCEDURE — 97162 PT EVAL MOD COMPLEX 30 MIN: CPT

## 2025-05-25 PROCEDURE — 21400001 HC TELEMETRY ROOM

## 2025-05-25 PROCEDURE — 25000003 PHARM REV CODE 250

## 2025-05-25 PROCEDURE — 84100 ASSAY OF PHOSPHORUS: CPT

## 2025-05-25 PROCEDURE — 85014 HEMATOCRIT: CPT

## 2025-05-25 PROCEDURE — 85025 COMPLETE CBC W/AUTO DIFF WBC: CPT

## 2025-05-25 PROCEDURE — 11000001 HC ACUTE MED/SURG PRIVATE ROOM

## 2025-05-25 PROCEDURE — 80197 ASSAY OF TACROLIMUS: CPT

## 2025-05-25 PROCEDURE — 63600175 PHARM REV CODE 636 W HCPCS

## 2025-05-25 PROCEDURE — 63600175 PHARM REV CODE 636 W HCPCS: Performed by: INTERNAL MEDICINE

## 2025-05-25 PROCEDURE — 83735 ASSAY OF MAGNESIUM: CPT

## 2025-05-25 PROCEDURE — 80053 COMPREHEN METABOLIC PANEL: CPT

## 2025-05-25 PROCEDURE — 84132 ASSAY OF SERUM POTASSIUM: CPT

## 2025-05-25 PROCEDURE — 84295 ASSAY OF SERUM SODIUM: CPT

## 2025-05-25 PROCEDURE — 82803 BLOOD GASES ANY COMBINATION: CPT

## 2025-05-25 PROCEDURE — 82330 ASSAY OF CALCIUM: CPT

## 2025-05-25 RX ORDER — MAGNESIUM SULFATE HEPTAHYDRATE 40 MG/ML
2 INJECTION, SOLUTION INTRAVENOUS ONCE
Status: COMPLETED | OUTPATIENT
Start: 2025-05-25 | End: 2025-05-25

## 2025-05-25 RX ORDER — FUROSEMIDE 10 MG/ML
40 INJECTION INTRAMUSCULAR; INTRAVENOUS 2 TIMES DAILY
Status: DISCONTINUED | OUTPATIENT
Start: 2025-05-25 | End: 2025-05-27

## 2025-05-25 RX ORDER — TACROLIMUS 1 MG/1
1 CAPSULE ORAL 2 TIMES DAILY
Status: DISCONTINUED | OUTPATIENT
Start: 2025-05-25 | End: 2025-05-28 | Stop reason: HOSPADM

## 2025-05-25 RX ADMIN — NIFEDIPINE 60 MG: 60 TABLET, FILM COATED, EXTENDED RELEASE ORAL at 12:05

## 2025-05-25 RX ADMIN — FUROSEMIDE 40 MG: 10 INJECTION, SOLUTION INTRAVENOUS at 11:05

## 2025-05-25 RX ADMIN — MICONAZOLE NITRATE 2 % TOPICAL POWDER: at 12:05

## 2025-05-25 RX ADMIN — CEFTRIAXONE 1 G: 1 INJECTION, POWDER, FOR SOLUTION INTRAMUSCULAR; INTRAVENOUS at 01:05

## 2025-05-25 RX ADMIN — LAMIVUDINE 100 MG: 100 TABLET, FILM COATED ORAL at 12:05

## 2025-05-25 RX ADMIN — MUPIROCIN: 20 OINTMENT TOPICAL at 12:05

## 2025-05-25 RX ADMIN — THYROID, PORCINE 60 MG: 60 TABLET ORAL at 06:05

## 2025-05-25 RX ADMIN — HEPARIN SODIUM 5000 UNITS: 5000 INJECTION INTRAVENOUS; SUBCUTANEOUS at 06:05

## 2025-05-25 RX ADMIN — HEPARIN SODIUM 5000 UNITS: 5000 INJECTION INTRAVENOUS; SUBCUTANEOUS at 11:05

## 2025-05-25 RX ADMIN — MICONAZOLE NITRATE 2 % TOPICAL POWDER: at 11:05

## 2025-05-25 RX ADMIN — HEPARIN SODIUM 5000 UNITS: 5000 INJECTION INTRAVENOUS; SUBCUTANEOUS at 01:05

## 2025-05-25 RX ADMIN — TACROLIMUS 1 MG: 1 CAPSULE ORAL at 05:05

## 2025-05-25 RX ADMIN — METOPROLOL TARTRATE 50 MG: 50 TABLET, FILM COATED ORAL at 12:05

## 2025-05-25 RX ADMIN — FUROSEMIDE 40 MG: 10 INJECTION, SOLUTION INTRAVENOUS at 01:05

## 2025-05-25 RX ADMIN — MAGNESIUM SULFATE HEPTAHYDRATE 2 G: 40 INJECTION, SOLUTION INTRAVENOUS at 01:05

## 2025-05-25 RX ADMIN — PANTOPRAZOLE SODIUM 40 MG: 40 TABLET, DELAYED RELEASE ORAL at 12:05

## 2025-05-25 RX ADMIN — BUSPIRONE HYDROCHLORIDE 5 MG: 5 TABLET ORAL at 12:05

## 2025-05-25 RX ADMIN — MAGNESIUM SULFATE HEPTAHYDRATE 2 G: 40 INJECTION, SOLUTION INTRAVENOUS at 03:05

## 2025-05-25 NOTE — ASSESSMENT & PLAN NOTE
Anemia is likely due to chronic disease due to Chronic Kidney Disease. Most recent hemoglobin and hematocrit are listed below.  Recent Labs     05/24/25  0515 05/25/25  1049 05/25/25  1242   HGB 7.5* 7.2* 7.2*   HCT 25.1* 23.9* 24.6*     Plan  - Monitor serial CBC: Daily  - Transfuse PRBC if patient becomes hemodynamically unstable, symptomatic or H/H drops below 7/21.  - Patient has received 1 units of PRBCs on unknown, documented at outside hospital  - Patient's anemia is currently stable  - concern it could be related to amyloid or MM

## 2025-05-25 NOTE — PT/OT/SLP EVAL
"Physical Therapy Evaluation    Patient Name:  Jing Philippe   MRN:  [id number]    Recommendations:     Discharge Recommendations: Moderate Intensity Therapy   Discharge Equipment Recommendations: hospital bed, lift device   Barriers to discharge: Inaccessible home and Decreased caregiver support    Assessment:     Jing Philippe is a 75 y.o. female admitted with a medical diagnosis of Acute renal failure superimposed on stage 3b chronic kidney disease.  She presents with the following impairments/functional limitations: weakness, impaired endurance, impaired self care skills, gait instability, impaired balance, decreased coordination, decreased upper extremity function, decreased lower extremity function, decreased safety awareness, impaired functional mobility, pain, edema, impaired cognition. Pt would benefit from a moderate intensity/frequency therapy for: Dynamic/static standing/sitting balance through skilled balance training, strengthening with the use of skilled therapeutic exercises interventions, and mobility through adaptive equipment training. Pt continues to benefit from a collaborative PT/OT program to improve quality of life and focus on recovery of impairments.      Rehab Prognosis: Good and Fair; patient would benefit from acute skilled PT services to address these deficits and reach maximum level of function.    Recent Surgery: * No surgery found *      Plan:     During this hospitalization, patient to be seen 4 x/week to address the identified rehab impairments via gait training, therapeutic activities, therapeutic exercises, neuromuscular re-education and progress toward the following goals:    Plan of Care Expires:  06/24/25    Subjective     Chief Complaint: pain with movement  Patient/Family Comments/goals: "Jing" and "Manas" no other intelligible verbalizations during session   Pain/Comfort:  Pain Rating 1: 0/10  Pain Rating Post-Intervention 1: 0/10    Patients cultural, spiritual, " "Hoahaoism conflicts given the current situation: no    Living Environment: Per OT note, pt unable to answer questions. Information confirmed by spouse  "Living Environment: Pt resides with spouse in 1 story house with 4 steps to enter & rails.  Pt was modified independent with ADL's & ambulated with rollator ~ 7-8 weeks ago however has since been in hospital or SNF with fluctuating mentation and medical status. While at  SNF pt was dependent with ADL's from bed level and used lift device to get to chair for therapy. Therapy worked on BUE & BLE therex & was just starting to work on standing in bars.  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 Pt enjoys doing activities around the house. Pt has a walk-in shower for bathing.  Equipment Used at Home: rollator, cane, straight, shower chair, wheelchair, bedside commode (hand held shower hose)  Assistance upon Discharge: spouse (recent knee surgery)"    Objective:     Communicated with RN prior to session.  Patient found HOB elevated with telemetry, peripheral IV, baeza catheter  upon PT entry to room.    General Precautions: Standard, fall  Orthopedic Precautions:N/A   Braces: N/A  Respiratory Status: Room air    Exams:  Cognitive Exam:  Patient is oriented to "Jing"  Gross Motor Coordination:  impaired  Postural Exam: YAHIR   Sensation: YAHIR 2/2 impaired sensation  Skin Integrity/Edema:      -       Edema in UE and LEs   RLE ROM: decreased 2/2 swelling/edema and pt resisting 2/2 pain in knee  RLE Strength: hip flexion 2/5, knee extension 3+/5, knee flexion 3+/5, DF 2/5  LLE ROM: decreased 2/2 swelling/edema and pt resisting 2/2 pain in knee  LLE Strength: hip flexion 2/5, knee extension 3+/5, knee flexion 3+/5, DF 2/5    Functional Mobility:  Bed Mobility:     Rolling Left:  total assistance  Rolling Right: total assistance  Scooting to HOB in supine: total assistance and of 2 persons  Supine to Sit: N/T pain and pt resistive to movement      AM-PAC 6 CLICK " MOBILITY  Total Score:6       Treatment & Education:  Patient and spouse educated on role of therapy, goals of session, and benefits of mobilizing.   Discussed PT plan of care during hospitalization.   Patient and spouse educated on calling for assistance.   Patient and spouse educated on how their diagnosis impacts their mobility within PT scope of practice.   Communication board up to date.  All questions answered within PT scope of practice.    Patient left HOB elevated with all lines intact, call button in reach, RN notified, and pt's spouse present.    GOALS:   Multidisciplinary Problems       Physical Therapy Goals          Problem: Physical Therapy    Goal Priority Disciplines Outcome Interventions   Physical Therapy Goal     PT, PT/OT Progressing    Description: Goals to be met by: 2025     Patient will increase functional independence with mobility by performin. Supine to sit with MInimal Assistance  2. Sit to supine with MInimal Assistance  3. Sit to stand transfer with Minimal Assistance and RW  4. Bed to chair transfer with Minimal Assistance using Rolling Walker  5. Gait  x 50 feet with Minimal Assistance using Rolling Walker.   6. Lower extremity exercise program x10 reps per handout, with supervision                         DME Justifications:  Jing requires a hospital bed due to her requiring positioning of the body in ways not feasible with an ordinary bed to alleviate pain and due to limited ability and cannot independently make changes in body position without the use of the bed.The positioning of the body cannot be sufficiently resolved by the use of pillows and wedges.    History:     Past Medical History:   Diagnosis Date    Acute osteomyelitis of ankle and foot, left     Amyloid kidney 2000    Amyloidosis, unspecified     s/p chemotherapy x 4 occassions  (IV x3, po x1)    Anticoagulant long-term use     Benign hypertension with end-stage renal disease     Candida vaginitis  1/16/2015    Chronic asthma     CKD (chronic kidney disease) stage 3, GFR 30-59 ml/min 9/14/2012    GERD (gastroesophageal reflux disease)     Hyperlipidemia     Lymphedema     LLE    Need for prophylactic immunotherapy     Obesity     Osteoarthritis of left knee s/p total knee replacement 1/16/2015    Surgery October 23, 2014    PONV (postoperative nausea and vomiting)     Renal disease due to hypertension 12/14/2012    Secondary hyperparathyroidism of renal origin     Vertigo        Past Surgical History:   Procedure Laterality Date    AV FISTULA PLACEMENT      CHOLECYSTECTOMY      HYSTERECTOMY      IMPLANTATION OF PERMANENT SACRAL NERVE STIMULATOR N/A 12/24/2019    Procedure: INSERTION, NEUROSTIMULATOR, PERMANENT, SACRAL;  Surgeon: Misael Browning MD;  Location: [facility] OR [address] [place] [address];  Service: Urology;  Laterality: N/A;  1hr  Ivania Dejesus, medjillian rep confirmed    KIDNEY TRANSPLANT      REMOVAL OF ELECTRODE LEAD OF SACRAL NERVE STIMULATOR         Time Tracking:     PT Received On: 05/25/25  PT Start Time: 0904     PT Stop Time: 0924  PT Total Time (min): 20 min     Billable Minutes: Evaluation 10 and Therapeutic Activity 10      05/25/2025

## 2025-05-25 NOTE — PROGRESS NOTES
Kidney Transplant Medicine Service Chart Check Note:    Medications:   busPIRone  5 mg Oral BID    cefTRIAXone (Rocephin) IV (PEDS and ADULTS)  1 g Intravenous Q24H    furosemide (LASIX) injection  40 mg Intravenous BID    heparin (porcine)  5,000 Units Subcutaneous Q8H    lamiVUDine  100 mg Oral Daily    magnesium sulfate 2 g IVPB  2 g Intravenous Once    Followed by    magnesium sulfate 2 g IVPB  2 g Intravenous Once    metoprolol tartrate  50 mg Oral BID    miconazole NITRATE 2 %   Topical (Top) BID    mupirocin   Nasal BID    NIFEdipine  60 mg Oral Daily    pantoprazole  40 mg Oral Daily    thyroid (pork)  60 mg Oral Before breakfast       Vitals:  Temp:  [96.3 °F (35.7 °C)-98.4 °F (36.9 °C)] 97.5 °F (36.4 °C)  Pulse:  [68-87] 71  Resp:  [16-20] 16  SpO2:  [95 %-96 %] 96 %  BP: (121-161)/(57-74) 131/59    I/Os:  I/O last 3 completed shifts:  In: 670 [P.O.:670]  Out: 1950 [Urine:1950]    Labs reviewed  Hb 7.2 g/dl  Na 143/ k 4.6 / Chloride 116 / Bicarb 21 / Cr 1.3   Pending tacrolimus level      Recommendations:    Follow up tracolimus level  - Continue BP medications  - Lasix 40 mg BID  - Follow up volume status        Kal Painter MD  Renal Transplant Attending

## 2025-05-25 NOTE — PLAN OF CARE
Recommendations  --Continue Renal Non-Dialysis Minced & Moist (IDDSI Level 5) Fluid - 1200 mL diet as tolerated and clinically indicated   --Continue Boost Plus TID  --Consider appetite stimulant  --Encourage good intakes   --Nursing: please continue to document % meal eaten on flowsheet   --RD to monitor weight, PO intake     Goals: Meet % EEN/EPN by next RD follow-up  Nutrition Goal Status: new  Communication of RD Recs:  (POC)

## 2025-05-25 NOTE — ASSESSMENT & PLAN NOTE
Hyperkalemia is likely due to LUCIA.The patients most recent potassium results are listed below.  Recent Labs     05/24/25  0515 05/25/25  1049 05/25/25  1242   K 4.7 4.6 4.5     Plan  - Monitor for arrhythmias with EKG and/or continuous telemetry.   - Treat the hyperkalemia with renal diet  - Monitor potassium: Daily  - The patient's hyperkalemia is improving

## 2025-05-25 NOTE — CONSULTS
"  [facility] - Med Surg  Adult Nutrition  Consult Note    SUMMARY     Recommendations  --Continue Renal Non-Dialysis Minced & Moist (IDDSI Level 5) Fluid - 1200 mL diet as tolerated and clinically indicated   --Continue Boost Plus TID  --Consider appetite stimulant  --Encourage good intakes   --Nursing: please continue to document % meal eaten on flowsheet   --RD to monitor weight, PO intake     Goals: Meet % EEN/EPN by next RD follow-up  Nutrition Goal Status: new  Communication of RD Recs:  (POC)    Nutrition Discharge Planning     Nutrition Discharge Planning: Therapeutic diet (comments), Oral diet with texture modifications per SLP (comments)  Therapeutic diet (comments): Renal diet    Reason for Assessment    Reason For Assessment: consult (Decreased PO intake)  Diagnosis:  (Acute renal failure superimposed on stage 3b chronic kidney disease)  General Information Comments: 75 y.o. female with PMHx of renal transplant (12/13/2011) due to renal amyloidosis now CKD 3b on immunosuppression, HTN, HLD, asthma, GERD, obesity, multiple myeloma, dementia transferred from [facility] to [facility] due to ARF and acute pyelo in transplanted kidney. RD consult for decreased PO intake. Attempted to speak to pt - provider in room at time of visit. Noted 0-25% PO intake. Weight trending upward - possibly d/t mild-severe edema present.     Nutrition Related Social Determinants of Health: SDOH: Adequate food in home environment     Food Insecurity: No Food Insecurity (5/23/2025)    Hunger Vital Sign     Worried About Running Out of Food in the Last Year: Never true     Ran Out of Food in the Last Year: Never true       Nutrition/Diet History    Spiritual, Cultural Beliefs, Judaism Practices, Values that Affect Care: no  Food Allergies: NKFA  Factors Affecting Nutritional Intake: decreased appetite    Anthropometrics    Height: 5' 5" (165.1 cm)  Height (inches): 65 in  Height Method: Stated  Weight: 107 kg (235 lb 14.3 " oz)  Weight (lb): 235.89 lb  Weight Method: Bed Scale  Ideal Body Weight (IBW), Female: 125 lb  % Ideal Body Weight, Female (lb): 188.71 %  BMI (Calculated): 39.3  BMI Grade: 35 - 39.9 - obesity - grade II       Lab/Procedures/Meds    Pertinent Labs Reviewed: reviewed - Na 146, BUN 26, eGFR 39, Mg 1.5    Pertinent Medications Reviewed: reviewed - heparin, pantoprazole    Estimated/Assessed Needs    Weight Used For Calorie Calculations: 107 kg (235 lb 14.3 oz)  Energy Calorie Requirements (kcal): 1556 kcal/day (x 1.0 AF)  Energy Need Method: Sargent-St Jeor  Protein Requirements:  g/day (0.8-1.0 g/kg)  Weight Used For Protein Calculations: 107 kg (235 lb 14.3 oz)     Estimated Fluid Requirement Method: RDA Method  RDA Method (mL): 1556         Nutrition Prescription Ordered    Current Diet Order: Renal Non-Dialysis Minced & Moist (IDDSI Level 5) Fluid - 1200 mL  Oral Nutrition Supplement: Boost Plus TID    Evaluation of Received Nutrient/Fluid Intake    Energy Calories Required: not meeting needs  Protein Required: not meeting needs  Fluid Required:  (Per MD)  Comments: LBM 5/24  % Intake of Estimated Energy Needs: 0 - 25 %  % Meal Intake: 0 - 25 %    PES Statement  Inadequate energy intake related to  (Decreased appetite) as evidenced by Intake <25% estimated needs  Status: New    Nutrition Risk    Level of Risk/Frequency of Follow-up: moderate - high       Monitor and Evaluation    Monitor and Evaluation: Energy intake, Food and beverage intake, Diet order, Food and nutrition knowledge, Weight, Electrolyte and renal panel, Gastrointestinal profile, Glucose/endocrine profile, Inflammatory profile, Lipid profile, Nutrition focused physical findings, Skin       Nutrition Follow-Up    RD Follow-up?: Yes

## 2025-05-25 NOTE — ASSESSMENT & PLAN NOTE
- history of dementia and delirium noted    - MRI at outside facility negative for structural causes intracranially  - Ca elevated, being treated  - renal function at baseline  - infections have been treated, will finish additional treatment at this facility  - suspected multifactorial in the setting of infection, elevated Ca, renal failure and general medical condition with dementia and delirium.     - delirium precautions. Continuing to monitor

## 2025-05-25 NOTE — PLAN OF CARE
Problem: Physical Therapy  Goal: Physical Therapy Goal  Description: Goals to be met by: 2025     Patient will increase functional independence with mobility by performin. Supine to sit with MInimal Assistance  2. Sit to supine with MInimal Assistance  3. Sit to stand transfer with Minimal Assistance and RW  4. Bed to chair transfer with Minimal Assistance using Rolling Walker  5. Gait  x 50 feet with Minimal Assistance using Rolling Walker.   6. Lower extremity exercise program x10 reps per handout, with supervision    Outcome: Progressing

## 2025-05-25 NOTE — ASSESSMENT & PLAN NOTE
Hypernatremia is likely due to Dehydration. The patient's most recent sodium results are listed below.  Recent Labs     05/24/25  0515 05/25/25  1049 05/25/25  1242   * 143 144     Plan  - Aim to correct the sodium by 8-10mEq in 24 hours.   - Will plan to trend the patient's sodium: Daily  - The patient's hypernatremia is improving

## 2025-05-25 NOTE — SUBJECTIVE & OBJECTIVE
Interval History: NAEO, resting in bed. Was a little more interactive today. Did not follow commands but did respond to some questions. Interacted some with her  in the room    Review of Systems   Unable to perform ROS: Mental status change     Objective:     Vital Signs (Most Recent):  Temp: 97.5 °F (36.4 °C) (05/25/25 1112)  Pulse: 71 (05/25/25 1205)  Resp: 16 (05/25/25 1112)  BP: (!) 131/59 (05/25/25 1205)  SpO2: 96 % (05/25/25 1112) Vital Signs (24h Range):  Temp:  [96.3 °F (35.7 °C)-98.4 °F (36.9 °C)] 97.5 °F (36.4 °C)  Pulse:  [68-87] 71  Resp:  [16-20] 16  SpO2:  [95 %-96 %] 96 %  BP: (121-161)/(57-74) 131/59     Weight: 107 kg (235 lb 14.3 oz)  Body mass index is 39.25 kg/m².    Intake/Output Summary (Last 24 hours) at 5/25/2025 1310  Last data filed at 5/24/2025 1809  Gross per 24 hour   Intake 420 ml   Output 350 ml   Net 70 ml         Physical Exam  HENT:      Head: Normocephalic.   Cardiovascular:      Rate and Rhythm: Normal rate.      Pulses: Normal pulses.      Heart sounds: No murmur heard.  Pulmonary:      Effort: Pulmonary effort is normal. No respiratory distress.   Abdominal:      General: Abdomen is flat. Bowel sounds are normal. There is no distension.   Musculoskeletal:      Comments: R IJ CVC removed, slight bruising around the area. Clean and dry               Significant Labs: All pertinent labs within the past 24 hours have been reviewed.    Significant Imaging: I have reviewed all pertinent imaging results/findings within the past 24 hours.

## 2025-05-25 NOTE — ASSESSMENT & PLAN NOTE
LUCIA is likely due to hypervolemia, pyelo/infection. Baseline creatinine is 1.5. Most recent creatinine and eGFR are listed below.  Recent Labs     05/24/25  0515 05/25/25  1049 05/25/25  1242   CREATININE 1.4 1.3 1.3   EGFRNORACEVR 39* 42* 42*       Report from Transfer:  VSSAF. No white count. Cr 2.0, BUN 35. Received levaquin + cefepime (5/11-5/14), zosyn + zyvox 5/14-5/21). Appears overloaded, received torsemide 20mg daily. Continued on prograf.      Plan  - LUCIA is improving - Cr 2.2 >> 2.0 (bl 1.5)  - treat pyelo, s/p abx x 11 days  - KTM consulted  - Cr back at baseline  - will try some diuresis starting 5/25 with lasix BID  - monitor output and response

## 2025-05-25 NOTE — PROGRESS NOTES
[facility] Medicine  Progress Note    Patient Name: Jing Philippe  MRN: [id number]  Patient Class: IP- Inpatient   Admission Date: 5/22/2025  Length of Stay: 3 days  Attending Physician: J Carlos Chavez DO  Primary Care Provider: Fito Land Jr., MD        Subjective     Principal Problem:Acute renal failure superimposed on stage 3b chronic kidney disease        HPI:  Jing Philippe is a 75 y.o. female with PMHx of renal transplant (12/13/2011) due to renal amyloidosis now CKD 3b on immunosuppression, HTN, HLD, asthma, GERD, obesity, multiple myeloma, dementia transferred from [facility] to [facility] due to ARF and acute pyelo in transplanted kidney.     Last seen by Nephro in April 2025 at [facility] as f/u from prior admission. At that time, prograf was increased and torsemide held due to LUCIA, restarted once Cr returned to normal. Admitted to [facility] 5/11 due to AMS iso UTI. MRI brain at that time normal. Started on antibiotics, urine clx grew Klebsiella. Over past 10 days, Cr trending upwards to 2.2 (bl 1.5).     Per Handoff -- VSSAF. No white count. Cr 2.0, BUN 35. Received levaquin + cefepime (5/11-5/14), zosyn + zyvox 5/14-5/21). Appears overloaded, received torsemide 20mg daily. Continued on prograf.     Overview/Hospital Course:  Patient transferred due to worsening renal function in transplanted kidney. Needed to be evaluated by renal transplant team    Diuresis was held due to LUCIA, although patient appears edematous on exam. Echo showed likely intravascular volume depletion. Edema likely due to proteinuria and low albumin state. Will try adding IV lasix to see if that helps    Patient also with elevated light chain with a history of amyloidosis (s/p chemo) in remission. Elevated light chain on labs. Oncology consulted, work up ongoing.     Was given zoledronic acid for elevated calcium 5/23.. risks and benefits discussed with  prior to dose being  given.       5/25 - had discussion with  about risks/benefits of trying a different diet type to try and get the patient to eat some more. Will advance diet today, careful monitoring of patient while eating.  understands and accepts the risk of advancing diet     No new subjective & objective note has been filed under this hospital service since the last note was generated.        Assessment & Plan  Acute renal failure superimposed on stage 3b chronic kidney disease  LUCIA is likely due to hypervolemia, pyelo/infection. Baseline creatinine is 1.5. Most recent creatinine and eGFR are listed below.  Recent Labs     05/24/25  0515 05/25/25  1049 05/25/25  1242   CREATININE 1.4 1.3 1.3   EGFRNORACEVR 39* 42* 42*       Report from Transfer:  VSSAF. No white count. Cr 2.0, BUN 35. Received levaquin + cefepime (5/11-5/14), zosyn + zyvox 5/14-5/21). Appears overloaded, received torsemide 20mg daily. Continued on prograf.      Plan  - LUCIA is improving - Cr 2.2 >> 2.0 (bl 1.5)  - treat pyelo, s/p abx x 11 days  - KTM consulted  - Cr back at baseline  - will try some diuresis starting 5/25 with lasix BID  - monitor output and response  Elevated serum immunoglobulin free light chains  - history of MM and amyloidosis, not currently on treatment  - labs with elevated light chains  - concerns that worsening amyloidosis is contributing to her current presentation with proteinuria and anasarca as well as anemia.  - oncology consulted for recommendations and treatment options  - work up ongoing    Acute pyelonephritis  Reported - Acute Pyelo in Transplant Kidney w/ associated ARF. Urine clx grew klebsiella. Received levaquin + cefepime (5/11-5/14), zosyn + zyvox 5/14-5/21).     - consider continuing abx pending KTM recs  - repeat UA with clx pending  - continue on rocephin for now. Will given 5 days. No culture data available but seems to be adequately treated at outside facility  S/P living-donor kidney transplantation -  12/13/11  - noted, see ARF in Renal Transplant  Secondary hyperparathyroidism of renal origin  - cont home meds  Hyperlipidemia  - not on statin  GERD (gastroesophageal reflux disease)  - protonix  Chronic asthma  - no acute issues, prn nebs  Chronic immunosuppression with Prograf and Cellcept  - hold cellcept, cont prograf  - KTM consulted  Class 2 obesity in adult  Body mass index is 39.25 kg/m². Morbid obesity complicates all aspects of disease management from diagnostic modalities to treatment. Weight loss encouraged and health benefits explained to patient.    - BMI 37.7, obesity class II  Hypertension, renal  - cont lopressor 50 bid, nifedipine 60 daily  CKD (chronic kidney disease) stage 3, GFR 30-59 ml/min  See ARF in Renal Transplant.   Multiple myeloma not having achieved remission  - no longer on Revlimid  Dementia with behavioral disturbance  Noted, delirium precautions in place.  Anemia  Anemia is likely due to chronic disease due to Chronic Kidney Disease. Most recent hemoglobin and hematocrit are listed below.  Recent Labs     05/24/25  0515 05/25/25  1049 05/25/25  1242   HGB 7.5* 7.2* 7.2*   HCT 25.1* 23.9* 24.6*     Plan  - Monitor serial CBC: Daily  - Transfuse PRBC if patient becomes hemodynamically unstable, symptomatic or H/H drops below 7/21.  - Patient has received 1 units of PRBCs on unknown, documented at outside hospital  - Patient's anemia is currently stable  - concern it could be related to amyloid or MM  Hyperkalemia  Hyperkalemia is likely due to LUCIA.The patients most recent potassium results are listed below.  Recent Labs     05/24/25  0515 05/25/25  1049 05/25/25  1242   K 4.7 4.6 4.5     Plan  - Monitor for arrhythmias with EKG and/or continuous telemetry.   - Treat the hyperkalemia with renal diet  - Monitor potassium: Daily  - The patient's hyperkalemia is improving          Hypernatremia  Hypernatremia is likely due to Dehydration. The patient's most recent sodium results are  listed below.  Recent Labs     05/24/25  0515 05/25/25  1049 05/25/25  1242   * 143 144     Plan  - Aim to correct the sodium by 8-10mEq in 24 hours.   - Will plan to trend the patient's sodium: Daily  - The patient's hypernatremia is improving    Thrombocytopenia  The likely etiology of thrombocytopenia is sepsis. The patients 3 most recent labs are listed below.  Recent Labs     05/24/25  0515 05/25/25  1049 05/25/25  1242   PLT 96* 104* 95*     Plan  - Will transfuse if platelet count is <50k (if undergoing surgical procedure or have active bleeding).      Encephalopathy, metabolic  - history of dementia and delirium noted    - MRI at outside facility negative for structural causes intracranially  - Ca elevated, being treated  - renal function at baseline  - infections have been treated, will finish additional treatment at this facility  - suspected multifactorial in the setting of infection, elevated Ca, renal failure and general medical condition with dementia and delirium.     - delirium precautions. Continuing to monitor      VTE Risk Mitigation (From admission, onward)           Ordered     heparin (porcine) injection 5,000 Units  Every 8 hours         05/22/25 1142     IP VTE HIGH RISK PATIENT  Once         05/22/25 1142     Place sequential compression device  Until discontinued         05/22/25 1142                    Discharge Planning   LESTER: 5/30/2025     Code Status: Full Code   Medical Readiness for Discharge Date:   Discharge Plan A: Skilled Nursing Facility                Please place Justification for DME        J Carlos Chavez DO  Department of Hospital Medicine   [facility] Surg

## 2025-05-25 NOTE — ASSESSMENT & PLAN NOTE
The likely etiology of thrombocytopenia is sepsis. The patients 3 most recent labs are listed below.  Recent Labs     05/24/25  0515 05/25/25  1049 05/25/25  1242   PLT 96* 104* 95*     Plan  - Will transfuse if platelet count is <50k (if undergoing surgical procedure or have active bleeding).

## 2025-05-26 PROBLEM — L89.153 PRESSURE INJURY OF SACRAL REGION, STAGE 3: Status: ACTIVE | Noted: 2025-05-26

## 2025-05-26 PROBLEM — E83.52 HYPERCALCEMIA: Status: ACTIVE | Noted: 2025-05-26

## 2025-05-26 LAB
ABSOLUTE EOSINOPHIL (OHS): 0.07 K/UL
ABSOLUTE MONOCYTE (OHS): 0.51 K/UL (ref 0.3–1)
ABSOLUTE NEUTROPHIL COUNT (OHS): 3.89 K/UL (ref 1.8–7.7)
ALBUMIN SERPL BCP-MCNC: 1.8 G/DL (ref 3.5–5.2)
ALP SERPL-CCNC: 69 UNIT/L (ref 40–150)
ALT SERPL W/O P-5'-P-CCNC: 13 UNIT/L (ref 10–44)
ANION GAP (OHS): 9 MMOL/L (ref 8–16)
AST SERPL-CCNC: 11 UNIT/L (ref 11–45)
BASOPHILS # BLD AUTO: 0.02 K/UL
BASOPHILS NFR BLD AUTO: 0.4 %
BILIRUB SERPL-MCNC: 0.2 MG/DL (ref 0.1–1)
BUN SERPL-MCNC: 19 MG/DL (ref 8–23)
CALCIUM SERPL-MCNC: 9.3 MG/DL (ref 8.7–10.5)
CHLORIDE SERPL-SCNC: 113 MMOL/L (ref 95–110)
CO2 SERPL-SCNC: 21 MMOL/L (ref 23–29)
CREAT SERPL-MCNC: 1.3 MG/DL (ref 0.5–1.4)
ERYTHROCYTE [DISTWIDTH] IN BLOOD BY AUTOMATED COUNT: 18.8 % (ref 11.5–14.5)
GFR SERPLBLD CREATININE-BSD FMLA CKD-EPI: 42 ML/MIN/1.73/M2
GLUCOSE SERPL-MCNC: 96 MG/DL (ref 70–110)
HCT VFR BLD AUTO: 22.6 % (ref 37–48.5)
HGB BLD-MCNC: 6.8 GM/DL (ref 12–16)
IMM GRANULOCYTES # BLD AUTO: 0.05 K/UL (ref 0–0.04)
IMM GRANULOCYTES NFR BLD AUTO: 0.9 % (ref 0–0.5)
INDIRECT COOMBS: NORMAL
LYMPHOCYTES # BLD AUTO: 0.76 K/UL (ref 1–4.8)
MCH RBC QN AUTO: 25.8 PG (ref 27–31)
MCHC RBC AUTO-ENTMCNC: 30.1 G/DL (ref 32–36)
MCV RBC AUTO: 86 FL (ref 82–98)
NUCLEATED RBC (/100WBC) (OHS): 0 /100 WBC
PATHOLOGIST INTERPRETATION - IFE SERUM (OHS): NORMAL
PLATELET # BLD AUTO: 93 K/UL (ref 150–450)
PMV BLD AUTO: 11.3 FL (ref 9.2–12.9)
POTASSIUM SERPL-SCNC: 3.8 MMOL/L (ref 3.5–5.1)
PROT SERPL-MCNC: 5.7 GM/DL (ref 6–8.4)
RBC # BLD AUTO: 2.64 M/UL (ref 4–5.4)
RELATIVE EOSINOPHIL (OHS): 1.3 %
RELATIVE LYMPHOCYTE (OHS): 14.3 % (ref 18–48)
RELATIVE MONOCYTE (OHS): 9.6 % (ref 4–15)
RELATIVE NEUTROPHIL (OHS): 73.5 % (ref 38–73)
RH BLD: NORMAL
SODIUM SERPL-SCNC: 143 MMOL/L (ref 136–145)
SPECIMEN OUTDATE: NORMAL
TACROLIMUS BLD-MCNC: 7.5 NG/ML (ref 5–15)
TACROLIMUS BLD-MCNC: 9.9 NG/ML (ref 5–15)
WBC # BLD AUTO: 5.3 K/UL (ref 3.9–12.7)

## 2025-05-26 PROCEDURE — 80197 ASSAY OF TACROLIMUS: CPT | Performed by: STUDENT IN AN ORGANIZED HEALTH CARE EDUCATION/TRAINING PROGRAM

## 2025-05-26 PROCEDURE — 21400001 HC TELEMETRY ROOM

## 2025-05-26 PROCEDURE — 25000003 PHARM REV CODE 250

## 2025-05-26 PROCEDURE — 63600175 PHARM REV CODE 636 W HCPCS

## 2025-05-26 PROCEDURE — 36415 COLL VENOUS BLD VENIPUNCTURE: CPT

## 2025-05-26 PROCEDURE — 36415 COLL VENOUS BLD VENIPUNCTURE: CPT | Performed by: STUDENT IN AN ORGANIZED HEALTH CARE EDUCATION/TRAINING PROGRAM

## 2025-05-26 PROCEDURE — 99222 1ST HOSP IP/OBS MODERATE 55: CPT | Mod: ,,, | Performed by: NURSE PRACTITIONER

## 2025-05-26 PROCEDURE — 63600175 PHARM REV CODE 636 W HCPCS: Performed by: INTERNAL MEDICINE

## 2025-05-26 PROCEDURE — 97535 SELF CARE MNGMENT TRAINING: CPT

## 2025-05-26 PROCEDURE — 86850 RBC ANTIBODY SCREEN: CPT

## 2025-05-26 PROCEDURE — 84166 PROTEIN E-PHORESIS/URINE/CSF: CPT

## 2025-05-26 PROCEDURE — 92526 ORAL FUNCTION THERAPY: CPT

## 2025-05-26 PROCEDURE — 99232 SBSQ HOSP IP/OBS MODERATE 35: CPT | Mod: GC,,, | Performed by: INTERNAL MEDICINE

## 2025-05-26 PROCEDURE — 86335 IMMUNFIX E-PHORSIS/URINE/CSF: CPT

## 2025-05-26 PROCEDURE — 85025 COMPLETE CBC W/AUTO DIFF WBC: CPT

## 2025-05-26 PROCEDURE — 80053 COMPREHEN METABOLIC PANEL: CPT

## 2025-05-26 PROCEDURE — 11000001 HC ACUTE MED/SURG PRIVATE ROOM

## 2025-05-26 RX ADMIN — MICONAZOLE NITRATE 2 % TOPICAL POWDER: at 11:05

## 2025-05-26 RX ADMIN — PANTOPRAZOLE SODIUM 40 MG: 40 TABLET, DELAYED RELEASE ORAL at 08:05

## 2025-05-26 RX ADMIN — HEPARIN SODIUM 5000 UNITS: 5000 INJECTION INTRAVENOUS; SUBCUTANEOUS at 09:05

## 2025-05-26 RX ADMIN — HEPARIN SODIUM 5000 UNITS: 5000 INJECTION INTRAVENOUS; SUBCUTANEOUS at 04:05

## 2025-05-26 RX ADMIN — FUROSEMIDE 40 MG: 10 INJECTION, SOLUTION INTRAVENOUS at 08:05

## 2025-05-26 RX ADMIN — METOPROLOL TARTRATE 50 MG: 50 TABLET, FILM COATED ORAL at 09:05

## 2025-05-26 RX ADMIN — BUSPIRONE HYDROCHLORIDE 5 MG: 5 TABLET ORAL at 08:05

## 2025-05-26 RX ADMIN — FUROSEMIDE 40 MG: 10 INJECTION, SOLUTION INTRAVENOUS at 09:05

## 2025-05-26 RX ADMIN — CEFTRIAXONE 1 G: 1 INJECTION, POWDER, FOR SOLUTION INTRAMUSCULAR; INTRAVENOUS at 11:05

## 2025-05-26 RX ADMIN — LAMIVUDINE 100 MG: 100 TABLET, FILM COATED ORAL at 08:05

## 2025-05-26 RX ADMIN — METOPROLOL TARTRATE 50 MG: 50 TABLET, FILM COATED ORAL at 08:05

## 2025-05-26 RX ADMIN — MUPIROCIN: 20 OINTMENT TOPICAL at 09:05

## 2025-05-26 RX ADMIN — BUSPIRONE HYDROCHLORIDE 5 MG: 5 TABLET ORAL at 09:05

## 2025-05-26 RX ADMIN — THYROID, PORCINE 60 MG: 60 TABLET ORAL at 06:05

## 2025-05-26 RX ADMIN — MUPIROCIN: 20 OINTMENT TOPICAL at 08:05

## 2025-05-26 RX ADMIN — TACROLIMUS 1 MG: 1 CAPSULE ORAL at 06:05

## 2025-05-26 RX ADMIN — MICONAZOLE NITRATE 2 % TOPICAL POWDER: at 09:05

## 2025-05-26 RX ADMIN — NIFEDIPINE 60 MG: 60 TABLET, FILM COATED, EXTENDED RELEASE ORAL at 08:05

## 2025-05-26 RX ADMIN — TACROLIMUS 1 MG: 1 CAPSULE ORAL at 08:05

## 2025-05-26 NOTE — ASSESSMENT & PLAN NOTE
- consult received for evaluation of skin injury.  - pt transferred from [facility] [place] [facility] [place] to [place]F and acute pyelo in transplanted kidney.   - full thickness tissue loss to sacrum with pink, yellow, and moist wound base.  - continue treatment per wound care RN recs.  - Immerse surface.  - heel and sacral foam intact.  - heel boots, wedge, and pillows for offloading.  - pt incontinent of stool. Stool episode cleaned during assessment.  - turn q2h.  - randi score is 11 with a nutrition sub scale score of 2.  - RD following.  - nursing to maintain pressure injury prevention measures and continue wound care per orders.

## 2025-05-26 NOTE — ASSESSMENT & PLAN NOTE
Hypercalcemia is likely due to Malignancy. The patient has the following symptoms due to their hypercalcemia: encephalopathy. Their most recent calcium and albumin results are listed below.  Recent Labs     05/25/25  1049 05/25/25  1242 05/26/25  0515   CALCIUM 9.6 9.7 9.3   ALBUMIN 1.8* 1.8* 1.8*     Plan  - Treat the hypercalcemia with: Bisphosphonates and Loop diuretics.   - The patient's hypercalcemia is improving.   - given renal adjusted dose of zoldronic acid on 5/23 with improvement in labs.

## 2025-05-26 NOTE — CARE UPDATE
Unable to obtain ABG, secure chat and advised Doctor concerning this RN and Supervisor advised as well

## 2025-05-26 NOTE — PROGRESS NOTES
[facility] Medicine  Progress Note    Patient Name: Jing Philippe  MRN: [id number]  Patient Class: IP- Inpatient   Admission Date: 5/22/2025  Length of Stay: 4 days  Attending Physician: J Carlos Chavez DO  Primary Care Provider: Fito Land Jr., MD        Subjective     Principal Problem:Acute renal failure superimposed on stage 3b chronic kidney disease        HPI:  Jing Philippe is a 75 y.o. female with PMHx of renal transplant (12/13/2011) due to renal amyloidosis now CKD 3b on immunosuppression, HTN, HLD, asthma, GERD, obesity, multiple myeloma, dementia transferred from [facility] [place] [facility] due to [place]F and acute pyelo in transplanted kidney.     Last seen by Nephro in April 2025 at [facility] as f/u from prior admission. At that time, prograf was increased and torsemide held due to LUCIA, restarted once Cr returned to normal. Admitted to [facility] 5/11 due to AMS iso UTI. MRI brain at that time normal. Started on antibiotics, urine clx grew Klebsiella. Over past 10 days, Cr trending upwards to 2.2 (bl 1.5).     Per Handoff -- VSSAF. No white count. Cr 2.0, BUN 35. Received levaquin + cefepime (5/11-5/14), zosyn + zyvox 5/14-5/21). Appears overloaded, received torsemide 20mg daily. Continued on prograf.     Overview/Hospital Course:  Patient transferred due to worsening renal function in transplanted kidney. Needed to be evaluated by renal transplant team    Diuresis was held due to LUCIA, although patient appears edematous on exam. Echo showed likely intravascular volume depletion. Edema likely due to proteinuria and low albumin state. Will try adding IV lasix to see if that helps    Patient also with elevated light chain with a history of amyloidosis (s/p chemo) in remission. Elevated light chain on labs. Oncology consulted, work up ongoing.     Was given zoledronic acid for elevated calcium 5/23.. risks and benefits discussed with  prior to dose being  given.       5/25 - had discussion with  about risks/benefits of trying a different diet type to try and get the patient to eat some more. Will advance diet today, careful monitoring of patient while eating.  understands and accepts the risk of advancing diet     5/26 - patient mentation greatly improved, able to have conversations. Knew name and husbands name. Did not know location or situation. Steady improvement over the last few days.    Interval History: Patient somnolent overnight, abg and labs ordered. No acute abnormalities. Patients mentation greatly improved overnight.    Review of Systems   Unable to perform ROS: Mental status change     Objective:     Vital Signs (Most Recent):  Temp: 97.4 °F (36.3 °C) (05/26/25 1208)  Pulse: 67 (05/26/25 1208)  Resp: 18 (05/26/25 1208)  BP: 116/61 (05/26/25 1208)  SpO2: (!) 94 % (05/26/25 1208) Vital Signs (24h Range):  Temp:  [97.1 °F (36.2 °C)-98.2 °F (36.8 °C)] 97.4 °F (36.3 °C)  Pulse:  [65-83] 67  Resp:  [16-18] 18  SpO2:  [90 %-99 %] 94 %  BP: (116-139)/(58-63) 116/61     Weight: 107 kg (235 lb 14.3 oz)  Body mass index is 39.25 kg/m².    Intake/Output Summary (Last 24 hours) at 5/26/2025 1302  Last data filed at 5/25/2025 1720  Gross per 24 hour   Intake 200 ml   Output 1000 ml   Net -800 ml         Physical Exam  HENT:      Head: Normocephalic and atraumatic.   Cardiovascular:      Rate and Rhythm: Normal rate.      Pulses: Normal pulses.      Heart sounds: No murmur heard.  Pulmonary:      Effort: Pulmonary effort is normal. No respiratory distress.   Abdominal:      General: Abdomen is flat. Bowel sounds are normal. There is no distension.   Musculoskeletal:      Right lower leg: Edema present.      Left lower leg: Edema present.   Skin:     General: Skin is warm and dry.   Neurological:      General: No focal deficit present.      Mental Status: She is alert.      Comments: Oriented to self and knew husbands name. Not oriented to time, place or  situation. Not able to reliably answer ROS questions. Able to follow basic commands               Significant Labs: All pertinent labs within the past 24 hours have been reviewed.    Significant Imaging: I have reviewed all pertinent imaging results/findings within the past 24 hours.      Assessment & Plan  Acute renal failure superimposed on stage 3b chronic kidney disease  LUCIA is likely due to hypervolemia, pyelo/infection. Baseline creatinine is 1.5. Most recent creatinine and eGFR are listed below.  Recent Labs     05/25/25  1049 05/25/25  1242 05/26/25  0515   CREATININE 1.3 1.3 1.3   EGFRNORACEVR 42* 42* 42*       Report from Transfer:  VSSAF. No white count. Cr 2.0, BUN 35. Received levaquin + cefepime (5/11-5/14), zosyn + zyvox 5/14-5/21). Appears overloaded, received torsemide 20mg daily. Continued on prograf.      Plan  - LUCIA is improving - Cr 2.2 >> 2.0 (bl 1.5)  - treat pyelo, s/p abx x 11 days  - KTM consulted  - Cr back at baseline  - will try some diuresis starting 5/25 with lasix BID  - monitor output and response  Elevated serum immunoglobulin free light chains  - history of MM and amyloidosis, not currently on treatment  - labs with elevated light chains  - concerns that worsening amyloidosis is contributing to her current presentation with proteinuria and anasarca as well as anemia.  - oncology consulted for recommendations and treatment options  - work up ongoing    UPEP pending    Acute pyelonephritis  Reported - Acute Pyelo in Transplant Kidney w/ associated ARF. Urine clx grew klebsiella. Received levaquin + cefepime (5/11-5/14), zosyn + zyvox 5/14-5/21).     - consider continuing abx pending KTM recs  - repeat UA with clx pending - culture never collected    - patient with good improvement in mental status on rocephin ( states that mental status will worsen with past infections)   - will give 7 days of rocephin total and work on removal of baeza after UPEP collected.   S/P living-donor  kidney transplantation - 12/13/11  - noted, see ARF in Renal Transplant  Secondary hyperparathyroidism of renal origin  - cont home meds  Hyperlipidemia  - not on statin  GERD (gastroesophageal reflux disease)  - protonix  Chronic asthma  - no acute issues, prn nebs  Chronic immunosuppression with Prograf and Cellcept  - hold cellcept, cont prograf  - KTM consulted  Class 2 obesity in adult  Body mass index is 39.25 kg/m². Morbid obesity complicates all aspects of disease management from diagnostic modalities to treatment. Weight loss encouraged and health benefits explained to patient.    - BMI 37.7, obesity class II  Hypertension, renal  - cont lopressor 50 bid, nifedipine 60 daily  CKD (chronic kidney disease) stage 3, GFR 30-59 ml/min  See ARF in Renal Transplant.   Multiple myeloma not having achieved remission  - no longer on Revlimid  Dementia with behavioral disturbance  Noted, delirium precautions in place.  Anemia  Anemia is likely due to chronic disease due to Chronic Kidney Disease. Most recent hemoglobin and hematocrit are listed below.  Recent Labs     05/25/25  1242 05/25/25  2256 05/25/25  2259 05/26/25  0515   HGB 7.2*  --  7.0* 6.8*   HCT 24.6* 20* 23.2* 22.6*     Plan  - Monitor serial CBC: Daily  - Transfuse PRBC if patient becomes hemodynamically unstable, symptomatic or H/H drops below 7/21.  - Patient has received 1 units of PRBCs on unknown, documented at outside hospital  - Patient's anemia is currently stable  - concern it could be related to amyloid or MM  - hbg at 6.8 today, discussed with family and will monitor for possible transfusion tomorrow if still low  Hyperkalemia  Hyperkalemia is likely due to LUCIA.The patients most recent potassium results are listed below.  Recent Labs     05/25/25  1049 05/25/25  1242 05/26/25  0515   K 4.6 4.5 3.8     Plan  - Monitor for arrhythmias with EKG and/or continuous telemetry.   - Treat the hyperkalemia with renal diet  - Monitor potassium: Daily  -  The patient's hyperkalemia is improving    Hypernatremia  Hypernatremia is likely due to Dehydration. The patient's most recent sodium results are listed below.  Recent Labs     05/25/25  1049 05/25/25  1242 05/26/25  0515    144 143     Plan  - Aim to correct the sodium by 8-10mEq in 24 hours.   - Will plan to trend the patient's sodium: Daily  - The patient's hypernatremia is improving    Thrombocytopenia  The likely etiology of thrombocytopenia is sepsis. The patients 3 most recent labs are listed below.  Recent Labs     05/25/25  1242 05/25/25  2259 05/26/25  0515   PLT 95* 86* 93*     Plan  - Will transfuse if platelet count is <50k (if undergoing surgical procedure or have active bleeding).      Encephalopathy, metabolic  - history of dementia and delirium noted    - MRI at outside facility negative for structural causes intracranially  - Ca elevated, being treated  - renal function at baseline  - infections have been treated, will finish additional treatment at this facility  - suspected multifactorial in the setting of infection, elevated Ca, renal failure and general medical condition with dementia and delirium.     - delirium precautions. Continuing to monitor  - improving with Ca treatment, UTI treatment, diuresis and kidney function returning to normal.       Hypercalcemia  Hypercalcemia is likely due to Malignancy. The patient has the following symptoms due to their hypercalcemia: encephalopathy. Their most recent calcium and albumin results are listed below.  Recent Labs     05/25/25  1049 05/25/25  1242 05/26/25  0515   CALCIUM 9.6 9.7 9.3   ALBUMIN 1.8* 1.8* 1.8*     Plan  - Treat the hypercalcemia with: Bisphosphonates and Loop diuretics.   - The patient's hypercalcemia is improving.   - given renal adjusted dose of zoldronic acid on 5/23 with improvement in labs.   VTE Risk Mitigation (From admission, onward)           Ordered     heparin (porcine) injection 5,000 Units  Every 8 hours          05/22/25 1142     IP VTE HIGH RISK PATIENT  Once         05/22/25 1142     Place sequential compression device  Until discontinued         05/22/25 1142                    Discharge Planning   LESTER: 5/29/2025     Code Status: Full Code   Medical Readiness for Discharge Date:   Discharge Plan A: Skilled Nursing Facility                Please place Justification for DME        J Carlos Chavez DO  Department of Hospital Medicine   [facility] Surg

## 2025-05-26 NOTE — ASSESSMENT & PLAN NOTE
The likely etiology of thrombocytopenia is sepsis. The patients 3 most recent labs are listed below.  Recent Labs     05/25/25  1242 05/25/25  2259 05/26/25  0515   PLT 95* 86* 93*     Plan  - Will transfuse if platelet count is <50k (if undergoing surgical procedure or have active bleeding).

## 2025-05-26 NOTE — PLAN OF CARE
[facility] - Med Surg  Discharge Reassessment    Primary Care Provider: Fito Land Jr., MD    Expected Discharge Date: 5/29/2025        Sw met with patient at bedside to discuss placement. Pt's spouse Manas reported that due to distance, he would like referrals sent to [facility] and [place].  Pt's belongings are currently at [place]. Sw to follow up on referrals sent.     Pt is not medically ready.    Discharge Plan A and Plan B have been determined by review of patient's clinical status, future medical and therapeutic needs, and coverage/benefits for post-acute care in coordination with multidisciplinary team members.    Reassessment (most recent)       Discharge Reassessment - 05/26/25 1650          Discharge Reassessment    Assessment Type Discharge Planning Reassessment (P)      Did the patient's condition or plan change since previous assessment? No (P)      Discharge Plan discussed with: Spouse/sig other (P)      Communicated LESTER with patient/caregiver Yes (P)      Discharge Plan A Skilled Nursing Facility (P)      Discharge Plan B Rehab (P)      DME Needed Upon Discharge  none (P)      Transition of Care Barriers None (P)      Why the patient remains in the hospital Requires continued medical care (P)         Post-Acute Status    Post-Acute Authorization Placement (P)      Post-Acute Placement Status Referrals Sent (P)                    MEKA Samaniego  Case Management  [phone]

## 2025-05-26 NOTE — ASSESSMENT & PLAN NOTE
Anemia is likely due to chronic disease due to Chronic Kidney Disease. Most recent hemoglobin and hematocrit are listed below.  Recent Labs     05/25/25  1242 05/25/25  2256 05/25/25  2259 05/26/25  0515   HGB 7.2*  --  7.0* 6.8*   HCT 24.6* 20* 23.2* 22.6*     Plan  - Monitor serial CBC: Daily  - Transfuse PRBC if patient becomes hemodynamically unstable, symptomatic or H/H drops below 7/21.  - Patient has received 1 units of PRBCs on unknown, documented at outside hospital  - Patient's anemia is currently stable  - concern it could be related to amyloid or MM  - hbg at 6.8 today, discussed with family and will monitor for possible transfusion tomorrow if still low

## 2025-05-26 NOTE — ASSESSMENT & PLAN NOTE
Reported - Acute Pyelo in Transplant Kidney w/ associated ARF. Urine clx grew klebsiella. Received levaquin + cefepime (5/11-5/14), zosyn + zyvox 5/14-5/21).     - consider continuing abx pending KTM recs  - repeat UA with clx pending - culture never collected    - patient with good improvement in mental status on rocephin ( states that mental status will worsen with past infections)   - will give 7 days of rocephin total and work on removal of baeza after UPEP collected.

## 2025-05-26 NOTE — ASSESSMENT & PLAN NOTE
LUCIA is likely due to hypervolemia, pyelo/infection. Baseline creatinine is 1.5. Most recent creatinine and eGFR are listed below.  Recent Labs     05/25/25  1049 05/25/25  1242 05/26/25  0515   CREATININE 1.3 1.3 1.3   EGFRNORACEVR 42* 42* 42*       Report from Transfer:  VSSAF. No white count. Cr 2.0, BUN 35. Received levaquin + cefepime (5/11-5/14), zosyn + zyvox 5/14-5/21). Appears overloaded, received torsemide 20mg daily. Continued on prograf.      Plan  - LUCIA is improving - Cr 2.2 >> 2.0 (bl 1.5)  - treat pyelo, s/p abx x 11 days  - KTM consulted  - Cr back at baseline  - will try some diuresis starting 5/25 with lasix BID  - monitor output and response

## 2025-05-26 NOTE — CONSULTS
[facility] - Med Surg  Skin Integrity CATARINA  Consult Note    Patient Name: Jing Philippe  MRN: [id number]  Admission Date: 5/22/2025  Hospital Length of Stay: 4 days  Attending Physician: J Carlos Chavez DO  Primary Care Provider: Fito Land Jr., MD     Inpatient consult to Skin Integrity  Practitioner  Consult performed by: Shila Seo, KHOA  Consult ordered by: Shila Seo, KHOA        Subjective:     History of Present Illness:  Jing Philippe is a 75 year old female with PMHx of renal transplant (12/13/2011) due to renal amyloidosis now CKD 3b on immunosuppression, HTN, HLD, asthma, GERD, obesity, multiple myeloma, dementia [place] [facility] [place] [facility] and acute pyelo in transplanted kidney.      Last seen by Nephro in April 2025 at [facility] as f/u from prior admission. At that time, prograf was increased and torsemide held due to LUCIA, restarted once Cr returned to normal. Admitted to [facility] 5/11 due to AMS iso UTI. MRI brain at that time normal. Started on antibiotics, urine clx grew Klebsiella. Over past 10 days, Cr trending upwards to 2.2 (bl 1.5).      Per Handoff -- VSSAF. No white count. Cr 2.0, BUN 35. Received levaquin + cefepime (5/11-5/14), zosyn + zyvox 5/14-5/21). Appears overloaded, received torsemide 20mg daily. Continued on prograf. Patient admitted to hospital medicine service. Skin integrity CATARINA consulted for evaluation of skin injury.    Scheduled Meds:   busPIRone  5 mg Oral BID    cefTRIAXone (Rocephin) IV (PEDS and ADULTS)  1 g Intravenous Q24H    furosemide (LASIX) injection  40 mg Intravenous BID    heparin (porcine)  5,000 Units Subcutaneous Q8H    lamiVUDine  100 mg Oral Daily    metoprolol tartrate  50 mg Oral BID    miconazole NITRATE 2 %   Topical (Top) BID    mupirocin   Nasal BID    NIFEdipine  60 mg Oral Daily    pantoprazole  40 mg Oral Daily    tacrolimus  1 mg Oral BID    thyroid (pork)  60 mg Oral Before breakfast      Continuous Infusions:  PRN Meds:  Current Facility-Administered Medications:     albuterol, 1 puff, Inhalation, Q6H PRN    albuterol-ipratropium, 3 mL, Nebulization, Q4H PRN    bisacodyL, 10 mg, Rectal, Daily PRN    dextrose 50%, 12.5 g, Intravenous, PRN    dextrose 50%, 25 g, Intravenous, PRN    glucagon (human recombinant), 1 mg, Intramuscular, PRN    glucose, 16 g, Oral, PRN    glucose, 24 g, Oral, PRN    melatonin, 6 mg, Oral, Nightly PRN    naloxone, 0.02 mg, Intravenous, PRN    ondansetron, 8 mg, Oral, Q8H PRN    polyethylene glycol, 17 g, Oral, Daily PRN    promethazine, 25 mg, Oral, Q6H PRN    sodium chloride 0.9%, 10 mL, Intravenous, Q12H PRN    Review of patient's allergies indicates:   Allergen Reactions    Anesthetics - jen type- parabens      Pt allergic to parabens- per pharmacist    Antihistamines - ethylenediamine     Codeine      Other reaction(s): Vomiting  Other reaction(s): Nausea    Ethanol (ethyl alcohol)     Hydrocodone      Other reaction(s): Vomiting  Other reaction(s): Nausea    Opioids - morphine analogues     Propylene glycol     Saccharin     Tylenol [acetaminophen]     Yellow dye         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    Amyloidosis, unspecified 1998    s/p chemotherapy x 4 occassions  (IV x3, po x1)    Anticoagulant long-term use     Benign hypertension with end-stage renal disease     Candida vaginitis 1/16/2015    Chronic asthma     CKD (chronic kidney disease) stage 3, GFR 30-59 ml/min 9/14/2012    GERD (gastroesophageal reflux disease)     Hyperlipidemia     Lymphedema     LLE    Need for prophylactic immunotherapy     Obesity     Osteoarthritis of left knee s/p total knee replacement 1/16/2015    Surgery October 23, 2014    PONV (postoperative nausea and vomiting)     Renal disease due to hypertension 12/14/2012    Secondary hyperparathyroidism of renal origin     Vertigo      Past Surgical  History:   Procedure Laterality Date    AV FISTULA PLACEMENT      CHOLECYSTECTOMY      HYSTERECTOMY      IMPLANTATION OF PERMANENT SACRAL NERVE STIMULATOR N/A 12/24/2019    Procedure: INSERTION, NEUROSTIMULATOR, PERMANENT, SACRAL;  Surgeon: Misael Browning MD;  Location: [facility] OR [address] [place] [address];  Service: Urology;  Laterality: N/A;  1hr  Ivania Oleg, medtronic rep confirmed    KIDNEY TRANSPLANT      REMOVAL OF ELECTRODE LEAD OF SACRAL NERVE STIMULATOR         Family History       Problem Relation (Age of Onset)    Diabetes Mother    Hypertension Mother, Father    Kidney disease Grandchild          Tobacco Use    Smoking status: Never     Passive exposure: Never    Smokeless tobacco: Never   Substance and Sexual Activity    Alcohol use: Yes     Comment: rare margartia    Drug use: No    Sexual activity: Not on file     Review of Systems   Skin:  Positive for wound.       Objective:     Vital Signs (Most Recent):  Temp: 97.4 °F (36.3 °C) (05/26/25 1208)  Pulse: 67 (05/26/25 1208)  Resp: 18 (05/26/25 1208)  BP: 116/61 (05/26/25 1208)  SpO2: (!) 94 % (05/26/25 1208) Vital Signs (24h Range):  Temp:  [97.1 °F (36.2 °C)-98.2 °F (36.8 °C)] 97.4 °F (36.3 °C)  Pulse:  [65-83] 67  Resp:  [16-18] 18  SpO2:  [90 %-99 %] 94 %  BP: (116-139)/(58-63) 116/61     Weight: 107 kg (235 lb 14.3 oz)  Body mass index is 39.25 kg/m².     Physical Exam  Constitutional:       Appearance: Normal appearance.   Skin:     General: Skin is warm and dry.      Findings: Lesion present.   Neurological:      Mental Status: She is alert.          Laboratory:  All pertinent labs reviewed within the last 24 hours.    Diagnostic Results:  None      Assessment/Plan:              CATARINA Skin Integrity Evaluation      Skin Integrity CATARINA evaluation of patient as part of the comprehensive skin care team.     She has been admitted for 4 days. Skin injury was noted on 5/22/25. POA yes.    Sacrum        Orthopedic  Pressure injury of sacral region, stage 3  - consult  received for evaluation of skin injury.  - pt transferred from [facility] to [facility] due to ARF and acute pyelo in transplanted kidney.   - full thickness tissue loss to sacrum with pink, yellow, and moist wound base.  - continue treatment per wound care RN recs.  - Immerse surface.  - heel and sacral foam intact.  - heel boots, wedge, and pillows for offloading.  - pt incontinent of stool. Stool episode cleaned during assessment.  - turn q2h.  - randi score is 11 with a nutrition sub scale score of 2.  - RD following.  - nursing to maintain pressure injury prevention measures and continue wound care per orders.        Thank you for your consult. I will follow-up with patient. Please contact us if you have any additional questions.      Shila Seo NP  Skin Integrity CATARINA  [facility] - Med Surg

## 2025-05-26 NOTE — ASSESSMENT & PLAN NOTE
Hypernatremia is likely due to Dehydration. The patient's most recent sodium results are listed below.  Recent Labs     05/25/25  1049 05/25/25  1242 05/26/25  0515    144 143     Plan  - Aim to correct the sodium by 8-10mEq in 24 hours.   - Will plan to trend the patient's sodium: Daily  - The patient's hypernatremia is improving

## 2025-05-26 NOTE — CARE UPDATE
RAPID RESPONSE NURSE PROACTIVE ROUNDING NOTE       Time of Visit:     Patient Information:  Admit Date: 2025  LOS: 4  Code Status: Full Code   Date of Visit: 2025  : 1950  Age: 75 y.o.  Sex: female  Race: White  Bed: 610/610 A:   MRN: [id number]    Recent Clinical History:  ICU discharge this admission? No   PACU discharge (last 24 hours)? No   Received conscious sedation/general anesthesia (last 24 hours)? No  ED Visit (Last 24 hours)? No  On NIPPV (Last 24 hours)? No     Primary Team:  Attending Physician: J Carlos Chavez DO  [place] [facility]     SITUATION    Notification source: Charge RN during rounding.  Reason for alert: lethargy  Alert category: Neuro     BACKGROUND     Primary Reason for Admission: Acute renal failure superimposed on stage 3b chronic kidney disease    Patient has a past medical history of Acute osteomyelitis of ankle and foot, left, Amyloid kidney, Amyloidosis, unspecified, Anticoagulant long-term use, Benign hypertension with end-stage renal disease, Candida vaginitis, Chronic asthma, CKD (chronic kidney disease) stage 3, GFR 30-59 ml/min, GERD (gastroesophageal reflux disease), Hyperlipidemia, Lymphedema, Need for prophylactic immunotherapy, Obesity, Osteoarthritis of left knee s/p total knee replacement, PONV (postoperative nausea and vomiting), Renal disease due to hypertension, Secondary hyperparathyroidism of renal origin, and Vertigo.    Last Vitals:  Temp: 97.1 °F (36.2 °C) ( 191)  Pulse: 83 ( 0001)  Resp: 16 ( 0001)  BP: 134/62 ( 0001)  SpO2: 93 % ( 0001)    24 Hours Vitals Range:  Temp:  [97.1 °F (36.2 °C)-97.9 °F (36.6 °C)]   Pulse:  [67-83]   Resp:  [16-20]   BP: (118-134)/(57-62)   SpO2:  [90 %-98 %]     Labs:  Recent Labs     25  1049 25  1242 25  2256 25  2259   WBC 6.29 6.02  --  5.46   HGB 7.2* 7.2*  --  7.0*   HCT 23.9* 24.6* 20* 23.2*   * 95*  --  86*       Recent Labs      05/23/25  0510 05/24/25  0515 05/25/25  1045 05/25/25  1049 05/25/25  1242    146*  --  143 144   K 4.9 4.7  --  4.6 4.5   * 116*  --  116* 117*   CO2 23 23  --  21* 19*   BUN 29* 26*  --  20 20   CREATININE 1.5* 1.4  --  1.3 1.3   GLU 97 106  --  104 100   PHOS 3.3 2.9 2.5*  --   --    MG 1.6 1.5* 1.3*  --   --         Recent Labs     05/25/25  2256   PH 7.426   PCO2 35.7   PO2 46   HCO3 23.5*   POCSATURATED 83   BE -1        ASSESSMENT      Physical Exam  Constitutional:       Appearance: She is ill-appearing.   Eyes:      Pupils: Pupils are equal, round, and reactive to light.   Cardiovascular:      Rate and Rhythm: Normal rate.   Pulmonary:      Effort: Pulmonary effort is normal.      Breath sounds: Decreased breath sounds present.   Skin:     General: Skin is warm and dry.      Coloration: Skin is pale.      Findings: Bruising present.   Neurological:      General: No focal deficit present.      Mental Status: She is lethargic and disoriented.      GCS: GCS eye subscore is 3. GCS verbal subscore is 4. GCS motor subscore is 6.       Notified by charge RN that the patient was somnolent. ABG attempted earlier x2, unsuccessful    Upon arrival to bedside the patient was lying in bed. Opens eyes to voice, oriented to self and knows she is in the hospital but confused to exact location, situation, time. Slight verbal delay. No facial droop or focal deficit noted, pupils equal and reactive.     Patient denies any acute complaint at this time    INTERVENTIONS    Patient evaluated for Neurological problem. Staff concerns included decreased responsiveness. The following interventions were performed: POCT glucose, continuous cardiac monitoring , and POCT venous blood gas, CBC, type & screen.        RT to bedside for VBG. Blood drawn by RRN, CBC sent. Venous Blood Gas result:  pO2 46; pCO2 35.7; pH 7.426;  HCO3 23.5, BE -1, K+ 4.3, Hct 20.     Type & screen added to AM lab draw    Time spent at the  bedside: 15 -30 min    RECOMMENDATIONS    We Recommend: Continuous telemetry monitoring, Maintain IV access, Strict I/O, Aspiration precautions, Delirium prevention, Monitor for signs of hemodynamic instability and worsening neuro exam or focal deficits, and Notify Rapid Response of decline in patient status    PROVIDER ESCALATION    Escalation Required:  No    Orders received and case discussed with NA.    Patient Disposition: Remain in room 610.    FOLLOW-UP    Bedside nurseCassanrda charge RN Ramesh updated on plan of care. Instructed to contact Rapid Response NurseFELICIA RN   at [phone] for further questions or concerns.

## 2025-05-26 NOTE — ASSESSMENT & PLAN NOTE
- history of MM and amyloidosis, not currently on treatment  - labs with elevated light chains  - concerns that worsening amyloidosis is contributing to her current presentation with proteinuria and anasarca as well as anemia.  - oncology consulted for recommendations and treatment options  - work up ongoing    UPEP pending

## 2025-05-26 NOTE — ASSESSMENT & PLAN NOTE
- history of dementia and delirium noted    - MRI at outside facility negative for structural causes intracranially  - Ca elevated, being treated  - renal function at baseline  - infections have been treated, will finish additional treatment at this facility  - suspected multifactorial in the setting of infection, elevated Ca, renal failure and general medical condition with dementia and delirium.     - delirium precautions. Continuing to monitor  - improving with Ca treatment, UTI treatment, diuresis and kidney function returning to normal.

## 2025-05-26 NOTE — SUBJECTIVE & OBJECTIVE
Scheduled Meds:   busPIRone  5 mg Oral BID    cefTRIAXone (Rocephin) IV (PEDS and ADULTS)  1 g Intravenous Q24H    furosemide (LASIX) injection  40 mg Intravenous BID    heparin (porcine)  5,000 Units Subcutaneous Q8H    lamiVUDine  100 mg Oral Daily    metoprolol tartrate  50 mg Oral BID    miconazole NITRATE 2 %   Topical (Top) BID    mupirocin   Nasal BID    NIFEdipine  60 mg Oral Daily    pantoprazole  40 mg Oral Daily    tacrolimus  1 mg Oral BID    thyroid (pork)  60 mg Oral Before breakfast     Continuous Infusions:  PRN Meds:  Current Facility-Administered Medications:     albuterol, 1 puff, Inhalation, Q6H PRN    albuterol-ipratropium, 3 mL, Nebulization, Q4H PRN    bisacodyL, 10 mg, Rectal, Daily PRN    dextrose 50%, 12.5 g, Intravenous, PRN    dextrose 50%, 25 g, Intravenous, PRN    glucagon (human recombinant), 1 mg, Intramuscular, PRN    glucose, 16 g, Oral, PRN    glucose, 24 g, Oral, PRN    melatonin, 6 mg, Oral, Nightly PRN    naloxone, 0.02 mg, Intravenous, PRN    ondansetron, 8 mg, Oral, Q8H PRN    polyethylene glycol, 17 g, Oral, Daily PRN    promethazine, 25 mg, Oral, Q6H PRN    sodium chloride 0.9%, 10 mL, Intravenous, Q12H PRN    Review of patient's allergies indicates:   Allergen Reactions    Anesthetics - jen type- parabens      Pt allergic to parabens- per pharmacist    Antihistamines - ethylenediamine     Codeine      Other reaction(s): Vomiting  Other reaction(s): Nausea    Ethanol (ethyl alcohol)     Hydrocodone      Other reaction(s): Vomiting  Other reaction(s): Nausea    Opioids - morphine analogues     Propylene glycol     Saccharin     Tylenol [acetaminophen]     Yellow dye         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    Amyloidosis, unspecified 1998    s/p chemotherapy x 4 occassions  (IV x3, po x1)    Anticoagulant long-term use     Benign hypertension with end-stage renal disease     Candida vaginitis  1/16/2015    Chronic asthma     CKD (chronic kidney disease) stage 3, GFR 30-59 ml/min 9/14/2012    GERD (gastroesophageal reflux disease)     Hyperlipidemia     Lymphedema     LLE    Need for prophylactic immunotherapy     Obesity     Osteoarthritis of left knee s/p total knee replacement 1/16/2015    Surgery October 23, 2014    PONV (postoperative nausea and vomiting)     Renal disease due to hypertension 12/14/2012    Secondary hyperparathyroidism of renal origin     Vertigo      Past Surgical History:   Procedure Laterality Date    AV FISTULA PLACEMENT      CHOLECYSTECTOMY      HYSTERECTOMY      IMPLANTATION OF PERMANENT SACRAL NERVE STIMULATOR N/A 12/24/2019    Procedure: INSERTION, NEUROSTIMULATOR, PERMANENT, SACRAL;  Surgeon: Misael Browning MD;  Location: [facility] OR [address] [place] [address];  Service: Urology;  Laterality: N/A;  1hr  Ivania Dejesus, medtronic rep confirmed    KIDNEY TRANSPLANT      REMOVAL OF ELECTRODE LEAD OF SACRAL NERVE STIMULATOR         Family History       Problem Relation (Age of Onset)    Diabetes Mother    Hypertension Mother, Father    Kidney disease Grandchild          Tobacco Use    Smoking status: Never     Passive exposure: Never    Smokeless tobacco: Never   Substance and Sexual Activity    Alcohol use: Yes     Comment: rare margartia    Drug use: No    Sexual activity: Not on file     Review of Systems   Skin:  Positive for wound.       Objective:     Vital Signs (Most Recent):  Temp: 97.4 °F (36.3 °C) (05/26/25 1208)  Pulse: 67 (05/26/25 1208)  Resp: 18 (05/26/25 1208)  BP: 116/61 (05/26/25 1208)  SpO2: (!) 94 % (05/26/25 1208) Vital Signs (24h Range):  Temp:  [97.1 °F (36.2 °C)-98.2 °F (36.8 °C)] 97.4 °F (36.3 °C)  Pulse:  [65-83] 67  Resp:  [16-18] 18  SpO2:  [90 %-99 %] 94 %  BP: (116-139)/(58-63) 116/61     Weight: 107 kg (235 lb 14.3 oz)  Body mass index is 39.25 kg/m².     Physical Exam  Constitutional:       Appearance: Normal appearance.   Skin:     General: Skin is warm and dry.       Findings: Lesion present.   Neurological:      Mental Status: She is alert.          Laboratory:  All pertinent labs reviewed within the last 24 hours.    Diagnostic Results:  None

## 2025-05-26 NOTE — PROGRESS NOTES
Name: Jing Philippe  Medical Record Number: [id number]  Date of Service: 05/26/2025  Note By: Kristy Dumont MD    RENAL TRANSPLANT PROGRESS NOTE      ORGAN: RIGHT KIDNEY  Donor Type: living  PHS Increased Risk: no  Cold Ischemia: 34.2 mins  Induction Medications: Camputh    Reason for Follow-up: Reassessment of kidney transplant recipient and management of immunosuppression.    Summary:  ESRD 2/2 renal amyloidosis s/p LRKT (12/13/2011) on prograf, post-Tx CKD3b, HTN, dementia, who was [place] [facility] [place] [facility] [place] for worsening renal function. Initially presented for AMS, found to have PNA and UTI, s/p Abx. Noted to be very anasarcic, so was given IV diuretics, which resulted in worsening of renal function.     Interval History: Patient much more awake and alert this morning. Answering questions, overall feels fine. Urinating well.     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    Amyloidosis, unspecified 1998    s/p chemotherapy x 4 occassions  (IV x3, po x1)    Anticoagulant long-term use     Benign hypertension with end-stage renal disease     Candida vaginitis 1/16/2015    Chronic asthma     CKD (chronic kidney disease) stage 3, GFR 30-59 ml/min 9/14/2012    GERD (gastroesophageal reflux disease)     Hyperlipidemia     Lymphedema     LLE    Need for prophylactic immunotherapy     Obesity     Osteoarthritis of left knee s/p total knee replacement 1/16/2015    Surgery October 23, 2014    PONV (postoperative nausea and vomiting)     Renal disease due to hypertension 12/14/2012    Secondary hyperparathyroidism of renal origin     Vertigo      Medications:   Scheduled Meds:   busPIRone  5 mg Oral BID    cefTRIAXone (Rocephin) IV (PEDS and ADULTS)  1 g Intravenous Q24H    furosemide (LASIX) injection  40 mg Intravenous BID    heparin (porcine)  5,000 Units Subcutaneous Q8H    lamiVUDine  100 mg Oral Daily     metoprolol tartrate  50 mg Oral BID    miconazole NITRATE 2 %   Topical (Top) BID    mupirocin   Nasal BID    NIFEdipine  60 mg Oral Daily    pantoprazole  40 mg Oral Daily    tacrolimus  1 mg Oral BID    thyroid (pork)  60 mg Oral Before breakfast     Continuous Infusions:    PRN Meds:.  Current Facility-Administered Medications:     albuterol, 1 puff, Inhalation, Q6H PRN    albuterol-ipratropium, 3 mL, Nebulization, Q4H PRN    bisacodyL, 10 mg, Rectal, Daily PRN    dextrose 50%, 12.5 g, Intravenous, PRN    dextrose 50%, 25 g, Intravenous, PRN    glucagon (human recombinant), 1 mg, Intramuscular, PRN    glucose, 16 g, Oral, PRN    glucose, 24 g, Oral, PRN    melatonin, 6 mg, Oral, Nightly PRN    naloxone, 0.02 mg, Intravenous, PRN    ondansetron, 8 mg, Oral, Q8H PRN    polyethylene glycol, 17 g, Oral, Daily PRN    promethazine, 25 mg, Oral, Q6H PRN    sodium chloride 0.9%, 10 mL, Intravenous, Q12H PRN    Physical Exam:  Vitals:  Vitals:    05/26/25 0737   BP:    Pulse: 83   Resp:    Temp:      Temp:  [97.1 °F (36.2 °C)-98.2 °F (36.8 °C)] 98.2 °F (36.8 °C)  Pulse:  [67-83] 83  Resp:  [16-18] 18  SpO2:  [90 %-99 %] 99 %  BP: (118-139)/(58-63) 132/63      Intake/Output Summary (Last 24 hours) at 5/26/2025 0835  Last data filed at 5/25/2025 1720  Gross per 24 hour   Intake 560 ml   Output 1000 ml   Net -440 ml     Exam  General: alert, answering some questions, NAD  Respiratory: CTAB, no crackles or wheezes, breathing comfortably on RA  Cardiovacular: RRR   Gastrointestinal: Soft, non-tender   Renal allograft exam: No tenderness   Extremities: pitting edema in BUE and BLE , RUE AVF with good thrill    Labs:  Lab Results   Component Value Date    WBC 5.30 05/26/2025    HGB 6.8 (L) 05/26/2025    HCT 22.6 (L) 05/26/2025     05/26/2025    K 3.8 05/26/2025     (H) 05/26/2025    CO2 21 (L) 05/26/2025    BUN 19 05/26/2025    CREATININE 1.3 05/26/2025    EGFRNONAA 46.2 (A) 01/26/2018    CALCIUM 9.3 05/26/2025     PHOS 2.5 (L) 05/25/2025    MG 1.3 (L) 05/25/2025    ALBUMIN 1.8 (L) 05/26/2025    AST 11 05/26/2025    ALT 13 05/26/2025     Lab Results   Component Value Date    EXTANC  12/09/2016      Comment:      repeat Prograf level, last not true trough    EXTWBC 10.11 06/18/2019    EXTSEGS 70.1 06/18/2019    EXTPLATELETS 150 06/18/2019    EXTHEMOGLOBI 9.2 (A) 06/18/2019    EXTHEMATOCRI 29.1 (A) 06/18/2019    EXTCREATININ 1.03 (A) 06/18/2019    EXTSODIUM 141 06/18/2019    EXTPOTASSIUM 3.7 06/18/2019    EXTBUN 22 (A) 06/18/2019    EXTCO2 22.9 06/18/2019    EXTCALCIUM 10.4 (A) 06/18/2019    EXTPHOSPHORU 2.1 (A) 06/18/2019    EXTGLUCOSE 119 12/04/2017    EXTALBUMIN 2.40 (A) 06/18/2019    EXTAST 11 (A) 06/18/2019    EXTALT 42 06/18/2019    EXTBILITOTAL 0.31 06/18/2019     Lab Results   Component Value Date    EXTTACROLVL 6.1 06/18/2019    EXTPROTCRE 0.84 12/04/2017    EXTPTHINTACT 153.3 11/10/2015    EXTPROTEINUA 1+ 11/30/2016    EXTWBCUA none seen 11/30/2016    EXTRBCUA none seen 11/30/2016     Imaging Studies:  Reviewed. ?    Assessment/Plan:  75 y.o. female with ESRD 2/2 renal amyloidosis s/p LRKT (12/13/2011) on prograf, post-Tx CKD3b, HTN, dementia, who was transferred from [facility] to [facility] on [place] for worsening renal function.      ESRD 2/2 Renal Amyloidosis s/p LRKT (12/13/2011)   LUCIA on CKD 3b  Diffuse anasarca  Severe hypoalbuminemia  Failure to thrive  - previous baseline Cr ~1.1-1.2 but with recurrent AKIs in last few months, lowest Cr has been lately ~1.5  - spoke to patient's OP nephrologist (Dr. Fajardo) who believes patient has some degree of chronic rejection and light chain deposition  - suspect patient's LUCIA at OSH was 2/2 prograf toxicity (level ~21) worsened by torsemide administration  - Renal function has normalized since improvement in prograf levels, remains stable despite lasix yesterday  - recommend high protein diet to help with hypoalbuminemia  - can continue IV lasix 40mg BID for now to  help with edema     Immunosuppression Management  - per , patient is normally on prograf 2mg qAM and 3mg qPM  - goal [id number] trough level is 4-6  - prograf level better today, will continue prograf 1mg BID for now  - will continue to adjust prograf as needed  - continue prednisone 5mg daily   - continue to monitor for toxicities from immunosuppressive medications     Anemia  - Hb currently ~7, was up to ~11 in 04/2025  - unclear etiology of sudden drop in Hb  - iron studies consistent with AoCD  - no evidence of hemolysis  - 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  - heme/onc following     HTN  - BP at goal  - continue nifedipine 60mg   - will titrate antihypertensives as needed for BP  - continue to monitor        We will continue to monitor    Kristy Dumont MD  [facility] Nephrology Fellow, PGY-4  Kidney Transplant Medicine

## 2025-05-26 NOTE — PT/OT/SLP PROGRESS
Speech Language Pathology Treatment    Patient Name:  Jing Philippe   MRN:  [id number]  Admitting Diagnosis: Acute renal failure superimposed on stage 3b chronic kidney disease    Recommendations:                 General Recommendations:  Dysphagia therapy  Diet recommendations:  Minced & Moist Diet - IDDSI Level 5, Liquid Diet Level: Thin liquids - IDDSI Level 0   Aspiration Precautions:    1 bite/sip at a time, Alternating bites/sips, Assistance with meals, Eliminate distractions, Feed only when awake/alert, Frequent oral care, HOB to 90 degrees, Meds crushed in puree, Remain upright 30 minutes post meal, and Small bites/sips   General Precautions: Standard, aspiration, fall  Communication strategies:  none    Assessment:     Jing Philippe is a 75 y.o. female with an SLP diagnosis of Dysphagia.     Subjective     Pt awake and alert   Spouse at the bedside     Pain/Comfort:  Pain Rating Post-Intervention 1: 0/10    Respiratory Status: Room air    Objective:     Has the patient been evaluated by SLP for swallowing?   Yes  Keep patient NPO? No   Current Respiratory Status:        Pt awake and alert. Pt tolerated re-positioning to upright in bed for PO trials. Pt upon arrival eating a soft solid from spouses meal tray. Pt chewing soft solids (tater tot x1) for extended period of time 5 minutes + , and continued to chew little pieces even after a liquid wash was provided. When SLP questioned both pt and spouse re: extended period of time to chew pt reporting new since admission. Pt then offered portions from her AM meal tray. Pt managed more moistened foods such as diced peaches and grits w/ butter in a timely and efficient fashion. Pt reporting dislike for the minced and moist solid meals. SLP offering education re: importance of ease and efficiency while reducing aspiration risk during hospital stay. Pt managed single and cyclic straw sips without difficulty. Pt to continue with minced and moist diet with thin  liquids. Speech to continue to monitor.     Goals:   Multidisciplinary Problems       SLP Goals          Problem: SLP    Goal Priority Disciplines Outcome   SLP Goal     SLP Progressing   Description: Speech Language Pathology Goals  Goals expected to be met by 6/6:    1. The patient will tolerate a least restrictive diet without displaying overt signs of airway threat.   2. The family participate in education on safe swallow precautions and will implement these precautions with PO intake.                                Plan:     Patient to be seen:  4 x/week   Plan of Care expires:  06/20/25  Plan of Care reviewed with:  patient, spouse   SLP Follow-Up:  Yes       Discharge recommendations:   (tbd)   Barriers to Discharge:  None    Time Tracking:     SLP Treatment Date:   05/26/25  Speech Start Time:  0919  Speech Stop Time:  0938     Speech Total Time (min):  19 min    Billable Minutes: Treatment Swallowing Dysfunction 10 and Self Care/Home Management Training 9    05/26/2025

## 2025-05-26 NOTE — SUBJECTIVE & OBJECTIVE
Interval History: Patient somnolent overnight, abg and labs ordered. No acute abnormalities. Patients mentation greatly improved overnight.    Review of Systems   Unable to perform ROS: Mental status change     Objective:     Vital Signs (Most Recent):  Temp: 97.4 °F (36.3 °C) (05/26/25 1208)  Pulse: 67 (05/26/25 1208)  Resp: 18 (05/26/25 1208)  BP: 116/61 (05/26/25 1208)  SpO2: (!) 94 % (05/26/25 1208) Vital Signs (24h Range):  Temp:  [97.1 °F (36.2 °C)-98.2 °F (36.8 °C)] 97.4 °F (36.3 °C)  Pulse:  [65-83] 67  Resp:  [16-18] 18  SpO2:  [90 %-99 %] 94 %  BP: (116-139)/(58-63) 116/61     Weight: 107 kg (235 lb 14.3 oz)  Body mass index is 39.25 kg/m².    Intake/Output Summary (Last 24 hours) at 5/26/2025 1302  Last data filed at 5/25/2025 1720  Gross per 24 hour   Intake 200 ml   Output 1000 ml   Net -800 ml         Physical Exam  HENT:      Head: Normocephalic and atraumatic.   Cardiovascular:      Rate and Rhythm: Normal rate.      Pulses: Normal pulses.      Heart sounds: No murmur heard.  Pulmonary:      Effort: Pulmonary effort is normal. No respiratory distress.   Abdominal:      General: Abdomen is flat. Bowel sounds are normal. There is no distension.   Musculoskeletal:      Right lower leg: Edema present.      Left lower leg: Edema present.   Skin:     General: Skin is warm and dry.   Neurological:      General: No focal deficit present.      Mental Status: She is alert.      Comments: Oriented to self and knew husbands name. Not oriented to time, place or situation. Not able to reliably answer ROS questions. Able to follow basic commands               Significant Labs: All pertinent labs within the past 24 hours have been reviewed.    Significant Imaging: I have reviewed all pertinent imaging results/findings within the past 24 hours.

## 2025-05-26 NOTE — ASSESSMENT & PLAN NOTE
Hyperkalemia is likely due to LUCIA.The patients most recent potassium results are listed below.  Recent Labs     05/25/25  1049 05/25/25  1242 05/26/25  0515   K 4.6 4.5 3.8     Plan  - Monitor for arrhythmias with EKG and/or continuous telemetry.   - Treat the hyperkalemia with renal diet  - Monitor potassium: Daily  - The patient's hyperkalemia is improving

## 2025-05-27 LAB
ABSOLUTE EOSINOPHIL (OHS): 0.09 K/UL
ABSOLUTE MONOCYTE (OHS): 0.59 K/UL (ref 0.3–1)
ABSOLUTE NEUTROPHIL COUNT (OHS): 3.94 K/UL (ref 1.8–7.7)
ALBUMIN SERPL BCP-MCNC: 1.9 G/DL (ref 3.5–5.2)
ALP SERPL-CCNC: 87 UNIT/L (ref 40–150)
ALT SERPL W/O P-5'-P-CCNC: 14 UNIT/L (ref 10–44)
ANION GAP (OHS): 11 MMOL/L (ref 8–16)
AST SERPL-CCNC: 12 UNIT/L (ref 11–45)
BASOPHILS # BLD AUTO: 0.02 K/UL
BASOPHILS NFR BLD AUTO: 0.4 %
BILIRUB SERPL-MCNC: 0.2 MG/DL (ref 0.1–1)
BUN SERPL-MCNC: 17 MG/DL (ref 8–23)
CALCIUM SERPL-MCNC: 9.2 MG/DL (ref 8.7–10.5)
CHLORIDE SERPL-SCNC: 113 MMOL/L (ref 95–110)
CO2 SERPL-SCNC: 19 MMOL/L (ref 23–29)
CREAT SERPL-MCNC: 1.2 MG/DL (ref 0.5–1.4)
ERYTHROCYTE [DISTWIDTH] IN BLOOD BY AUTOMATED COUNT: 19 % (ref 11.5–14.5)
GFR SERPLBLD CREATININE-BSD FMLA CKD-EPI: 47 ML/MIN/1.73/M2
GLUCOSE SERPL-MCNC: 96 MG/DL (ref 70–110)
HCT VFR BLD AUTO: 22.2 % (ref 37–48.5)
HGB BLD-MCNC: 6.9 GM/DL (ref 12–16)
IMM GRANULOCYTES # BLD AUTO: 0.05 K/UL (ref 0–0.04)
IMM GRANULOCYTES NFR BLD AUTO: 0.9 % (ref 0–0.5)
LYMPHOCYTES # BLD AUTO: 0.8 K/UL (ref 1–4.8)
MCH RBC QN AUTO: 26.1 PG (ref 27–31)
MCHC RBC AUTO-ENTMCNC: 31.1 G/DL (ref 32–36)
MCV RBC AUTO: 84 FL (ref 82–98)
NUCLEATED RBC (/100WBC) (OHS): 0 /100 WBC
PATHOLOGIST REVIEW - SPE (OHS): NORMAL
PLATELET # BLD AUTO: 102 K/UL (ref 150–450)
PMV BLD AUTO: 11.8 FL (ref 9.2–12.9)
POTASSIUM SERPL-SCNC: 3.7 MMOL/L (ref 3.5–5.1)
PROT SERPL-MCNC: 5.7 GM/DL (ref 6–8.4)
RBC # BLD AUTO: 2.64 M/UL (ref 4–5.4)
RELATIVE EOSINOPHIL (OHS): 1.6 %
RELATIVE LYMPHOCYTE (OHS): 14.6 % (ref 18–48)
RELATIVE MONOCYTE (OHS): 10.7 % (ref 4–15)
RELATIVE NEUTROPHIL (OHS): 71.8 % (ref 38–73)
SODIUM SERPL-SCNC: 143 MMOL/L (ref 136–145)
TACROLIMUS BLD-MCNC: 6.1 NG/ML (ref 5–15)
VIT B12 SERPL-MCNC: 1439 PG/ML (ref 210–950)
WBC # BLD AUTO: 5.49 K/UL (ref 3.9–12.7)

## 2025-05-27 PROCEDURE — 84425 ASSAY OF VITAMIN B-1: CPT

## 2025-05-27 PROCEDURE — 99232 SBSQ HOSP IP/OBS MODERATE 35: CPT | Mod: GC,,, | Performed by: INTERNAL MEDICINE

## 2025-05-27 PROCEDURE — 97535 SELF CARE MNGMENT TRAINING: CPT

## 2025-05-27 PROCEDURE — 63600175 PHARM REV CODE 636 W HCPCS

## 2025-05-27 PROCEDURE — 36415 COLL VENOUS BLD VENIPUNCTURE: CPT

## 2025-05-27 PROCEDURE — 25000003 PHARM REV CODE 250

## 2025-05-27 PROCEDURE — 63600175 PHARM REV CODE 636 W HCPCS: Performed by: INTERNAL MEDICINE

## 2025-05-27 PROCEDURE — 82374 ASSAY BLOOD CARBON DIOXIDE: CPT

## 2025-05-27 PROCEDURE — 92526 ORAL FUNCTION THERAPY: CPT

## 2025-05-27 PROCEDURE — 36415 COLL VENOUS BLD VENIPUNCTURE: CPT | Performed by: STUDENT IN AN ORGANIZED HEALTH CARE EDUCATION/TRAINING PROGRAM

## 2025-05-27 PROCEDURE — 21400001 HC TELEMETRY ROOM

## 2025-05-27 PROCEDURE — 80197 ASSAY OF TACROLIMUS: CPT | Performed by: STUDENT IN AN ORGANIZED HEALTH CARE EDUCATION/TRAINING PROGRAM

## 2025-05-27 PROCEDURE — 82607 VITAMIN B-12: CPT

## 2025-05-27 PROCEDURE — 85025 COMPLETE CBC W/AUTO DIFF WBC: CPT

## 2025-05-27 PROCEDURE — 11000001 HC ACUTE MED/SURG PRIVATE ROOM

## 2025-05-27 PROCEDURE — 97112 NEUROMUSCULAR REEDUCATION: CPT

## 2025-05-27 RX ORDER — FUROSEMIDE 40 MG/1
40 TABLET ORAL 2 TIMES DAILY
Status: DISCONTINUED | OUTPATIENT
Start: 2025-05-27 | End: 2025-05-28 | Stop reason: HOSPADM

## 2025-05-27 RX ADMIN — PANTOPRAZOLE SODIUM 40 MG: 40 TABLET, DELAYED RELEASE ORAL at 08:05

## 2025-05-27 RX ADMIN — TACROLIMUS 1 MG: 1 CAPSULE ORAL at 08:05

## 2025-05-27 RX ADMIN — CEFTRIAXONE 1 G: 1 INJECTION, POWDER, FOR SOLUTION INTRAMUSCULAR; INTRAVENOUS at 12:05

## 2025-05-27 RX ADMIN — FUROSEMIDE 40 MG: 40 TABLET ORAL at 05:05

## 2025-05-27 RX ADMIN — TACROLIMUS 1 MG: 1 CAPSULE ORAL at 05:05

## 2025-05-27 RX ADMIN — FUROSEMIDE 40 MG: 10 INJECTION, SOLUTION INTRAVENOUS at 09:05

## 2025-05-27 RX ADMIN — BUSPIRONE HYDROCHLORIDE 5 MG: 5 TABLET ORAL at 08:05

## 2025-05-27 RX ADMIN — HEPARIN SODIUM 5000 UNITS: 5000 INJECTION INTRAVENOUS; SUBCUTANEOUS at 02:05

## 2025-05-27 RX ADMIN — MICONAZOLE NITRATE 2 % TOPICAL POWDER: at 09:05

## 2025-05-27 RX ADMIN — MUPIROCIN: 20 OINTMENT TOPICAL at 08:05

## 2025-05-27 RX ADMIN — HEPARIN SODIUM 5000 UNITS: 5000 INJECTION INTRAVENOUS; SUBCUTANEOUS at 07:05

## 2025-05-27 RX ADMIN — HEPARIN SODIUM 5000 UNITS: 5000 INJECTION INTRAVENOUS; SUBCUTANEOUS at 10:05

## 2025-05-27 RX ADMIN — LAMIVUDINE 100 MG: 100 TABLET, FILM COATED ORAL at 08:05

## 2025-05-27 RX ADMIN — THYROID, PORCINE 60 MG: 60 TABLET ORAL at 07:05

## 2025-05-27 RX ADMIN — Medication 6 MG: at 08:05

## 2025-05-27 NOTE — ASSESSMENT & PLAN NOTE
Hyperkalemia is likely due to LUCIA.The patients most recent potassium results are listed below.  Recent Labs     05/25/25  1242 05/26/25  0515 05/27/25  0308   K 4.5 3.8 3.7     Plan  - Monitor for arrhythmias with EKG and/or continuous telemetry.   - Treat the hyperkalemia with renal diet  - Monitor potassium: Daily  - The patient's hyperkalemia is improving

## 2025-05-27 NOTE — PLAN OF CARE
Problem: Adult Inpatient Plan of Care  Goal: Plan of Care Review  Outcome: Progressing  Goal: Patient-Specific Goal (Individualized)  Outcome: Progressing  Goal: Absence of Hospital-Acquired Illness or Injury  Outcome: Progressing  Goal: Optimal Comfort and Wellbeing  Outcome: Progressing  Goal: Readiness for Transition of Care  Outcome: Progressing     Problem: Acute Kidney Injury/Impairment  Goal: Fluid and Electrolyte Balance  Outcome: Progressing  Goal: Improved Oral Intake  Outcome: Progressing  Goal: Effective Renal Function  Outcome: Progressing     Problem: Infection  Goal: Absence of Infection Signs and Symptoms  Outcome: Progressing     Problem: Skin Injury Risk Increased  Goal: Skin Health and Integrity  Outcome: Progressing

## 2025-05-27 NOTE — PT/OT/SLP PROGRESS
"Physical Therapy Treatment    Patient Name:  Jing Philippe   MRN:  [id number]    Recommendations:     Discharge Recommendations: Moderate Intensity Therapy  Discharge Equipment Recommendations: hospital bed, lift device  Barriers to discharge: requiring increased level of skilled A    Assessment:     Jing Philippe is a 75 y.o. female admitted with a medical diagnosis of Acute renal failure superimposed on stage 3b chronic kidney disease.  She presents with the following impairments/functional limitations: weakness, impaired endurance, impaired self care skills, gait instability, impaired balance, decreased coordination, decreased upper extremity function, decreased lower extremity function, decreased safety awareness, impaired functional mobility, pain, edema, impaired cognition Patient pleasantly agreeable to therapy session, improved mentation + participation noted. Diffuse generalized edema + deconditioning evident. Sat EOB with improved level of assist and increased time. Patient will continue to benefit from skilled PT during this admit to address BLE strength and endurance deficits, and maximize independence with functional mobility.    Rehab Prognosis: Good; patient would benefit from acute skilled PT services to address these deficits and reach maximum level of function.    Recent Surgery: * No surgery found *      Plan:     During this hospitalization, patient to be seen 4 x/week to address the identified rehab impairments via gait training, therapeutic activities, therapeutic exercises, wheelchair management/training, neuromuscular re-education and progress toward the following goals:    Plan of Care Expires:  06/24/25    Subjective     Chief Complaint: "I was confused before"  Patient/Family Comments/goals: return home to [facility]  Pain/Comfort:  Pain Rating 1: 0/10  Pain Rating Post-Intervention 1: 0/10      Objective:     Communicated with RN prior to session.  Patient found HOB elevated with " telemetry, PureWick, peripheral IV upon PT entry to room.     General Precautions: Standard, fall  Orthopedic Precautions: N/A  Braces: N/A  Respiratory Status: Room air     Functional Mobility:  Bed Mobility:     Scooting: total assistance and of 2 persons  Supine to Sit: total assistance and of 2 persons  Sit to Supine: total assistance and of 2 persons  Balance:   Sitting: initially max A with R lateral lean, requires mod A with dynamic sitting, close SBA for static sitting  Using unilateral UE throughout  Reaching outside CHERISE with UE x5 trials bilaterally, encouraged to facilitate core musculature activation  Standing: not appropriate this session       AM-PAC 6 CLICK MOBILITY  Turning over in bed (including adjusting bedclothes, sheets and blankets)?: 2  Sitting down on and standing up from a chair with arms (e.g., wheelchair, bedside commode, etc.): 1  Moving from lying on back to sitting on the side of the bed?: 2  Moving to and from a bed to a chair (including a wheelchair)?: 1  Need to walk in hospital room?: 1  Climbing 3-5 steps with a railing?: 1  Basic Mobility Total Score: 8       Treatment & Education:  Patient educated on calling for assistance for any needs to improve overall safety awareness.  All items placed within reach, and notified on call light usage for assistance with any needs for fall prevention.  Patient educated on importance of EOB activity to promote overall endurance.  Co-treatment with OT due to patient's poor activity tolerance and medical complexity requiring skilled assistance from 2 therapists.    Patient left HOB elevated with all lines intact, call button in reach, nursing notified, and  present..    GOALS:   Multidisciplinary Problems       Physical Therapy Goals          Problem: Physical Therapy    Goal Priority Disciplines Outcome Interventions   Physical Therapy Goal     PT, PT/OT Progressing    Description: Goals to be met by: 6/8/2025     Patient will increase  functional independence with mobility by performin. Supine to sit with MInimal Assistance  2. Sit to supine with MInimal Assistance  3. Sit to stand transfer with Minimal Assistance and RW  4. Bed to chair transfer with Minimal Assistance using Rolling Walker  5. Gait  x 50 feet with Minimal Assistance using Rolling Walker.   6. Lower extremity exercise program x10 reps per handout, with supervision                         DME Justifications:  Jing requires a hospital bed due to her requiring positioning of the body in ways not feasible with an ordinary bed due to limited ability and cannot independently make changes in body position without the use of the bed.The positioning of the body cannot be sufficiently resolved by the use of pillows and wedges.    Time Tracking:     PT Received On: 25  PT Start Time: 1208     PT Stop Time: 1234  PT Total Time (min): 26 min     Billable Minutes: Neuromuscular Re-education 26       PT/PTA: PT     Number of PTA visits since last PT visit: 0     2025

## 2025-05-27 NOTE — PT/OT/SLP PROGRESS
Speech Language Pathology Treatment    Patient Name:  Jing Philippe   MRN:  [id number]  Admitting Diagnosis: Acute renal failure superimposed on stage 3b chronic kidney disease    Recommendations:                 General Recommendations:  Dysphagia therapy  Diet recommendations:  Regular Diet - IDDSI Level 7, Liquid Diet Level: Thin liquids - IDDSI Level 0   Aspiration Precautions:    1 bite/sip at a time, Alternating bites/sips, Assistance with meals, Eliminate distractions, Feed only when awake/alert, Frequent oral care, HOB to 90 degrees, Meds crushed in puree, Remain upright 30 minutes post meal, and Small bites/sips   General Precautions: Standard, aspiration, fall  Communication strategies:  none    Assessment:     Jing Philippe is a 75 y.o. female with an SLP diagnosis of Dysphagia.     Subjective     Pt awake and alert   Spouse at the bedside     Pain/Comfort:  Pain Rating 1: 0/10  Pain Rating Post-Intervention 1: 0/10    Respiratory Status: Room air    Objective:     Has the patient been evaluated by SLP for swallowing?   Yes  Keep patient NPO? No   Current Respiratory Status:        Pt awake and alert. Pt tolerated re-positioning to upright in bed for PO trials. Pt more awake and alert this date compared to previous date. Pt spouse reporting pt will not eat minced/moist diet and unhappy with diet downgrade. SLP attempting to offer pt and spouse education re: rationale for diet change to soft chopped foods given her inability to manage trials of soft solids previous date. Spouse also reporting pt with difficulty managing oral medications last evening secondary to being woken from deep sleep. SLP offered pt single sips of thin liquids and manages with timely oral manipulation and no overt clinical signs of aspiration (no coughing or changes in vocal quality). Pt  then offered trials of regular solid x3. She demonstrated increased timeliness for chewing but still warranted a liquid wash to clear any  oral residue.  SLP discussed with pt and spouse trialing diet upgrade to regular solids and thin liquids and emphasizing to spouse to cut food sup into bite sized pieces and continue to alternate liquids and solids. Spouse demonstrating understanding and agreement with plan. Pt to continue with oral medications one at a time paired with thin liquids.       Goals:   Multidisciplinary Problems       SLP Goals          Problem: SLP    Goal Priority Disciplines Outcome   SLP Goal     SLP Progressing   Description: Speech Language Pathology Goals  Goals expected to be met by 6/6:    1. The patient will tolerate a least restrictive diet without displaying overt signs of airway threat.   2. The family participate in education on safe swallow precautions and will implement these precautions with PO intake.                                Plan:     Patient to be seen:  4 x/week   Plan of Care expires:  06/20/25  Plan of Care reviewed with:  patient, spouse   SLP Follow-Up:  Yes       Discharge recommendations:   (tbd)   Barriers to Discharge:  None    Time Tracking:     SLP Treatment Date:   05/27/25  Speech Start Time:  0811  Speech Stop Time:  0842     Speech Total Time (min):  31 min    Billable Minutes: Treatment Swallowing Dysfunction 16 and Self Care/Home Management Training 15    05/27/2025

## 2025-05-27 NOTE — ASSESSMENT & PLAN NOTE
Anemia is likely due to chronic disease due to Chronic Kidney Disease. Most recent hemoglobin and hematocrit are listed below.  Recent Labs     05/25/25  2259 05/26/25  0515 05/27/25  0308   HGB 7.0* 6.8* 6.9*   HCT 23.2* 22.6* 22.2*     Plan  - Monitor serial CBC: Daily  - Transfuse PRBC if patient becomes hemodynamically unstable, symptomatic or H/H drops below 7/21.  - Patient has received 1 units of PRBCs on unknown, documented at outside hospital  - Patient's anemia is currently stable  - concern it could be related to amyloid or MM  - hbg remains low to board[place] line. Discussed with  regarding transfusion. Will monitor for another day to avoid excess volume.

## 2025-05-27 NOTE — PROGRESS NOTES
[facility] Medicine  Progress Note    Patient Name: Jing Philippe  MRN: [id number]  Patient Class: IP- Inpatient   Admission Date: 5/22/2025  Length of Stay: 5 days  Attending Physician: J Carlos Chavez DO  Primary Care Provider: Fito Land Jr., MD        Subjective     Principal Problem:Acute renal failure superimposed on stage 3b chronic kidney disease        HPI:  Jing Philippe is a 75 y.o. female with PMHx of renal transplant (12/13/2011) due to renal amyloidosis now CKD 3b on immunosuppression, HTN, HLD, asthma, GERD, obesity, multiple myeloma, dementia transferred from [facility] to [facility] due to ARF and acute pyelo in transplanted kidney.     Last seen by Nephro in April 2025 at [facility] as f/u from prior admission. At that time, prograf was increased and torsemide held due to LUCIA, restarted once Cr returned to normal. Admitted to [facility] 5/11 due to AMS iso UTI. MRI brain at that time normal. Started on antibiotics, urine clx grew Klebsiella. Over past 10 days, Cr trending upwards to 2.2 (bl 1.5).     Per Handoff -- VSSAF. No white count. Cr 2.0, BUN 35. Received levaquin + cefepime (5/11-5/14), zosyn + zyvox 5/14-5/21). Appears overloaded, received torsemide 20mg daily. Continued on prograf.     Overview/Hospital Course:  Patient transferred due to worsening renal function in transplanted kidney. Needed to be evaluated by renal transplant team    Diuresis was held due to LUCIA, although patient appears edematous on exam. Echo showed likely intravascular volume depletion. Edema likely due to proteinuria and low albumin state. Will try adding IV lasix to see if that helps    Patient also with elevated light chain with a history of amyloidosis (s/p chemo) in remission. Elevated light chain on labs. Oncology consulted, work up ongoing.     Was given zoledronic acid for elevated calcium 5/23.. risks and benefits discussed with  prior to dose being  given.       5/25 - had discussion with  about risks/benefits of trying a different diet type to try and get the patient to eat some more. Will advance diet today, careful monitoring of patient while eating.  understands and accepts the risk of advancing diet     5/26 - patient mentation greatly improved, able to have conversations. Knew name and husbands name. Did not know location or situation. Steady improvement over the last few days.    Continues to have improvement in mental status after ongoing treatment. Kidney function stable. Diuresis ongoing and completing course of abx    Interval History: NAEO. Mental status has improved greatly since transfer. Will attempt to remove baeza today and bladder scan. High likelihood that it will need to be replaced.     Review of Systems   Reason unable to perform ROS: able to answer limited ROS today.   Constitutional:  Negative for fever.   Respiratory:  Negative for cough and shortness of breath.    Cardiovascular:  Negative for chest pain.   Gastrointestinal:  Negative for abdominal pain.     Objective:     Vital Signs (Most Recent):  Temp: 97.5 °F (36.4 °C) (05/27/25 1519)  Pulse: 83 (05/27/25 1519)  Resp: 18 (05/27/25 1519)  BP: 135/70 (05/27/25 1519)  SpO2: 97 % (05/27/25 1519) Vital Signs (24h Range):  Temp:  [97 °F (36.1 °C)-98.7 °F (37.1 °C)] 97.5 °F (36.4 °C)  Pulse:  [67-83] 83  Resp:  [18] 18  SpO2:  [90 %-97 %] 97 %  BP: (115-135)/(57-70) 135/70     Weight: 107 kg (235 lb 14.3 oz)  Body mass index is 39.25 kg/m².    Intake/Output Summary (Last 24 hours) at 5/27/2025 1557  Last data filed at 5/27/2025 1200  Gross per 24 hour   Intake --   Output 750 ml   Net -750 ml         Physical Exam  HENT:      Head: Normocephalic and atraumatic.   Cardiovascular:      Rate and Rhythm: Normal rate.      Pulses: Normal pulses.      Heart sounds: No murmur heard.  Pulmonary:      Effort: Pulmonary effort is normal. No respiratory distress.   Abdominal:       General: Abdomen is flat. Bowel sounds are normal. There is no distension.   Musculoskeletal:      Right lower leg: Edema present.      Left lower leg: Edema present.   Skin:     General: Skin is warm and dry.   Neurological:      General: No focal deficit present.      Mental Status: She is alert and oriented to person, place, and time.               Significant Labs: All pertinent labs within the past 24 hours have been reviewed.    Significant Imaging: I have reviewed all pertinent imaging results/findings within the past 24 hours.      Assessment & Plan  Acute renal failure superimposed on stage 3b chronic kidney disease  LUCIA is likely due to hypervolemia, pyelo/infection. Baseline creatinine is 1.5. Most recent creatinine and eGFR are listed below.  Recent Labs     05/25/25  1242 05/26/25  0515 05/27/25  0308   CREATININE 1.3 1.3 1.2   EGFRNORACEVR 42* 42* 47*       Report from Transfer:  VSSAF. No white count. Cr 2.0, BUN 35. Received levaquin + cefepime (5/11-5/14), zosyn + zyvox 5/14-5/21). Appears overloaded, received torsemide 20mg daily. Continued on prograf.      Plan  - LUCIA is improving - Cr 2.2 >> 2.0 (bl 1.5)  - treat pyelo, s/p abx x 11 days  - KTM consulted  - Cr back at baseline  - will try some diuresis starting 5/25 with lasix BID  - monitor output and response  - will attempt to remove baeza and monitor output.   Elevated serum immunoglobulin free light chains  - history of MM and amyloidosis, not currently on treatment  - labs with elevated light chains  - concerns that worsening amyloidosis is contributing to her current presentation with proteinuria and anasarca as well as anemia.  - oncology consulted for recommendations and treatment options  - work up ongoing    UPEP pending    Acute pyelonephritis  Reported - Acute Pyelo in Transplant Kidney w/ associated ARF. Urine clx grew klebsiella. Received levaquin + cefepime (5/11-5/14), zosyn + zyvox 5/14-5/21).     - consider continuing abx  pending KTM recs  - repeat UA with clx pending - culture never collected    - patient with good improvement in mental status on rocephin ( states that mental status will worsen with past infections)   - will give 7 days of rocephin total and work on removal of baeza after UPEP collected.   S/P living-donor kidney transplantation - 12/13/11  - noted, see ARF in Renal Transplant  Secondary hyperparathyroidism of renal origin  - cont home meds  Hyperlipidemia  - not on statin  GERD (gastroesophageal reflux disease)  - protonix  Chronic asthma  - no acute issues, prn nebs  Chronic immunosuppression with Prograf and Cellcept  - hold cellcept, cont prograf  - KTM consulted  Class 2 obesity in adult  Body mass index is 39.25 kg/m². Morbid obesity complicates all aspects of disease management from diagnostic modalities to treatment. Weight loss encouraged and health benefits explained to patient.    - BMI 37.7, obesity class II  Hypertension, renal  - cont lopressor 50 bid, nifedipine 60 daily  CKD (chronic kidney disease) stage 3, GFR 30-59 ml/min  See ARF in Renal Transplant.   Multiple myeloma not having achieved remission  - no longer on Revlimid  Dementia with behavioral disturbance  Noted, delirium precautions in place.  Anemia  Anemia is likely due to chronic disease due to Chronic Kidney Disease. Most recent hemoglobin and hematocrit are listed below.  Recent Labs     05/25/25  2259 05/26/25  0515 05/27/25  0308   HGB 7.0* 6.8* 6.9*   HCT 23.2* 22.6* 22.2*     Plan  - Monitor serial CBC: Daily  - Transfuse PRBC if patient becomes hemodynamically unstable, symptomatic or H/H drops below 7/21.  - Patient has received 1 units of PRBCs on unknown, documented at outside hospital  - Patient's anemia is currently stable  - concern it could be related to amyloid or MM  - hbg remains low to boarder line. Discussed with  regarding transfusion. Will monitor for another day to avoid excess volume.    Hyperkalemia  Hyperkalemia is likely due to LUCIA.The patients most recent potassium results are listed below.  Recent Labs     05/25/25  1242 05/26/25  0515 05/27/25  0308   K 4.5 3.8 3.7     Plan  - Monitor for arrhythmias with EKG and/or continuous telemetry.   - Treat the hyperkalemia with renal diet  - Monitor potassium: Daily  - The patient's hyperkalemia is improving    Hypernatremia  Hypernatremia is likely due to Dehydration. The patient's most recent sodium results are listed below.  Recent Labs     05/25/25  1242 05/26/25  0515 05/27/25  0308    143 143     Plan  - Aim to correct the sodium by 8-10mEq in 24 hours.   - Will plan to trend the patient's sodium: Daily  - The patient's hypernatremia is improving    Thrombocytopenia  The likely etiology of thrombocytopenia is sepsis. The patients 3 most recent labs are listed below.  Recent Labs     05/25/25  2259 05/26/25  0515 05/27/25  0308   PLT 86* 93* 102*     Plan  - Will transfuse if platelet count is <50k (if undergoing surgical procedure or have active bleeding).      Encephalopathy, metabolic  - history of dementia and delirium noted    - MRI at outside facility negative for structural causes intracranially  - Ca elevated, being treated  - renal function at baseline  - infections have been treated, will finish additional treatment at this facility  - suspected multifactorial in the setting of infection, elevated Ca, renal failure and general medical condition with dementia and delirium.     - delirium precautions. Continuing to monitor  - improving with Ca treatment, UTI treatment, diuresis and kidney function returning to normal.     - mental status improving    Hypercalcemia  Hypercalcemia is likely due to Malignancy. The patient has the following symptoms due to their hypercalcemia: encephalopathy. Their most recent calcium and albumin results are listed below.  Recent Labs     05/25/25  1242 05/26/25  0515 05/27/25  0308   CALCIUM 9.7 9.3  9.2   ALBUMIN 1.8* 1.8* 1.9*     Plan  - Treat the hypercalcemia with: Bisphosphonates and Loop diuretics.   - The patient's hypercalcemia is improving.   - given renal adjusted dose of zoldronic acid on 5/23 with improvement in labs.   - need to monitor Ca (corrected) and might need additional doses  Pressure injury of sacral region, stage 3      VTE Risk Mitigation (From admission, onward)           Ordered     heparin (porcine) injection 5,000 Units  Every 8 hours         05/22/25 1142     IP VTE HIGH RISK PATIENT  Once         05/22/25 1142     Place sequential compression device  Until discontinued         05/22/25 1142                    Discharge Planning   LESTER: 5/29/2025     Code Status: Full Code   Medical Readiness for Discharge Date:   Discharge Plan A: Skilled Nursing Facility                Please place Justification for DME        J Carlos Chavez DO  Department of Hospital Medicine   [facility]

## 2025-05-27 NOTE — ASSESSMENT & PLAN NOTE
LUCIA is likely due to hypervolemia, pyelo/infection. Baseline creatinine is 1.5. Most recent creatinine and eGFR are listed below.  Recent Labs     05/25/25  1242 05/26/25  0515 05/27/25  0308   CREATININE 1.3 1.3 1.2   EGFRNORACEVR 42* 42* 47*       Report from Transfer:  VSSAF. No white count. Cr 2.0, BUN 35. Received levaquin + cefepime (5/11-5/14), zosyn + zyvox 5/14-5/21). Appears overloaded, received torsemide 20mg daily. Continued on prograf.      Plan  - LUCIA is improving - Cr 2.2 >> 2.0 (bl 1.5)  - treat pyelo, s/p abx x 11 days  - KTM consulted  - Cr back at baseline  - will try some diuresis starting 5/25 with lasix BID  - monitor output and response  - will attempt to remove baeza and monitor output.

## 2025-05-27 NOTE — ASSESSMENT & PLAN NOTE
Hypercalcemia is likely due to Malignancy. The patient has the following symptoms due to their hypercalcemia: encephalopathy. Their most recent calcium and albumin results are listed below.  Recent Labs     05/25/25  1242 05/26/25  0515 05/27/25  0308   CALCIUM 9.7 9.3 9.2   ALBUMIN 1.8* 1.8* 1.9*     Plan  - Treat the hypercalcemia with: Bisphosphonates and Loop diuretics.   - The patient's hypercalcemia is improving.   - given renal adjusted dose of zoldronic acid on 5/23 with improvement in labs.   - need to monitor Ca (corrected) and might need additional doses

## 2025-05-27 NOTE — PROGRESS NOTES
Name: Jing Philippe  Medical Record Number: [id number]  Date of Service: 05/27/2025  Note By: Kristy Dumont MD    RENAL TRANSPLANT PROGRESS NOTE      ORGAN: RIGHT KIDNEY  Donor Type: living  PHS Increased Risk: no  Cold Ischemia: 34.2 mins  Induction Medications: Campath    Reason for Follow-up: Reassessment of kidney transplant recipient and management of immunosuppression.    Summary:  ESRD 2/2 renal amyloidosis s/p LRKT (12/13/2011) on prograf, post-Tx CKD3b, HTN, dementia, who was transferred from [facility] to [facility] on 5/22/2025 for worsening renal function. Initially presented for AMS, found to have PNA and UTI, s/p Abx. Noted to be very anasarcic, so was given IV diuretics, which resulted in worsening of renal function.     Interval History: Patient continues to be alert and awake. Reports no complaints this morning.     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    Amyloidosis, unspecified 1998    s/p chemotherapy x 4 occassions  (IV x3, po x1)    Anticoagulant long-term use     Benign hypertension with end-stage renal disease     Candida vaginitis 1/16/2015    Chronic asthma     CKD (chronic kidney disease) stage 3, GFR 30-59 ml/min 9/14/2012    GERD (gastroesophageal reflux disease)     Hyperlipidemia     Lymphedema     LLE    Need for prophylactic immunotherapy     Obesity     Osteoarthritis of left knee s/p total knee replacement 1/16/2015    Surgery October 23, 2014    PONV (postoperative nausea and vomiting)     Renal disease due to hypertension 12/14/2012    Secondary hyperparathyroidism of renal origin     Vertigo      Medications:   Scheduled Meds:   busPIRone  5 mg Oral BID    cefTRIAXone (Rocephin) IV (PEDS and ADULTS)  1 g Intravenous Q24H    furosemide (LASIX) injection  40 mg Intravenous BID    heparin (porcine)  5,000 Units Subcutaneous Q8H    lamiVUDine  100 mg Oral Daily    metoprolol tartrate  50 mg Oral BID     miconazole NITRATE 2 %   Topical (Top) BID    mupirocin   Nasal BID    NIFEdipine  60 mg Oral Daily    pantoprazole  40 mg Oral Daily    tacrolimus  1 mg Oral BID    thyroid (pork)  60 mg Oral Before breakfast     Continuous Infusions:    PRN Meds:.  Current Facility-Administered Medications:     albuterol, 1 puff, Inhalation, Q6H PRN    albuterol-ipratropium, 3 mL, Nebulization, Q4H PRN    bisacodyL, 10 mg, Rectal, Daily PRN    dextrose 50%, 12.5 g, Intravenous, PRN    dextrose 50%, 25 g, Intravenous, PRN    glucagon (human recombinant), 1 mg, Intramuscular, PRN    glucose, 16 g, Oral, PRN    glucose, 24 g, Oral, PRN    melatonin, 6 mg, Oral, Nightly PRN    naloxone, 0.02 mg, Intravenous, PRN    ondansetron, 8 mg, Oral, Q8H PRN    polyethylene glycol, 17 g, Oral, Daily PRN    promethazine, 25 mg, Oral, Q6H PRN    sodium chloride 0.9%, 10 mL, Intravenous, Q12H PRN    Physical Exam:  Vitals:  Vitals:    05/27/25 0800   BP: (!) 119/57   Pulse: 72   Resp: 18   Temp: 98.7 °F (37.1 °C)     Temp:  [97 °F (36.1 °C)-98.7 °F (37.1 °C)] 98.7 °F (37.1 °C)  Pulse:  [65-76] 72  Resp:  [18] 18  SpO2:  [90 %-97 %] 97 %  BP: (115-125)/(57-62) 119/57      Intake/Output Summary (Last 24 hours) at 5/27/2025 0837  Last data filed at 5/27/2025 0026  Gross per 24 hour   Intake --   Output 451 ml   Net -451 ml     Exam  General: alert, answering some questions, NAD  Respiratory: CTAB, no crackles or wheezes, breathing comfortably on RA  Cardiovacular: RRR   Gastrointestinal: Soft, non-tender   Renal allograft exam: No tenderness   Extremities: pitting edema in BUE and BLE - improving, RUE AVF with good thrill    Labs:  Lab Results   Component Value Date    WBC 5.49 05/27/2025    HGB 6.9 (L) 05/27/2025    HCT 22.2 (L) 05/27/2025     05/27/2025    K 3.7 05/27/2025     (H) 05/27/2025    CO2 19 (L) 05/27/2025    BUN 17 05/27/2025    CREATININE 1.2 05/27/2025    EGFRNONAA 46.2 (A) 01/26/2018    CALCIUM 9.2 05/27/2025    PHOS 2.5  (L) 05/25/2025    MG 1.3 (L) 05/25/2025    ALBUMIN 1.9 (L) 05/27/2025    AST 12 05/27/2025    ALT 14 05/27/2025     Lab Results   Component Value Date    EXTANC  12/09/2016      Comment:      repeat Prograf level, last not true trough    EXTWBC 10.11 06/18/2019    EXTSEGS 70.1 06/18/2019    EXTPLATELETS 150 06/18/2019    EXTHEMOGLOBI 9.2 (A) 06/18/2019    EXTHEMATOCRI 29.1 (A) 06/18/2019    EXTCREATININ 1.03 (A) 06/18/2019    EXTSODIUM 141 06/18/2019    EXTPOTASSIUM 3.7 06/18/2019    EXTBUN 22 (A) 06/18/2019    EXTCO2 22.9 06/18/2019    EXTCALCIUM 10.4 (A) 06/18/2019    EXTPHOSPHORU 2.1 (A) 06/18/2019    EXTGLUCOSE 119 12/04/2017    EXTALBUMIN 2.40 (A) 06/18/2019    EXTAST 11 (A) 06/18/2019    EXTALT 42 06/18/2019    EXTBILITOTAL 0.31 06/18/2019     Lab Results   Component Value Date    EXTTACROLVL 6.1 06/18/2019    EXTPROTCRE 0.84 12/04/2017    EXTPTHINTACT 153.3 11/10/2015    EXTPROTEINUA 1+ 11/30/2016    EXTWBCUA none seen 11/30/2016    EXTRBCUA none seen 11/30/2016     Imaging Studies:  Reviewed. ?    Assessment/Plan:  75 y.o. female with ESRD 2/2 renal amyloidosis s/p LRKT (12/13/2011) on prograf, post-Tx CKD3b, HTN, dementia, who was transferred from [facility] to [facility] on 5/22/2025 for worsening renal function.      ESRD 2/2 Renal Amyloidosis s/p LRKT (12/13/2011)   LUCIA on CKD 3b  Diffuse anasarca  Severe hypoalbuminemia  Failure to thrive  - previous baseline Cr ~1.1-1.2 but with recurrent AKIs in last few months, lowest Cr has been lately ~1.5  - spoke to patient's OP nephrologist (Dr. Fajardo) who believes patient has some degree of chronic rejection and light chain deposition  - suspect patient's LUCIA at OSH was 2/2 prograf toxicity (level ~21) worsened by torsemide administration  - Renal function has normalized since improvement in prograf levels, remains stable despite lasix yesterday  - recommend high protein diet to help with hypoalbuminemia  - recommend changing IV lasix to PO lasix, can start  with 40mg, to see how she responds to PO regimen     Immunosuppression Management  - per , patient is normally on prograf 2mg qAM and 3mg qPM  - goal [id number] trough level is 4-6  - prograf level better today, will continue prograf 1mg BID for now  - will continue to adjust prograf as needed  - continue prednisone 5mg daily   - continue to monitor for toxicities from immunosuppressive medications     Anemia  - Hb currently ~7, was up to ~11 in 04/2025  - unclear etiology of sudden drop in Hb  - iron studies consistent with AoCD  - no evidence of hemolysis  - 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  - sees oncology OP regularly  - heme/onc following     HTN  - BP at goal  - continue nifedipine 60mg   - will titrate antihypertensives as needed for BP  - continue to monitor        We will continue to monitor    Kristy Dumont MD  \[facility]\"" Nephrology Fellow, PGY-4  Kidney Transplant Medicine

## 2025-05-27 NOTE — ASSESSMENT & PLAN NOTE
Hypernatremia is likely due to Dehydration. The patient's most recent sodium results are listed below.  Recent Labs     05/25/25  1242 05/26/25  0515 05/27/25  0308    143 143     Plan  - Aim to correct the sodium by 8-10mEq in 24 hours.   - Will plan to trend the patient's sodium: Daily  - The patient's hypernatremia is improving

## 2025-05-27 NOTE — SUBJECTIVE & OBJECTIVE
Interval History: NAEO. Mental status has improved greatly since transfer. Will attempt to remove baeza today and bladder scan. High likelihood that it will need to be replaced.     Review of Systems   Reason unable to perform ROS: able to answer limited ROS today.   Constitutional:  Negative for fever.   Respiratory:  Negative for cough and shortness of breath.    Cardiovascular:  Negative for chest pain.   Gastrointestinal:  Negative for abdominal pain.     Objective:     Vital Signs (Most Recent):  Temp: 97.5 °F (36.4 °C) (05/27/25 1519)  Pulse: 83 (05/27/25 1519)  Resp: 18 (05/27/25 1519)  BP: 135/70 (05/27/25 1519)  SpO2: 97 % (05/27/25 1519) Vital Signs (24h Range):  Temp:  [97 °F (36.1 °C)-98.7 °F (37.1 °C)] 97.5 °F (36.4 °C)  Pulse:  [67-83] 83  Resp:  [18] 18  SpO2:  [90 %-97 %] 97 %  BP: (115-135)/(57-70) 135/70     Weight: 107 kg (235 lb 14.3 oz)  Body mass index is 39.25 kg/m².    Intake/Output Summary (Last 24 hours) at 5/27/2025 1557  Last data filed at 5/27/2025 1200  Gross per 24 hour   Intake --   Output 750 ml   Net -750 ml         Physical Exam  HENT:      Head: Normocephalic and atraumatic.   Cardiovascular:      Rate and Rhythm: Normal rate.      Pulses: Normal pulses.      Heart sounds: No murmur heard.  Pulmonary:      Effort: Pulmonary effort is normal. No respiratory distress.   Abdominal:      General: Abdomen is flat. Bowel sounds are normal. There is no distension.   Musculoskeletal:      Right lower leg: Edema present.      Left lower leg: Edema present.   Skin:     General: Skin is warm and dry.   Neurological:      General: No focal deficit present.      Mental Status: She is alert and oriented to person, place, and time.               Significant Labs: All pertinent labs within the past 24 hours have been reviewed.    Significant Imaging: I have reviewed all pertinent imaging results/findings within the past 24 hours.

## 2025-05-27 NOTE — ASSESSMENT & PLAN NOTE
The likely etiology of thrombocytopenia is sepsis. The patients 3 most recent labs are listed below.  Recent Labs     05/25/25  2259 05/26/25  0515 05/27/25  0308   PLT 86* 93* 102*     Plan  - Will transfuse if platelet count is <50k (if undergoing surgical procedure or have active bleeding).

## 2025-05-27 NOTE — ASSESSMENT & PLAN NOTE
Reported - Acute Pyelo in Transplant Kidney w/ associated ARF. Urine clx grew klebsiella. Received levaquin + cefepime (5/11-5/14), zosyn + zyvox 5/14-5/21).     - consider continuing abx pending KTM recs  - repeat UA with clx pending - culture never collected    - patient with good improvement in mental status on rocephin ( states that mental status will worsen with past infections)   - will give 7 days of rocephin total and work on removal of baeza after UPEP collected.    - s/p ORIF with podiatry on 2/25  - c/w pain control, tramadol 37.5 BID q 12, PRN severe pain  - c/w with supportive therapy, off   - non weight bearing to RLE.  - Elevate RLE with 2 pillow and ice behind knee  - PMR consulted, recommended for acute rehab

## 2025-05-27 NOTE — PT/OT/SLP PROGRESS
Occupational Therapy   CO-Treatment  Co-evaluation/treatment performed due to patient's multiple deficits requiring two skilled therapists to appropriately and safely assess patient's strength, endurance, functional mobility, and ADL performance while facilitating functional tasks in addition to accommodating for patient's activity tolerance and medical acuity.   Name: Jing Philippe  MRN: [id number]  Admitting Diagnosis:  Acute renal failure superimposed on stage 3b chronic kidney disease       Recommendations:     Discharge Recommendations: Moderate Intensity Therapy  Discharge Equipment Recommendations:  hospital bed, lift device  Barriers to discharge:  Decreased caregiver support, Other (Comment) (increased A required)    Assessment:     Jing Philippe is a 75 y.o. female with a medical diagnosis of Acute renal failure superimposed on stage 3b chronic kidney disease.  She presents with fair participation for presented therapeutic interventions, however limited by weakness and impaired cognition. Pt reported feeling dizzy upon supine>sit this date limiting EOB activities. Pt with prominent edema in BUE/BLE this date. Pt with increased orientation/alertness compared to harpreet OT session and able to follow all simple commands this date however with some intermittent confusion. Performance deficits affecting function are weakness, impaired endurance, impaired self care skills, impaired functional mobility, gait instability, impaired balance, pain, decreased safety awareness, decreased lower extremity function, decreased upper extremity function, impaired cognition, impaired coordination, impaired fine motor, edema, impaired cardiopulmonary response to activity.     Rehab Prognosis:  Good; patient would benefit from acute skilled OT services to address these deficits and reach maximum level of function.       Plan:     Patient to be seen 3 x/week to address the above listed problems via self-care/home  "management, therapeutic activities, therapeutic exercises, neuromuscular re-education, cognitive retraining, wheelchair management/training  Plan of Care Expires: 06/23/25  Plan of Care Reviewed with: patient, spouse    Subjective     Chief Complaint: pt grunting with bed mobility, "I'm not in pain I just do that"  Patient/Family Comments/goals: pt and spouse agreeable to session, "I hope you come back tomorrow"  Pain/Comfort:  Pain Rating 1: 0/10  Pain Rating Post-Intervention 1: 0/10    Objective:     Communicated with: RN prior to session.  Patient found supine with telemetry, PureWick, peripheral IV upon OT entry to room.    General Precautions: Standard, fall    Orthopedic Precautions:N/A  Braces: N/A  Respiratory Status: Room air     Occupational Performance:     Bed Mobility:    Patient completed Rolling/Turning to Right with total assistance  Patient completed Scooting/Bridging with total assistance  Patient completed Supine to Sit with total assistance and 2 persons  Patient completed Sit to Supine with total assistance and 2 persons     Functional Mobility/Transfers:  Pt able to sit EOB ~15 mins with MAX A, progressing to SBA for STATIC sitting, MOD A for DYNAMIC sitting  Functional Mobility: deferred 2/2 impaired sitting tolerance and pt's PLOF not appropriate    Activities of Daily Living:  Grooming: moderate assistance for oral care seated EOB  Lower Body Dressing: total assistance to don purewick brief supine  Toileting: dependence purewick in place      [facility] 6 Click ADL: 8    Treatment & Education:  Pt educated on the following:  - role of OT and OT POC, including DC from OT. Pt verbalized understanding.  - importance of continued mobilization  - Safe transfer techniques and proper body mechanics for fall prevention and improved independence with functional transfers   - Importance of OOB activities to increase endurance and tolerance for increased participation in daily ADLs.    - All pt questions " within OT scope of practice addressed, pt verbalized understanding.     Pt able to engage in dynamic balance/functional reaching exercises with BUE 5X each at EOB.    Patient left HOB elevated with all lines intact, call button in reach, RN notified, and spouse present    GOALS:   Multidisciplinary Problems       Occupational Therapy Goals          Problem: Occupational Therapy    Goal Priority Disciplines Outcome Interventions   Occupational Therapy Goal     OT, PT/OT Progressing    Description: Goals to be met by: 6/23/25     Patient will increase functional independence with ADLs by performing:    UE Dressing with Max Assistance.  Grooming while seated with Moderate Assistance.  Toileting from bedside commode with Maximum Assistance for hygiene and clothing management.   Sitting at edge of bed x15 minutes with Moderate Assistance.  Rolling to Bilateral with Moderate Assistance.   Supine to sit with Maximum Assistance.  Pt will follow 2/4 one step verbal commands.                         DME Justifications:  Jing requires a hospital bed due to her requiring positioning of the body in ways not feasible with an ordinary bed due to being completely immobile and cannot independently make changes in body position without the use of the bed.The positioning of the body cannot be sufficiently resolved by the use of pillows and wedges.    Time Tracking:     OT Date of Treatment: 05/27/25  OT Start Time: 1208  OT Stop Time: 1233  OT Total Time (min): 25 min    Billable Minutes:Self Care/Home Management 25    OT/MICHELLE: OT          5/27/2025

## 2025-05-27 NOTE — ASSESSMENT & PLAN NOTE
- history of dementia and delirium noted    - MRI at outside facility negative for structural causes intracranially  - Ca elevated, being treated  - renal function at baseline  - infections have been treated, will finish additional treatment at this facility  - suspected multifactorial in the setting of infection, elevated Ca, renal failure and general medical condition with dementia and delirium.     - delirium precautions. Continuing to monitor  - improving with Ca treatment, UTI treatment, diuresis and kidney function returning to normal.     - mental status improving

## 2025-05-28 VITALS
HEIGHT: 65 IN | DIASTOLIC BLOOD PRESSURE: 61 MMHG | WEIGHT: 235.88 LBS | BODY MASS INDEX: 39.3 KG/M2 | SYSTOLIC BLOOD PRESSURE: 138 MMHG | OXYGEN SATURATION: 98 % | RESPIRATION RATE: 18 BRPM | HEART RATE: 73 BPM | TEMPERATURE: 98 F

## 2025-05-28 LAB
ABSOLUTE EOSINOPHIL (OHS): 0.06 K/UL
ABSOLUTE MONOCYTE (OHS): 0.44 K/UL (ref 0.3–1)
ABSOLUTE NEUTROPHIL COUNT (OHS): 2.22 K/UL (ref 1.8–7.7)
ALBUMIN SERPL BCP-MCNC: 2 G/DL (ref 3.5–5.2)
ALP SERPL-CCNC: 82 UNIT/L (ref 40–150)
ALT SERPL W/O P-5'-P-CCNC: 14 UNIT/L (ref 10–44)
ANION GAP (OHS): 10 MMOL/L (ref 8–16)
AST SERPL-CCNC: 12 UNIT/L (ref 11–45)
BASOPHILS # BLD AUTO: 0.01 K/UL
BASOPHILS NFR BLD AUTO: 0.3 %
BILIRUB SERPL-MCNC: 0.2 MG/DL (ref 0.1–1)
BUN SERPL-MCNC: 14 MG/DL (ref 8–23)
CALCIUM SERPL-MCNC: 9 MG/DL (ref 8.7–10.5)
CHLORIDE SERPL-SCNC: 113 MMOL/L (ref 95–110)
CO2 SERPL-SCNC: 20 MMOL/L (ref 23–29)
CREAT SERPL-MCNC: 1.1 MG/DL (ref 0.5–1.4)
ERYTHROCYTE [DISTWIDTH] IN BLOOD BY AUTOMATED COUNT: 18.7 % (ref 11.5–14.5)
GFR SERPLBLD CREATININE-BSD FMLA CKD-EPI: 52 ML/MIN/1.73/M2
GLUCOSE SERPL-MCNC: 79 MG/DL (ref 70–110)
HCT VFR BLD AUTO: 23.5 % (ref 37–48.5)
HGB BLD-MCNC: 7.2 GM/DL (ref 12–16)
IMM GRANULOCYTES # BLD AUTO: 0.04 K/UL (ref 0–0.04)
IMM GRANULOCYTES NFR BLD AUTO: 1.1 % (ref 0–0.5)
LYMPHOCYTES # BLD AUTO: 0.93 K/UL (ref 1–4.8)
MAGNESIUM SERPL-MCNC: 1.4 MG/DL (ref 1.6–2.6)
MCH RBC QN AUTO: 26 PG (ref 27–31)
MCHC RBC AUTO-ENTMCNC: 30.6 G/DL (ref 32–36)
MCV RBC AUTO: 85 FL (ref 82–98)
NUCLEATED RBC (/100WBC) (OHS): 0 /100 WBC
PHOSPHATE SERPL-MCNC: 2.4 MG/DL (ref 2.7–4.5)
PLATELET # BLD AUTO: 100 K/UL (ref 150–450)
PMV BLD AUTO: 11.9 FL (ref 9.2–12.9)
POTASSIUM SERPL-SCNC: 3.7 MMOL/L (ref 3.5–5.1)
PROT SERPL-MCNC: 5.9 GM/DL (ref 6–8.4)
RBC # BLD AUTO: 2.77 M/UL (ref 4–5.4)
RELATIVE EOSINOPHIL (OHS): 1.6 %
RELATIVE LYMPHOCYTE (OHS): 25.1 % (ref 18–48)
RELATIVE MONOCYTE (OHS): 11.9 % (ref 4–15)
RELATIVE NEUTROPHIL (OHS): 60 % (ref 38–73)
SODIUM SERPL-SCNC: 143 MMOL/L (ref 136–145)
TACROLIMUS BLD-MCNC: 6.4 NG/ML (ref 5–15)
WBC # BLD AUTO: 3.7 K/UL (ref 3.9–12.7)

## 2025-05-28 PROCEDURE — 85025 COMPLETE CBC W/AUTO DIFF WBC: CPT

## 2025-05-28 PROCEDURE — 84100 ASSAY OF PHOSPHORUS: CPT

## 2025-05-28 PROCEDURE — 63600175 PHARM REV CODE 636 W HCPCS

## 2025-05-28 PROCEDURE — 80053 COMPREHEN METABOLIC PANEL: CPT

## 2025-05-28 PROCEDURE — 25000003 PHARM REV CODE 250

## 2025-05-28 PROCEDURE — 63600175 PHARM REV CODE 636 W HCPCS: Performed by: INTERNAL MEDICINE

## 2025-05-28 PROCEDURE — 83735 ASSAY OF MAGNESIUM: CPT

## 2025-05-28 PROCEDURE — 80197 ASSAY OF TACROLIMUS: CPT | Performed by: STUDENT IN AN ORGANIZED HEALTH CARE EDUCATION/TRAINING PROGRAM

## 2025-05-28 PROCEDURE — 92526 ORAL FUNCTION THERAPY: CPT

## 2025-05-28 PROCEDURE — 97535 SELF CARE MNGMENT TRAINING: CPT

## 2025-05-28 PROCEDURE — 36415 COLL VENOUS BLD VENIPUNCTURE: CPT

## 2025-05-28 RX ORDER — MAGNESIUM SULFATE HEPTAHYDRATE 40 MG/ML
2 INJECTION, SOLUTION INTRAVENOUS ONCE
Status: COMPLETED | OUTPATIENT
Start: 2025-05-28 | End: 2025-05-28

## 2025-05-28 RX ORDER — SODIUM,POTASSIUM PHOSPHATES 280-250MG
1 POWDER IN PACKET (EA) ORAL ONCE
Status: COMPLETED | OUTPATIENT
Start: 2025-05-28 | End: 2025-05-28

## 2025-05-28 RX ADMIN — FUROSEMIDE 40 MG: 40 TABLET ORAL at 08:05

## 2025-05-28 RX ADMIN — LAMIVUDINE 100 MG: 100 TABLET, FILM COATED ORAL at 08:05

## 2025-05-28 RX ADMIN — HEPARIN SODIUM 5000 UNITS: 5000 INJECTION INTRAVENOUS; SUBCUTANEOUS at 02:05

## 2025-05-28 RX ADMIN — MAGNESIUM SULFATE HEPTAHYDRATE 2 G: 40 INJECTION, SOLUTION INTRAVENOUS at 10:05

## 2025-05-28 RX ADMIN — HEPARIN SODIUM 5000 UNITS: 5000 INJECTION INTRAVENOUS; SUBCUTANEOUS at 05:05

## 2025-05-28 RX ADMIN — TACROLIMUS 1 MG: 1 CAPSULE ORAL at 05:05

## 2025-05-28 RX ADMIN — METOPROLOL TARTRATE 50 MG: 50 TABLET, FILM COATED ORAL at 08:05

## 2025-05-28 RX ADMIN — CEFTRIAXONE 1 G: 1 INJECTION, POWDER, FOR SOLUTION INTRAMUSCULAR; INTRAVENOUS at 12:05

## 2025-05-28 RX ADMIN — FUROSEMIDE 40 MG: 40 TABLET ORAL at 05:05

## 2025-05-28 RX ADMIN — PANTOPRAZOLE SODIUM 40 MG: 40 TABLET, DELAYED RELEASE ORAL at 08:05

## 2025-05-28 RX ADMIN — TACROLIMUS 1 MG: 1 CAPSULE ORAL at 08:05

## 2025-05-28 RX ADMIN — POTASSIUM BICARBONATE 20 MEQ: 391 TABLET, EFFERVESCENT ORAL at 10:05

## 2025-05-28 RX ADMIN — THYROID, PORCINE 60 MG: 60 TABLET ORAL at 05:05

## 2025-05-28 RX ADMIN — BUSPIRONE HYDROCHLORIDE 5 MG: 5 TABLET ORAL at 09:05

## 2025-05-28 RX ADMIN — MAGNESIUM SULFATE HEPTAHYDRATE 2 G: 40 INJECTION, SOLUTION INTRAVENOUS at 12:05

## 2025-05-28 RX ADMIN — METOPROLOL TARTRATE 50 MG: 50 TABLET, FILM COATED ORAL at 09:05

## 2025-05-28 RX ADMIN — POTASSIUM & SODIUM PHOSPHATES POWDER PACK 280-160-250 MG 1 PACKET: 280-160-250 PACK at 10:05

## 2025-05-28 RX ADMIN — BUSPIRONE HYDROCHLORIDE 5 MG: 5 TABLET ORAL at 08:05

## 2025-05-28 RX ADMIN — HEPARIN SODIUM 5000 UNITS: 5000 INJECTION INTRAVENOUS; SUBCUTANEOUS at 09:05

## 2025-05-28 RX ADMIN — NIFEDIPINE 60 MG: 60 TABLET, FILM COATED, EXTENDED RELEASE ORAL at 08:05

## 2025-05-28 NOTE — DISCHARGE SUMMARY
[facility] Medicine  Discharge Summary      Patient Name: Jing Philippe  MRN: [id number]  GEORGE: [id number]  Patient Class: IP- Inpatient  Admission Date: 5/22/2025  Hospital Length of Stay: 6 days  Discharge Date and Time: 05/28/2025 5:48 PM  Attending Physician: J Carlos Chavez DO   Discharging Provider: J Carlos Chavez DO  Primary Care Provider: Fito Land Jr., MD  [facility] Medicine Team: [facility] MED S J Carlos Chavez DO  Primary Care Team: [facility]    HPI:   Jing Philippe is a 75 y.o. female with PMHx of renal transplant (12/13/2011) due to renal amyloidosis now CKD 3b on immunosuppression, HTN, HLD, asthma, GERD, obesity, multiple myeloma, dementia transferred from [facility] to [facility] due to ARF and acute pyelo in transplanted kidney.     Last seen by Nephro in April 2025 at [facility] as f/u from prior admission. At that time, prograf was increased and torsemide held due to LUCIA, restarted once Cr returned to normal. Admitted to [facility] 5/11 due to AMS iso UTI. MRI brain at that time normal. Started on antibiotics, urine clx grew Klebsiella. Over past 10 days, Cr trending upwards to 2.2 (bl 1.5).     Per Handoff -- VSSAF. No white count. Cr 2.0, BUN 35. Received levaquin + cefepime (5/11-5/14), zosyn + zyvox 5/14-5/21). Appears overloaded, received torsemide 20mg daily. Continued on prograf.     * No surgery found *      Hospital Course:   Patient transferred due to worsening renal function in transplanted kidney. Needed to be evaluated by renal transplant team    Diuresis was held due to LUCIA, although patient appears edematous on exam. Echo showed likely intravascular volume depletion. Edema likely due to proteinuria and low albumin state. Will try adding IV lasix to see if that helps    Patient also with elevated light chain with a history of amyloidosis (s/p chemo) in remission. Elevated light chain on labs. Oncology consulted, work up  ongoing. No plans for inpatient management at this time. Can follow up with outpatient oncology.     Was given zoledronic acid for elevated calcium 5/23.. risks and benefits discussed with  prior to dose being given.       Plan to complete course of Rocephin, last dose 5/30 for UTI (7 days total, 4 days after baeza removed).   Monitor for urinary retention with bladder scans, patient with a history of urinary retention and high likelihood of needing a baeza again at some point  Need to monitor Ca levels (corrected or ionized levels), re dose Zoledronic acid as needed (last dose 5/23), doses need to be renally adjusted  Continue with wound care for sacral wound  Work with PT/OT/SLP for continued therapy, likely needs placement with SNF  KTM recs:  - can continue PO lasix 40mg BID as patient appears to have decent UOP with this dose  - will continue prograf 1mg BID for now, will adjust depending on trough level  - continue prednisone 5mg daily         Goals of Care Treatment Preferences:  Code Status: Full Code      SDOH Screening:  The patient was screened for utility difficulties, food insecurity, transport difficulties, housing insecurity, and interpersonal safety and there were no concerns identified this admission.     Consults:   Consults (From admission, onward)          Status Ordering Provider     Inpatient consult to Registered Dietitian/Nutritionist  Once        Provider:  (Not yet assigned)    Completed VALENTINA NUNEZ     Inpatient consult to Midline team  Once        Provider:  (Not yet assigned)    Completed VALENTINA NUNEZ     Inpatient consult to Hematology/Oncology  Once        Provider:  (Not yet assigned)    Completed VALENTINA NUNEZ     Inpatient consult to Skin Integrity  Practitioner  Once        Provider:  (Not yet assigned)    Completed BEV EDWARDS     Inpatient consult to Kidney/Pancreas Transplant Medicine  Once        Provider:  (Not yet assigned)    Completed SHIN LANE  JACOB            Assessment & Plan  Acute renal failure superimposed on stage 3b chronic kidney disease  LUCIA is likely due to hypervolemia, pyelo/infection. Baseline creatinine is 1.5. Most recent creatinine and eGFR are listed below.  Recent Labs     05/26/25  0515 05/27/25  0308 05/28/25  0715   CREATININE 1.3 1.2 1.1   EGFRNORACEVR 42* 47* 52*       Report from Transfer:  VSSAF. No white count. Cr 2.0, BUN 35. Received levaquin + cefepime (5/11-5/14), zosyn + zyvox 5/14-5/21). Appears overloaded, received torsemide 20mg daily. Continued on prograf.      Plan  - LUCIA is improving - Cr 2.2 >> 2.0 (bl 1.5)  - treat pyelo, s/p abx x 11 days  - KTM consulted  - Cr back at baseline  - will try some diuresis starting 5/25 with lasix BID  - monitor output and response  - will attempt to remove baeza and monitor output.   - patient has been urinating without a baeza. Needs consistent bladder scans to make sure she is not retaining. High likelihood that she will need a baeza again given her history  Elevated serum immunoglobulin free light chains  - history of MM and amyloidosis, not currently on treatment  - labs with elevated light chains  - concerns that worsening amyloidosis is contributing to her current presentation with proteinuria and anasarca as well as anemia.  - oncology consulted for recommendations and treatment options    UPEP pending. Can follow up with OP oncologist. Nothing further from inpatient oncology    Acute pyelonephritis  Reported - Acute Pyelo in Transplant Kidney w/ associated ARF. Urine clx grew klebsiella. Received levaquin + cefepime (5/11-5/14), zosyn + zyvox 5/14-5/21).     - consider continuing abx pending KTM recs  - repeat UA with clx pending - culture never collected    - patient with good improvement in mental status on rocephin ( states that mental status will worsen with past infections)   - will give 7 days of rocephin total to cover several days after baeza is removed.   S/P  living-donor kidney transplantation - 12/13/11  - noted, see ARF in Renal Transplant  Secondary hyperparathyroidism of renal origin  - cont home meds  Hyperlipidemia  - not on statin  GERD (gastroesophageal reflux disease)  - protonix  Chronic asthma  - no acute issues, prn nebs  Chronic immunosuppression with Prograf and Cellcept  KTM following with recs:    - can continue PO lasix 40mg BID as patient appears to have decent UOP with this dose  - will continue prograf 1mg BID for now, will adjust depending on trough level  - continue prednisone 5mg daily  Class 2 obesity in adult  Body mass index is 39.25 kg/m². Morbid obesity complicates all aspects of disease management from diagnostic modalities to treatment. Weight loss encouraged and health benefits explained to patient.    - BMI 37.7, obesity class II  Hypertension, renal  - cont lopressor 50 bid, nifedipine 60 daily  CKD (chronic kidney disease) stage 3, GFR 30-59 ml/min  See ARF in Renal Transplant.   Multiple myeloma not having achieved remission  - no longer on Revlimid  Dementia with behavioral disturbance  Noted, delirium precautions in place.  Anemia  Anemia is likely due to chronic disease due to Chronic Kidney Disease. Most recent hemoglobin and hematocrit are listed below.  Recent Labs     05/26/25  0515 05/27/25  0308 05/28/25  0715   HGB 6.8* 6.9* 7.2*   HCT 22.6* 22.2* 23.5*     Plan  - Monitor serial CBC: Daily  - Transfuse PRBC if patient becomes hemodynamically unstable, symptomatic or H/H drops below 7/21.  - Patient has received 1 units of PRBCs on unknown, documented at outside hospital  - Patient's anemia is currently stable  - concern it could be related to amyloid or MM  - hbg remains low to boarder line. Discussed with  regarding transfusion. Will monitor for another day to avoid excess volume.   - patient has not received a transfusion at this hospitalization  Hyperkalemia  Hyperkalemia is likely due to LUCIA.The patients most  recent potassium results are listed below.  Recent Labs     05/26/25  0515 05/27/25  0308 05/28/25  0715   K 3.8 3.7 3.7     Plan  - Monitor for arrhythmias with EKG and/or continuous telemetry.   - Treat the hyperkalemia with renal diet  - Monitor potassium: Daily  - The patient's hyperkalemia is improving    Hypernatremia  Hypernatremia is likely due to Dehydration. The patient's most recent sodium results are listed below.  Recent Labs     05/26/25  0515 05/27/25  0308 05/28/25  0715    143 143     Plan  - Aim to correct the sodium by 8-10mEq in 24 hours.   - Will plan to trend the patient's sodium: Daily  - The patient's hypernatremia is improving    Thrombocytopenia  The likely etiology of thrombocytopenia is sepsis. The patients 3 most recent labs are listed below.  Recent Labs     05/26/25  0515 05/27/25  0308 05/28/25  0715   PLT 93* 102* 100*     Plan  - Will transfuse if platelet count is <50k (if undergoing surgical procedure or have active bleeding).      Encephalopathy, metabolic  - history of dementia and delirium noted    - MRI at outside facility negative for structural causes intracranially  - Ca elevated, being treated  - renal function at baseline  - infections have been treated, will finish additional treatment at this facility  - suspected multifactorial in the setting of infection, elevated Ca, renal failure and general medical condition with dementia and delirium.     - delirium precautions. Continuing to monitor  - improving with Ca treatment, UTI treatment, diuresis and kidney function returning to normal.     - mental status improving    Hypercalcemia  Hypercalcemia is likely due to Malignancy. The patient has the following symptoms due to their hypercalcemia: encephalopathy. Their most recent calcium and albumin results are listed below.  Recent Labs     05/26/25  0515 05/27/25  0308 05/28/25  0715   CALCIUM 9.3 9.2 9.0   ALBUMIN 1.8* 1.9* 2.0*     Plan  - Treat the hypercalcemia  with: Bisphosphonates and Loop diuretics.   - The patient's hypercalcemia is improving.   - given renal adjusted dose of zoldronic acid on 5/23 with improvement in labs.   - need to monitor Ca (corrected) and might need additional doses of zoldronic acid renally dosed  Pressure injury of sacral region, stage 3  - would care following. Needs continued care for healing    Final Active Diagnoses:    Diagnosis Date Noted POA    PRINCIPAL PROBLEM:  Acute renal failure superimposed on stage 3b chronic kidney disease [N17.9, N18.32] 05/22/2025 Yes    Hypercalcemia [E83.52] 05/26/2025 Yes    Pressure injury of sacral region, stage 3 [L89.153] 05/26/2025 Yes    Encephalopathy, metabolic [G93.41] 05/24/2025 Yes    Anemia [D64.9] 05/23/2025 Yes    Hyperkalemia [E87.5] 05/23/2025 Yes    Hypernatremia [E87.0] 05/23/2025 Yes    Thrombocytopenia [D69.6] 05/23/2025 Yes    Elevated serum immunoglobulin free light chains [R76.8] 05/23/2025 Yes    Dementia with behavioral disturbance [F03.918] 05/22/2025 Yes    Acute pyelonephritis [N10] 05/22/2025 Yes    Multiple myeloma not having achieved remission [C90.00] 01/26/2018 Yes     Chronic    CKD (chronic kidney disease) stage 3, GFR 30-59 ml/min [N18.30] 01/20/2017 Yes     Chronic    Hypertension, renal [I12.9] 06/07/2013 Yes    Chronic immunosuppression with Prograf and Cellcept [Z29.89]  Not Applicable     Chronic    Class 2 obesity in adult [E66.812]  Yes    Hyperlipidemia [E78.5]  Yes     Chronic    GERD (gastroesophageal reflux disease) [K21.9]  Yes     Chronic    Secondary hyperparathyroidism of renal origin [N25.81]  Yes     Chronic    Chronic asthma [J45.909]  Yes     Chronic    S/P living-donor kidney transplantation - 12/13/11 [Z94.0] 12/13/2011 Not Applicable     Chronic      Problems Resolved During this Admission:       Discharged Condition: good    Disposition:     Follow Up:    Patient Instructions:   No discharge procedures on file.    Significant Diagnostic Studies:  Labs: CMP   Recent Labs   Lab 05/27/25  0308 05/28/25  0715    143   K 3.7 3.7   * 113*   CO2 19* 20*   GLU 96 79   BUN 17 14   CREATININE 1.2 1.1   CALCIUM 9.2 9.0   PROT 5.7* 5.9*   ALBUMIN 1.9* 2.0*   BILITOT 0.2 0.2   ALKPHOS 87 82   AST 12 12   ALT 14 14   ANIONGAP 11 10    and CBC   Recent Labs   Lab 05/27/25  0308 05/28/25  0715   WBC 5.49 3.70*   HGB 6.9* 7.2*   HCT 22.2* 23.5*   * 100*       Pending Diagnostic Studies:       Procedure Component Value Units Date/Time    Tacrolimus level [[id number]]     Order Status: Sent Lab Status: No result     Specimen: Blood     Vitamin B1 [[id number]] Collected: 05/27/25 0308    Order Status: Sent Lab Status: In process Updated: 05/27/25 0409    Specimen: Blood            Medications:  Reconciled Home Medications:      Medication List        ASK your doctor about these medications      0.9% NaCl solution     acetaminophen 325 MG tablet  Commonly known as: TYLENOL  Take 650 mg by mouth every 6 (six) hours as needed.     * albuterol 90 mcg/actuation inhaler  Commonly known as: PROVENTIL/VENTOLIN HFA  Inhale 1 Inhaler into the lungs every 4 (four) hours as needed.     * albuterol 90 mcg/actuation inhaler  Commonly known as: PROVENTIL/VENTOLIN HFA  Inhale 1 puff into the lungs PRN.     ARMOUR THYROID 60 mg Tab  Generic drug: thyroid (pork)  Take 60 mg by mouth before breakfast.     ascorbic acid (vitamin C) 1000 MG tablet  Commonly known as: VITAMIN C  Take 1,000 mg by mouth once daily.     busPIRone 5 MG Tab  Commonly known as: BUSPAR  Take 5 mg by mouth 2 (two) times daily.     ciprofloxacin HCl 500 MG tablet  Commonly known as: CIPRO  Take 500 mg by mouth 2 (two) times daily.     diclofenac sodium 1 % Gel  Commonly known as: VOLTAREN  Apply topically.     fluticasone propionate 50 mcg/actuation nasal spray  Commonly known as: FLONASE  by Each Nostril route.     gabapentin 300 MG capsule  Commonly known as: NEURONTIN  Take 300 mg by mouth 3 (three)  times daily.     heparin, porcine (PF) 10 unit/mL Syrg     k phos di & mono-sod phos mono 250 mg Tab  Commonly known as: K-PHOS-NEUTRAL  Take 2 tablets by mouth Three times a day.     lamiVUDine 100 MG Tab  Commonly known as: EPIVIR  TAKE 1 TABLET DAILY.     lisinopriL 5 MG tablet  Commonly known as: PRINIVIL,ZESTRIL  Take 1 tablet (5 mg total) by mouth once daily.     meclizine 25 mg tablet  Commonly known as: ANTIVERT  Take 1 tablet by mouth Daily.     MELATONIN ORAL  Take by mouth every evening.     metoprolol tartrate 50 MG tablet  Commonly known as: LOPRESSOR  Take 50 mg by mouth 2 (two) times daily.     * NIFEdipine 60 MG Tbsr  Commonly known as: ADALAT CC  Take 60 mg by mouth once daily.     * NIFEdipine 60 MG (OSM) 24 hr tablet  Commonly known as: PROCARDIA-XL  Take 60 mg by mouth once daily.     nitrofurantoin 50 MG capsule  Commonly known as: MACRODANTIN  Take 50 mg by mouth.     nystatin ointment  Commonly known as: MYCOSTATIN  Apply topically.     ondansetron 8 MG Tbdl  Commonly known as: ZOFRAN-ODT  Take by mouth as needed.     oxybutynin 10 MG 24 hr tablet  Commonly known as: DITROPAN-XL  Take 1 tablet (10 mg total) by mouth once daily.     pantoprazole 40 MG tablet  Commonly known as: PROTONIX  Take 40 mg by mouth continuous prn.     pregabalin 150 MG capsule  Commonly known as: LYRICA  Take 150 mg by mouth 2 (two) times daily.     sodium bicarbonate 650 MG tablet  Take 2 tablets (1,300 mg total) by mouth 2 (two) times daily.     tacrolimus 1 MG Cap  Commonly known as: PROGRAF  Take 1 capsule (1 mg total) by mouth every morning AND 2 capsules (2 mg total) every evening.     torsemide 20 MG Tab  Commonly known as: DEMADEX  Take 1 tablet (20 mg total) by mouth once daily.     traMADoL 50 mg tablet  Commonly known as: ULTRAM  Take 50 mg by mouth as needed.     VITAMIN B-1 50 MG tablet  Generic drug: thiamine  Take 50 mg by mouth once daily.     VITAMIN D2 50,000 unit Cap  Generic drug:  ergocalciferol  Take 50,000 Units by mouth every 7 days.     zinc gluconate 50 mg tablet  Take 50 mg by mouth once daily.           * This list has 4 medication(s) that are the same as other medications prescribed for you. Read the directions carefully, and ask your doctor or other care provider to review them with you.                  Indwelling Lines/Drains at time of discharge:   Lines/Drains/Airways       None                   Time spent on the discharge of patient: 47 minutes         J Carlos Chavez DO  Department of Hospital Medicine  [facility]

## 2025-05-28 NOTE — PROGRESS NOTES
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    Amyloidosis, unspecified 1998    s/p chemotherapy x 4 occassions  (IV x3, po x1)    Anticoagulant long-term use     Benign hypertension with end-stage renal disease     Candida vaginitis 1/16/2015    Chronic asthma     CKD (chronic kidney disease) stage 3, GFR 30-59 ml/min 9/14/2012    GERD (gastroesophageal reflux disease)     Hyperlipidemia     Lymphedema     LLE    Need for prophylactic immunotherapy     Obesity     Osteoarthritis of left knee s/p total knee replacement 1/16/2015    Surgery October 23, 2014    PONV (postoperative nausea and vomiting)     Renal disease due to hypertension 12/14/2012    Secondary hyperparathyroidism of renal origin     Vertigo        Social History[1]    Precautions:     Allergies as of 05/21/2025 - Reviewed 04/10/2025   Allergen Reaction Noted    Anesthetics - jen type- parabens  04/10/2025    Antihistamines - ethylenediamine  04/10/2025    Codeine  06/14/2012    Ethanol (ethyl alcohol)  04/10/2025    Hydrocodone  06/14/2012    Opioids - morphine analogues  04/10/2025    Propylene glycol  04/10/2025    Saccharin  04/10/2025    Tylenol [acetaminophen]  04/10/2025    Yellow dye  04/10/2025       WO Assessment Details/Treatment     Wound care follow-up completed for sacrum, skin folds.  Chart reviewed for this encounter.  See flowsheets for findings.    Pt found lying in bed, agreeable to care at this time.   Skin folds w/ continued MASD, improved from last assessment, continue Miconazole powder.  Pt turned in bed for assessment of sacrum. Sacral wound remains full thickness w/ pink & white wound bed, cleansed w/ NS, Aquacel Ag applied to wound bed, secured w/ foam  dressing.   No changes to WC orders at this time.     05/28/25 1040   WOCN Assessment   WOCN Total Time (mins) 30   Visit Date 05/28/25   Visit Time 1040   Consult Type Follow Up   WOCN Speciality Wound   Wound pressure   Intervention assessed;changed;applied;chart review;coordination of care;orders   Teaching on-going        Wound 05/22/25 1100 Pressure Injury Sacral spine   Date First Assessed/Time First Assessed: 05/22/25 1100   Present on Original Admission: Yes  Primary Wound Type: Pressure Injury  Location: Sacral spine  Is this injury device related?: No   Wound Image    Pressure Injury Stage 3   Dressing Appearance Moist drainage   Drainage Amount Small   Drainage Characteristics/Odor Serosanguineous   Appearance Pink;Red;White;Moist   Tissue loss description Full thickness   Periwound Area Intact;Dry   Wound Edges Open   Care Cleansed with:;Sterile normal saline   Dressing Applied;Silver;Hydrofiber;Foam            [1]   Social History  Socioeconomic History    Marital status:    Tobacco Use    Smoking status: Never     Passive exposure: Never    Smokeless tobacco: Never   Substance and Sexual Activity    Alcohol use: Yes     Comment: rare margartia    Drug use: No   Social History Narrative    Inez used to work as , now retired for medical reasons.     Social Drivers of Health     Financial Resource Strain: Low Risk  (5/23/2025)    Overall Financial Resource Strain (CARDIA)     Difficulty of Paying Living Expenses: Not very hard   Food Insecurity: No Food Insecurity (5/23/2025)    Hunger Vital Sign     Worried About Running Out of Food in the Last Year: Never true     Ran Out of Food in the Last Year: Never true   Transportation Needs: No Transportation Needs (5/23/2025)    PRAPARE - Transportation     Lack of Transportation (Medical): No     Lack of Transportation (Non-Medical): No   Physical Activity: Inactive (5/23/2025)    Exercise Vital Sign     Days of Exercise per Week: 0 days      Minutes of Exercise per Session: 0 min   Stress: No Stress Concern Present (5/23/2025)    [employer] - Occupational Stress Questionnaire     Feeling of Stress : Not at all   Housing Stability: Low Risk  (5/23/2025)    Housing Stability Vital Sign     Unable to Pay for Housing in the Last Year: No     Homeless in the Last Year: No

## 2025-05-28 NOTE — ASSESSMENT & PLAN NOTE
The likely etiology of thrombocytopenia is sepsis. The patients 3 most recent labs are listed below.  Recent Labs     05/26/25  0515 05/27/25  0308 05/28/25  0715   PLT 93* 102* 100*     Plan  - Will transfuse if platelet count is <50k (if undergoing surgical procedure or have active bleeding).

## 2025-05-28 NOTE — CLINICAL REVIEW
Kidney Transplant Medicine Service Chart Check Note:    Medications:   busPIRone  5 mg Oral BID    cefTRIAXone (Rocephin) IV (PEDS and ADULTS)  1 g Intravenous Q24H    furosemide  40 mg Oral BID    heparin (porcine)  5,000 Units Subcutaneous Q8H    lamiVUDine  100 mg Oral Daily    magnesium sulfate 2 g IVPB  2 g Intravenous Once    Followed by    magnesium sulfate 2 g IVPB  2 g Intravenous Once    metoprolol tartrate  50 mg Oral BID    miconazole NITRATE 2 %   Topical (Top) BID    NIFEdipine  60 mg Oral Daily    pantoprazole  40 mg Oral Daily    tacrolimus  1 mg Oral BID    thyroid (pork)  60 mg Oral Before breakfast       Vitals:  Temp:  [97.5 °F (36.4 °C)-98.2 °F (36.8 °C)] 97.6 °F (36.4 °C)  Pulse:  [76-86] 82  Resp:  [18] 18  SpO2:  [94 %-98 %] 94 %  BP: (115-141)/(57-70) 141/63    I/Os:    Intake/Output Summary (Last 24 hours) at 5/28/2025 1027  Last data filed at 5/27/2025 1726  Gross per 24 hour   Intake --   Output 900 ml   Net -900 ml     Prograf level still pending (collected at 13 hours)  Cr 1.1  BUN 14  HCO3 20  K 3.7    Recommendations:  - can continue PO lasix 40mg BID as patient appears to have decent UOP with this dose  - will continue prograf 1mg BID for now, will adjust depending on trough level  - continue prednisone 5mg daily  - continue nifedipine 60mg for BP  - agree with Mag and Phos replacement  - continue to encourage high protein diet      Kristy Dumont MD  [employer] Nephrology Fellow, PGY-4  Kidney Transplant Medicine

## 2025-05-28 NOTE — ASSESSMENT & PLAN NOTE
Hypernatremia is likely due to Dehydration. The patient's most recent sodium results are listed below.  Recent Labs     05/26/25  0515 05/27/25  0308 05/28/25  0715    143 143     Plan  - Aim to correct the sodium by 8-10mEq in 24 hours.   - Will plan to trend the patient's sodium: Daily  - The patient's hypernatremia is improving

## 2025-05-28 NOTE — PLAN OF CARE
Darren aden - Med Surg  Discharge Reassessment    Primary Care Provider: Fito Land Jr., MD    Expected Discharge Date: 5/29/2025    SW met with patient and pt's spouse, Manas, regarding discharge plan.  Pt and spouse agreeable to patient's transfer back to [facility].  Pt and  also agreeable to pt's return to [facility].  MEKA sent updated clinicals to [facility].  MEKA spoke to Airam [phone] and asked if she could submit for insurance auth today as patient is medically ready for d/c.  MEKA faxed clinicals to [phone].      Discharge Plan A and Plan B have been determined by review of patient's clinical status, future medical and therapeutic needs, and coverage/benefits for post-acute care in coordination with multidisciplinary team members.    Reassessment (most recent)       Discharge Reassessment - 05/28/25 1240          Discharge Reassessment    Assessment Type Discharge Planning Reassessment     Did the patient's condition or plan change since previous assessment? No     Name(s) and Number(s) Manas-Spouse- [phone]     Communicated LESTER with patient/caregiver Yes     Discharge Plan A Skilled Nursing Facility     Discharge Plan B Home;Home with family;Home Health     DME Needed Upon Discharge  other (see comments)   TBD    Transition of Care Barriers None     Why the patient remains in the hospital Requires continued medical care        Post-Acute Status    Post-Acute Authorization Placement     Post-Acute Placement Status Pending payor review/awaiting authorization (if required)     Discharge Delays None known at this time                   Camilla Jeffery LMSW  Part-Time-  [facility]  Ext. [phone]

## 2025-05-28 NOTE — PT/OT/SLP PROGRESS
Speech Language Pathology Treatment    Patient Name:  Jing Philippe   MRN:  [id number]  Admitting Diagnosis: Acute renal failure superimposed on stage 3b chronic kidney disease    Recommendations:                 General Recommendations:  diet tolerance  Diet recommendations:  Regular Diet - IDDSI Level 7, Liquid Diet Level: Thin liquids - IDDSI Level 0   Aspiration Precautions: 1 bite/sip at a time, Feed only when awake/alert, Frequent oral care, HOB to 90 degrees, Meds whole 1 at a time, Monitor for s/s of aspiration, Small bites/sips, and Standard aspiration precautions   General Precautions: Standard, aspiration  Communication strategies:  provide increased time to answer and go to room if call light pushed    Assessment:     Jing Philippe is a 75 y.o. female with an SLP diagnosis of Dysphagia.     Subjective     Pt resting with spouse present upon SLP arrival. RN agreeable to SLP session.    Pain/Comfort:  Pain Rating 1: 0/10    Respiratory Status: Room air    Objective:     Has the patient been evaluated by SLP for swallowing?   Yes  Keep patient NPO? No     Pt seen for diet tolerance for recent upgrade to unrestricted textures. Pt resting upon arrival requiring moderate multimodal cueing to rouse. Pt required frequent multimodal cueing throughout visit to sustain wakeful state. Pt's spouse endorsed frustration with pt not receiving dinner last night. Pt declined bites from regular consistency breakfast tray despite encouragement from SLP and spouse. Pt additionally declined thin liquid trials. Pt was eventually amenable to a single bite of a regular solid tushar cracker demonstrating prolonged mastication requiring two liquid washes to largely clear. Pt with a single cough following solid trial, though pt and spouse endorsed recent onset of cough and suspect 2/2 pt catching sinus infection from spouse. No other overt signs of airway compromise with subsequent straw sips of thin liquids. Pt declined  further solid trials. SLP educated pt and spouse re: SLP services, ongoing diet rec, opting for softer meal options, spouse cutting food for pt, aspiration precautions, and poc to which both parties verbalized understanding though will benefit from ongoing education. SLP to follow-up to ensure adequate tolerance of unrestricted diet texture.    Goals:   Multidisciplinary Problems       SLP Goals          Problem: SLP    Goal Priority Disciplines Outcome   SLP Goal     SLP Progressing   Description: Speech Language Pathology Goals  Goals expected to be met by 6/6:    1. The patient will tolerate a least restrictive diet without displaying overt signs of airway threat.   2. The family participate in education on safe swallow precautions and will implement these precautions with PO intake.                                Plan:     Patient to be seen:  4 x/week   Plan of Care expires:  06/20/25  Plan of Care reviewed with:  patient, spouse   SLP Follow-Up:  Yes       Discharge recommendations:   (tbd)   Barriers to Discharge:  None    Time Tracking:     SLP Treatment Date:   05/28/25  Speech Start Time:  0917  Speech Stop Time:  0931     Speech Total Time (min):  14 min    Billable Minutes: Treatment Swallowing Dysfunction 6 and Self Care/Home Management Training 8    05/28/2025

## 2025-05-28 NOTE — SUBJECTIVE & OBJECTIVE
Interval History: NAEO. Resting in bed. Mental status improved. Able to hold a conversation today. Some slower thought process but overall improved    Review of Systems   Constitutional:  Negative for fever.   Respiratory:  Negative for cough and shortness of breath.    Cardiovascular:  Negative for chest pain.   Gastrointestinal:  Negative for abdominal pain.     Objective:     Vital Signs (Most Recent):  Temp: 97.5 °F (36.4 °C) (05/28/25 1120)  Pulse: 67 (05/28/25 1120)  Resp: 18 (05/28/25 1120)  BP: (!) 118/57 (05/28/25 1120)  SpO2: (!) 94 % (05/28/25 1120) Vital Signs (24h Range):  Temp:  [97.5 °F (36.4 °C)-98 °F (36.7 °C)] 97.5 °F (36.4 °C)  Pulse:  [66-86] 67  Resp:  [18] 18  SpO2:  [94 %-98 %] 94 %  BP: (118-141)/(57-70) 118/57     Weight: 107 kg (235 lb 14.3 oz)  Body mass index is 39.25 kg/m².    Intake/Output Summary (Last 24 hours) at 5/28/2025 1334  Last data filed at 5/27/2025 1726  Gross per 24 hour   Intake --   Output 600 ml   Net -600 ml         Physical Exam  HENT:      Head: Normocephalic.   Cardiovascular:      Rate and Rhythm: Normal rate.      Pulses: Normal pulses.      Heart sounds: No murmur heard.  Pulmonary:      Effort: Pulmonary effort is normal. No respiratory distress.      Breath sounds: Normal breath sounds.   Abdominal:      General: Abdomen is flat. Bowel sounds are normal. There is no distension.   Musculoskeletal:      Right lower leg: Edema present.      Left lower leg: Edema present.      Comments: 3+ pitting edema bilaterally  Upper extremities with 3+ pitting edema    Skin:     General: Skin is warm and dry.   Neurological:      Mental Status: She is alert.               Significant Labs: All pertinent labs within the past 24 hours have been reviewed.    Significant Imaging: I have reviewed all pertinent imaging results/findings within the past 24 hours.

## 2025-05-28 NOTE — PLAN OF CARE
Recommendations     Recommendation/Intervention:   1. Continue renal non-dialysis diet as tolerated     - please continue to document PO % intake via flowsheets     2. Continue boost plus TID    3. Encourage good intake     4. RD to monitor weight, labs, intake, tolerance    5. Recommend daily weights     Goals:   1. % nutritional needs met with diet during admission     2. Maintain weight during admission    3. Display s/s of wound healing during admission  Nutrition Goal Status: progressing towards goal  Communication of RD Recs: other (comment) (POC)     Nutrition Discharge Planning     Nutrition Discharge Planning: Oral supplement regimen (comments)  Therapeutic diet (comments): renal diet  Oral supplement regimen (comments): supplement of choice and MVI

## 2025-05-28 NOTE — ASSESSMENT & PLAN NOTE
- history of MM and amyloidosis, not currently on treatment  - labs with elevated light chains  - concerns that worsening amyloidosis is contributing to her current presentation with proteinuria and anasarca as well as anemia.  - oncology consulted for recommendations and treatment options    UPEP pending. Can follow up with OP oncologist. Nothing further from inpatient oncology

## 2025-05-28 NOTE — ASSESSMENT & PLAN NOTE
KTM following with recs:    - can continue PO lasix 40mg BID as patient appears to have decent UOP with this dose  - will continue prograf 1mg BID for now, will adjust depending on trough level  - continue prednisone 5mg daily

## 2025-05-28 NOTE — NURSING
Patient to be transferred to [facility]. Report called to [phone]. Patient in room with  waiting for transportation.

## 2025-05-28 NOTE — PROGRESS NOTES
[facility] Medicine  Progress Note    Patient Name: Jing Philippe  MRN: [id number]  Patient Class: IP- Inpatient   Admission Date: 5/22/2025  Length of Stay: 6 days  Attending Physician: J Carlos Chavez DO  Primary Care Provider: Fito Land Jr., MD        Subjective     Principal Problem:Acute renal failure superimposed on stage 3b chronic kidney disease        HPI:  Jing Philippe is a 75 y.o. female with PMHx of renal transplant (12/13/2011) due to renal amyloidosis now CKD 3b on immunosuppression, HTN, HLD, asthma, GERD, obesity, multiple myeloma, dementia transferred [facility] due to ARF and acute pyelo in transplanted kidney.     Last seen by Nephro in April 2025 at [facility] as f/u from prior admission. At that time, prograf was increased and torsemide held due to LUCIA, restarted once Cr returned to normal. Admitted to [facility] 5/11 due to AMS iso UTI. MRI brain at that time normal. Started on antibiotics, urine clx grew Klebsiella. Over past 10 days, Cr trending upwards to 2.2 (bl 1.5).     Per Handoff -- VSSAF. No white count. Cr 2.0, BUN 35. Received levaquin + cefepime (5/11-5/14), zosyn + zyvox 5/14-5/21). Appears overloaded, received torsemide 20mg daily. Continued on prograf.     Overview/Hospital Course:  Patient transferred due to worsening renal function in transplanted kidney. Needed to be evaluated by renal transplant team    Diuresis was held due to LUCIA, although patient appears edematous on exam. Echo showed likely intravascular volume depletion. Edema likely due to proteinuria and low albumin state. Will try adding IV lasix to see if that helps    Patient also with elevated light chain with a history of amyloidosis (s/p chemo) in remission. Elevated light chain on labs. Oncology consulted, work up ongoing. No plans for inpatient management at this time. Can follow up with outpatient oncology.     Was given zoledronic acid  for elevated calcium 5/23.. risks and benefits discussed with  prior to dose being given.       Plan to complete course of Rocephin, last dose 5/30 for UTI  Need to monitor Ca levels (corrected or ionized levels), re dose Zoledronic acid as needed (last dose 5/23), doses need to be renally adjusted  Continue with wound care for sacral wound  Work with PT/OT/SLP for continued therapy, likely needs placement with SNF  KTM recs:  - can continue PO lasix 40mg BID as patient appears to have decent UOP with this dose  - will continue prograf 1mg BID for now, will adjust depending on trough level  - continue prednisone 5mg daily        Interval History: NAEO. Resting in bed. Mental status improved. Able to hold a conversation today. Some slower thought process but overall improved    Review of Systems   Constitutional:  Negative for fever.   Respiratory:  Negative for cough and shortness of breath.    Cardiovascular:  Negative for chest pain.   Gastrointestinal:  Negative for abdominal pain.     Objective:     Vital Signs (Most Recent):  Temp: 97.5 °F (36.4 °C) (05/28/25 1120)  Pulse: 67 (05/28/25 1120)  Resp: 18 (05/28/25 1120)  BP: (!) 118/57 (05/28/25 1120)  SpO2: (!) 94 % (05/28/25 1120) Vital Signs (24h Range):  Temp:  [97.5 °F (36.4 °C)-98 °F (36.7 °C)] 97.5 °F (36.4 °C)  Pulse:  [66-86] 67  Resp:  [18] 18  SpO2:  [94 %-98 %] 94 %  BP: (118-141)/(57-70) 118/57     Weight: 107 kg (235 lb 14.3 oz)  Body mass index is 39.25 kg/m².    Intake/Output Summary (Last 24 hours) at 5/28/2025 1334  Last data filed at 5/27/2025 1726  Gross per 24 hour   Intake --   Output 600 ml   Net -600 ml         Physical Exam  HENT:      Head: Normocephalic.   Cardiovascular:      Rate and Rhythm: Normal rate.      Pulses: Normal pulses.      Heart sounds: No murmur heard.  Pulmonary:      Effort: Pulmonary effort is normal. No respiratory distress.      Breath sounds: Normal breath sounds.   Abdominal:      General: Abdomen is flat.  Bowel sounds are normal. There is no distension.   Musculoskeletal:      Right lower leg: Edema present.      Left lower leg: Edema present.      Comments: 3+ pitting edema bilaterally  Upper extremities with 3+ pitting edema    Skin:     General: Skin is warm and dry.   Neurological:      Mental Status: She is alert.               Significant Labs: All pertinent labs within the past 24 hours have been reviewed.    Significant Imaging: I have reviewed all pertinent imaging results/findings within the past 24 hours.      Assessment & Plan  Acute renal failure superimposed on stage 3b chronic kidney disease  LUCIA is likely due to hypervolemia, pyelo/infection. Baseline creatinine is 1.5. Most recent creatinine and eGFR are listed below.  Recent Labs     05/26/25  0515 05/27/25  0308 05/28/25  0715   CREATININE 1.3 1.2 1.1   EGFRNORACEVR 42* 47* 52*       Report from Transfer:  VSSAF. No white count. Cr 2.0, BUN 35. Received levaquin + cefepime (5/11-5/14), zosyn + zyvox 5/14-5/21). Appears overloaded, received torsemide 20mg daily. Continued on prograf.      Plan  - LUCIA is improving - Cr 2.2 >> 2.0 (bl 1.5)  - treat pyelo, s/p abx x 11 days  - KTM consulted  - Cr back at baseline  - will try some diuresis starting 5/25 with lasix BID  - monitor output and response  - will attempt to remove baeza and monitor output.   - patient has been urinating without a baeza. Needs consistent bladder scans to make sure she is not retaining. High likelihood that she will need a baeza again given her history  Elevated serum immunoglobulin free light chains  - history of MM and amyloidosis, not currently on treatment  - labs with elevated light chains  - concerns that worsening amyloidosis is contributing to her current presentation with proteinuria and anasarca as well as anemia.  - oncology consulted for recommendations and treatment options    UPEP pending. Can follow up with OP oncologist. Nothing further from inpatient  oncology    Acute pyelonephritis  Reported - Acute Pyelo in Transplant Kidney w/ associated ARF. Urine clx grew klebsiella. Received levaquin + cefepime (5/11-5/14), zosyn + zyvox 5/14-5/21).     - consider continuing abx pending KTM recs  - repeat UA with clx pending - culture never collected    - patient with good improvement in mental status on rocephin ( states that mental status will worsen with past infections)   - will give 7 days of rocephin total to cover several days after baeza is removed.   S/P living-donor kidney transplantation - 12/13/11  - noted, see ARF in Renal Transplant  Secondary hyperparathyroidism of renal origin  - cont home meds  Hyperlipidemia  - not on statin  GERD (gastroesophageal reflux disease)  - protonix  Chronic asthma  - no acute issues, prn nebs  Chronic immunosuppression with Prograf and Cellcept  KTM following with recs:    - can continue PO lasix 40mg BID as patient appears to have decent UOP with this dose  - will continue prograf 1mg BID for now, will adjust depending on trough level  - continue prednisone 5mg daily  Class 2 obesity in adult  Body mass index is 39.25 kg/m². Morbid obesity complicates all aspects of disease management from diagnostic modalities to treatment. Weight loss encouraged and health benefits explained to patient.    - BMI 37.7, obesity class II  Hypertension, renal  - cont lopressor 50 bid, nifedipine 60 daily  CKD (chronic kidney disease) stage 3, GFR 30-59 ml/min  See ARF in Renal Transplant.   Multiple myeloma not having achieved remission  - no longer on Revlimid  Dementia with behavioral disturbance  Noted, delirium precautions in place.  Anemia  Anemia is likely due to chronic disease due to Chronic Kidney Disease. Most recent hemoglobin and hematocrit are listed below.  Recent Labs     05/26/25  0515 05/27/25  0308 05/28/25  0715   HGB 6.8* 6.9* 7.2*   HCT 22.6* 22.2* 23.5*     Plan  - Monitor serial CBC: Daily  - Transfuse PRBC if  patient becomes hemodynamically unstable, symptomatic or H/H drops below 7/21.  - Patient has received 1 units of PRBCs on unknown, documented at outside hospital  - Patient's anemia is currently stable  - concern it could be related to amyloid or MM  - hbg remains low to boarder line. Discussed with  regarding transfusion. Will monitor for another day to avoid excess volume.   - patient has not received a transfusion at this hospitalization  Hyperkalemia  Hyperkalemia is likely due to LUCIA.The patients most recent potassium results are listed below.  Recent Labs     05/26/25  0515 05/27/25  0308 05/28/25  0715   K 3.8 3.7 3.7     Plan  - Monitor for arrhythmias with EKG and/or continuous telemetry.   - Treat the hyperkalemia with renal diet  - Monitor potassium: Daily  - The patient's hyperkalemia is improving    Hypernatremia  Hypernatremia is likely due to Dehydration. The patient's most recent sodium results are listed below.  Recent Labs     05/26/25  0515 05/27/25  0308 05/28/25  0715    143 143     Plan  - Aim to correct the sodium by 8-10mEq in 24 hours.   - Will plan to trend the patient's sodium: Daily  - The patient's hypernatremia is improving    Thrombocytopenia  The likely etiology of thrombocytopenia is sepsis. The patients 3 most recent labs are listed below.  Recent Labs     05/26/25  0515 05/27/25  0308 05/28/25  0715   PLT 93* 102* 100*     Plan  - Will transfuse if platelet count is <50k (if undergoing surgical procedure or have active bleeding).      Encephalopathy, metabolic  - history of dementia and delirium noted    - MRI at outside facility negative for structural causes intracranially  - Ca elevated, being treated  - renal function at baseline  - infections have been treated, will finish additional treatment at this facility  - suspected multifactorial in the setting of infection, elevated Ca, renal failure and general medical condition with dementia and delirium.     -  delirium precautions. Continuing to monitor  - improving with Ca treatment, UTI treatment, diuresis and kidney function returning to normal.     - mental status improving    Hypercalcemia  Hypercalcemia is likely due to Malignancy. The patient has the following symptoms due to their hypercalcemia: encephalopathy. Their most recent calcium and albumin results are listed below.  Recent Labs     05/26/25  0515 05/27/25  0308 05/28/25  0715   CALCIUM 9.3 9.2 9.0   ALBUMIN 1.8* 1.9* 2.0*     Plan  - Treat the hypercalcemia with: Bisphosphonates and Loop diuretics.   - The patient's hypercalcemia is improving.   - given renal adjusted dose of zoldronic acid on 5/23 with improvement in labs.   - need to monitor Ca (corrected) and might need additional doses of zoldronic acid renally dosed  Pressure injury of sacral region, stage 3  - would care following. Needs continued care for healing    VTE Risk Mitigation (From admission, onward)           Ordered     heparin (porcine) injection 5,000 Units  Every 8 hours         05/22/25 1142     IP VTE HIGH RISK PATIENT  Once         05/22/25 1142     Place sequential compression device  Until discontinued         05/22/25 1142                    Discharge Planning   LESTER: 5/29/2025     Code Status: Full Code   Medical Readiness for Discharge Date:   Discharge Plan A: Skilled Nursing Facility   Discharge Delays: None known at this time            Please place Justification for ÁNGEL Chavez DO  Department of Hospital Medicine   [facility]

## 2025-05-28 NOTE — ASSESSMENT & PLAN NOTE
Reported - Acute Pyelo in Transplant Kidney w/ associated ARF. Urine clx grew klebsiella. Received levaquin + cefepime (5/11-5/14), zosyn + zyvox 5/14-5/21).     - consider continuing abx pending KTM recs  - repeat UA with clx pending - culture never collected    - patient with good improvement in mental status on rocephin ( states that mental status will worsen with past infections)   - will give 7 days of rocephin total to cover several days after baeza is removed.

## 2025-05-28 NOTE — PROGRESS NOTES
[facility] - Med Surg  Adult Nutrition  Progress Note    SUMMARY       Recommendations    Recommendation/Intervention:   1. Continue renal non-dialysis diet as tolerated     - please continue to document PO % intake via flowsheets     2. Continue boost plus TID    3. Encourage good intake     4. RD to monitor weight, labs, intake, tolerance    5. Recommend daily weights    Goals:   1. % nutritional needs met with diet during admission     2. Maintain weight during admission    3. Display s/s of wound healing during admission  Nutrition Goal Status: progressing towards goal  Communication of RD Recs: other (comment) (POC)    Nutrition Discharge Planning    Nutrition Discharge Planning: Oral supplement regimen (comments)  Therapeutic diet (comments): renal diet  Oral supplement regimen (comments): supplement of choice and MVI    Malnutrition Assessment    Skin (Micronutrient): edema           Orbital Region (Subcutaneous Fat Loss): well nourished  Upper Arm Region (Subcutaneous Fat Loss): well nourished  Thoracic and Lumbar Region: well nourished   Buddhism Region (Muscle Loss): well nourished  Clavicle Bone Region (Muscle Loss): well nourished  Clavicle and Acromion Bone Region (Muscle Loss): well nourished  Dorsal Hand (Muscle Loss): well nourished  Patellar Region (Muscle Loss): well nourished  Anterior Thigh Region (Muscle Loss): well nourished     Reason for Assessment    Reason For Assessment: RD follow-up  Diagnosis: renal disease (acute renal failure superimposed on stage 3b chronic kidney disease)  General Information Comments: Pt admitted with acute renal failure superimposed on stage 3b chronic kidney disease. No new surgical hx. 2+ and 3+ edema. No new wounds - current wounds stable. No GI s/s - BM: 5/27. Pt needs assistance with feedings. Pt reported a fair appetite, appetite is getting better. Having a poor to fair intake - PO: 25-50%. Pt stated she does not eat breakfast. No new wt since 5/25. NFPE  "completed .    Nutrition/Diet History    Nutrition Intake History: 2 meals and snacks  Spiritual, Cultural Beliefs, Yazdanism Practices, Values that Affect Care: no  Food Allergies: NKFA  Factors Affecting Nutritional Intake: decreased appetite    Nutrition Related Social Determinants of Health: SDOH: Adequate food in home environment    Food Insecurity: No Food Insecurity (2025)    Hunger Vital Sign     Worried About Running Out of Food in the Last Year: Never true     Ran Out of Food in the Last Year: Never true     Anthropometrics    Height: 5' 5" (165.1 cm)  Height (inches): 65 in  Height Method: Stated  Weight: 107 kg (235 lb 14.3 oz)  Weight (lb): 235.89 lb  Weight Method: Bed Scale  Ideal Body Weight (IBW), Female: 125 lb  % Ideal Body Weight, Female (lb): 188.71 %  BMI (Calculated): 39.3  BMI Grade: 35 - 39.9 - obesity - grade II  Usual Body Weight (UBW), k.3 kg  % Usual Body Weight: 104.81    Lab/Procedures/Meds    Pertinent Labs Reviewed: reviewed  Pertinent Labs Comments: H/H 7.2/23.5 low, Cl 113 high, GFR 52 low, P 2.4 low, Mg 1.4 low, albumin 2.0 low  Pertinent Medications Reviewed: reviewed  Pertinent Medications Comments: ceftriaxone, furosemide, heparin, lamivudine, metoprolol, nifedipine, pantoprazole, tacrolimus, thyroid    Estimated/Assessed Needs    Weight Used For Calorie Calculations: 107 kg (235 lb 14.3 oz)  Energy Calorie Requirements (kcal):  kcal  Energy Need Method: [place]-St Jeor (* 1.3)  Protein Requirements: 57-85 g/pro (1.0-1.5 g/kg)  Weight Used For Protein Calculations: 56.7 kg (125 lb) (IBW)     Estimated Fluid Requirement Method: RDA Method  RDA Method (mL):   CHO Requirement: 253 g    Nutrition Prescription Ordered    Current Diet Order: renal non-dialysis  Oral Nutrition Supplement: boost plus TID    Evaluation of Received Nutrient/Fluid Intake    Energy Calories Required: not meeting needs  Protein Required: not meeting needs  Fluid Required: other (see " comments) (per MD)  Comments: LBM 5/24  Tolerance: tolerating  % Intake of Estimated Energy Needs: 25 - 50 %  % Meal Intake: 25 - 50 %    PES Statement    Increased nutrient needs (protein/calorie) related to Wound healing as evidenced by Wounds (Stage 3 full thickness pressure injury on sacral spine)  Status: New    Nutrition Risk    Level of Risk/Frequency of Follow-up: low (1/week)     Monitor and Evaluation    Monitor and Evaluation: Energy intake, Food and beverage intake, Protein intake, Diet order, Carbohydrate intake, Weight, Electrolyte and renal panel, Glucose/endocrine profile, Inflammatory profile, Gastrointestinal profile, Lipid profile, Nutrition focused physical findings, Skin (NFPE completed 5/28)     Nutrition Follow-Up    RD Follow-up?: Yes

## 2025-05-28 NOTE — ASSESSMENT & PLAN NOTE
LUCIA is likely due to hypervolemia, pyelo/infection. Baseline creatinine is 1.5. Most recent creatinine and eGFR are listed below.  Recent Labs     05/26/25  0515 05/27/25  0308 05/28/25  0715   CREATININE 1.3 1.2 1.1   EGFRNORACEVR 42* 47* 52*       Report from Transfer:  VSSAF. No white count. Cr 2.0, BUN 35. Received levaquin + cefepime (5/11-5/14), zosyn + zyvox 5/14-5/21). Appears overloaded, received torsemide 20mg daily. Continued on prograf.      Plan  - LUCIA is improving - Cr 2.2 >> 2.0 (bl 1.5)  - treat pyelo, s/p abx x 11 days  - KTM consulted  - Cr back at baseline  - will try some diuresis starting 5/25 with lasix BID  - monitor output and response  - will attempt to remove baeza and monitor output.   - patient has been urinating without a baeza. Needs consistent bladder scans to make sure she is not retaining. High likelihood that she will need a baeza again given her history

## 2025-05-28 NOTE — ASSESSMENT & PLAN NOTE
Hypercalcemia is likely due to Malignancy. The patient has the following symptoms due to their hypercalcemia: encephalopathy. Their most recent calcium and albumin results are listed below.  Recent Labs     05/26/25  0515 05/27/25  0308 05/28/25  0715   CALCIUM 9.3 9.2 9.0   ALBUMIN 1.8* 1.9* 2.0*     Plan  - Treat the hypercalcemia with: Bisphosphonates and Loop diuretics.   - The patient's hypercalcemia is improving.   - given renal adjusted dose of zoldronic acid on 5/23 with improvement in labs.   - need to monitor Ca (corrected) and might need additional doses of zoldronic acid renally dosed

## 2025-05-28 NOTE — ASSESSMENT & PLAN NOTE
Hyperkalemia is likely due to LUCIA.The patients most recent potassium results are listed below.  Recent Labs     05/26/25  0515 05/27/25  0308 05/28/25  0715   K 3.8 3.7 3.7     Plan  - Monitor for arrhythmias with EKG and/or continuous telemetry.   - Treat the hyperkalemia with renal diet  - Monitor potassium: Daily  - The patient's hyperkalemia is improving

## 2025-05-28 NOTE — ASSESSMENT & PLAN NOTE
Anemia is likely due to chronic disease due to Chronic Kidney Disease. Most recent hemoglobin and hematocrit are listed below.  Recent Labs     05/26/25  0515 05/27/25  0308 05/28/25  0715   HGB 6.8* 6.9* 7.2*   HCT 22.6* 22.2* 23.5*     Plan  - Monitor serial CBC: Daily  - Transfuse PRBC if patient becomes hemodynamically unstable, symptomatic or H/H drops below 7/21.  - Patient has received 1 units of PRBCs on unknown, documented at outside hospital  - Patient's anemia is currently stable  - concern it could be related to amyloid or MM  - hbg remains low to boarder line. Discussed with  regarding transfusion. Will monitor for another day to avoid excess volume.   - patient has not received a transfusion at this hospitalization

## 2025-05-29 NOTE — PLAN OF CARE
[facility] - Med Surg  Discharge Final Note    Primary Care Provider: Fito Land Jr., MD    Expected Discharge Date: 5/28/2025    Pt and , Manas, was agreeable to transfer back to [facility] with plans to transfer to [facility] for SNF.  Spoke to Airam espinal/ Joshua [phone] and asked her to submit for insurance auth.  Pt discharged back to [facility] via stretcher/room air.     Discharge Plan A and Plan B have been determined by review of patient's clinical status, future medical and therapeutic needs, and coverage/benefits for post-acute care in coordination with multidisciplinary team members.      Final Discharge Note (most recent)       Final Note - 05/29/25 0809          Final Note    Assessment Type Final Discharge Note     Anticipated Discharge Disposition Skilled Nursing Facility   Pt was transferred back to [facility]; pending auth for return to [facility].       Post-Acute Status    Post-Acute Authorization Placement     Post-Acute Placement Status Pending payor review/awaiting authorization (if required)     Discharge Delays None known at this time                     Important Message from Medicare Sandra Encalade, LMSW  Part-Time-  [facility]  Ext. [phone]

## 2025-05-30 LAB
PATHOLOGIST INTERPRETATION - UPE TIMED (OHS): ABNORMAL
PROT 24H UR-MRATE: 1353 MG/SPEC
PROT UR-MCNC: 41 MG/DL
TOTAL HOURS OF COLLECTION (OHS): 24 HR
TOTAL VOLUME  (OHS): 3300 ML

## 2025-06-02 LAB — W VITAMIN B1: 44 UG/L

## 2025-06-03 LAB
PATHOLOGIST INTERPRETATION - IFE TIMED URINE (OHS): ABNORMAL
PROT 24H UR-MRATE: 1353 MG/SPEC
PROT UR-MCNC: 41 MG/DL
TOTAL HOURS OF COLLECTION (OHS): 24 HR
TOTAL VOLUME  (OHS): 3300 ML

## 2025-06-12 ENCOUNTER — TELEPHONE (OUTPATIENT)
Dept: NEPHROLOGY | Facility: CLINIC | Age: 75
End: 2025-06-12
Payer: MEDICARE

## 2025-06-12 NOTE — TELEPHONE ENCOUNTER
Airam from [facility] called regarding pt catheter. She wants to know if it is okay to remove catheter to see if the pt can urinate on her own. Being that the pt had an episode of retention the doctor wants to get permission from nephrology before removing.     Please advise.  [phone], Airam or Leighton

## 2025-06-12 NOTE — TELEPHONE ENCOUNTER
I called [facility] and spoke to nurse, Airam, informed her that she will have to call urology or PCP to make decision about baeza.  Also, pt was not at [facility], she was at [facility] [place] and then transferred to [facility] in [place].  Airam verbalized understanding, will call PCP or urology for further instructions.

## 2025-06-25 DIAGNOSIS — E05.80 SECONDARY HYPERTHYROIDISM: ICD-10-CM

## 2025-06-25 DIAGNOSIS — R80.9 PROTEINURIA, UNSPECIFIED TYPE: ICD-10-CM

## 2025-06-25 DIAGNOSIS — N18.32 STAGE 3B CHRONIC KIDNEY DISEASE: Primary | ICD-10-CM

## 2025-06-25 DIAGNOSIS — Z94.0 KIDNEY TRANSPLANTED: ICD-10-CM

## 2025-06-30 DIAGNOSIS — R80.9 PROTEINURIA, UNSPECIFIED TYPE: ICD-10-CM

## 2025-06-30 DIAGNOSIS — N18.32 STAGE 3B CHRONIC KIDNEY DISEASE: Primary | ICD-10-CM

## 2025-06-30 DIAGNOSIS — Z94.0 KIDNEY TRANSPLANTED: ICD-10-CM

## 2025-06-30 DIAGNOSIS — I10 HYPERTENSION, UNSPECIFIED TYPE: ICD-10-CM

## 2025-06-30 DIAGNOSIS — E05.80 SECONDARY HYPERTHYROIDISM: ICD-10-CM

## 2025-06-30 RX ORDER — LISINOPRIL 5 MG/1
5 TABLET ORAL DAILY
Qty: 30 TABLET | Refills: 3 | Status: ON HOLD | OUTPATIENT
Start: 2025-06-30 | End: 2025-08-29

## 2025-07-02 ENCOUNTER — OFFICE VISIT (OUTPATIENT)
Dept: NEPHROLOGY | Facility: CLINIC | Age: 75
End: 2025-07-02
Payer: MEDICARE

## 2025-07-02 ENCOUNTER — HOSPITAL ENCOUNTER (INPATIENT)
Facility: HOSPITAL | Age: 75
LOS: 8 days | Discharge: NURSING, REHAB OR LONG-TERM CARE | DRG: 698 | End: 2025-07-10
Attending: STUDENT IN AN ORGANIZED HEALTH CARE EDUCATION/TRAINING PROGRAM | Admitting: STUDENT IN AN ORGANIZED HEALTH CARE EDUCATION/TRAINING PROGRAM
Payer: MEDICARE

## 2025-07-02 VITALS
HEART RATE: 54 BPM | SYSTOLIC BLOOD PRESSURE: 60 MMHG | DIASTOLIC BLOOD PRESSURE: 42 MMHG | OXYGEN SATURATION: 99 % | HEIGHT: 65 IN | RESPIRATION RATE: 18 BRPM | BODY MASS INDEX: 39.25 KG/M2

## 2025-07-02 DIAGNOSIS — E85.9 AMYLOIDOSIS, UNSPECIFIED TYPE: ICD-10-CM

## 2025-07-02 DIAGNOSIS — N17.9 AKI (ACUTE KIDNEY INJURY): Primary | ICD-10-CM

## 2025-07-02 DIAGNOSIS — Q89.9 DEFORMITY: ICD-10-CM

## 2025-07-02 DIAGNOSIS — Z94.0 S/P LIVING-DONOR KIDNEY TRANSPLANTATION: Primary | Chronic | ICD-10-CM

## 2025-07-02 DIAGNOSIS — I95.2 HYPOTENSION DUE TO DRUGS: ICD-10-CM

## 2025-07-02 DIAGNOSIS — D64.9 ANEMIA, UNSPECIFIED TYPE: ICD-10-CM

## 2025-07-02 DIAGNOSIS — R80.9 PROTEINURIA, UNSPECIFIED TYPE: ICD-10-CM

## 2025-07-02 DIAGNOSIS — R09.89 SUSPECTED DEEP VEIN THROMBOSIS (DVT): ICD-10-CM

## 2025-07-02 DIAGNOSIS — R07.9 CHEST PAIN: ICD-10-CM

## 2025-07-02 DIAGNOSIS — M79.672 LEFT FOOT PAIN: ICD-10-CM

## 2025-07-02 DIAGNOSIS — I95.9 HYPOTENSION: ICD-10-CM

## 2025-07-02 DIAGNOSIS — N18.32 STAGE 3B CHRONIC KIDNEY DISEASE: Chronic | ICD-10-CM

## 2025-07-02 DIAGNOSIS — N17.9 ACUTE RENAL FAILURE SUPERIMPOSED ON STAGE 3B CHRONIC KIDNEY DISEASE, UNSPECIFIED ACUTE RENAL FAILURE TYPE: ICD-10-CM

## 2025-07-02 DIAGNOSIS — Z29.89 NEED FOR PROPHYLACTIC IMMUNOTHERAPY: Chronic | ICD-10-CM

## 2025-07-02 DIAGNOSIS — Z94.0 RENAL TRANSPLANT RECIPIENT: ICD-10-CM

## 2025-07-02 DIAGNOSIS — E83.52 HYPERCALCEMIA: ICD-10-CM

## 2025-07-02 DIAGNOSIS — I95.9 HYPOTENSION, UNSPECIFIED HYPOTENSION TYPE: ICD-10-CM

## 2025-07-02 DIAGNOSIS — R33.9 URINARY RETENTION: ICD-10-CM

## 2025-07-02 DIAGNOSIS — N18.32 ACUTE RENAL FAILURE SUPERIMPOSED ON STAGE 3B CHRONIC KIDNEY DISEASE, UNSPECIFIED ACUTE RENAL FAILURE TYPE: ICD-10-CM

## 2025-07-02 DIAGNOSIS — C90.00 MULTIPLE MYELOMA NOT HAVING ACHIEVED REMISSION: Chronic | ICD-10-CM

## 2025-07-02 PROBLEM — E83.42 HYPOMAGNESEMIA: Status: ACTIVE | Noted: 2025-07-02

## 2025-07-02 PROBLEM — E87.6 HYPOKALEMIA: Status: ACTIVE | Noted: 2025-07-02

## 2025-07-02 PROBLEM — E66.9 OBESITY: Status: ACTIVE | Noted: 2025-07-02

## 2025-07-02 LAB
ALBUMIN SERPL-MCNC: 2 G/DL (ref 3.4–4.8)
ALBUMIN/GLOB SERPL: 0.7 RATIO (ref 1.1–2)
ALP SERPL-CCNC: 74 UNIT/L (ref 40–150)
ALT SERPL-CCNC: 11 UNIT/L (ref 0–55)
ANION GAP SERPL CALC-SCNC: 12 MEQ/L
APTT PPP: 45.8 SECONDS (ref 23.2–33.7)
AST SERPL-CCNC: 10 UNIT/L (ref 11–45)
BACTERIA #/AREA URNS AUTO: ABNORMAL /HPF
BASOPHILS # BLD AUTO: 0.04 X10(3)/MCL
BASOPHILS NFR BLD AUTO: 0.6 %
BILIRUB SERPL-MCNC: 0.2 MG/DL
BILIRUB UR QL STRIP.AUTO: NEGATIVE
BNP BLD-MCNC: 152.3 PG/ML
BUN SERPL-MCNC: 36 MG/DL (ref 9.8–20.1)
CALCIUM SERPL-MCNC: 6.6 MG/DL (ref 8.4–10.2)
CHLORIDE SERPL-SCNC: 103 MMOL/L (ref 98–107)
CLARITY UR: ABNORMAL
CO2 SERPL-SCNC: 28 MMOL/L (ref 23–31)
COLOR UR AUTO: ABNORMAL
CREAT SERPL-MCNC: 1.74 MG/DL (ref 0.55–1.02)
CREAT/UREA NIT SERPL: 21
EOSINOPHIL # BLD AUTO: 0.07 X10(3)/MCL (ref 0–0.9)
EOSINOPHIL NFR BLD AUTO: 1 %
ERYTHROCYTE [DISTWIDTH] IN BLOOD BY AUTOMATED COUNT: 18.6 % (ref 11.5–17)
FLUAV AG UPPER RESP QL IA.RAPID: NOT DETECTED
FLUBV AG UPPER RESP QL IA.RAPID: NOT DETECTED
GFR SERPLBLD CREATININE-BSD FMLA CKD-EPI: 30 ML/MIN/1.73/M2
GLOBULIN SER-MCNC: 3 GM/DL (ref 2.4–3.5)
GLUCOSE SERPL-MCNC: 95 MG/DL (ref 82–115)
GLUCOSE UR QL STRIP: NORMAL
HCT VFR BLD AUTO: 24 % (ref 37–47)
HGB BLD-MCNC: 7.6 G/DL (ref 12–16)
HGB UR QL STRIP: ABNORMAL
IMM GRANULOCYTES # BLD AUTO: 0.09 X10(3)/MCL (ref 0–0.04)
IMM GRANULOCYTES NFR BLD AUTO: 1.3 %
INR PPP: 1.8
KETONES UR QL STRIP: NEGATIVE
LACTATE SERPL-SCNC: 1.4 MMOL/L (ref 0.5–2.2)
LACTATE SERPL-SCNC: 2.6 MMOL/L (ref 0.5–2.2)
LEUKOCYTE ESTERASE UR QL STRIP: 500
LYMPHOCYTES # BLD AUTO: 1.52 X10(3)/MCL (ref 0.6–4.6)
LYMPHOCYTES NFR BLD AUTO: 22.1 %
MAGNESIUM SERPL-MCNC: 0.7 MG/DL (ref 1.6–2.6)
MCH RBC QN AUTO: 27 PG (ref 27–31)
MCHC RBC AUTO-ENTMCNC: 31.7 G/DL (ref 33–36)
MCV RBC AUTO: 85.1 FL (ref 80–94)
MONOCYTES # BLD AUTO: 0.53 X10(3)/MCL (ref 0.1–1.3)
MONOCYTES NFR BLD AUTO: 7.7 %
NEUTROPHILS # BLD AUTO: 4.62 X10(3)/MCL (ref 2.1–9.2)
NEUTROPHILS NFR BLD AUTO: 67.3 %
NITRITE UR QL STRIP: NEGATIVE
NRBC BLD AUTO-RTO: 0 %
OHS QRS DURATION: 110 MS
OHS QTC CALCULATION: 538 MS
PH UR STRIP: 6 [PH]
PLATELET # BLD AUTO: 90 X10(3)/MCL (ref 130–400)
PLATELETS.RETICULATED NFR BLD AUTO: 6.3 % (ref 0.9–11.2)
PMV BLD AUTO: 12.5 FL (ref 7.4–10.4)
POTASSIUM SERPL-SCNC: 3.2 MMOL/L (ref 3.5–5.1)
PROT SERPL-MCNC: 5 GM/DL (ref 5.8–7.6)
PROT UR QL STRIP: ABNORMAL
PROTHROMBIN TIME: 20.9 SECONDS (ref 12.5–14.5)
RBC # BLD AUTO: 2.82 X10(6)/MCL (ref 4.2–5.4)
RBC #/AREA URNS AUTO: ABNORMAL /HPF
SARS-COV-2 RNA RESP QL NAA+PROBE: NOT DETECTED
SODIUM SERPL-SCNC: 143 MMOL/L (ref 136–145)
SP GR UR STRIP.AUTO: 1.01 (ref 1–1.03)
SQUAMOUS #/AREA URNS LPF: ABNORMAL /HPF
TROPONIN I SERPL-MCNC: 0.02 NG/ML (ref 0–0.04)
UROBILINOGEN UR STRIP-ACNC: NORMAL
WBC # BLD AUTO: 6.87 X10(3)/MCL (ref 4.5–11.5)
WBC #/AREA URNS AUTO: ABNORMAL /HPF

## 2025-07-02 PROCEDURE — 99291 CRITICAL CARE FIRST HOUR: CPT

## 2025-07-02 PROCEDURE — 93010 ELECTROCARDIOGRAM REPORT: CPT | Mod: ,,, | Performed by: INTERNAL MEDICINE

## 2025-07-02 PROCEDURE — 87636 SARSCOV2 & INF A&B AMP PRB: CPT | Performed by: STUDENT IN AN ORGANIZED HEALTH CARE EDUCATION/TRAINING PROGRAM

## 2025-07-02 PROCEDURE — 21400001 HC TELEMETRY ROOM

## 2025-07-02 PROCEDURE — 25000003 PHARM REV CODE 250: Performed by: STUDENT IN AN ORGANIZED HEALTH CARE EDUCATION/TRAINING PROGRAM

## 2025-07-02 PROCEDURE — 63600175 PHARM REV CODE 636 W HCPCS: Performed by: STUDENT IN AN ORGANIZED HEALTH CARE EDUCATION/TRAINING PROGRAM

## 2025-07-02 PROCEDURE — 81001 URINALYSIS AUTO W/SCOPE: CPT | Performed by: STUDENT IN AN ORGANIZED HEALTH CARE EDUCATION/TRAINING PROGRAM

## 2025-07-02 PROCEDURE — 96361 HYDRATE IV INFUSION ADD-ON: CPT

## 2025-07-02 PROCEDURE — 96365 THER/PROPH/DIAG IV INF INIT: CPT

## 2025-07-02 PROCEDURE — 80053 COMPREHEN METABOLIC PANEL: CPT | Performed by: STUDENT IN AN ORGANIZED HEALTH CARE EDUCATION/TRAINING PROGRAM

## 2025-07-02 PROCEDURE — 84484 ASSAY OF TROPONIN QUANT: CPT | Performed by: STUDENT IN AN ORGANIZED HEALTH CARE EDUCATION/TRAINING PROGRAM

## 2025-07-02 PROCEDURE — 87040 BLOOD CULTURE FOR BACTERIA: CPT | Performed by: STUDENT IN AN ORGANIZED HEALTH CARE EDUCATION/TRAINING PROGRAM

## 2025-07-02 PROCEDURE — 83735 ASSAY OF MAGNESIUM: CPT | Performed by: STUDENT IN AN ORGANIZED HEALTH CARE EDUCATION/TRAINING PROGRAM

## 2025-07-02 PROCEDURE — 87086 URINE CULTURE/COLONY COUNT: CPT | Performed by: STUDENT IN AN ORGANIZED HEALTH CARE EDUCATION/TRAINING PROGRAM

## 2025-07-02 PROCEDURE — 84443 ASSAY THYROID STIM HORMONE: CPT | Performed by: STUDENT IN AN ORGANIZED HEALTH CARE EDUCATION/TRAINING PROGRAM

## 2025-07-02 PROCEDURE — 85025 COMPLETE CBC W/AUTO DIFF WBC: CPT | Performed by: STUDENT IN AN ORGANIZED HEALTH CARE EDUCATION/TRAINING PROGRAM

## 2025-07-02 PROCEDURE — 85730 THROMBOPLASTIN TIME PARTIAL: CPT | Performed by: STUDENT IN AN ORGANIZED HEALTH CARE EDUCATION/TRAINING PROGRAM

## 2025-07-02 PROCEDURE — 99999 PR PBB SHADOW E&M-EST. PATIENT-LVL III: CPT | Mod: PBBFAC,,, | Performed by: INTERNAL MEDICINE

## 2025-07-02 PROCEDURE — 93005 ELECTROCARDIOGRAM TRACING: CPT

## 2025-07-02 PROCEDURE — 83880 ASSAY OF NATRIURETIC PEPTIDE: CPT | Performed by: STUDENT IN AN ORGANIZED HEALTH CARE EDUCATION/TRAINING PROGRAM

## 2025-07-02 PROCEDURE — 85610 PROTHROMBIN TIME: CPT | Performed by: STUDENT IN AN ORGANIZED HEALTH CARE EDUCATION/TRAINING PROGRAM

## 2025-07-02 PROCEDURE — 83605 ASSAY OF LACTIC ACID: CPT | Performed by: STUDENT IN AN ORGANIZED HEALTH CARE EDUCATION/TRAINING PROGRAM

## 2025-07-02 RX ORDER — APIXABAN 5 MG/1
5 TABLET, FILM COATED ORAL 2 TIMES DAILY
Status: ON HOLD | COMMUNITY
Start: 2025-06-27

## 2025-07-02 RX ORDER — ONDANSETRON HYDROCHLORIDE 2 MG/ML
4 INJECTION, SOLUTION INTRAVENOUS EVERY 8 HOURS PRN
Status: DISCONTINUED | OUTPATIENT
Start: 2025-07-03 | End: 2025-07-10 | Stop reason: HOSPADM

## 2025-07-02 RX ORDER — CEFTRIAXONE 1 G/1
1 INJECTION, POWDER, FOR SOLUTION INTRAMUSCULAR; INTRAVENOUS
Status: DISCONTINUED | OUTPATIENT
Start: 2025-07-03 | End: 2025-07-04

## 2025-07-02 RX ORDER — ARGININE/ASCORBATE SOD/VITE AC 4.5 G/9.2G
1 POWDER IN PACKET (EA) ORAL 2 TIMES DAILY
Status: ON HOLD | COMMUNITY
Start: 2025-06-27

## 2025-07-02 RX ORDER — QUETIAPINE FUMARATE 25 MG/1
25 TABLET, FILM COATED ORAL NIGHTLY
Status: DISCONTINUED | OUTPATIENT
Start: 2025-07-03 | End: 2025-07-03

## 2025-07-02 RX ORDER — ASCORBIC ACID 250 MG
1000 TABLET ORAL DAILY
Status: DISCONTINUED | OUTPATIENT
Start: 2025-07-03 | End: 2025-07-10 | Stop reason: HOSPADM

## 2025-07-02 RX ORDER — ALUMINUM HYDROXIDE, MAGNESIUM HYDROXIDE, AND SIMETHICONE 1200; 120; 1200 MG/30ML; MG/30ML; MG/30ML
30 SUSPENSION ORAL 4 TIMES DAILY PRN
Status: DISCONTINUED | OUTPATIENT
Start: 2025-07-03 | End: 2025-07-10 | Stop reason: HOSPADM

## 2025-07-02 RX ORDER — SODIUM CHLORIDE, SODIUM LACTATE, POTASSIUM CHLORIDE, CALCIUM CHLORIDE 600; 310; 30; 20 MG/100ML; MG/100ML; MG/100ML; MG/100ML
INJECTION, SOLUTION INTRAVENOUS CONTINUOUS
Status: DISCONTINUED | OUTPATIENT
Start: 2025-07-03 | End: 2025-07-03

## 2025-07-02 RX ORDER — TALC
9 POWDER (GRAM) TOPICAL NIGHTLY PRN
Status: DISCONTINUED | OUTPATIENT
Start: 2025-07-03 | End: 2025-07-10 | Stop reason: HOSPADM

## 2025-07-02 RX ORDER — RIVASTIGMINE 4.6 MG/24H
1 PATCH, EXTENDED RELEASE TRANSDERMAL DAILY
Status: DISCONTINUED | OUTPATIENT
Start: 2025-07-03 | End: 2025-07-10 | Stop reason: HOSPADM

## 2025-07-02 RX ORDER — LINEZOLID 600 MG/1
600 TABLET, FILM COATED ORAL 2 TIMES DAILY
Status: ON HOLD | COMMUNITY
Start: 2025-06-27

## 2025-07-02 RX ORDER — TACROLIMUS 1 MG/1
2 CAPSULE ORAL EVERY EVENING
Status: DISCONTINUED | OUTPATIENT
Start: 2025-07-03 | End: 2025-07-10 | Stop reason: HOSPADM

## 2025-07-02 RX ORDER — POTASSIUM CHLORIDE 20 MEQ/1
20 TABLET, EXTENDED RELEASE ORAL DAILY
Status: ON HOLD | COMMUNITY
Start: 2025-06-27

## 2025-07-02 RX ORDER — FAMOTIDINE 20 MG/1
20 TABLET, FILM COATED ORAL NIGHTLY
Status: ON HOLD | COMMUNITY
Start: 2025-06-27

## 2025-07-02 RX ORDER — SODIUM CHLORIDE 0.9 % (FLUSH) 0.9 %
10 SYRINGE (ML) INJECTION
Status: DISCONTINUED | OUTPATIENT
Start: 2025-07-03 | End: 2025-07-10 | Stop reason: HOSPADM

## 2025-07-02 RX ORDER — PREDNISONE 5 MG/1
5 TABLET ORAL DAILY
Status: DISCONTINUED | OUTPATIENT
Start: 2025-07-03 | End: 2025-07-10 | Stop reason: HOSPADM

## 2025-07-02 RX ORDER — BISACODYL 10 MG/1
10 SUPPOSITORY RECTAL DAILY PRN
Status: DISCONTINUED | OUTPATIENT
Start: 2025-07-03 | End: 2025-07-10 | Stop reason: HOSPADM

## 2025-07-02 RX ORDER — PREDNISONE 5 MG/1
5 TABLET ORAL DAILY
Status: ON HOLD | COMMUNITY
Start: 2025-06-27

## 2025-07-02 RX ORDER — METOPROLOL TARTRATE 50 MG/1
50 TABLET ORAL 2 TIMES DAILY
Status: DISCONTINUED | OUTPATIENT
Start: 2025-07-03 | End: 2025-07-03

## 2025-07-02 RX ORDER — FUROSEMIDE 40 MG/1
40 TABLET ORAL 2 TIMES DAILY
Status: ON HOLD | COMMUNITY
Start: 2025-06-27

## 2025-07-02 RX ORDER — SODIUM CHLORIDE, SODIUM LACTATE, POTASSIUM CHLORIDE, CALCIUM CHLORIDE 600; 310; 30; 20 MG/100ML; MG/100ML; MG/100ML; MG/100ML
1000 INJECTION, SOLUTION INTRAVENOUS
Status: COMPLETED | OUTPATIENT
Start: 2025-07-02 | End: 2025-07-02

## 2025-07-02 RX ORDER — SODIUM BICARBONATE 650 MG/1
650 TABLET ORAL 2 TIMES DAILY
Status: DISCONTINUED | OUTPATIENT
Start: 2025-07-03 | End: 2025-07-03

## 2025-07-02 RX ORDER — IBUPROFEN 200 MG
24 TABLET ORAL
Status: DISCONTINUED | OUTPATIENT
Start: 2025-07-03 | End: 2025-07-10 | Stop reason: HOSPADM

## 2025-07-02 RX ORDER — RIVASTIGMINE 4.6 MG/24H
1 PATCH, EXTENDED RELEASE TRANSDERMAL DAILY
Status: ON HOLD | COMMUNITY
Start: 2025-06-27

## 2025-07-02 RX ORDER — MAGNESIUM SULFATE HEPTAHYDRATE 40 MG/ML
2 INJECTION, SOLUTION INTRAVENOUS ONCE
Status: COMPLETED | OUTPATIENT
Start: 2025-07-02 | End: 2025-07-02

## 2025-07-02 RX ORDER — LAMIVUDINE 100 MG/1
100 TABLET, FILM COATED ORAL DAILY
Status: DISCONTINUED | OUTPATIENT
Start: 2025-07-03 | End: 2025-07-10 | Stop reason: HOSPADM

## 2025-07-02 RX ORDER — IBUPROFEN 200 MG
16 TABLET ORAL
Status: DISCONTINUED | OUTPATIENT
Start: 2025-07-03 | End: 2025-07-10 | Stop reason: HOSPADM

## 2025-07-02 RX ORDER — ONDANSETRON 4 MG/1
8 TABLET, ORALLY DISINTEGRATING ORAL EVERY 8 HOURS PRN
Status: DISCONTINUED | OUTPATIENT
Start: 2025-07-03 | End: 2025-07-10 | Stop reason: HOSPADM

## 2025-07-02 RX ORDER — POTASSIUM CHLORIDE 20 MEQ/1
40 TABLET, EXTENDED RELEASE ORAL ONCE
Status: COMPLETED | OUTPATIENT
Start: 2025-07-03 | End: 2025-07-03

## 2025-07-02 RX ORDER — IPRATROPIUM BROMIDE AND ALBUTEROL SULFATE 2.5; .5 MG/3ML; MG/3ML
3 SOLUTION RESPIRATORY (INHALATION) EVERY 6 HOURS PRN
Status: ON HOLD | COMMUNITY
Start: 2025-06-21 | End: 2025-07-02

## 2025-07-02 RX ORDER — THYROID 30 MG/1
60 TABLET ORAL
Status: DISCONTINUED | OUTPATIENT
Start: 2025-07-03 | End: 2025-07-10 | Stop reason: HOSPADM

## 2025-07-02 RX ORDER — THIAMINE HCL 50 MG
50 TABLET ORAL DAILY
Status: DISCONTINUED | OUTPATIENT
Start: 2025-07-03 | End: 2025-07-10 | Stop reason: HOSPADM

## 2025-07-02 RX ORDER — MAGNESIUM SULFATE HEPTAHYDRATE 40 MG/ML
2 INJECTION, SOLUTION INTRAVENOUS ONCE
Status: COMPLETED | OUTPATIENT
Start: 2025-07-03 | End: 2025-07-03

## 2025-07-02 RX ORDER — POLYETHYLENE GLYCOL 3350 17 G/17G
17 POWDER, FOR SOLUTION ORAL 3 TIMES DAILY PRN
Status: DISCONTINUED | OUTPATIENT
Start: 2025-07-03 | End: 2025-07-10 | Stop reason: HOSPADM

## 2025-07-02 RX ORDER — GLUCAGON 1 MG
1 KIT INJECTION
Status: DISCONTINUED | OUTPATIENT
Start: 2025-07-03 | End: 2025-07-10 | Stop reason: HOSPADM

## 2025-07-02 RX ORDER — SIMETHICONE 80 MG
1 TABLET,CHEWABLE ORAL 4 TIMES DAILY PRN
Status: DISCONTINUED | OUTPATIENT
Start: 2025-07-03 | End: 2025-07-10 | Stop reason: HOSPADM

## 2025-07-02 RX ORDER — METOPROLOL SUCCINATE 50 MG/1
1 TABLET, EXTENDED RELEASE ORAL DAILY
COMMUNITY
End: 2025-07-02

## 2025-07-02 RX ORDER — MULTIVITAMIN
1 TABLET ORAL DAILY
Status: ON HOLD | COMMUNITY

## 2025-07-02 RX ORDER — QUETIAPINE FUMARATE 25 MG/1
25 TABLET, FILM COATED ORAL NIGHTLY
Status: ON HOLD | COMMUNITY
Start: 2025-06-27

## 2025-07-02 RX ORDER — FAMOTIDINE 20 MG/1
20 TABLET, FILM COATED ORAL NIGHTLY
Status: DISCONTINUED | OUTPATIENT
Start: 2025-07-02 | End: 2025-07-10 | Stop reason: HOSPADM

## 2025-07-02 RX ORDER — NALOXONE HCL 0.4 MG/ML
0.02 VIAL (ML) INJECTION
Status: DISCONTINUED | OUTPATIENT
Start: 2025-07-03 | End: 2025-07-10 | Stop reason: HOSPADM

## 2025-07-02 RX ORDER — TACROLIMUS 1 MG/1
1 CAPSULE ORAL EVERY MORNING
Status: DISCONTINUED | OUTPATIENT
Start: 2025-07-03 | End: 2025-07-10 | Stop reason: HOSPADM

## 2025-07-02 RX ORDER — GUAIFENESIN 600 MG/1
600 TABLET, EXTENDED RELEASE ORAL 2 TIMES DAILY
Status: ON HOLD | COMMUNITY
Start: 2025-06-27

## 2025-07-02 RX ORDER — IPRATROPIUM BROMIDE AND ALBUTEROL SULFATE 2.5; .5 MG/3ML; MG/3ML
3 SOLUTION RESPIRATORY (INHALATION) EVERY 6 HOURS PRN
Status: DISCONTINUED | OUTPATIENT
Start: 2025-07-03 | End: 2025-07-10 | Stop reason: HOSPADM

## 2025-07-02 RX ADMIN — SODIUM CHLORIDE, POTASSIUM CHLORIDE, SODIUM LACTATE AND CALCIUM CHLORIDE 1000 ML: 600; 310; 30; 20 INJECTION, SOLUTION INTRAVENOUS at 07:07

## 2025-07-02 RX ADMIN — SODIUM CHLORIDE, POTASSIUM CHLORIDE, SODIUM LACTATE AND CALCIUM CHLORIDE 2000 ML: 600; 310; 30; 20 INJECTION, SOLUTION INTRAVENOUS at 05:07

## 2025-07-02 RX ADMIN — POTASSIUM BICARBONATE 40 MEQ: 391 TABLET, EFFERVESCENT ORAL at 07:07

## 2025-07-02 RX ADMIN — MAGNESIUM SULFATE HEPTAHYDRATE 2 G: 40 INJECTION, SOLUTION INTRAVENOUS at 06:07

## 2025-07-02 NOTE — ASSESSMENT & PLAN NOTE
Currently taking tacrolimus 1 mg b.i.d.  Recommend checking tacro 12 hour trough and titrating tacro to a goal level 4 - 6

## 2025-07-02 NOTE — ASSESSMENT & PLAN NOTE
Reportedly received 1 unit PRBC during last hospitalization in May  CBC from yesterday's lab draws still pending today  We will need follow-up and possible transfusion given hypotension today  Recommend transfusion for hemoglobin < 7

## 2025-07-02 NOTE — PROGRESS NOTES
Nephrology Clinic    Patient ID: [id number]     Chief Complaint: follow up CKD in transplant kidney    HPI:     Jing Philippe is a 75 y.o. female here today for nephrology clinic office visit.     Since her last visit with KHOA Payton, she was hospitalized in May 2025 for pyelonephritis with LUCIA.  Peak creatinine 2.2 on 05/22/2025 per notes.  LUCIA thought to be due to volume depletion.  She was discharged with creatinine 1.1 on 05/28/2025.    During her hospitalization she was also evaluated by Hematology/Oncology given her history of renal amyloidosis and multiple myeloma diagnosed 03/2017 and LUCIA with proteinuria during hospitalization.  She received Zometa for hypercalcemia (corrected calcium was 12.1).  Heme/Onc did not feel that LUCIA was due to myeloma since creatinine improved during hospitalization.    Today, she reports she does not feel well.  Described as feeling fatigued, headache, dizziness. She is wearing her oxygen though no one knows why she has it.    Per ANA PAULA Manzanares (on the phone) BP this /71. She received all her standing BP meds for today. Per Le[place]on, her BP's have been 120/80's in the skilled nursing facility. She had one time 187/83 on 6/30/25. She has not had any low BP readings in the past 7 days. There is no hx CVA in the past. Her baeza catheter was changed in the facility yesterday, 7/1/25. Pt reports she has burning sensation. No hematuria in baeza in the past few days.       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    Amyloidosis, unspecified 1998    s/p chemotherapy x 4 occassions  (IV x3, po x1)    Anticoagulant long-term use     Benign hypertension with end-stage renal disease     Candida vaginitis 1/16/2015    Chronic asthma     CKD (chronic kidney disease) stage 3, GFR 30-59 ml/min 9/14/2012    GERD (gastroesophageal reflux disease)     Hyperlipidemia     Lymphedema     LLE    Need for  prophylactic immunotherapy     Obesity     Osteoarthritis of left knee s/p total knee replacement 1/16/2015    Surgery October 23, 2014    PONV (postoperative nausea and vomiting)     Renal disease due to hypertension 12/14/2012    Secondary hyperparathyroidism of renal origin     Vertigo         Past Surgical History:   Procedure Laterality Date    AV FISTULA PLACEMENT      CHOLECYSTECTOMY      HYSTERECTOMY      IMPLANTATION OF PERMANENT SACRAL NERVE STIMULATOR N/A 12/24/2019    Procedure: INSERTION, NEUROSTIMULATOR, PERMANENT, SACRAL;  Surgeon: Misael Browning MD;  Location: [facility] OR [address] [place] [address];  Service: Urology;  Laterality: N/A;  1hr  Ivania Dejesus, medtronic rep confirmed    KIDNEY TRANSPLANT      REMOVAL OF ELECTRODE LEAD OF SACRAL NERVE STIMULATOR          Social History     Tobacco Use    Smoking status: Never     Passive exposure: Never    Smokeless tobacco: Never   Substance and Sexual Activity    Alcohol use: Yes     Comment: rare margartia    Drug use: No    Sexual activity: Not on file        Current Outpatient Medications   Medication Instructions    0.9 % sodium chloride (0.9% NACL) solution     acetaminophen (TYLENOL) 650 mg, Every 6 hours PRN    albuterol (PROVENTIL HFA/VENTOLIN HFA) 200 puff inhaler 1 puff, Use PRN    albuterol (PROVENTIL/VENTOLIN HFA) 90 mcg/actuation inhaler 1 Inhaler, Every 4 hours PRN    albuterol-ipratropium (DUO-NEB) 2.5 mg-0.5 mg/3 mL nebulizer solution 3 mLs, Every 6 hours PRN    ARGINAID 4.5 gram-156 mg/9.2 gram PwPk 1 packet, 2 times daily    ARMOUR THYROID 60 mg, Before breakfast    ascorbic acid (vitamin C) (VITAMIN C) 1,000 mg, Daily    busPIRone (BUSPAR) 5 mg, 2 times daily    ciprofloxacin HCl (CIPRO) 500 mg, 2 times daily    diclofenac sodium (VOLTAREN) 1 % Gel Apply topically.    ELIQUIS 5 mg, 2 times daily    ergocalciferol (VITAMIN D2) 50,000 Units, Every 7 days    famotidine (PEPCID) 20 mg, Nightly    fluticasone propionate  (FLONASE) 50 mcg/actuation nasal spray by Each Nostril route.    furosemide (LASIX) 40 mg, 2 times daily    gabapentin (NEURONTIN) 300 mg, 3 times daily    heparin, porcine, PF, 10 unit/mL Syrg     k phos di & mono-sod phos mono (K-PHOS-NEUTRAL) 250 mg Tab 2 tablets, 3 times daily    k phos di & mono-sod phos mono (K-PHOS-NEUTRAL) 250 mg Tab 2 tablets, 3 times daily    lamivudine (EPIVIR) 100 MG Tab TAKE 1 TABLET DAILY.    linezolid (ZYVOX) 600 mg, 2 times daily    lisinopriL (PRINIVIL,ZESTRIL) 5 mg, Oral, Daily    meclizine (ANTIVERT) 25 mg tablet 1 tablet, Daily    MELATONIN ORAL Nightly    metoprolol tartrate (LOPRESSOR) 50 mg, 2 times daily    MUCINEX 600 mg, 2 times daily    multivit with min-folic acid 0.4 mg Tab 1 tablet, Daily    NIFEdipine (ADALAT CC) 60 mg, Daily    oxybutynin (DITROPAN-XL) 10 mg, Oral, Daily    pantoprazole (PROTONIX) 40 mg, Continuous PRN    potassium chloride SA (K-DUR,KLOR-CON) 20 MEQ tablet 20 mEq, Daily    predniSONE (DELTASONE) 5 mg, Daily    pregabalin (LYRICA) 150 mg, 2 times daily    QUEtiapine (SEROQUEL) 25 mg, Nightly    rivastigmine (EXELON) 4.6 mg/24 hour PT24 1 patch, Daily    sodium bicarbonate 1,300 mg, Oral, 2 times daily    tacrolimus (PROGRAF) 1 MG Cap Take 1 capsule (1 mg total) by mouth every morning AND 2 capsules (2 mg total) every evening.    thiamine (VITAMIN B-1) 50 mg, Daily    zinc gluconate 50 mg, Daily       Review of patient's allergies indicates:   Allergen Reactions    Anesthetics - jen type- parabens      Pt allergic to parabens- per pharmacist    Antihistamines - ethylenediamine     Codeine      Other reaction(s): Vomiting  Other reaction(s): Nausea    Ethanol (ethyl alcohol)     Hydrocodone      Other reaction(s): Vomiting  Other reaction(s): Nausea    Opioids - morphine analogues     Propylene glycol     Saccharin     Tylenol [acetaminophen]     Yellow dye         Patient Care Team:  Fito Land Jr., MD as PCP -  "General (Family Medicine)  Tana Goetz MD (Physical Medicine and Rehabilitation)  Christ Barnhart MD as Consulting Physician  Casimiro Null MD as Txp Nephrologist (Transplant)  Emigdio Killian MD as Current Nephrologist (Nephrology)  Anh Camp MD (Inactive) as Physician (Hematology and Oncology)  Bran Jessica MD (Nephrology)  Shellie Kwong as Registered Nurse (Advanced Heart Failure & Transplant Cardiology)  Eben Payton FNP as Nurse Practitioner (Nephrology)  Ronna Ocampo ACNP as Nurse Practitioner (Nephrology)  Seble Richmond DO as Consulting Physician (Nephrology)     Subjective:     ROS    Negative except as stated in the history of present illness. See HPI for details.    Objective:     Visit Vitals  BP (!) 60/42 (BP Location: Left arm, Patient Position: Sitting)   Pulse (!) 54   Resp 18   Ht 5' 5" (1.651 m)   SpO2 99%   BMI 39.25 kg/m²       Physical Exam  General Appearance: Patient is in no acute distress. Awake. Alert. On oxygen. In wheelchair. MM dry  Skin: No rashes or wounds.  HEENT: PERRLA, EOMI, no scleral icterus, no JVD. Neck is supple.  Chest: Respirations are unlabored. Lungs sounds are clear.   Heart: S1, S2. Bradycardia  Abdomen: Soft, Non-tender, Non-distended.  : No CVA tenderness. Polo catheter in place, yellow urine noted. No hematuria or urine sediment noted.  Extremities: RUE AVF with good bruit and thrill. LLE with 2+ pitting edema.   Neuro: No focal deficits.     Laboratory/Imaging Reviewed:     Blood:  Lab Results   Component Value Date    WBC 3.70 (L) 05/28/2025    HGB 7.2 (L) 05/28/2025    HCT 23.5 (L) 05/28/2025     (L) 05/28/2025    IRON 73 05/22/2025    TIBC 182 (L) 05/22/2025    LABIRON 40 05/22/2025    FERRITIN 872.0 (H) 05/22/2025    BHDNZEQW55 1,439 (H) 05/27/2025     05/23/2025     05/28/2025    K 3.7 05/28/2025    CO2 20 (L) 05/28/2025    BUN 14 05/28/2025    CREATININE 1.1 05/28/2025    EGFRNORACEVR 52 (L) 05/28/2025 "    CALCIUM 9.0 05/28/2025    ALKPHOS 82 05/28/2025    LABPROT 10.2 11/29/2011    ALBUMIN 2.0 (L) 05/28/2025    BILIDIR 0.1 01/26/2018    AST 12 05/28/2025    ALT 14 05/28/2025    MG 1.4 (L) 05/28/2025    PHOS 2.4 (L) 05/28/2025      Lab Results   Component Value Date    .5 (H) 04/09/2025    HEPCAB Negative 12/07/2010    HEPBSAB Positive (A) 08/05/2010    HEPBCAB Negative 08/05/2010     Urine:  Lab Results   Component Value Date    APPEARANCEUA Clear 05/22/2025    SGUA 1.020 04/09/2025    PROTEINUA 1+ (A) 05/22/2025    KETONESUA Negative 05/22/2025    LEUKOCYTESUR 2+ (A) 05/22/2025    RBCUA 7 (H) 05/22/2025    WBCUA 18 (H) 05/22/2025    BACTERIA Occasional 05/22/2025    SQEPUA 0-5 04/09/2025    HYALINECASTS 0 05/22/2025    CREATRANDUR 56.0 05/22/2025    CREATRANDUR 53.0 05/22/2025    PROTEINURINE 41 (H) 05/26/2025    PROTEINURINE 41 (H) 05/26/2025    UPROTCREA 2.6 04/09/2025      Imaging:  No results found for this or any previous visit from the past 1700 days.      All pertinent nephrology labwork and imaging independently reviewed. Discussed these findings in detail with the patient.    Assessment:       ICD-10-CM ICD-9-CM   1. S/P living-donor kidney transplantation - 12/13/11  Z94.0 V42.0   2. Acute renal failure superimposed on stage 3b chronic kidney disease, unspecified acute renal failure type  N17.9 584.9    N18.32 585.3   3. Stage 3b chronic kidney disease  N18.32 585.3   4. Hypotension due to drugs  I95.2 458.8     E947.9   5. Hypercalcemia  E83.52 275.42   6. Anemia, unspecified type  D64.9 285.9        Plan:     1. S/P living-donor kidney transplantation - 12/13/11  Overview:  LRD from sister.  Induced with Campath 30 mg and Solu-Medrol 875 mg      Assessment & Plan:  Currently taking tacrolimus 1 mg b.i.d.  Recommend checking tacro 12 hour trough and titrating tacro to a goal level 4 - 6      2. Acute renal failure superimposed on stage 3b chronic kidney disease, unspecified acute renal failure  type  Assessment & Plan:  Baseline creatinine around 1.5  She was discharged with creatinine 1.1 on 05/28/2025  BMP from yesterday shows creatinine up to 1.78    I suspect this is a prerenal LUCIA due to hypotension/hypovolemia  She has lost a lot of muscle mass so it is possible her new baseline creatinine is 1.1  Recommend follow-up with cystatin C from now on given fluctuating muscle mass due to chronic illness/hospitalization/chronic immobility    Recommend IV NS bolus for hypotension in the ER followed by repeat BMP  Recommend renal transplant ultrasound to follow-up on nephrolithiasis with mild transplant hydronephrosis noted on the last ultrasound from May.     She has a chronic indwelling Polo.  Urinalysis culture from yesterday currently negative.  Recommend close follow-up.  Per nursing Facility MAR, she is taking linezolid 600 mg b.i.d.     She needs a tacrolimus level, this does not appear to have been drawn with her labs yesterday  Goal tacrolimus 4-6 though prefer closer to 4 given her history of recurrent infections and LUCIA        3. Stage 3b chronic kidney disease  Assessment & Plan:  Baseline creatinine previously around 1.5 though lately has been around 1.1  Suspect this is due to decrease in muscle mass from chronic immobility/hospitalization    Recommend follow-up with cystatin C from now on  Please obtain [facility] for follow-up of proteinuria        4. Hypotension due to drugs  Assessment & Plan:  BP low today in the office despite multiple attempts (including a manual cuff)    Patient is symptomatic  Suspect hypovolemia due to Lasix 40 mg b.i.d.   Given 2 cups of water at in the office and ambulance called for urgent transfer to ER    She already received all of her antihypertensive meds according to ANA PAULA Manzanares from her skilled nursing facility  Recommend close follow up of BP since she takes several antihypertensive meds, r/o CVA, r/o MI    BP was 90/40 while laying flat in stretcher prior to leaving  clinic today.      5. Hypercalcemia  Assessment & Plan:  Though she had hypercalcemia during her hospitalization in May, she received Zometa  Serum calcium is 7.7 from yesterday's labs.  Serum albumin was not reported so I am not sure what the corrected calcium is    We will need repeat CMP today in the ER      6. Anemia, unspecified type  Assessment & Plan:  Reportedly received 1 unit PRBC during last hospitalization in May  CBC from yesterday's lab draws still pending today  We will need follow-up and possible transfusion given hypotension today  Recommend transfusion for hemoglobin < 7        Patient sent to ER via ambulance.    General renoprotective measures reviewed and discussed.  Avoid NSAIDs (Aleve, Mobic, Celebrex, Ibuprofen, Advil, Toradol and Diclofenac).  Only take tylenol occasionally if needed for aches/pains.    Educated on low sodium diet:  Avoid high salt foods (olives, pickles, smoked meats, deli meats, salted potato chips, etc.).   Do not add salt to your food at the table.   Use only small amounts of salt/cajun seasoning when cooking.    Recommended Daily Protein Intake for chronic kidney disease:  0.8 g/kg       Follow up in about 2 weeks (around 7/16/2025) for Blood Pressure Check, With Labwork Prior to Visit. In addition to their scheduled follow up, the patient has also been instructed to follow up on as needed basis.

## 2025-07-02 NOTE — ASSESSMENT & PLAN NOTE
Though she had hypercalcemia during her hospitalization in May, she received Zometa  Serum calcium is 7.7 from yesterday's labs.  Serum albumin was not reported so I am not sure what the corrected calcium is    We will need repeat CMP today in the ER

## 2025-07-02 NOTE — ASSESSMENT & PLAN NOTE
Baseline creatinine previously around 1.5 though lately has been around 1.1  Suspect this is due to decrease in muscle mass from chronic immobility/hospitalization    Recommend follow-up with cystatin C from now on  Please obtain [facility] for follow-up of proteinuria

## 2025-07-02 NOTE — ASSESSMENT & PLAN NOTE
BP low today in the office despite multiple attempts (including a manual cuff)    Patient is symptomatic  Suspect hypovolemia due to Lasix 40 mg b.i.d.   Given 2 cups of water at in the office and ambulance called for urgent transfer to ER    She already received all of her antihypertensive meds according to ANA PAULA Manzanares from her skilled nursing facility  Recommend close follow up of BP since she takes several antihypertensive meds, r/o CVA, r/o MI    BP was 90/40 while laying flat in stretcher prior to leaving clinic today.

## 2025-07-02 NOTE — ASSESSMENT & PLAN NOTE
Baseline creatinine around 1.5  She was discharged with creatinine 1.1 on 05/28/2025  BMP from yesterday shows creatinine up to 1.78    I suspect this is a prerenal LUCIA due to hypotension/hypovolemia  She has lost a lot of muscle mass so it is possible her new baseline creatinine is 1.1  Recommend follow-up with cystatin C from now on given fluctuating muscle mass due to chronic illness/hospitalization/chronic immobility    Recommend IV NS bolus for hypotension in the ER followed by repeat BMP  Recommend renal transplant ultrasound to follow-up on nephrolithiasis with mild transplant hydronephrosis noted on the last ultrasound from May.     She has a chronic indwelling Polo.  Urinalysis culture from yesterday currently negative.  Recommend close follow-up.  Per nursing Facility MAR, she is taking linezolid 600 mg b.i.d.     She needs a tacrolimus level, this does not appear to have been drawn with her labs yesterday  Goal tacrolimus 4-6 though prefer closer to 4 given her history of recurrent infections and LUCIA

## 2025-07-02 NOTE — ED PROVIDER NOTES
Encounter Date: 7/2/2025    SCRIBE #1 NOTE: I, Karina Knapp, am scribing for, and in the presence of,  Marco Navas MD. I have scribed the entire note.       History     Chief Complaint   Patient presents with    Hypotension     Arrives via [facility] from nephrology clinic for hypotension (initial SBP 60/40). Currently 92/50 after receiving 250 mL NS. C/o nausea. Hx kidney transplant & dementia. Baseline GCS 14     Patient is a 75 year old female with a hx of dementia and kidney transplant presents to the ED from the nephrology clinic via EMS for hypotension. Pt's initial pressure was 60/40 at the clinic, but improved to 92/50 after receiving 250 ml NS. Pt complains of nausea and vomiting. Pt also notes that she has a productive cough.         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    Amyloidosis, unspecified 1998    s/p chemotherapy x 4 occassions  (IV x3, po x1)    Anticoagulant long-term use     Benign hypertension with end-stage renal disease     Candida vaginitis 1/16/2015    Chronic asthma     CKD (chronic kidney disease) stage 3, GFR 30-59 ml/min 9/14/2012    GERD (gastroesophageal reflux disease)     Hyperlipidemia     Lymphedema     LLE    Need for prophylactic immunotherapy     Obesity     Osteoarthritis of left knee s/p total knee replacement 1/16/2015    Surgery October 23, 2014    PONV (postoperative nausea and vomiting)     Renal disease due to hypertension 12/14/2012     Secondary hyperparathyroidism of renal origin     Vertigo      Past Surgical History:   Procedure Laterality Date    AV FISTULA PLACEMENT      CHOLECYSTECTOMY      HYSTERECTOMY      IMPLANTATION OF PERMANENT SACRAL NERVE STIMULATOR N/A 12/24/2019    Procedure: INSERTION, NEUROSTIMULATOR, PERMANENT, SACRAL;  Surgeon: Misael Browning MD;  Location: [facility] OR [address] [place] [address];  Service: Urology;  Laterality: N/A;  1hr  Ivania Oleg, [employer] rep confirmed    KIDNEY TRANSPLANT      REMOVAL OF ELECTRODE LEAD OF SACRAL NERVE STIMULATOR       Family History   Problem Relation Name Age of Onset    Hypertension Mother      Diabetes Mother      Hypertension Father      Kidney disease Grandchild          recurrent infections, uncertain details     Social History[1]  Review of Systems   Constitutional:  Negative for fever.   HENT:  Negative for sore throat.    Eyes:  Negative for visual disturbance.   Respiratory:  Positive for cough. Negative for shortness of breath.    Cardiovascular:  Negative for chest pain.        Hypotension    Gastrointestinal:  Positive for nausea and vomiting. Negative for abdominal pain.   Genitourinary:  Negative for dysuria.   Musculoskeletal:  Negative for joint swelling.   Skin:  Negative for rash.   Neurological:  Negative for weakness.   Psychiatric/Behavioral:  Negative for confusion.        Physical Exam     Initial Vitals   BP Pulse Resp Temp SpO2   07/02/25 1553 07/02/25 1553 07/02/25 1608 07/03/25 2149 07/02/25 1553   (!) 92/50 (!) 55 17 97.9 °F (36.6 °C) 100 %      MAP       --                Physical Exam    Nursing note and vitals reviewed.  Constitutional: She appears well-developed and well-nourished.   HENT:   Head: Normocephalic and atraumatic.   Eyes: EOM are normal. Pupils are equal, round, and reactive to light.   Neck:   Normal range of motion.  Cardiovascular:  Normal rate, regular rhythm, normal heart sounds and intact distal pulses.           No murmur heard.  Pulmonary/Chest:  Breath sounds normal. No respiratory distress. She has no wheezes. She has no rales.   Abdominal: Abdomen is soft. She exhibits no distension. There is no abdominal tenderness. There is no rebound.   Musculoskeletal:         General: No tenderness or edema. Normal range of motion.      Cervical back: Normal range of motion.      Comments: Swelling to the left foot      Neurological: She is alert. She has normal strength. No cranial nerve deficit. GCS score is 15. GCS eye subscore is 4. GCS verbal subscore is 5. GCS motor subscore is 6.   Skin: Skin is warm and dry. Capillary refill takes less than 2 seconds. No rash noted. No erythema.   Psychiatric: She has a normal mood and affect.         ED Course   Critical Care    Date/Time: 7/2/2025 4:28 PM    Performed by: Marco Navas MD  Authorized by: Marco Navas MD  Direct patient critical care time: 15 minutes  Additional history critical care time: 8 minutes  Ordering / reviewing critical care time: 6 minutes  Documentation critical care time: 5 minutes  Total critical care time (exclusive of procedural time) : 34 minutes  Critical care time was exclusive of separately billable procedures and treating other patients and teaching time.  Critical care was necessary to treat or prevent imminent or life-threatening deterioration of the following conditions: shock.  Critical care was time spent personally by me on the following activities: development of treatment plan with patient or surrogate, discussions with consultants, interpretation of cardiac output measurements, evaluation of patient's response to treatment, examination of patient, obtaining history from patient or surrogate, ordering and performing treatments and interventions, ordering and review of laboratory studies, ordering and review of radiographic studies, pulse oximetry, re-evaluation of patient's condition and review of old charts.        Labs Reviewed   COMPREHENSIVE METABOLIC PANEL - Abnormal        Result Value    Sodium 143      Potassium 3.2 (*)     Chloride 103      CO2 28      Glucose 95      Blood Urea Nitrogen 36.0 (*)     Creatinine 1.74 (*)     Calcium 6.6 (*)     Protein Total 5.0 (*)     Albumin 2.0 (*)     Globulin 3.0      Albumin/Globulin Ratio 0.7 (*)     Bilirubin Total 0.2      ALP 74      ALT 11      AST 10 (*)     eGFR 30      Anion Gap 12.0      BUN/Creatinine Ratio 21     B-TYPE NATRIURETIC PEPTIDE - Abnormal    Natriuretic Peptide 152.3 (*)    APTT - Abnormal    PTT 45.8 (*)    PROTIME-INR - Abnormal    PT 20.9 (*)     INR 1.8 (*)     Narrative:     Protimes are used to monitor anticoagulant agents such as warfarin. PT INR values are based on the current patient normal mean and the MARQUISE value for the specific instrument reagent used.  **Routine theraputic target values for the INR are 2.0-3.0**   LACTIC ACID, PLASMA - Abnormal    Lactic Acid Level 2.6 (*)    MAGNESIUM - Abnormal    Magnesium Level 0.70 (*)    URINALYSIS, REFLEX TO URINE CULTURE - Abnormal    Color, UA Light-Yellow      Appearance, UA Turbid (*)     Specific Gravity, UA 1.007      pH, UA 6.0      Protein, UA 1+ (*)     Glucose, UA Normal      Ketones, UA Negative      Blood, UA 1+ (*)     Bilirubin, UA Negative      Urobilinogen, UA Normal      Nitrites, UA Negative      Leukocyte Esterase,  (*)     RBC, UA 0-5      WBC, UA 51-99 (*)     Bacteria, UA None Seen      Squamous Epithelial Cells, UA Trace     CBC WITH DIFFERENTIAL - Abnormal    WBC 6.87      RBC 2.82 (*)     Hgb 7.6 (*)     Hct 24.0 (*)     MCV 85.1      MCH 27.0      MCHC 31.7 (*)     RDW 18.6 (*)     Platelet 90 (*)     MPV 12.5 (*)     IPF 6.3      Neut % 67.3      Lymph % 22.1      Mono % 7.7      Eos % 1.0      Basophil % 0.6      Imm Grans % 1.3      Neut # 4.62      Lymph # 1.52      Mono # 0.53      Eos # 0.07      Baso # 0.04      Imm Gran # 0.09 (*)     NRBC% 0.0     TROPONIN I - Normal    Troponin-I 0.019     COVID/FLU A&B PCR - Normal     Influenza A PCR Not Detected      Influenza B PCR Not Detected      SARS-CoV-2 PCR Not Detected      Narrative:     The Xpert Xpress SARS-CoV-2/FLU/RSV plus is a rapid, multiplexed real-time PCR test intended for the simultaneous qualitative detection and differentiation of SARS-CoV-2, Influenza A, Influenza B, and respiratory syncytial virus (RSV) viral RNA in either nasopharyngeal swab or nasal swab specimens.         LACTIC ACID, PLASMA - Normal    Lactic Acid Level 1.4     CBC W/ AUTO DIFFERENTIAL    Narrative:     The following orders were created for panel order CBC auto differential.  Procedure                               Abnormality         Status                     ---------                               -----------         ------                     CBC with Differential[[id number]]       Abnormal            Final result                 Please view results for these tests on the individual orders.     EKG Readings: (Independently Interpreted)   Sinus bradycardia with a first-degree AV block.  Prolonged QT with a QTC of 538.  No STEMI.     ECG Results              EKG 12-lead (Final result)        Collection Time Result Time QRS Duration OHS QTC Calculation    07/02/25 16:49:05 07/02/25 17:02:10 110 538                     Final result by Cindy, Lab In [facility] (07/02/25 17:02:13)                   Narrative:    Test Reason : I95.9,    Vent. Rate :  53 BPM     Atrial Rate :  53 BPM     P-R Int : 216 ms          QRS Dur : 110 ms      QT Int : 574 ms       P-R-T Axes :  67  53  75 degrees    QTcB Int : 538 ms    Sinus bradycardia with 1st degree A-V block  Prolonged QT  Abnormal ECG    Confirmed by Maximilian Wyatt ([id number]) on 7/2/2025 5:02:09 PM    Referred By: AAAREFERRAL SELF           Confirmed By: Maximilian Wyatt                                  Imaging Results              X-Ray Foot Complete Left (Final result)  Result time 07/02/25 18:16:29      Final result by Derrick Reveles MD (07/02/25  18:16:29)                   Impression:      As above.      Electronically signed by: Derrick Reveles  Date:    07/02/2025  Time:    18:16               Narrative:    EXAMINATION:  XR FOOT COMPLETE 3 VIEW LEFT    CLINICAL HISTORY:  Congenital malformation, unspecified    COMPARISON:  None    FINDINGS:  Three views of the left foot.  No convincing acute fracture or dislocation.  Suspect Charcot joint with severe degenerative changes of the hindfoot.                                       X-Ray Chest AP Portable (Final result)  Result time 07/02/25 17:05:31      Final result by Jeramy Enamorado MD (07/02/25 17:05:31)                   Impression:      No acute cardiopulmonary process identified..      Electronically signed by: Jeramy Enamorado  Date:    07/02/2025  Time:    17:05               Narrative:    EXAMINATION:  XR CHEST AP PORTABLE    CLINICAL HISTORY:  Hypotension, unspecified    TECHNIQUE:  One view    COMPARISON:  February 23, 2025.    FINDINGS:  Cardiopericardial silhouette is within normal limits.  Obscured hilar and the mediastinal structures.  Lungs hypoventilatory changes without dense focal or segmental consolidation, congestive process, pleural effusions or pneumothorax.                                       Medications   thyroid (pork) tablet 60 mg (60 mg Oral Given 7/6/25 0519)   ascorbic acid (vitamin C) tablet 1,000 mg (1,000 mg Oral Given 7/6/25 0844)   famotidine tablet 20 mg (20 mg Oral Given 7/5/25 2152)   lamiVUDine tablet 100 mg (100 mg Oral Not Given 7/6/25 0800)   predniSONE tablet 5 mg (5 mg Oral Given 7/6/25 0844)   rivastigmine 4.6 mg/24 hour 1 patch (1 patch Transdermal Patch Applied 7/6/25 0845)   thiamine tablet 50 mg (50 mg Oral Given 7/6/25 0844)   sodium chloride 0.9% flush 10 mL (has no administration in time range)   albuterol-ipratropium 2.5 mg-0.5 mg/3 mL nebulizer solution 3 mL (3 mLs Nebulization Given 7/6/25 0859)   melatonin tablet 9 mg (has no administration in time range)    ondansetron disintegrating tablet 8 mg (has no administration in time range)   ondansetron injection 4 mg (has no administration in time range)   polyethylene glycol packet 17 g (has no administration in time range)   bisacodyL suppository 10 mg (has no administration in time range)   simethicone chewable tablet 80 mg (has no administration in time range)   aluminum-magnesium hydroxide-simethicone 200-200-20 mg/5 mL suspension 30 mL (has no administration in time range)   naloxone 0.4 mg/mL injection 0.02 mg (has no administration in time range)   glucose chewable tablet 16 g (has no administration in time range)   glucose chewable tablet 24 g (has no administration in time range)   dextrose 50% injection 12.5 g (has no administration in time range)   dextrose 50% injection 25 g (has no administration in time range)   glucagon (human recombinant) injection 1 mg (has no administration in time range)   tacrolimus capsule 1 mg (1 mg Oral Given 7/6/25 0844)     And   tacrolimus capsule 2 mg (2 mg Oral Given 7/6/25 1747)   mupirocin 2 % ointment ( Nasal Given 7/6/25 0845)   quetiapine split tablet 12.5 mg (12.5 mg Oral Given 7/5/25 2152)   metoprolol tartrate (LOPRESSOR) split tablet 12.5 mg (12.5 mg Oral Given 7/6/25 0844)   zinc oxide-cod liver oil 40 % paste ( Topical (Top) Given 7/6/25 0844)   calcitRIOL capsule 0.5 mcg (0.5 mcg Oral Given 7/6/25 0844)   cinacalcet tablet 30 mg (30 mg Oral Given 7/6/25 0844)   enoxaparin injection 90 mg (0 mg Subcutaneous Hold 7/5/25 2115)   fluconazole tablet 100 mg (100 mg Oral Given 7/6/25 0844)   0.45% NaCl infusion ( Intravenous New Bag 7/6/25 0915)   LIDOcaine (PF) 10 mg/ml (1%) injection 10 mg (has no administration in time range)   LIDOcaine HCL 10 mg/ml (1%) 10 mg/mL (1 %) injection (has no administration in time range)   magnesium sulfate 2g in water 50mL IVPB (premix) (0 g Intravenous Stopped 7/2/25 2488)   lactated ringers bolus 2,000 mL (0 mLs Intravenous Stopped  7/2/25 1807)   potassium bicarbonate disintegrating tablet 40 mEq (40 mEq Oral Given 7/2/25 1956)   lactated ringers infusion (1,000 mLs Intravenous New Bag 7/2/25 1956)   magnesium sulfate 2g in water 50mL IVPB (premix) (0 g Intravenous Stopped 7/3/25 0337)     Followed by   magnesium sulfate 2g in water 50mL IVPB (premix) (0 g Intravenous Stopped 7/3/25 0542)   potassium chloride SA CR tablet 40 mEq (40 mEq Oral Given 7/3/25 0123)   calcium gluconate 1 g in NS IVPB (premixed) (0 g Intravenous Stopped 7/3/25 1138)   epoetin nelson-epbx injection 20,000 Units (20,000 Units Subcutaneous Given 7/5/25 0957)     Medical Decision Making  Judging by the patient's chief complaint and pertinent history, the patient has the following possible differential diagnoses, including but not limited to the following.  Some of these are deemed to be lower likelihood and some more likely based on my physical exam and history combined with possible lab work and/or imaging studies.   Please see the pertinent studies, and refer to the HPI.  Some of these diagnoses will take further evaluation to fully rule out, perhaps as an outpatient and the patient was encouraged to follow up when discharged for more comprehensive evaluation.    Generalized weakness: anemia, dehydration, electrolyte derangement, hypoglycemia, infection, intoxication, respiratory failure, toxic ingestion, ACS, thyroid disease, medications, psychiatric, autoimmune, rhabdomyolysis, myositis, peripheral neuropathy     Patient is a 75-year-old female presents to emergency department for weakness, hypotension.  See HPI.  See physical exam.  Mild LUCIA.  Given IV fluids.  Labs and imaging as noted.  Discussed case with hospital medicine for admission.  All results discussed with the patient and family.  Answered all questions at this time.  Verbalized understanding and agreed to plan.      Problems Addressed:  LUCIA (acute kidney injury): acute illness or injury that poses a  threat to life or bodily functions  Hypotension: acute illness or injury that poses a threat to life or bodily functions  Left foot pain: acute illness or injury that poses a threat to life or bodily functions    Amount and/or Complexity of Data Reviewed  Labs: ordered.  Radiology: ordered.    Risk  Prescription drug management.  Parenteral controlled substances.  Decision regarding hospitalization.  Diagnosis or treatment significantly limited by social determinants of health.            Scribe Attestation:   Scribe #1: I performed the above scribed service and the documentation accurately describes the services I performed. I attest to the accuracy of the note.    Attending Attestation:           Physician Attestation for Scribe:  Physician Attestation Statement for Scribe #1: I, Marco Navas MD, reviewed documentation, as scribed by Karina Knapp in my presence, and it is both accurate and complete.             ED Course as of 07/06/25 2014 Wed Jul 02, 2025   1655 Sinus Britton with a first-degree AV block.  Prolonged QT with a QTC of 538.  No STEMI. [RP]   1847 Paged [facility] medicine [MB]      ED Course User Index  [MB] Esteban Ponce  [RP] Marco Navas MD                           Clinical Impression:  Final diagnoses:  [I95.9] Hypotension  [Q89.9] Deformity  [N17.9] LUCIA (acute kidney injury) (Primary)  [M79.672] Left foot pain          ED Disposition Condition    Admit                       [1]   Social History  Tobacco Use    Smoking status: Never     Passive exposure: Never    Smokeless tobacco: Never   Substance Use Topics    Alcohol use: Yes     Comment: rare margartia    Drug use: No        Marco Navas MD  07/06/25 2016

## 2025-07-03 LAB
ADV 40+41 DNA STL QL NAA+NON-PROBE: NOT DETECTED
ANION GAP SERPL CALC-SCNC: 6 MEQ/L
ASTRO TYP 1-8 RNA STL QL NAA+NON-PROBE: NOT DETECTED
BASOPHILS # BLD AUTO: 0.04 X10(3)/MCL
BASOPHILS NFR BLD AUTO: 0.6 %
BUN SERPL-MCNC: 32.8 MG/DL (ref 9.8–20.1)
C CAYETANENSIS DNA STL QL NAA+NON-PROBE: NOT DETECTED
C COLI+JEJ+UPSA DNA STL QL NAA+NON-PROBE: NOT DETECTED
C DIFF TOX A+B STL QL IA: NEGATIVE
CALCIUM SERPL-MCNC: 6.9 MG/DL (ref 8.4–10.2)
CHLORIDE SERPL-SCNC: 95 MMOL/L (ref 98–107)
CLOSTRIDIOIDES DIFFICILE GDH ANTIGEN (OHS): NEGATIVE
CO2 SERPL-SCNC: 31 MMOL/L (ref 23–31)
CREAT SERPL-MCNC: 1.67 MG/DL (ref 0.55–1.02)
CREAT UR-MCNC: 17.5 MG/DL (ref 45–106)
CREAT/UREA NIT SERPL: 20
CRYPTOSP DNA STL QL NAA+NON-PROBE: NOT DETECTED
E HISTOLYT DNA STL QL NAA+NON-PROBE: NOT DETECTED
EAEC PAA PLAS AGGR+AATA ST NAA+NON-PRB: NOT DETECTED
EC STX1+STX2 GENES STL QL NAA+NON-PROBE: NOT DETECTED
EOSINOPHIL # BLD AUTO: 0.07 X10(3)/MCL (ref 0–0.9)
EOSINOPHIL NFR BLD AUTO: 1 %
EPEC EAE GENE STL QL NAA+NON-PROBE: NOT DETECTED
ERYTHROCYTE [DISTWIDTH] IN BLOOD BY AUTOMATED COUNT: 18.6 % (ref 11.5–17)
ETEC LTA+ST1A+ST1B TOX ST NAA+NON-PROBE: NOT DETECTED
G LAMBLIA DNA STL QL NAA+NON-PROBE: NOT DETECTED
GFR SERPLBLD CREATININE-BSD FMLA CKD-EPI: 31 ML/MIN/1.73/M2
GLUCOSE SERPL-MCNC: 96 MG/DL (ref 82–115)
HAV IGM SERPL QL IA: NONREACTIVE
HBV CORE IGM SERPL QL IA: NONREACTIVE
HBV SURFACE AG SERPL QL IA: NONREACTIVE
HCT VFR BLD AUTO: 29.4 % (ref 37–47)
HCV AB SERPL QL IA: NONREACTIVE
HGB BLD-MCNC: 9 G/DL (ref 12–16)
HIV 1+2 AB+HIV1 P24 AG SERPL QL IA: NONREACTIVE
IMM GRANULOCYTES # BLD AUTO: 0.12 X10(3)/MCL (ref 0–0.04)
IMM GRANULOCYTES NFR BLD AUTO: 1.8 %
LYMPHOCYTES # BLD AUTO: 1.5 X10(3)/MCL (ref 0.6–4.6)
LYMPHOCYTES NFR BLD AUTO: 22.2 %
MAGNESIUM SERPL-MCNC: 1.8 MG/DL (ref 1.6–2.6)
MCH RBC QN AUTO: 26.5 PG (ref 27–31)
MCHC RBC AUTO-ENTMCNC: 30.6 G/DL (ref 33–36)
MCV RBC AUTO: 86.5 FL (ref 80–94)
MONOCYTES # BLD AUTO: 0.5 X10(3)/MCL (ref 0.1–1.3)
MONOCYTES NFR BLD AUTO: 7.4 %
NEUTROPHILS # BLD AUTO: 4.54 X10(3)/MCL (ref 2.1–9.2)
NEUTROPHILS NFR BLD AUTO: 67 %
NOROVIRUS GI+II RNA STL QL NAA+NON-PROBE: NOT DETECTED
NRBC BLD AUTO-RTO: 0 %
P SHIGELLOIDES DNA STL QL NAA+NON-PROBE: NOT DETECTED
PLATELET # BLD AUTO: 98 X10(3)/MCL (ref 130–400)
PLATELETS.RETICULATED NFR BLD AUTO: 8.5 % (ref 0.9–11.2)
PMV BLD AUTO: 12.4 FL (ref 7.4–10.4)
POTASSIUM SERPL-SCNC: 3.5 MMOL/L (ref 3.5–5.1)
PROT UR STRIP-MCNC: 74.3 MG/DL
RBC # BLD AUTO: 3.4 X10(6)/MCL (ref 4.2–5.4)
RVA RNA STL QL NAA+NON-PROBE: NOT DETECTED
S ENT+BONG DNA STL QL NAA+NON-PROBE: NOT DETECTED
SAPO I+II+IV+V RNA STL QL NAA+NON-PROBE: NOT DETECTED
SHIGELLA SP+EIEC IPAH ST NAA+NON-PROBE: NOT DETECTED
SODIUM SERPL-SCNC: 132 MMOL/L (ref 136–145)
SODIUM UR-SCNC: 57 MMOL/L
TSH SERPL-ACNC: 1.74 UIU/ML (ref 0.35–4.94)
URINE PROTEIN/CREATININE RATIO (OLG): 4.2
V CHOL+PARA+VUL DNA STL QL NAA+NON-PROBE: NOT DETECTED
V CHOLERAE DNA STL QL NAA+NON-PROBE: NOT DETECTED
WBC # BLD AUTO: 6.77 X10(3)/MCL (ref 4.5–11.5)
Y ENTEROCOL DNA STL QL NAA+NON-PROBE: NOT DETECTED

## 2025-07-03 PROCEDURE — 25000003 PHARM REV CODE 250: Performed by: STUDENT IN AN ORGANIZED HEALTH CARE EDUCATION/TRAINING PROGRAM

## 2025-07-03 PROCEDURE — 85025 COMPLETE CBC W/AUTO DIFF WBC: CPT | Performed by: STUDENT IN AN ORGANIZED HEALTH CARE EDUCATION/TRAINING PROGRAM

## 2025-07-03 PROCEDURE — 80074 ACUTE HEPATITIS PANEL: CPT | Performed by: NURSE PRACTITIONER

## 2025-07-03 PROCEDURE — 86318 IA INFECTIOUS AGENT ANTIBODY: CPT | Performed by: STUDENT IN AN ORGANIZED HEALTH CARE EDUCATION/TRAINING PROGRAM

## 2025-07-03 PROCEDURE — 25000003 PHARM REV CODE 250: Performed by: NURSE PRACTITIONER

## 2025-07-03 PROCEDURE — 84300 ASSAY OF URINE SODIUM: CPT | Performed by: NURSE PRACTITIONER

## 2025-07-03 PROCEDURE — 63600175 PHARM REV CODE 636 W HCPCS: Performed by: STUDENT IN AN ORGANIZED HEALTH CARE EDUCATION/TRAINING PROGRAM

## 2025-07-03 PROCEDURE — 36415 COLL VENOUS BLD VENIPUNCTURE: CPT | Performed by: STUDENT IN AN ORGANIZED HEALTH CARE EDUCATION/TRAINING PROGRAM

## 2025-07-03 PROCEDURE — 21400001 HC TELEMETRY ROOM

## 2025-07-03 PROCEDURE — 83735 ASSAY OF MAGNESIUM: CPT | Performed by: STUDENT IN AN ORGANIZED HEALTH CARE EDUCATION/TRAINING PROGRAM

## 2025-07-03 PROCEDURE — 87389 HIV-1 AG W/HIV-1&-2 AB AG IA: CPT | Performed by: NURSE PRACTITIONER

## 2025-07-03 PROCEDURE — 87077 CULTURE AEROBIC IDENTIFY: CPT

## 2025-07-03 PROCEDURE — 87507 IADNA-DNA/RNA PROBE TQ 12-25: CPT

## 2025-07-03 PROCEDURE — 84156 ASSAY OF PROTEIN URINE: CPT | Performed by: NURSE PRACTITIONER

## 2025-07-03 PROCEDURE — 80048 BASIC METABOLIC PNL TOTAL CA: CPT | Performed by: STUDENT IN AN ORGANIZED HEALTH CARE EDUCATION/TRAINING PROGRAM

## 2025-07-03 PROCEDURE — 99223 1ST HOSP IP/OBS HIGH 75: CPT | Mod: ,,, | Performed by: NURSE PRACTITIONER

## 2025-07-03 RX ORDER — SODIUM CHLORIDE 9 MG/ML
INJECTION, SOLUTION INTRAVENOUS CONTINUOUS
Status: DISCONTINUED | OUTPATIENT
Start: 2025-07-03 | End: 2025-07-05

## 2025-07-03 RX ORDER — MAGNESIUM SULFATE HEPTAHYDRATE 40 MG/ML
2 INJECTION, SOLUTION INTRAVENOUS ONCE
Status: DISCONTINUED | OUTPATIENT
Start: 2025-07-03 | End: 2025-07-03

## 2025-07-03 RX ORDER — CALCIUM GLUCONATE 20 MG/ML
1 INJECTION, SOLUTION INTRAVENOUS ONCE
Status: COMPLETED | OUTPATIENT
Start: 2025-07-03 | End: 2025-07-03

## 2025-07-03 RX ORDER — METOPROLOL TARTRATE 25 MG/1
12.5 TABLET ORAL 2 TIMES DAILY
Status: DISCONTINUED | OUTPATIENT
Start: 2025-07-03 | End: 2025-07-10 | Stop reason: HOSPADM

## 2025-07-03 RX ORDER — MUPIROCIN 20 MG/G
OINTMENT TOPICAL 2 TIMES DAILY
Status: DISPENSED | OUTPATIENT
Start: 2025-07-03 | End: 2025-07-08

## 2025-07-03 RX ORDER — LINEZOLID 600 MG/1
600 TABLET, FILM COATED ORAL 2 TIMES DAILY
Status: DISCONTINUED | OUTPATIENT
Start: 2025-07-03 | End: 2025-07-03

## 2025-07-03 RX ADMIN — CEFTRIAXONE SODIUM 1 G: 1 INJECTION, POWDER, FOR SOLUTION INTRAMUSCULAR; INTRAVENOUS at 01:07

## 2025-07-03 RX ADMIN — SODIUM CHLORIDE: 9 INJECTION, SOLUTION INTRAVENOUS at 06:07

## 2025-07-03 RX ADMIN — CALCIUM GLUCONATE 1 G: 20 INJECTION, SOLUTION INTRAVENOUS at 10:07

## 2025-07-03 RX ADMIN — Medication: at 08:07

## 2025-07-03 RX ADMIN — MAGNESIUM SULFATE HEPTAHYDRATE 2 G: 40 INJECTION, SOLUTION INTRAVENOUS at 01:07

## 2025-07-03 RX ADMIN — LINEZOLID 600 MG: 600 TABLET, FILM COATED ORAL at 08:07

## 2025-07-03 RX ADMIN — FAMOTIDINE 20 MG: 20 TABLET, FILM COATED ORAL at 08:07

## 2025-07-03 RX ADMIN — TACROLIMUS 1 MG: 1 CAPSULE ORAL at 08:07

## 2025-07-03 RX ADMIN — Medication 50 MG: at 08:07

## 2025-07-03 RX ADMIN — SODIUM BICARBONATE 650 MG: 650 TABLET ORAL at 08:07

## 2025-07-03 RX ADMIN — Medication 1000 MG: at 08:07

## 2025-07-03 RX ADMIN — SODIUM CHLORIDE, POTASSIUM CHLORIDE, SODIUM LACTATE AND CALCIUM CHLORIDE: 600; 310; 30; 20 INJECTION, SOLUTION INTRAVENOUS at 01:07

## 2025-07-03 RX ADMIN — LEVOTHYROXINE, LIOTHYRONINE 60 MG: 19; 4.5 TABLET ORAL at 05:07

## 2025-07-03 RX ADMIN — SODIUM CHLORIDE: 9 INJECTION, SOLUTION INTRAVENOUS at 10:07

## 2025-07-03 RX ADMIN — MUPIROCIN: 20 OINTMENT TOPICAL at 08:07

## 2025-07-03 RX ADMIN — FAMOTIDINE 20 MG: 20 TABLET, FILM COATED ORAL at 01:07

## 2025-07-03 RX ADMIN — RIVASTIGMINE 1 PATCH: 4.6 PATCH, EXTENDED RELEASE TRANSDERMAL at 08:07

## 2025-07-03 RX ADMIN — APIXABAN 5 MG: 5 TABLET, FILM COATED ORAL at 08:07

## 2025-07-03 RX ADMIN — POTASSIUM CHLORIDE EXTENDED-RELEASE 40 MEQ: 1500 TABLET ORAL at 01:07

## 2025-07-03 RX ADMIN — METOPROLOL TARTRATE 50 MG: 50 TABLET, FILM COATED ORAL at 08:07

## 2025-07-03 RX ADMIN — TACROLIMUS 2 MG: 1 CAPSULE ORAL at 05:07

## 2025-07-03 RX ADMIN — METOPROLOL TARTRATE 12.5 MG: 25 TABLET, FILM COATED ORAL at 08:07

## 2025-07-03 RX ADMIN — PREDNISONE 5 MG: 5 TABLET ORAL at 08:07

## 2025-07-03 RX ADMIN — Medication: at 05:07

## 2025-07-03 RX ADMIN — MAGNESIUM SULFATE HEPTAHYDRATE 2 G: 40 INJECTION, SOLUTION INTRAVENOUS at 03:07

## 2025-07-03 RX ADMIN — QUETIAPINE FUMARATE 12.5 MG: 25 TABLET ORAL at 09:07

## 2025-07-03 NOTE — CONSULTS
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    Amyloidosis, unspecified 1998    s/p chemotherapy x 4 occassions  (IV x3, po x1)    Anticoagulant long-term use     Benign hypertension with end-stage renal disease     Candida vaginitis 1/16/2015    Chronic asthma     CKD (chronic kidney disease) stage 3, GFR 30-59 ml/min 9/14/2012    GERD (gastroesophageal reflux disease)     Hyperlipidemia     Lymphedema     LLE    Need for prophylactic immunotherapy     Obesity     Osteoarthritis of left knee s/p total knee replacement 1/16/2015    Surgery October 23, 2014    PONV (postoperative nausea and vomiting)     Renal disease due to hypertension 12/14/2012    Secondary hyperparathyroidism of renal origin     Vertigo        Social History[1]    Precautions:     Allergies as of 07/02/2025 - Reviewed 07/02/2025   Allergen Reaction Noted    Anesthetics - jen type- parabens  04/10/2025    Antihistamines - ethylenediamine  04/10/2025    Codeine  06/14/2012    Ethanol (ethyl alcohol)  04/10/2025    Hydrocodone  06/14/2012    Opioids - morphine analogues  04/10/2025    Propylene glycol  04/10/2025    Saccharin  04/10/2025    Tylenol [acetaminophen]  04/10/2025    Yellow dye  04/10/2025       WO Assessment Details/Treatment        07/03/25 1032   WOCN Assessment   Visit Date 07/03/25   Visit Time 1032   Consult Type New   [facility] Wound   Wound pressure;moisture   Intervention assessed;chart review;orders   Teaching on-going   Skin Interventions   Pressure Reduction Devices specialty bed utilized        Wound 05/22/25 1100 Pressure Injury Sacral spine   Date First Assessed/Time First Assessed: 05/22/25 1100   Present on Original Admission: Yes  Primary Wound Type:  Pressure Injury  Location: Sacral spine  Is this injury device related?: No   Wound Image    Pressure Injury Stage 3   Dressing Appearance Open to air   Drainage Amount Scant   Drainage Characteristics/Odor Sanguineous   Appearance Red   Tissue loss description Full thickness   Red (%), Wound Tissue Color 100 %   Periwound Area Moist;Redness   Wound Edges Irregular   Wound Length (cm) 11 cm   Wound Width (cm) 8 cm   Wound Depth (cm) 0.2 cm   Wound Volume (cm^3) 9.215 cm^3   Wound Surface Area (cm^2) 69.11 cm^2   Care Cleansed with:;Antimicrobial agent   Dressing Applied;Paste     WOCN consulted for sacrum. Family at bedside. Educated patient on the importance of turning every 2 hours, heel lift boots and wedge.  Treatment recommendations put into place.  Sacrum: Cleanse with NS. Apply Desitin, cover with ABD pad, secure with medipore tape. BID and PRN. Keep areas clean and dry, no adult briefs while in bed. Nursing to continue with turning every two hours, wedge, and floating heels.  Head of bed elevated, bed in lowest position, and call bell within reach. On MAYKEL mattress. Will follow up.    07/03/2025         [1]   Social History  Socioeconomic History    Marital status:    Tobacco Use    Smoking status: Never     Passive exposure: Never    Smokeless tobacco: Never   Substance and Sexual Activity    Alcohol use: Yes     Comment: rare margartia    Drug use: No   Social History Narrative    Inez used to work as , now retired for medical reasons.     Social Drivers of Health     Financial Resource Strain: Low Risk  (5/23/2025)    Overall Financial Resource Strain (CARDIA)     Difficulty of Paying Living Expenses: Not very hard   Food Insecurity: No Food Insecurity (5/23/2025)    Hunger Vital Sign     Worried About Running Out of Food in the Last Year: Never true     Ran Out of Food in the Last Year: Never true   Transportation Needs: No Transportation Needs (5/23/2025)    PRAPARE - Transportation      Lack of Transportation (Medical): No     Lack of Transportation (Non-Medical): No   Physical Activity: Inactive (5/23/2025)    Exercise Vital Sign     Days of Exercise per Week: 0 days     Minutes of Exercise per Session: 0 min   Stress: No Stress Concern Present (5/23/2025)    [employer] [place] [employer] - Occupational Stress Questionnaire     Feeling of Stress : Not at all   Housing Stability: Low Risk  (5/23/2025)    Housing Stability Vital Sign     Unable to Pay for Housing in the Last Year: No     Homeless in the Last Year: No

## 2025-07-03 NOTE — CONSULTS
[facility] [place]    Patient Name: Jing Philippe  Admission Date: 7/2/2025  Hospital Length of Stay: 1 days  Code Status: Full Code   Attending Physician: Ping Frazier DO   Primary Care Physician: Fito Land Jr., MD  Principal Problem:LUCIA (acute kidney injury)    HPI:    Jing Philippe 75 y.o. female presented to the hospital on 7/2/2025.  She has a history of living related donor transplant in 2011, CKD 3B, renal amyloidosis, upper extremity DVT, and CVA, and hypertension.  Patient was hospitalized recently then discharged to rehab and subsequently to [facility] nursing facility.  She was admitted initially for pyelonephritis and LUCIA.  Noted creatinine peaked at 2.2.  During hospitalization she was treated for hypercalcemia nurse as well.  LUCIA resolved and at discharge her creatinine was 1.1.  She presented for follow up to [facility] yesterday and was noted to be hypotensive with BP 65/42.  Of note she had chronic indwelling Polo and was complaining of some dysuria.  It also appears that she was discharged home on Lasix 40 mg b.i.d. at last visit in the office her Lasix was decreased to 3 times a week.  Patient was sent from the office directly to the emergency room.  Workup for UTI, diarrhea, sepsis already in place.  Blood cultures, stool culture urine culture pending.  Stool C diff negative.  Patient also found to have sacral wound.  We have been consulted for LUCIA on CKD IIIb.  Patient is awake and alert during my visit.  She does answer a telephone calls became with her family member.  She denies any CP, SOB at present.  Does complain of diarrhea.  She voices to me she is concerned about her kidney.        Medical History:   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    Amyloidosis, unspecified 1998    s/p chemotherapy x 4 occassions  (IV x3, po x1)    Anticoagulant long-term use     Benign hypertension with  end-stage renal disease     Candida vaginitis 1/16/2015    Chronic asthma     CKD (chronic kidney disease) stage 3, GFR 30-59 ml/min 9/14/2012    GERD (gastroesophageal reflux disease)     Hyperlipidemia     Lymphedema     LLE    Need for prophylactic immunotherapy     Obesity     Osteoarthritis of left knee s/p total knee replacement 1/16/2015    Surgery October 23, 2014    PONV (postoperative nausea and vomiting)     Renal disease due to hypertension 12/14/2012    Secondary hyperparathyroidism of renal origin     Vertigo        Surgical History:   Past Surgical History:   Procedure Laterality Date    AV FISTULA PLACEMENT      CHOLECYSTECTOMY      HYSTERECTOMY      IMPLANTATION OF PERMANENT SACRAL NERVE STIMULATOR N/A 12/24/2019    Procedure: INSERTION, NEUROSTIMULATOR, PERMANENT, SACRAL;  Surgeon: Misael Browning MD;  Location: [facility] OR [address] [place] [address];  Service: Urology;  Laterality: N/A;  1hr  Ivania Dejesus, medjillian rep confirmed    KIDNEY TRANSPLANT      REMOVAL OF ELECTRODE LEAD OF SACRAL NERVE STIMULATOR         Family History:   Family History   Problem Relation Name Age of Onset    Hypertension Mother      Diabetes Mother      Hypertension Father      Kidney disease Grandchild          recurrent infections, uncertain details   .     Social History:   Social History     Tobacco Use    Smoking status: Never     Passive exposure: Never    Smokeless tobacco: Never   Substance Use Topics    Alcohol use: Yes     Comment: rare margartia       Allergies:  Review of patient's allergies indicates:   Allergen Reactions    Anesthetics - jen type- parabens      Pt allergic to parabens- per pharmacist    Antihistamines - ethylenediamine     Codeine      Other reaction(s): Vomiting  Other reaction(s): Nausea    Ethanol (ethyl alcohol)     Hydrocodone      Other reaction(s): Vomiting  Other reaction(s): Nausea    Opioids - morphine analogues     Propylene glycol     Saccharin     Tylenol [acetaminophen]     Yellow dye   "        Review of Systems:  Constitutional: Denies fever, fatigue, chills, or ++generalized weakness  Skin: Denies wounds, rashes, no skin lesions or itching  EENT: Denies acute hearing/vision changes, tinnitus, rhinorrhea or dysphagia  Respiratory:  Denies cough, shortness of breath, or wheezing  Cardiovascular: Denies chest pain, palpitations, or swelling  Gastrointestional: Denies abdominal pain, nausea, vomiting, diarrhea or constipation  Genitourinary+dysuria, denies hematuria, foamy urine or incontinence; able to empty bladder  Musculoskeletal: Denies myalgias, back pain, flank pain, new decreased ROM or acute focal weakness  Neurological: Denies headaches, seizures, paresthesias, dizziness or asterixis  Hematological: Denies unusual bruising or bleeding  Psychiatric: Denies psychiatric concerns, hallucinations, or depression; no confusion    Medications:  Current Medications[1]     Scheduled Meds:   apixaban  5 mg Oral BID    ascorbic acid (vitamin C)  1,000 mg Oral Daily    calcium gluconate 1 g  1 g Intravenous Once    cefTRIAXone (Rocephin) IV (PEDS and ADULTS)  1 g Intravenous Q24H    famotidine  20 mg Oral Nightly    lamiVUDine  100 mg Oral Daily    metoprolol tartrate  50 mg Oral BID    mupirocin   Nasal BID    predniSONE  5 mg Oral Daily    QUEtiapine  25 mg Oral QHS    rivastigmine  1 patch Transdermal Daily    sodium bicarbonate  650 mg Oral BID    tacrolimus  1 mg Oral Daily AM    And    tacrolimus  2 mg Oral Daily PM    thiamine  50 mg Oral Daily    thyroid (pork)  60 mg Oral Before breakfast     Continuous Infusions:   lactated ringers   Intravenous Continuous 100 mL/hr at 07/03/25 0134 New Bag at 07/03/25 0134         Objective:  BP (!) 135/59   Pulse 60   Resp 20   Ht 5' 5" (1.651 m)   Wt 89.4 kg (197 lb)   SpO2 100%   BMI 32.78 kg/m²  Body mass index is 32.78 kg/m².    No intake/output data recorded.  No intake/output data recorded.        Physical Exam:  General: no acute distress, " awake, alert  Eyes: PERRLA, EOMI, conjunctiva clear, eyelids without swelling  HENT: atraumatic, oropharynx and nasal mucosa patent, no difficulty hearing  Neck: full ROM, no JVD, no thyromegaly or lymphadenopathy  Respiratory: equal, unlabored, clear to auscultation A/P  Cardiovascular: RRR without murmur or rub;   Edema: Trace ble edema   Gastrointestinal: soft, non-tender, non-distended; positive bowel sounds; no masses to palpation  Genitourinary: no CVA tenderness upon palpation; + baeza   Musculoskeletal: no new  limitation or discomfort  Integumentary: warm, dry; no rashes, wounds, or skin lesions  Neurological: appropriate, no acute deficits      Labs:  Chemistries:   Recent Labs   Lab 07/02/25 1737 07/03/25 0312    132*   K 3.2* 3.5    95*   CO2 28 31   BUN 36.0* 32.8*   CREATININE 1.74* 1.67*   CALCIUM 6.6* 6.9*   PROT 5.0*  --    BILITOT 0.2  --    ALKPHOS 74  --    ALT 11  --    AST 10*  --    MG 0.70* 1.80        CBC/Anemia Labs: Coags:    Recent Labs   Lab 07/02/25 1737 07/03/25 0312   WBC 6.87 6.77   HGB 7.6* 9.0*   HCT 24.0* 29.4*   PLT 90* 98*   MCV 85.1 86.5   RDW 18.6* 18.6*    Recent Labs   Lab 07/02/25 1737   INR 1.8*   APTT 45.8*          Impression:    LUCIA on CKD IIIb likely due to intravascular volume depletion and dehydration.  History of living related renal transplant in 2011 on chronic immunosuppression therapy   History of renal amyloidosis   Hypotension with volume depletion.  Also ruling out infectious process. Linezolid bid resumed on admission.   Urinary retention with indwelling baeza new since recent hospitalization in may   Anemia-multifactorial with history of renal amyloidosis, CKD. Patient also on OAC for DVT-required PRBC transfusion during hospitalization in May. HGB up to 9.0 today.   Thrombocytopenia   Corrected calcium 8.5      Plan:  BP better with holding antihypertensives and fluid resuscitation.  Change IV fluids to saline  Agree with holding lasix and  lisinopril  Check prograf level/PTH  Accurate I/O  Blood, urine, stool cultures pending   Stop sodium bicarbonate.   Transplant ultrasound          Ronna JACOB Ocampo [facility]        Thank you for this consult.         [1]   Current Facility-Administered Medications:     albuterol-ipratropium 2.5 mg-0.5 mg/3 mL nebulizer solution 3 mL, 3 mL, Nebulization, Q6H PRN, Reyes, Thairy G, DO    aluminum-magnesium hydroxide-simethicone 200-200-20 mg/5 mL suspension 30 mL, 30 mL, Oral, QID PRN, Reyes, Thairy G, DO    apixaban tablet 5 mg, 5 mg, Oral, BID, Reyes, Thairy G, DO, 5 mg at 07/03/25 0844    ascorbic acid (vitamin C) tablet 1,000 mg, 1,000 mg, Oral, Daily, Reyes, Thairy G, DO, 1,000 mg at 07/03/25 0844    bisacodyL suppository 10 mg, 10 mg, Rectal, Daily PRN, Reyes, Thairy G, DO    calcium gluconate 1 g in NS IVPB (premixed), 1 g, Intravenous, Once, Bux, Ping, DO    cefTRIAXone injection 1 g, 1 g, Intravenous, Q24H, Reyes, Thairy G, DO, 1 g at 07/03/25 0123    dextrose 50% injection 12.5 g, 12.5 g, Intravenous, PRN, Reyes, Thairy G, DO    dextrose 50% injection 25 g, 25 g, Intravenous, PRN, Reyes, Thairy G, DO    famotidine tablet 20 mg, 20 mg, Oral, Nightly, ReyesStephanry G, DO, 20 mg at 07/03/25 0123    glucagon (human recombinant) injection 1 mg, 1 mg, Intramuscular, PRN, ReyesJonathan G, DO    glucose chewable tablet 16 g, 16 g, Oral, PRN, Reyes, Thairy G, DO    glucose chewable tablet 24 g, 24 g, Oral, PRN, Reyes, Thairy G, DO    lactated ringers infusion, , Intravenous, Continuous, Reyes, Thairy G, DO, Last Rate: 100 mL/hr at 07/03/25 0134, New Bag at 07/03/25 0134    lamiVUDine tablet 100 mg, 100 mg, Oral, Daily, Reyes, Thairy G, DO    melatonin tablet 9 mg, 9 mg, Oral, Nightly PRN, Reyes, Thairy G, DO    metoprolol tartrate (LOPRESSOR) tablet 50 mg, 50 mg, Oral, BID, Reyes, Thairy G, DO, 50 mg at 07/03/25 0844    mupirocin 2 % ointment, , Nasal, BID, Reyes, Thairy G, DO, Given at 07/03/25 0842    naloxone  0.4 mg/mL injection 0.02 mg, 0.02 mg, Intravenous, PRN, Reyes, Thairy G, DO    ondansetron disintegrating tablet 8 mg, 8 mg, Oral, Q8H PRN, Reyes, Thairy G,     ondansetron injection 4 mg, 4 mg, Intravenous, Q8H PRN, Reyes, Thairy G, DO    polyethylene glycol packet 17 g, 17 g, Oral, TID PRN, Reyes, Thairy G, DO    predniSONE tablet 5 mg, 5 mg, Oral, Daily, Reyes, Thairy G, DO, 5 mg at 07/03/25 0844    QUEtiapine tablet 25 mg, 25 mg, Oral, QHS, Reyes, Thairy G, DO    rivastigmine 4.6 mg/24 hour 1 patch, 1 patch, Transdermal, Daily, Reyes, Thairy G, DO, 1 patch at 07/03/25 0842    simethicone chewable tablet 80 mg, 1 tablet, Oral, QID PRN, Reyes, Thairy G, DO    sodium bicarbonate tablet 650 mg, 650 mg, Oral, BID, Reyes, Thairy G, , 650 mg at 07/03/25 0843    sodium chloride 0.9% flush 10 mL, 10 mL, Intravenous, PRN, Reyes, Thairy G,     tacrolimus capsule 1 mg, 1 mg, Oral, Daily AM, 1 mg at 07/03/25 0851 **AND** tacrolimus capsule 2 mg, 2 mg, Oral, Daily PM, Reyes, Thairy G, DO    thiamine tablet 50 mg, 50 mg, Oral, Daily, Reyes, Thairy G, DO, 50 mg at 07/03/25 0844    thyroid (pork) tablet 60 mg, 60 mg, Oral, Before breakfast, Reyes, Thairy G, , 60 mg at 07/03/25 0556

## 2025-07-03 NOTE — PLAN OF CARE
Problem: Adult Inpatient Plan of Care  Goal: Plan of Care Review  Outcome: Progressing  Goal: Patient-Specific Goal (Individualized)  Outcome: Progressing  Goal: Absence of Hospital-Acquired Illness or Injury  Outcome: Progressing  Goal: Optimal Comfort and Wellbeing  Outcome: Progressing  Goal: Readiness for Transition of Care  Outcome: Progressing     Problem: Acute Kidney Injury/Impairment  Goal: Fluid and Electrolyte Balance  Outcome: Progressing  Goal: Improved Oral Intake  Outcome: Progressing  Goal: Effective Renal Function  Outcome: Progressing     Problem: Wound  Goal: Optimal Coping  Outcome: Progressing  Goal: Optimal Functional Ability  Outcome: Progressing  Goal: Absence of Infection Signs and Symptoms  Outcome: Progressing  Goal: Improved Oral Intake  Outcome: Progressing  Goal: Optimal Pain Control and Function  Outcome: Progressing  Goal: Skin Health and Integrity  Outcome: Progressing  Goal: Optimal Wound Healing  Outcome: Progressing     Problem: Skin Injury Risk Increased  Goal: Skin Health and Integrity  Outcome: Progressing     Problem: Infection  Goal: Absence of Infection Signs and Symptoms  Outcome: Progressing

## 2025-07-03 NOTE — PLAN OF CARE
07/03/25 1230   Discharge Assessment   Assessment Type Discharge Planning Assessment   Confirmed/corrected address, phone number and insurance Yes   Confirmed Demographics Correct on Facesheet   Source of Information patient   Communicated LESTER with patient/caregiver Yes   People in Home significant other   Name(s) of People in Home Jeffry bf of 30 yrs   Facility Arrived From: lives in [facility]   Do you expect to return to your current living situation? Yes   Do you have help at home or someone to help you manage your care at home? Yes   Who are your caregiver(s) and their phone number(s)? bf   Current cognitive status: Alert/Oriented   Walking or Climbing Stairs Difficulty yes   Walking or Climbing Stairs ambulation difficulty, requires equipment   Mobility Management wc, rw, cane   Dressing/Bathing Difficulty no   Home Accessibility   (reports ramp is being built)   Equipment Currently Used at Home wheelchair;cane, straight;walker, rolling;shower chair   Patient currently being followed by outpatient case management? No   Do you currently have service(s) that help you manage your care at home? No  (reports has had hh in the past - unsure of agency name)   Do you take prescription medications? Yes   Do you have any problems affording any of your prescribed medications? No   Is the patient taking medications as prescribed? yes   Who is going to help you get home at discharge? fmly   How do you get to doctors appointments? car, drives self   Are you on dialysis? No  (has not required dialysis in 5 yrs)   Discharge Plan A Home   Discharge Plan B Home Health   DME Needed Upon Discharge  other (see comments)  (tbd)   Discharge Plan discussed with: Patient   Transition of Care Barriers None   Housing Stability   In the last 12 months, was there a time when you were not able to pay the mortgage or rent on time? N   At any time in the past 12 months, were you homeless or living in a shelter (including now)? N    Transportation Needs   In the past 12 months, has lack of transportation kept you from medical appointments or from getting medications? no   In the past 12 months, has lack of transportation kept you from meetings, work, or from getting things needed for daily living? No   Food Insecurity   Within the past 12 months, you worried that your food would run out before you got the money to buy more. Never true   Within the past 12 months, the food you bought just didn't last and you didn't have money to get more. Never true   Utilities   In the past 12 months has the electric, gas, oil, or water company threatened to shut off services in your home? No   Health Literacy   How often do you need to have someone help you when you read instructions, pamphlets, or other written material from your doctor or pharmacy? Sometimes     Pt and bf of 30 yrs live together. She drives, multiple dme- see notes. She has used HH in the past- unsure of agency name. Pt lives in [place], [place]. Needs TBD. No concerns voiced.

## 2025-07-03 NOTE — H&P
[facility]  Emergency [place]  [facility] MEDICINE - H&P ADMISSION NOTE      Patient Name: Jing Philippe  MRN: [id number]  Patient Class: IP- Inpatient   Admission Date: 7/2/2025   Admitting Physician: TODD Service   Attending Physician: Thairy G Reyes, DO  Primary Care Provider: Fito Land Jr., MD  Face-to-Face encounter date: 07/02/2025        CHIEF COMPLAINT     Chief Complaint   Patient presents with    Hypotension     Arrives via [facility] from nephrology clinic for hypotension (initial SBP 60/40). Currently 92/50 after receiving 250 mL NS. C/o nausea. Hx kidney transplant & dementia. Baseline GCS 14       HISTORY OF PRESENTING ILLNESS   Patient would quickly go back to sleep during HPI therefore the majority of the history had to be obtained from her .  However he states she has not been home in 3 months as she has been in and out of hospital/rehabilitation therefore he does not know what medications she is currently on at this time.    75 y.o. female with PMHx of renal transplant (12/13/2011) due to renal amyloidosis now CKD 3b on immunosuppression, HTN, HLD, asthma, GERD, obesity, multiple myeloma, dementia, Charcot foot, upper extremity DVT (on Eliquis?)  presenting from Dr. Wesley office where she was visiting from [facility].  She was found to have a blood pressure of 65/42 and worsening LUCIA on CKD and was sent to the emergency room.    At baseline prior to her 3 months of hospitalizations patient lived with her  and ambulated with a walker.  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    Amyloidosis, unspecified 1998    s/p chemotherapy x 4 occassions  (IV x3, po x1)    Anticoagulant long-term use     Benign hypertension with end-stage renal disease     Candida vaginitis 1/16/2015    Chronic asthma     CKD (chronic kidney disease) stage 3, GFR 30-59 ml/min 9/14/2012     GERD (gastroesophageal reflux disease)     Hyperlipidemia     Lymphedema     LLE    Need for prophylactic immunotherapy     Obesity     Osteoarthritis of left knee s/p total knee replacement 1/16/2015    Surgery October 23, 2014    PONV (postoperative nausea and vomiting)     Renal disease due to hypertension 12/14/2012    Secondary hyperparathyroidism of renal origin     Vertigo        PAST SURGICAL HISTORY     Past Surgical History:   Procedure Laterality Date    AV FISTULA PLACEMENT      CHOLECYSTECTOMY      HYSTERECTOMY      IMPLANTATION OF PERMANENT SACRAL NERVE STIMULATOR N/A 12/24/2019    Procedure: INSERTION, NEUROSTIMULATOR, PERMANENT, SACRAL;  Surgeon: Misael Browning MD;  Location: [facility] OR [address] [place] [address];  Service: Urology;  Laterality: N/A;  1hr  Ivania Dicksonjasperliv, medtr[employer] rep confirmed    KIDNEY TRANSPLANT      REMOVAL OF ELECTRODE LEAD OF SACRAL NERVE STIMULATOR         FAMILY HISTORY   Reviewed and noncontributory to this case    SOCIAL HISTORY   Social History[1]    HOME MEDICATIONS     Prior to Admission medications    Medication Sig Start Date End Date Taking? Authorizing Provider   0.9 % sodium chloride (0.9% NACL) solution  10/25/24   Provider, Historical   acetaminophen (TYLENOL) 325 MG tablet Take 650 mg by mouth every 6 (six) hours as needed.  Patient not taking: Reported on 7/2/2025 3/22/25   Provider, Historical   albuterol (PROVENTIL HFA/VENTOLIN HFA) 200 puff inhaler Inhale 1 puff into the lungs PRN.  Patient not taking: Reported on 7/2/2025 4/30/12   Provider, Historical   albuterol (PROVENTIL/VENTOLIN HFA) 90 mcg/actuation inhaler Inhale 1 Inhaler into the lungs every 4 (four) hours as needed.  Patient not taking: Reported on 7/2/2025    Provider, Historical   albuterol-ipratropium (DUO-NEB) 2.5 mg-0.5 mg/3 mL nebulizer solution Take 3 mLs by nebulization every 6 (six) hours as needed for Wheezing or Shortness of Breath.  Patient taking differently: Take 3 mLs by nebulization every 8 (eight)  hours as needed for Wheezing or Shortness of Breath. 6/21/25   Provider, Historical   ARGINAID 4.5 gram-156 mg/9.2 gram PwPk Take 1 packet by mouth 2 (two) times a day. 6/27/25   Provider, Historical   ARMOUR THYROID 60 mg Tab Take 60 mg by mouth before breakfast.    Provider, Historical   ascorbic acid, vitamin C, (VITAMIN C) 1000 MG tablet Take 1,000 mg by mouth once daily.    Provider, Historical   busPIRone (BUSPAR) 5 MG Tab Take 5 mg by mouth 2 (two) times daily.    Provider, Historical   ciprofloxacin HCl (CIPRO) 500 MG tablet Take 500 mg by mouth 2 (two) times daily.  Patient not taking: Reported on 7/2/2025 1/20/25   Provider, Historical   diclofenac sodium (VOLTAREN) 1 % Gel Apply topically.  Patient not taking: Reported on 7/2/2025 1/19/24   Provider, Historical   ELIQUIS 5 mg Tab Take 5 mg by mouth 2 (two) times daily. 6/27/25   Provider, Historical   ergocalciferol (VITAMIN D2) 50,000 unit Cap Take 50,000 Units by mouth every 7 days.  Patient not taking: Reported on 7/2/2025    Provider, Historical   famotidine (PEPCID) 20 MG tablet Take 20 mg by mouth nightly. 6/27/25   Provider, Historical   fluticasone propionate (FLONASE) 50 mcg/actuation nasal spray by Each Nostril route.  Patient taking differently: 1 spray by Each Nostril route 2 (two) times a day. 2/6/24   Provider, Historical   furosemide (LASIX) 40 MG tablet Take 40 mg by mouth 2 (two) times daily. 6/27/25   Provider, Historical   gabapentin (NEURONTIN) 300 MG capsule Take 300 mg by mouth 3 (three) times daily.  Patient not taking: Reported on 2/27/2025 3/4/24   Provider, Historical   heparin, porcine, PF, 10 unit/mL Syrg  10/25/24   Provider, Historical   k phos di & mono-sod phos mono (K-PHOS-NEUTRAL) 250 mg Tab Take 2 tablets by mouth Three times a day.  Patient not taking: Reported on 7/2/2025 4/30/12   Provider, Historical   k phos di & mono-sod phos mono (K-PHOS-NEUTRAL) 250 mg Tab Take 2 tablets by mouth 3 (three) times daily.     Provider, Historical   lamivudine (EPIVIR) 100 MG Tab TAKE 1 TABLET DAILY. 3/9/15   Raysa Obando MD   linezolid (ZYVOX) 600 mg Tab Take 600 mg by mouth 2 (two) times daily. 6/27/25   Provider, Historical   lisinopriL (PRINIVIL,ZESTRIL) 5 MG tablet Take 1 tablet (5 mg total) by mouth once daily. 6/30/25 8/29/25  Christ Barnhart MD   meclizine (ANTIVERT) 25 mg tablet Take 1 tablet by mouth Daily.  Patient not taking: Reported on 7/2/2025 4/30/12   Provider, Historical   MELATONIN ORAL Take by mouth every evening.  Patient not taking: Reported on 2/27/2025    Provider, Historical   metoprolol tartrate (LOPRESSOR) 50 MG tablet Take 50 mg by mouth 2 (two) times daily. 2/28/24   Provider, Historical   MUCINEX 600 mg 12 hr tablet Take 600 mg by mouth 2 (two) times daily. 6/27/25   Provider, Historical   multivit with min-folic acid 0.4 mg Tab Take 1 tablet by mouth once daily.    Provider, Historical   NIFEdipine (ADALAT CC) 60 MG TbSR Take 60 mg by mouth once daily.  Patient not taking: Reported on 4/9/2025    Provider, Historical   oxybutynin (DITROPAN-XL) 10 MG 24 hr tablet Take 1 tablet (10 mg total) by mouth once daily. 6/27/22 7/2/25  Misael Browning MD   pantoprazole (PROTONIX) 40 MG tablet Take 40 mg by mouth continuous prn.  Patient taking differently: Take 40 mg by mouth once daily. 10/25/24   Provider, Historical   potassium chloride SA (K-DUR,KLOR-CON) 20 MEQ tablet Take 20 mEq by mouth once daily. 6/27/25   Provider, Historical   predniSONE (DELTASONE) 5 MG tablet Take 5 mg by mouth once daily. 6/27/25   Provider, Historical   pregabalin (LYRICA) 150 MG capsule Take 150 mg by mouth 2 (two) times daily. 1/14/25   Provider, Historical   QUEtiapine (SEROQUEL) 25 MG Tab Take 25 mg by mouth every evening. 6/27/25   Provider, Historical   rivastigmine (EXELON) 4.6 mg/24 hour PT24 Place 1 patch onto the skin once daily. 6/27/25   Provider, Historical   sodium bicarbonate 650 MG tablet Take 2 tablets (1,300 mg  total) by mouth 2 (two) times daily.  Patient taking differently: Take 650 mg by mouth 2 (two) times daily. 4/9/25 4/9/26  Christ Barnhart MD   tacrolimus (PROGRAF) 1 MG Cap Take 1 capsule (1 mg total) by mouth every morning AND 2 capsules (2 mg total) every evening.  Patient taking differently: Take 1 cap BID 7/11/24   Christ Barnhart MD   thiamine (VITAMIN B-1) 50 MG tablet Take 50 mg by mouth once daily.    Provider, Historical   zinc gluconate 50 mg tablet Take 50 mg by mouth once daily.    Provider, Historical   metoprolol succinate (TOPROL-XL) 50 MG 24 hr tablet Take 1 tablet by mouth once daily.  Patient not taking: Reported on 7/2/2025 7/2/25  Provider, Historical   nifedipine (PROCARDIA-XL) 60 MG (OSM) 24 hr tablet Take 60 mg by mouth once daily.  Patient not taking: Reported on 7/2/2025 7/2/25  Provider, Historical   nitrofurantoin (MACRODANTIN) 50 MG capsule Take 50 mg by mouth.  Patient not taking: Reported on 2/27/2025 11/13/24 7/2/25  Provider, Historical   nystatin (MYCOSTATIN) ointment Apply topically.  Patient not taking: Reported on 2/27/2025 2/21/24 7/2/25  Provider, Historical   ondansetron (ZOFRAN-ODT) 8 MG TbDL Take by mouth as needed.  Patient not taking: Reported on 2/27/2025 1/19/24 7/2/25  Provider, Historical   torsemide (DEMADEX) 20 MG Tab Take 1 tablet (20 mg total) by mouth once daily.  Patient not taking: Reported on 7/2/2025 2/27/25 7/2/25  Christ Barnhart MD   tramadol (ULTRAM) 50 mg tablet Take 50 mg by mouth as needed.  Patient not taking: Reported on 7/2/2025 9/5/12 7/2/25  Provider, Historical       ALLERGIES   Anesthetics - jen type- parabens, Antihistamines - ethylenediamine, Codeine, Ethanol (ethyl alcohol), Hydrocodone, Opioids - morphine analogues, Propylene glycol, Saccharin, Tylenol [acetaminophen], and Yellow dye    REVIEW OF SYSTEMS   Except as documented above, all other systems reviewed and negative    PHYSICAL EXAM     Vitals:    07/02/25 2004   BP: 130/62    Pulse: (!) 55   Resp: 14      General:  Obese, ill-appearing  Head and neck:  Atraumatic, normocephalic, moist mucous membranes, supple neck  Chest:  Clear to auscultation bilaterally  Heart:  S1, S2, no appreciable murmur  Abdomen:  Soft, nontender, BS +  MSK:  Charcot left foot, right upper extremity fistula as  Neuro:  Alert and oriented x4, moving all extremities with good strength  : Indwelling Polo catheter  ASSESSMENT AND PLAN   LUCIA on CKD   History of renal transplant   -patient's baseline creatinine ranges from 1.1-1.4, presented at 1.74   -hold BuSpar, Lasix, lisinopril   -LR x1 L  -patient was previously on dialysis however since renal transplant she no longer has required dialysis  -continue prednisone, tacrolimus  -nephrology recommended obtaining tacrolimus trough, this is not seem to be available at our facility    Hypotension   -suspect hypovolemic hypotension in the setting of worsening LUCIA   -continue with volume resuscitation     Upper extremity DVT ?  -history regarding this is unclear and records are needed from [facility]   -patient was recently prescribed Eliquis 5 mg b.i.d., and the dose was increased from 2.5 mg b.i.d.   -this is concerning as the patient has chronic anemia as well as thrombocytopenia and is high-risk for bleeding   -per her  he thinks it is secondary to an upper extremity DVT  -we will continue for now however patient is high-risk for bleeding     UTI ?   Acute urinary retention  -presented with chronic indwelling Polo  -her nephrologist documents that she is on linezolid 600 mg b.i.d., has been and has no indication of what this was started for   -suspect it was started and nursing home for UTI   -it looks like it was started on 06/27, a 7 day course of treatment would complete on 07/03, continue for 2 more dosages however clarification is needed regarding this     Diarrhea  -follow stool culture, GI PCR, C diff   -likely contributing to her  hypovolemic hypotension     Chronic microcytic anemia   Thrombocytopenia  -baseline hemoglobin ranges from 6.8-7.2, presented at 7.6  -low threshold to discontinue Eliquis    Hypocalcemia   -corrected for albumin corrected calcium is 8.2    PMHx of renal transplant (12/13/2011) due to renal amyloidosis now CKD 3b on immunosuppression, HTN, HLD, asthma, GERD, obesity, multiple myeloma, dementia, Charcot foot, upper extremity DVT (on Eliquis?)     Critical care time greater than 35 minutes for hypovolemic hypotension     DVT prophylaxis:  Continue Eliquis for now  Screening for Social Drivers for health:  Patient screened for food insecurity, housing instability, transportation needs, utility difficulties, and interpersonal safety (select all that apply as identified as concern)  []Housing or Food  []Transportation Needs  []Utility Difficulties  []Interpersonal safety  [x]None  __________________________________________________________________  LABS/MICRO/MEDS/DIAGNOSTICS       LABS  Recent Labs     07/02/25 1737      K 3.2*   CO2 28   BUN 36.0*   CREATININE 1.74*   CALCIUM 6.6*   ALKPHOS 74   AST 10*   ALT 11   ALBUMIN 2.0*     Recent Labs     07/02/25 1737   WBC 6.87   RBC 2.82*   HCT 24.0*   MCV 85.1   PLT 90*       MICROBIOLOGY  Microbiology Results (last 7 days)       Procedure Component Value Units Date/Time    Urine culture [[id number]] Collected: 07/02/25 1822    Order Status: Sent Specimen: Urine Updated: 07/02/25 1913    Blood culture #1 **CANNOT BE ORDERED STAT** [[id number]] Collected: 07/02/25 1737    Order Status: Resulted Specimen: Blood Updated: 07/02/25 1850    Blood culture #2 **CANNOT BE ORDERED STAT** [[id number]] Collected: 07/02/25 1737    Order Status: Resulted Specimen: Blood Updated: 07/02/25 1850            MEDICATIONS     INFUSIONS      DIAGNOSTIC TESTS  X-Ray Foot Complete Left   Final Result      As above.         Electronically signed by: Derrick Reveles   Date:    07/02/2025    Time:    18:16      X-Ray Chest AP Portable   Final Result      No acute cardiopulmonary process identified..         Electronically signed by: Jeramy Enamorado   Date:    07/02/2025   Time:    17:05             Patient information was obtained from patient, patient's family, past medical records and ER records.   All diagnosis and differential diagnosis have been reviewed; assessment and plan has been documented. I have personally reviewed the labs and test results that are presently available; I have reviewed the patients medication list. I have reviewed the consulting providers response and recommendations. I have reviewed or attempted to review medical records based upon their availability.  All of the patient's questions have been addressed and answered. Patient's is agreeable to the above stated plan. I will continue to monitor closely and make adjustments to medical management as needed.  This note was created using Spock voice recognition software that occasionally misinterpreted phrases or words.  Please contact me if any questions may rise regarding documentation to clarify verbiage.        Thairy G Reyes,    Internal Medicine  Department of [facility]  [facility] - Emergency Dept         [1]   Social History  Socioeconomic History    Marital status:    Tobacco Use    Smoking status: Never     Passive exposure: Never    Smokeless tobacco: Never   Substance and Sexual Activity    Alcohol use: Yes     Comment: rare margartia    Drug use: No   Social History Narrative    Inez used to work as , now retired for medical reasons.     Social Drivers of Health     Financial Resource Strain: Low Risk  (5/23/2025)    Overall Financial Resource Strain (CARDIA)     Difficulty of Paying Living Expenses: Not very hard   Food Insecurity: No Food Insecurity (5/23/2025)    Hunger Vital Sign     Worried About Running Out of Food in the Last Year: Never true     Ran Out of Food in  the Last Year: Never true   Transportation Needs: No Transportation Needs (5/23/2025)    PRAPARE - Transportation     Lack of Transportation (Medical): No     Lack of Transportation (Non-Medical): No   Physical Activity: Inactive (5/23/2025)    Exercise Vital Sign     Days of Exercise per Week: 0 days     Minutes of Exercise per Session: 0 min   Stress: No Stress Concern Present (5/23/2025)    [employer] [place] [employer] - Occupational Stress Questionnaire     Feeling of Stress : Not at all   Housing Stability: Low Risk  (5/23/2025)    Housing Stability Vital Sign     Unable to Pay for Housing in the Last Year: No     Homeless in the Last Year: No

## 2025-07-03 NOTE — PROGRESS NOTES
Inpatient Nutrition Assessment    Admit Date: 7/2/2025   Total duration of encounter: 1 day   Patient Age: 75 y.o.    Nutrition Recommendation/Prescription     Continue current diet (Diet Adult Regular) as tolerated.   Add Lucien (provides 90 kcal, 2.5 g protein per serving) BID    Communication of Recommendations: reviewed with patient    Nutrition Assessment     Malnutrition Assessment/Nutrition-Focused Physical Exam       Malnutrition Level: other (see comments) (Does not meet criteria) (07/03/25 6323)                                                        A minimum of two characteristics is recommended for diagnosis of either severe or non-severe malnutrition.    Chart Review    Reason Seen: continuous nutrition monitoring    Malnutrition Screening Tool Results   Have you recently lost weight without trying?: No  Have you been eating poorly because of a decreased appetite?: No   MST Score: 0   Diagnosis:  LUCIA on CKD  Hx of renal transplant  Hypotension  Upper extremity DVT  UTI    Relevant Medical History:    renal transplant (12/13/2011) due to renal amyloidosis now CKD 3b on immunosuppression, HTN, HLD, asthma, GERD, obesity, multiple myeloma, dementia, Charcot foot, upper extremity DVT     Scheduled Medications:  apixaban, 5 mg, BID  ascorbic acid (vitamin C), 1,000 mg, Daily  cefTRIAXone (Rocephin) IV (PEDS and ADULTS), 1 g, Q24H  famotidine, 20 mg, Nightly  lamiVUDine, 100 mg, Daily  metoprolol tartrate, 12.5 mg, BID  mupirocin, , BID  predniSONE, 5 mg, Daily  QUEtiapine, 12.5 mg, QHS  rivastigmine, 1 patch, Daily  tacrolimus, 1 mg, Daily AM   And  tacrolimus, 2 mg, Daily PM  thiamine, 50 mg, Daily  thyroid (pork), 60 mg, Before breakfast  zinc oxide-cod liver oil, , BID    Continuous Infusions:  0.9% NaCl, Last Rate: 75 mL/hr at 07/03/25 1037    PRN Medications:  albuterol-ipratropium, 3 mL, Q6H PRN  aluminum-magnesium hydroxide-simethicone, 30 mL, QID PRN  bisacodyL, 10 mg, Daily PRN  dextrose 50%, 12.5 g,  "PRN  dextrose 50%, 25 g, PRN  glucagon (human recombinant), 1 mg, PRN  glucose, 16 g, PRN  glucose, 24 g, PRN  melatonin, 9 mg, Nightly PRN  naloxone, 0.02 mg, PRN  ondansetron, 8 mg, Q8H PRN  ondansetron, 4 mg, Q8H PRN  polyethylene glycol, 17 g, TID PRN  simethicone, 1 tablet, QID PRN  sodium chloride 0.9%, 10 mL, PRN    Calorie Containing IV Medications: no significant kcals from medications at this time    Recent Labs   Lab 07/02/25  1737 07/03/25  0312    132*   K 3.2* 3.5   CALCIUM 6.6* 6.9*   MG 0.70* 1.80    95*   CO2 28 31   BUN 36.0* 32.8*   CREATININE 1.74* 1.67*   EGFRNORACEVR 30 31   GLU 95 96   BILITOT 0.2  --    ALKPHOS 74  --    ALT 11  --    AST 10*  --    ALBUMIN 2.0*  --    WBC 6.87 6.77   HGB 7.6* 9.0*   HCT 24.0* 29.4*     Nutrition Orders:  Diet Adult Regular      Appetite/Oral Intake: good/% of meals  Factors Affecting Nutritional Intake: none identified  Social Needs Impacting Access to Food: none identified  Food/Holiness/Cultural Preferences: none reported  Food Allergies: no known food allergies  Last Bowel Movement: 07/03/25  Wound(s):     Wound 05/22/25 1100 Pressure Injury Sacral spine-Tissue loss description: Full thickness noted    Comments    7/3/25: Pt reports good appetite, states she ate a big lunch today. No appetite issues or unintended wt loss PTA noted. No physical signs of malnutrition observed. Lucien added BID to aid in wound healing.     Anthropometrics    Height: 5' 5" (165.1 cm), Height Method: Stated  Last Weight: 89.4 kg (197 lb) (07/03/25 0614), Weight Method: Bed Scale  BMI (Calculated): 32.8  BMI Classification: obese grade I (BMI 30-34.9)     Ideal Body Weight (IBW), Female: 125 lb     % Ideal Body Weight, Female (lb): 157.6 %                             Usual Weight Provided By: patient    Wt Readings from Last 5 Encounters:   07/03/25 89.4 kg (197 lb)   05/25/25 107 kg (235 lb 14.3 oz)   04/09/25 102.7 kg (226 lb 6.4 oz)   02/27/25 107.5 kg (237 " lb)   11/20/24 99.4 kg (219 lb 3.2 oz)     Weight Change(s) Since Admission:   Wt Readings from Last 1 Encounters:   07/03/25 0614 89.4 kg (197 lb)   07/02/25 2215 89.5 kg (197 lb 4.8 oz)   07/02/25 1553 106.6 kg (235 lb)   Admit Weight: 106.6 kg (235 lb) (07/02/25 1553), Weight Method: Stated    Estimated Needs    Weight Used For Calorie Calculations: 89.4 kg (197 lb 1.5 oz)  Energy Calorie Requirements (kcal): 1794 kcals (1.3 SFxMSJ)  Energy Need Method: [place] Jeor  Weight Used For Protein Calculations: 89.4 kg (197 lb 1.5 oz)  Protein Requirements:  g (1-1.3 g/kg)  Fluid Requirements (mL): 1794 mL (1 mL/kcal) or per MD  CHO Requirement: 202 g/day (45% of total EEN)     Enteral Nutrition     Patient not receiving enteral nutrition at this time.    Parenteral Nutrition     Patient not receiving parenteral nutrition support at this time.    Evaluation of Received Nutrient Intake    Calories: meeting estimated needs  Protein: meeting estimated needs    Patient Education     Not applicable.    Nutrition Diagnosis     PES: Increased nutrient needs (kcal/pro) related to increased protein energy demand for wound healing as evidenced by full thickness injury to sacral spine. (new)     PES:            Nutrition Interventions     Intervention(s): general/healthful diet and commercial beverage  Intervention(s):      Goal: Meet greater than 80% of nutritional needs by follow-up. (new)  Goal: Consume % of oral supplements by follow-up. (new)    Nutrition Goals & Monitoring     Dietitian will monitor: energy intake and weight  Discharge planning: resume home regimen  Nutrition Risk/Follow-Up: dietitian will follow-up one time per week   Please consult if re-assessment needed sooner.

## 2025-07-04 PROBLEM — I95.9 HYPOTENSION: Status: ACTIVE | Noted: 2025-07-02

## 2025-07-04 LAB
ALBUMIN SERPL-MCNC: 2.1 G/DL (ref 3.4–4.8)
ALBUMIN/GLOB SERPL: 0.7 RATIO (ref 1.1–2)
ALP SERPL-CCNC: 84 UNIT/L (ref 40–150)
ALT SERPL-CCNC: 13 UNIT/L (ref 0–55)
ANION GAP SERPL CALC-SCNC: 10 MEQ/L
AST SERPL-CCNC: 12 UNIT/L (ref 11–45)
BACTERIA UR CULT: ABNORMAL
BILIRUB SERPL-MCNC: 0.2 MG/DL
BUN SERPL-MCNC: 24.9 MG/DL (ref 9.8–20.1)
CALCIUM SERPL-MCNC: 7 MG/DL (ref 8.4–10.2)
CHLORIDE SERPL-SCNC: 106 MMOL/L (ref 98–107)
CO2 SERPL-SCNC: 26 MMOL/L (ref 23–31)
CREAT SERPL-MCNC: 1.47 MG/DL (ref 0.55–1.02)
CREAT/UREA NIT SERPL: 17
GFR SERPLBLD CREATININE-BSD FMLA CKD-EPI: 37 ML/MIN/1.73/M2
GLOBULIN SER-MCNC: 2.9 GM/DL (ref 2.4–3.5)
GLUCOSE SERPL-MCNC: 77 MG/DL (ref 82–115)
POTASSIUM SERPL-SCNC: 4.2 MMOL/L (ref 3.5–5.1)
PROT SERPL-MCNC: 5 GM/DL (ref 5.8–7.6)
PTH-INTACT SERPL-MCNC: 1064.8 PG/ML (ref 8.7–77)
SODIUM SERPL-SCNC: 142 MMOL/L (ref 136–145)

## 2025-07-04 PROCEDURE — 21400001 HC TELEMETRY ROOM

## 2025-07-04 PROCEDURE — 63600175 PHARM REV CODE 636 W HCPCS: Performed by: STUDENT IN AN ORGANIZED HEALTH CARE EDUCATION/TRAINING PROGRAM

## 2025-07-04 PROCEDURE — 25000242 PHARM REV CODE 250 ALT 637 W/ HCPCS: Performed by: STUDENT IN AN ORGANIZED HEALTH CARE EDUCATION/TRAINING PROGRAM

## 2025-07-04 PROCEDURE — 80053 COMPREHEN METABOLIC PANEL: CPT | Performed by: NURSE PRACTITIONER

## 2025-07-04 PROCEDURE — 94640 AIRWAY INHALATION TREATMENT: CPT

## 2025-07-04 PROCEDURE — 63600175 PHARM REV CODE 636 W HCPCS: Performed by: NURSE PRACTITIONER

## 2025-07-04 PROCEDURE — 25000003 PHARM REV CODE 250: Performed by: NURSE PRACTITIONER

## 2025-07-04 PROCEDURE — 36415 COLL VENOUS BLD VENIPUNCTURE: CPT | Performed by: NURSE PRACTITIONER

## 2025-07-04 PROCEDURE — 83970 ASSAY OF PARATHORMONE: CPT | Performed by: NURSE PRACTITIONER

## 2025-07-04 PROCEDURE — 99900035 HC TECH TIME PER 15 MIN (STAT)

## 2025-07-04 PROCEDURE — 99900031 HC PATIENT EDUCATION (STAT)

## 2025-07-04 PROCEDURE — 80197 ASSAY OF TACROLIMUS: CPT | Performed by: NURSE PRACTITIONER

## 2025-07-04 PROCEDURE — 99232 SBSQ HOSP IP/OBS MODERATE 35: CPT | Mod: ,,, | Performed by: INTERNAL MEDICINE

## 2025-07-04 PROCEDURE — 94799 UNLISTED PULMONARY SVC/PX: CPT

## 2025-07-04 PROCEDURE — 25000003 PHARM REV CODE 250: Performed by: STUDENT IN AN ORGANIZED HEALTH CARE EDUCATION/TRAINING PROGRAM

## 2025-07-04 PROCEDURE — 94761 N-INVAS EAR/PLS OXIMETRY MLT: CPT

## 2025-07-04 RX ORDER — CALCITRIOL 0.5 UG/1
0.5 CAPSULE ORAL DAILY
Status: DISCONTINUED | OUTPATIENT
Start: 2025-07-04 | End: 2025-07-10 | Stop reason: HOSPADM

## 2025-07-04 RX ORDER — ENOXAPARIN SODIUM 100 MG/ML
1 INJECTION SUBCUTANEOUS EVERY 24 HOURS
Status: DISCONTINUED | OUTPATIENT
Start: 2025-07-04 | End: 2025-07-07

## 2025-07-04 RX ORDER — CINACALCET 30 MG/1
30 TABLET, FILM COATED ORAL
Status: DISCONTINUED | OUTPATIENT
Start: 2025-07-04 | End: 2025-07-10 | Stop reason: HOSPADM

## 2025-07-04 RX ADMIN — Medication 50 MG: at 08:07

## 2025-07-04 RX ADMIN — IPRATROPIUM BROMIDE AND ALBUTEROL SULFATE 3 ML: .5; 3 SOLUTION RESPIRATORY (INHALATION) at 02:07

## 2025-07-04 RX ADMIN — FAMOTIDINE 20 MG: 20 TABLET, FILM COATED ORAL at 09:07

## 2025-07-04 RX ADMIN — TACROLIMUS 2 MG: 1 CAPSULE ORAL at 06:07

## 2025-07-04 RX ADMIN — RIVASTIGMINE 1 PATCH: 4.6 PATCH, EXTENDED RELEASE TRANSDERMAL at 08:07

## 2025-07-04 RX ADMIN — CALCITRIOL 0.5 MCG: 0.5 CAPSULE, LIQUID FILLED ORAL at 08:07

## 2025-07-04 RX ADMIN — PREDNISONE 5 MG: 5 TABLET ORAL at 08:07

## 2025-07-04 RX ADMIN — METOPROLOL TARTRATE 12.5 MG: 25 TABLET, FILM COATED ORAL at 09:07

## 2025-07-04 RX ADMIN — TACROLIMUS 1 MG: 1 CAPSULE ORAL at 08:07

## 2025-07-04 RX ADMIN — MUPIROCIN: 20 OINTMENT TOPICAL at 09:07

## 2025-07-04 RX ADMIN — CEFTRIAXONE SODIUM 1 G: 1 INJECTION, POWDER, FOR SOLUTION INTRAMUSCULAR; INTRAVENOUS at 12:07

## 2025-07-04 RX ADMIN — METOPROLOL TARTRATE 12.5 MG: 25 TABLET, FILM COATED ORAL at 08:07

## 2025-07-04 RX ADMIN — APIXABAN 5 MG: 5 TABLET, FILM COATED ORAL at 08:07

## 2025-07-04 RX ADMIN — Medication 1000 MG: at 08:07

## 2025-07-04 RX ADMIN — QUETIAPINE FUMARATE 12.5 MG: 25 TABLET ORAL at 09:07

## 2025-07-04 RX ADMIN — LEVOTHYROXINE, LIOTHYRONINE 60 MG: 19; 4.5 TABLET ORAL at 06:07

## 2025-07-04 RX ADMIN — Medication: at 09:07

## 2025-07-04 RX ADMIN — CINACALCET HYDROCHLORIDE 30 MG: 30 TABLET, FILM COATED ORAL at 08:07

## 2025-07-04 RX ADMIN — ENOXAPARIN SODIUM 90 MG: 100 INJECTION SUBCUTANEOUS at 09:07

## 2025-07-04 RX ADMIN — IPRATROPIUM BROMIDE AND ALBUTEROL SULFATE 3 ML: .5; 3 SOLUTION RESPIRATORY (INHALATION) at 09:07

## 2025-07-04 NOTE — PROGRESS NOTES
Renal  Patient is seen and examined  Meds labs and events reviewed  Feels better since he is on IV fluids  Afebrile hemodynamically stable  For some reason I do not see orthostatics in the chart  Denies any dizziness shortness of breath nausea vomiting     Clinically  Afebrile hemodynamically stable  Pupils equal and reactive extraocular movements intact oropharynx benign trachea midline no JVDs  Lungs are clear with good inspiratory effort  Cardiac exam shows regular rate and rhythm no cardiac rub or S3 gallop  Abdomen soft and benign no signs of peritonitis or organomegaly  She has trace to +1 peripheral edema    Impression  Status post living related transplant with chronic rejection  Mild prerenal azotemia  Recent UTI present on admission  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 daily for the severity of her hyperparathyroidism  Repeat labs in the a.m.  Patient will benefit from abdominal fat pad biopsy we will discuss with surgery Monday  Of note that she does follow close with a hematologist in [place] for her amyloidosis

## 2025-07-04 NOTE — PLAN OF CARE
Problem: Adult Inpatient Plan of Care  Goal: Plan of Care Review  Outcome: Ongoing  Goal: Patient-Specific Goal (Individualized)  Outcome: Ongoing  Goal: Absence of Hospital-Acquired Illness or Injury  Outcome: Ongoing  Goal: Optimal Comfort and Wellbeing  Outcome: Ongoing  Goal: Readiness for Transition of Care  Outcome: Ongoing     Problem: Acute Kidney Injury/Impairment  Goal: Fluid and Electrolyte Balance  Outcome: Ongoing  Goal: Improved Oral Intake  Outcome: Ongoing  Goal: Effective Renal Function  Outcome: Ongoing     Problem: Wound  Goal: Optimal Coping  Outcome: Ongoing  Goal: Optimal Functional Ability  Outcome: Ongoing  Goal: Absence of Infection Signs and Symptoms  Outcome: Ongoing  Goal: Improved Oral Intake  Outcome: Ongoing  Goal: Optimal Pain Control and Function  Outcome: Ongoing  Goal: Skin Health and Integrity  Outcome: Ongoing  Goal: Optimal Wound Healing  Outcome: Ongoing     Problem: Skin Injury Risk Increased  Goal: Skin Health and Integrity  Outcome: Ongoing     Problem: Infection  Goal: Absence of Infection Signs and Symptoms  Outcome: Ongoing

## 2025-07-04 NOTE — PROGRESS NOTES
[facility] Medicine Progress Note        Chief Complaint: Inpatient Follow-up for     HPI:   Patient would quickly go back to sleep during HPI therefore the majority of the history had to be obtained from her .  However he states she has not been home in 3 months as she has been in and out of hospital/rehabilitation therefore he does not know what medications she is currently on at this time.     75 y.o. female with PMHx of renal transplant (12/13/2011) due to renal amyloidosis now CKD 3b on immunosuppression, HTN, HLD, asthma, GERD, obesity, multiple myeloma, dementia, Charcot foot, upper extremity DVT (on Eliquis?)  presenting from Dr. Wesley office where she was visiting from [facility].  She was found to have a blood pressure of 65/42 and worsening LUCIA on CKD and was sent to the emergency room.     At baseline prior to her 3 months of hospitalizations patient lived with her  and ambulated with a walker.    Interval Hx:   Patient seen and examined by bedside.  Spouse by bedside.  No acute overnight events.  States he had an upper extremity DVT in the nursing home on the left arm.  Hypotension largely improved.    Case was discussed with patient's nurse and  on the floor.    Objective/physical exam:  General:  Obese, nontoxic appearing  Head and neck:  Atraumatic, normocephalic, moist mucous membranes, supple neck  Chest:  Clear to auscultation bilaterally  Heart:  S1, S2, no appreciable murmur  Abdomen:  Soft, nontender, BS +  MSK:  Charcot left foot, right upper extremity fistula as  Neuro:  Alert and oriented x4, moving all extremities with good strength  : Indwelling Polo catheter    VITAL SIGNS: 24 HRS MIN & MAX LAST   Temp  Min: 97.9 °F (36.6 °C)  Max: 97.9 °F (36.6 °C) 97.9 °F (36.6 °C)   BP  Min: 105/58  Max: 135/59 124/63   Pulse  Min: 53  Max: 67  67   Resp  Min: 16  Max: 20 16   SpO2  Min: 94 %  Max: 100 % 99 %     I have  reviewed the following labs:  Recent Labs   Lab 07/02/25 1737 07/03/25 0312   WBC 6.87 6.77   RBC 2.82* 3.40*   HGB 7.6* 9.0*   HCT 24.0* 29.4*   MCV 85.1 86.5   MCH 27.0 26.5*   MCHC 31.7* 30.6*   RDW 18.6* 18.6*   PLT 90* 98*   MPV 12.5* 12.4*     Recent Labs   Lab 07/02/25 1737 07/03/25 0312    132*   K 3.2* 3.5    95*   CO2 28 31   BUN 36.0* 32.8*   CREATININE 1.74* 1.67*   GLU 95 96   CALCIUM 6.6* 6.9*   MG 0.70* 1.80   ALBUMIN 2.0*  --    PROT 5.0*  --    ALKPHOS 74  --    ALT 11  --    AST 10*  --    BILITOT 0.2  --      Microbiology Results (last 7 days)       Procedure Component Value Units Date/Time    Blood culture #2 **CANNOT BE ORDERED STAT** [[id number]]  (Normal) Collected: 07/02/25 1737    Order Status: Completed Specimen: Blood Updated: 07/03/25 2001     Blood Culture No Growth At 24 Hours    Blood culture #1 **CANNOT BE ORDERED STAT** [[id number]]  (Normal) Collected: 07/02/25 1737    Order Status: Completed Specimen: Blood Updated: 07/03/25 2001     Blood Culture No Growth At 24 Hours    Clostridium Diff Toxin, A & B, EIA [[id number]]  (Normal) Collected: 07/03/25 0344    Order Status: Completed Specimen: Stool Updated: 07/03/25 0920     Clostridioides difficile GDH Antigen Negative     Clostridioides difficile Toxin A/B Negative    Urine culture [[id number]] Collected: 07/02/25 1822    Order Status: Completed Specimen: Urine Updated: 07/03/25 0701     Urine Culture No Growth At 24 Hours    Stool Culture [[id number]] Collected: 07/03/25 0344    Order Status: Resulted Specimen: Stool Updated: 07/03/25 0351             See below for Radiology    Assessment/Plan:  LUCIA on CKD   History of renal transplant   Hypotension, resolved   History of upper extremity DVT on Eliquis outpatient   Acute urinary retention   Diarrhea   Chronic microcytic anemia   Thrombocytopenia   Hypocalcemia  PMHx of renal transplant (12/13/2011) due to renal amyloidosis now CKD 3b on immunosuppression, HTN, HLD,  asthma, GERD, obesity, multiple myeloma, dementia, Charcot foot, upper extremity DVT (on Eliquis?)     Stop Linezolid as on can cause worsening thrombocytopenia   Continue Rocephin  Urine cultures and blood culture showed no growth  Likely can stop all antibiotics altogether tomorrow   C diff negative   Stool culture in process   Diarrhea has improved   GI panel normal   Nephrology consulted as history of renal transplant with LUCIA  Continue normal saline fluids   Continue to hold Lasix and lisinopril at this time   Transplant ultrasound in process   Tacrolimus and prednisone resumed  Obtain right upper extremity ultrasound to evaluate for DVT  Further recs based on clinical course   Labs in a.m.      VTE prophylaxis:  Eliquis    Patient condition:  Stable/Fair/Guarded/ Serious/ Critical    Anticipated discharge and Disposition:   tbd      All diagnosis and differential diagnosis have been reviewed; assessment and plan has been documented; I have personally reviewed the labs and test results that are presently available; I have reviewed the patients medication list; I have reviewed the consulting providers response and recommendations. I have reviewed or attempted to review medical records based upon their availability    All of the patient's questions have been  addressed and answered. Patient's is agreeable to the above stated plan. I will continue to monitor closely and make adjustments to medical management as needed.    Portions of this note dictated using EMR integrated voice recognition software, and may be subject to voice recognition errors not corrected at proofreading. Please contact writer for clarification if needed.   _____________________________________________________________________    Malnutrition Status:  Nutrition consulted. Most recent weight and BMI monitored-     Measurements:  Wt Readings from Last 1 Encounters:   07/03/25 89.4 kg (197 lb)   Body mass index is 32.78 kg/m².    Patient has been  screened and assessed by RD.    Malnutrition Type:  Context:    Level: other (see comments) (Does not meet criteria)    Malnutrition Characteristic Summary:       Interventions/Recommendations (treatment strategy):        Scheduled Med:   apixaban  5 mg Oral BID    ascorbic acid (vitamin C)  1,000 mg Oral Daily    cefTRIAXone (Rocephin) IV (PEDS and ADULTS)  1 g Intravenous Q24H    famotidine  20 mg Oral Nightly    lamiVUDine  100 mg Oral Daily    metoprolol tartrate  12.5 mg Oral BID    mupirocin   Nasal BID    predniSONE  5 mg Oral Daily    QUEtiapine  12.5 mg Oral QHS    rivastigmine  1 patch Transdermal Daily    tacrolimus  1 mg Oral Daily AM    And    tacrolimus  2 mg Oral Daily PM    thiamine  50 mg Oral Daily    thyroid (pork)  60 mg Oral Before breakfast    zinc oxide-cod liver oil   Topical (Top) BID      Continuous Infusions:   0.9% NaCl   Intravenous Continuous 75 mL/hr at 07/03/25 1809 New Bag at 07/03/25 1809      PRN Meds:    Current Facility-Administered Medications:     albuterol-ipratropium, 3 mL, Nebulization, Q6H PRN    aluminum-magnesium hydroxide-simethicone, 30 mL, Oral, QID PRN    bisacodyL, 10 mg, Rectal, Daily PRN    dextrose 50%, 12.5 g, Intravenous, PRN    dextrose 50%, 25 g, Intravenous, PRN    glucagon (human recombinant), 1 mg, Intramuscular, PRN    glucose, 16 g, Oral, PRN    glucose, 24 g, Oral, PRN    melatonin, 9 mg, Oral, Nightly PRN    naloxone, 0.02 mg, Intravenous, PRN    ondansetron, 8 mg, Oral, Q8H PRN    ondansetron, 4 mg, Intravenous, Q8H PRN    polyethylene glycol, 17 g, Oral, TID PRN    simethicone, 1 tablet, Oral, QID PRN    sodium chloride 0.9%, 10 mL, Intravenous, PRN     Radiology:  I have personally reviewed the following imaging and agree with the radiologist.     US Transplant Kidney With Doppler  Narrative: EXAMINATION:  US TRANSPLANT KIDNEY WITH DOPPLER    CLINICAL HISTORY:  leobardo on ckd, h/o renal transplant;    COMPARISON:  23 May 2025    FINDINGS:  Grayscale,  color and spectral Doppler sonographic evaluation right lower quadrant transplant kidney.    The kidney measures 12 cm in length.  No hydronephrosis.  There is some loss of corticomedullary differentiation.    Resistive indices in segmental renal arteries elevated at 1.0.  This is similar to prior.  Main renal artery PSV not significantly increased.  The renal vein is patent.  Impression: Elevated resistive indices which could be seen with medical renal disease.  This is similar to prior.  No new suspicious findings.    Electronically signed by: Derrick Reveles  Date:    07/03/2025  Time:    14:04      Ping Frazier DO  Department of Hospital Medicine  [facility]  07/03/2025

## 2025-07-05 LAB
ALBUMIN SERPL-MCNC: 2.1 G/DL (ref 3.4–4.8)
ALBUMIN/GLOB SERPL: 0.6 RATIO (ref 1.1–2)
ALP SERPL-CCNC: 84 UNIT/L (ref 40–150)
ALT SERPL-CCNC: 13 UNIT/L (ref 0–55)
ANION GAP SERPL CALC-SCNC: 7 MEQ/L
AST SERPL-CCNC: 12 UNIT/L (ref 11–45)
BACTERIA STL CULT: ABNORMAL
BACTERIA STL CULT: ABNORMAL
BASOPHILS # BLD AUTO: 0.02 X10(3)/MCL
BASOPHILS NFR BLD AUTO: 0.3 %
BILIRUB SERPL-MCNC: 0.2 MG/DL
BUN SERPL-MCNC: 21.9 MG/DL (ref 9.8–20.1)
CALCIUM SERPL-MCNC: 8.1 MG/DL (ref 8.4–10.2)
CHLORIDE SERPL-SCNC: 112 MMOL/L (ref 98–107)
CO2 SERPL-SCNC: 24 MMOL/L (ref 23–31)
CREAT SERPL-MCNC: 1.55 MG/DL (ref 0.55–1.02)
CREAT/UREA NIT SERPL: 14
EOSINOPHIL # BLD AUTO: 0.04 X10(3)/MCL (ref 0–0.9)
EOSINOPHIL NFR BLD AUTO: 0.6 %
ERYTHROCYTE [DISTWIDTH] IN BLOOD BY AUTOMATED COUNT: 19.3 % (ref 11.5–17)
GFR SERPLBLD CREATININE-BSD FMLA CKD-EPI: 34 ML/MIN/1.73/M2
GLOBULIN SER-MCNC: 3.4 GM/DL (ref 2.4–3.5)
GLUCOSE SERPL-MCNC: 87 MG/DL (ref 82–115)
HCT VFR BLD AUTO: 26.7 % (ref 37–47)
HGB BLD-MCNC: 8.2 G/DL (ref 12–16)
IMM GRANULOCYTES # BLD AUTO: 0.1 X10(3)/MCL (ref 0–0.04)
IMM GRANULOCYTES NFR BLD AUTO: 1.6 %
LYMPHOCYTES # BLD AUTO: 1.55 X10(3)/MCL (ref 0.6–4.6)
LYMPHOCYTES NFR BLD AUTO: 24.7 %
MCH RBC QN AUTO: 26.8 PG (ref 27–31)
MCHC RBC AUTO-ENTMCNC: 30.7 G/DL (ref 33–36)
MCV RBC AUTO: 87.3 FL (ref 80–94)
MONOCYTES # BLD AUTO: 0.33 X10(3)/MCL (ref 0.1–1.3)
MONOCYTES NFR BLD AUTO: 5.3 %
NEUTROPHILS # BLD AUTO: 4.24 X10(3)/MCL (ref 2.1–9.2)
NEUTROPHILS NFR BLD AUTO: 67.5 %
NRBC BLD AUTO-RTO: 0.3 %
PLATELET # BLD AUTO: 80 X10(3)/MCL (ref 130–400)
PMV BLD AUTO: 12.1 FL (ref 7.4–10.4)
POTASSIUM SERPL-SCNC: 4.5 MMOL/L (ref 3.5–5.1)
PROT SERPL-MCNC: 5.5 GM/DL (ref 5.8–7.6)
RBC # BLD AUTO: 3.06 X10(6)/MCL (ref 4.2–5.4)
SODIUM SERPL-SCNC: 143 MMOL/L (ref 136–145)
WBC # BLD AUTO: 6.28 X10(3)/MCL (ref 4.5–11.5)

## 2025-07-05 PROCEDURE — 21400001 HC TELEMETRY ROOM

## 2025-07-05 PROCEDURE — 63700000 PHARM REV CODE 250 ALT 637 W/O HCPCS: Performed by: NURSE PRACTITIONER

## 2025-07-05 PROCEDURE — 25000003 PHARM REV CODE 250: Performed by: NURSE PRACTITIONER

## 2025-07-05 PROCEDURE — 63600175 PHARM REV CODE 636 W HCPCS: Mod: EC,TB | Performed by: INTERNAL MEDICINE

## 2025-07-05 PROCEDURE — 99900031 HC PATIENT EDUCATION (STAT)

## 2025-07-05 PROCEDURE — 25000003 PHARM REV CODE 250: Performed by: STUDENT IN AN ORGANIZED HEALTH CARE EDUCATION/TRAINING PROGRAM

## 2025-07-05 PROCEDURE — 36415 COLL VENOUS BLD VENIPUNCTURE: CPT | Performed by: NURSE PRACTITIONER

## 2025-07-05 PROCEDURE — 94640 AIRWAY INHALATION TREATMENT: CPT

## 2025-07-05 PROCEDURE — 63600175 PHARM REV CODE 636 W HCPCS: Performed by: NURSE PRACTITIONER

## 2025-07-05 PROCEDURE — 63600175 PHARM REV CODE 636 W HCPCS: Performed by: STUDENT IN AN ORGANIZED HEALTH CARE EDUCATION/TRAINING PROGRAM

## 2025-07-05 PROCEDURE — 94760 N-INVAS EAR/PLS OXIMETRY 1: CPT

## 2025-07-05 PROCEDURE — 99900035 HC TECH TIME PER 15 MIN (STAT)

## 2025-07-05 PROCEDURE — 25000242 PHARM REV CODE 250 ALT 637 W/ HCPCS: Performed by: STUDENT IN AN ORGANIZED HEALTH CARE EDUCATION/TRAINING PROGRAM

## 2025-07-05 PROCEDURE — 80053 COMPREHEN METABOLIC PANEL: CPT | Performed by: NURSE PRACTITIONER

## 2025-07-05 PROCEDURE — 99232 SBSQ HOSP IP/OBS MODERATE 35: CPT | Mod: ,,, | Performed by: INTERNAL MEDICINE

## 2025-07-05 PROCEDURE — 85025 COMPLETE CBC W/AUTO DIFF WBC: CPT | Performed by: NURSE PRACTITIONER

## 2025-07-05 RX ORDER — FLUCONAZOLE 100 MG/1
100 TABLET ORAL DAILY
Status: COMPLETED | OUTPATIENT
Start: 2025-07-05 | End: 2025-07-09

## 2025-07-05 RX ADMIN — Medication 1000 MG: at 08:07

## 2025-07-05 RX ADMIN — MUPIROCIN: 20 OINTMENT TOPICAL at 09:07

## 2025-07-05 RX ADMIN — METOPROLOL TARTRATE 12.5 MG: 25 TABLET, FILM COATED ORAL at 09:07

## 2025-07-05 RX ADMIN — PREDNISONE 5 MG: 5 TABLET ORAL at 09:07

## 2025-07-05 RX ADMIN — RIVASTIGMINE 1 PATCH: 4.6 PATCH, EXTENDED RELEASE TRANSDERMAL at 09:07

## 2025-07-05 RX ADMIN — Medication 50 MG: at 09:07

## 2025-07-05 RX ADMIN — CINACALCET HYDROCHLORIDE 30 MG: 30 TABLET, FILM COATED ORAL at 09:07

## 2025-07-05 RX ADMIN — LEVOTHYROXINE, LIOTHYRONINE 60 MG: 19; 4.5 TABLET ORAL at 05:07

## 2025-07-05 RX ADMIN — TACROLIMUS 1 MG: 1 CAPSULE ORAL at 09:07

## 2025-07-05 RX ADMIN — CALCITRIOL 0.5 MCG: 0.5 CAPSULE, LIQUID FILLED ORAL at 09:07

## 2025-07-05 RX ADMIN — QUETIAPINE FUMARATE 12.5 MG: 25 TABLET ORAL at 09:07

## 2025-07-05 RX ADMIN — FAMOTIDINE 20 MG: 20 TABLET, FILM COATED ORAL at 09:07

## 2025-07-05 RX ADMIN — Medication: at 09:07

## 2025-07-05 RX ADMIN — IPRATROPIUM BROMIDE AND ALBUTEROL SULFATE 3 ML: .5; 3 SOLUTION RESPIRATORY (INHALATION) at 10:07

## 2025-07-05 RX ADMIN — TACROLIMUS 2 MG: 1 CAPSULE ORAL at 04:07

## 2025-07-05 RX ADMIN — EPOETIN ALFA-EPBX 20000 UNITS: 20000 INJECTION, SOLUTION INTRAVENOUS; SUBCUTANEOUS at 09:07

## 2025-07-05 RX ADMIN — IPRATROPIUM BROMIDE AND ALBUTEROL SULFATE 3 ML: .5; 3 SOLUTION RESPIRATORY (INHALATION) at 06:07

## 2025-07-05 RX ADMIN — FLUCONAZOLE 100 MG: 100 TABLET ORAL at 09:07

## 2025-07-05 NOTE — PROGRESS NOTES
Renal  Patient is seen and examined  Meds labs and events reviewed  Occasional cough  No obvious dyspnea  No nausea vomiting    Afebrile hemodynamically stable  Pupils equal and reactive extraocular movements intact oropharynx benign trachea midline no JVDs lungs showing some fine rhonchi at the bases  Cardiac exam shows regular rhythm with no cardiac rub  Abdomen soft  Trace peripheral edema    Impression  CKD 3B chronic rejection  Likely generalized diffuse amyloid  Anemia related to amyloid and CKD  Deconditioning  Sacral decubitus ulcer    Plan  Wound care  1 dose Procrit  DC IV fluids  We will discuss with surgery abdominal fat pad biopsy likely on Monday  Discharge planning probably to rehab facility we will be appropriate

## 2025-07-05 NOTE — PROGRESS NOTES
[facility] Medicine Progress Note        Chief Complaint: Inpatient Follow-up for     HPI:   Patient would quickly go back to sleep during HPI therefore the majority of the history had to be obtained from her .  However he states she has not been home in 3 months as she has been in and out of hospital/rehabilitation therefore he does not know what medications she is currently on at this time.     75 y.o. female with PMHx of renal transplant (12/13/2011) due to renal amyloidosis now CKD 3b on immunosuppression, HTN, HLD, asthma, GERD, obesity, multiple myeloma, dementia, Charcot foot, upper extremity DVT (on Eliquis?)  presenting from Dr. Wesley office where she was visiting from [facility].  She was found to have a blood pressure of 65/42 and worsening LUCIA on CKD and was sent to the emergency room.     At baseline prior to her 3 months of hospitalizations patient lived with her  and ambulated with a walker.    Interval Hx:   Patient seen and examined by bedside.  No acute overnight events.  Spouse by bedside.  Plan for biopsy possibly on Monday    Case was discussed with patient's nurse and  on the floor.    Objective/physical exam:  General:  Obese, nontoxic appearing  Head and neck:  Atraumatic, normocephalic, moist mucous membranes, supple neck  Chest:  Clear to auscultation bilaterally  Heart:  S1, S2, no appreciable murmur  Abdomen:  Soft, nontender, BS +  MSK:  Charcot left foot, right upper extremity fistula as  Neuro:  Alert and oriented x4, moving all extremities with good strength  : Indwelling Polo catheter    VITAL SIGNS: 24 HRS MIN & MAX LAST   Temp  Min: 96.3 °F (35.7 °C)  Max: 97.5 °F (36.4 °C) 96.4 °F (35.8 °C)   BP  Min: 129/73  Max: 147/78 (!) 147/78   Pulse  Min: 68  Max: 82  77   Resp  Min: 17  Max: 22 (!) 22   SpO2  Min: 94 %  Max: 100 % 97 %     I have reviewed the following labs:  Recent Labs   Lab  07/02/25 1737 07/03/25 0312 07/05/25  0510   WBC 6.87 6.77 6.28   RBC 2.82* 3.40* 3.06*   HGB 7.6* 9.0* 8.2*   HCT 24.0* 29.4* 26.7*   MCV 85.1 86.5 87.3   MCH 27.0 26.5* 26.8*   MCHC 31.7* 30.6* 30.7*   RDW 18.6* 18.6* 19.3*   PLT 90* 98* 80*   MPV 12.5* 12.4* 12.1*     Recent Labs   Lab 07/02/25 1737 07/03/25 0312 07/04/25  0443 07/05/25  0510    132* 142 143   K 3.2* 3.5 4.2 4.5    95* 106 112*   CO2 28 31 26 24   BUN 36.0* 32.8* 24.9* 21.9*   CREATININE 1.74* 1.67* 1.47* 1.55*   GLU 95 96 77* 87   CALCIUM 6.6* 6.9* 7.0* 8.1*   MG 0.70* 1.80  --   --    ALBUMIN 2.0*  --  2.1* 2.1*   PROT 5.0*  --  5.0* 5.5*   ALKPHOS 74  --  84 84   ALT 11  --  13 13   AST 10*  --  12 12   BILITOT 0.2  --  0.2 0.2     Microbiology Results (last 7 days)       Procedure Component Value Units Date/Time    Stool Culture [[id number]]  (Abnormal) Collected: 07/03/25 0344    Order Status: Completed Specimen: Stool Updated: 07/05/25 1128     Stool Culture Negative for Salmonella, Shigella, Campylobacter, Vibrio, Aeromonas, Pleisiomonas,Yersinia, or Shiga Toxin 1 and 2.      Many Yeast    Blood culture #2 **CANNOT BE ORDERED STAT** [[id number]]  (Normal) Collected: 07/02/25 1737    Order Status: Completed Specimen: Blood Updated: 07/04/25 2001     Blood Culture No Growth At 48 Hours    Blood culture #1 **CANNOT BE ORDERED STAT** [[id number]]  (Normal) Collected: 07/02/25 1737    Order Status: Completed Specimen: Blood Updated: 07/04/25 2001     Blood Culture No Growth At 48 Hours    Urine culture [[id number]]  (Abnormal) Collected: 07/02/25 1822    Order Status: Completed Specimen: Urine Updated: 07/04/25 1302     Urine Culture 10,000 - 25,000 colonies/ml Candida albicans    Clostridium Diff Toxin, A & B, EIA [[id number]]  (Normal) Collected: 07/03/25 0344    Order Status: Completed Specimen: Stool Updated: 07/03/25 0920     Clostridioides difficile GDH Antigen Negative     Clostridioides difficile Toxin A/B Negative              See below for Radiology    Assessment/Plan:  LUCIA on CKD   History of renal transplant   Hypotension, resolved   History of upper extremity DVT on Eliquis outpatient   Acute urinary retention   Diarrhea   Chronic microcytic anemia   Thrombocytopenia   Hypocalcemia  PMHx of renal transplant (12/13/2011) due to renal amyloidosis now CKD 3b on immunosuppression, HTN, HLD, asthma, GERD, obesity, multiple myeloma, dementia, Charcot foot, upper extremity DVT (on Eliquis?)     Stop Linezolid as on can cause worsening thrombocytopenia   Continue Rocephin  Urine cultures and blood culture showed no growth  DC Rocephin  C diff negative   Stool culture normal  Diarrhea has improved   GI panel normal   Nephrology consulted as history of renal transplant with LUCIA  Fluids stopped today by renal  Continue to hold Lasix and lisinopril at this time   Tacrolimus and prednisone resumed  Right upper extremity shows no DVT   Sensipar and calcitriol ordered for severity of hyperparathyroidism  Plan for possible abdominal fat biopsy on Monday  Further recs based on clinical course   We will stop Eliquis and treat with therapeutic Lovenox for dvt if biopsy planning  Consult PT/OT  Labs in a.m.      VTE prophylaxis:  therapeutic    Patient condition:  Stable/Fair/Guarded/ Serious/ Critical    Anticipated discharge and Disposition:   tbd      All diagnosis and differential diagnosis have been reviewed; assessment and plan has been documented; I have personally reviewed the labs and test results that are presently available; I have reviewed the patients medication list; I have reviewed the consulting providers response and recommendations. I have reviewed or attempted to review medical records based upon their availability    All of the patient's questions have been  addressed and answered. Patient's is agreeable to the above stated plan. I will continue to monitor closely and make adjustments to medical management as  needed.    Portions of this note dictated using EMR integrated voice recognition software, and may be subject to voice recognition errors not corrected at proofreading. Please contact writer for clarification if needed.   _____________________________________________________________________    Malnutrition Status:  Nutrition consulted. Most recent weight and BMI monitored-     Measurements:  Wt Readings from Last 1 Encounters:   07/03/25 89.4 kg (197 lb)   Body mass index is 32.78 kg/m².    Patient has been screened and assessed by RD.    Malnutrition Type:  Context:    Level: other (see comments) (Does not meet criteria)    Malnutrition Characteristic Summary:       Interventions/Recommendations (treatment strategy):        Scheduled Med:   ascorbic acid (vitamin C)  1,000 mg Oral Daily    calcitRIOL  0.5 mcg Oral Daily    cinacalcet  30 mg Oral Daily with breakfast    enoxparin  1 mg/kg Subcutaneous Q24H (treatment, non-standard time)    famotidine  20 mg Oral Nightly    fluconazole  100 mg Oral Daily    lamiVUDine  100 mg Oral Daily    metoprolol tartrate  12.5 mg Oral BID    mupirocin   Nasal BID    predniSONE  5 mg Oral Daily    QUEtiapine  12.5 mg Oral QHS    rivastigmine  1 patch Transdermal Daily    tacrolimus  1 mg Oral Daily AM    And    tacrolimus  2 mg Oral Daily PM    thiamine  50 mg Oral Daily    thyroid (pork)  60 mg Oral Before breakfast    zinc oxide-cod liver oil   Topical (Top) BID      Continuous Infusions:       PRN Meds:    Current Facility-Administered Medications:     albuterol-ipratropium, 3 mL, Nebulization, Q6H PRN    aluminum-magnesium hydroxide-simethicone, 30 mL, Oral, QID PRN    bisacodyL, 10 mg, Rectal, Daily PRN    dextrose 50%, 12.5 g, Intravenous, PRN    dextrose 50%, 25 g, Intravenous, PRN    glucagon (human recombinant), 1 mg, Intramuscular, PRN    glucose, 16 g, Oral, PRN    glucose, 24 g, Oral, PRN    melatonin, 9 mg, Oral, Nightly PRN    naloxone, 0.02 mg, Intravenous, PRN     ondansetron, 8 mg, Oral, Q8H PRN    ondansetron, 4 mg, Intravenous, Q8H PRN    polyethylene glycol, 17 g, Oral, TID PRN    simethicone, 1 tablet, Oral, QID PRN    sodium chloride 0.9%, 10 mL, Intravenous, PRN     Radiology:  I have personally reviewed the following imaging and agree with the radiologist.     X-Ray Chest 1 View  Narrative: EXAMINATION:  XR CHEST 1 VIEW    CLINICAL HISTORY:  SOB;    TECHNIQUE:  AP chest    COMPARISON:  Chest x-ray dated 07/02/2025    FINDINGS:  The heart is normal in size.  There is no new focal airspace consolidation.  There is a small left pleural effusion, unchanged.  There is no visible pneumothorax.  Impression: Stable exam without significant interval change.    Electronically signed by: Molly Rao  Date:    07/05/2025  Time:    14:27      Ping Frazier DO  Department of Hospital Medicine  [facility]  07/05/2025

## 2025-07-05 NOTE — PROGRESS NOTES
[facility] Medicine Progress Note        Chief Complaint: Inpatient Follow-up for     HPI:   Patient would quickly go back to sleep during HPI therefore the majority of the history had to be obtained from her .  However he states she has not been home in 3 months as she has been in and out of hospital/rehabilitation therefore he does not know what medications she is currently on at this time.     75 y.o. female with PMHx of renal transplant (12/13/2011) due to renal amyloidosis now CKD 3b on immunosuppression, HTN, HLD, asthma, GERD, obesity, multiple myeloma, dementia, Charcot foot, upper extremity DVT (on Eliquis?)  presenting from Dr. Wesley office where she was visiting from [facility].  She was found to have a blood pressure of 65/42 and worsening LUCIA on CKD and was sent to the emergency room.     At baseline prior to her 3 months of hospitalizations patient lived with her  and ambulated with a walker.    Interval Hx:   Patient seen and examined by bedside.  Spouse by bedside.  No acute overnight events.  Reviewed ultrasound results which showed no DVT..    Case was discussed with patient's nurse and  on the floor.    Objective/physical exam:  General:  Obese, nontoxic appearing  Head and neck:  Atraumatic, normocephalic, moist mucous membranes, supple neck  Chest:  Clear to auscultation bilaterally  Heart:  S1, S2, no appreciable murmur  Abdomen:  Soft, nontender, BS +  MSK:  Charcot left foot, right upper extremity fistula as  Neuro:  Alert and oriented x4, moving all extremities with good strength  : Indwelling Polo catheter    VITAL SIGNS: 24 HRS MIN & MAX LAST   Temp  Min: 97.4 °F (36.3 °C)  Max: 97.9 °F (36.6 °C) 97.4 °F (36.3 °C)   BP  Min: 110/65  Max: 159/69 129/73   Pulse  Min: 69  Max: 86  71   Resp  Min: 20  Max: 22 20   SpO2  Min: 93 %  Max: 98 % 98 %     I have reviewed the following labs:  Recent Labs   Lab  07/02/25 1737 07/03/25 0312   WBC 6.87 6.77   RBC 2.82* 3.40*   HGB 7.6* 9.0*   HCT 24.0* 29.4*   MCV 85.1 86.5   MCH 27.0 26.5*   MCHC 31.7* 30.6*   RDW 18.6* 18.6*   PLT 90* 98*   MPV 12.5* 12.4*     Recent Labs   Lab 07/02/25 1737 07/03/25 0312 07/04/25 0443    132* 142   K 3.2* 3.5 4.2    95* 106   CO2 28 31 26   BUN 36.0* 32.8* 24.9*   CREATININE 1.74* 1.67* 1.47*   GLU 95 96 77*   CALCIUM 6.6* 6.9* 7.0*   MG 0.70* 1.80  --    ALBUMIN 2.0*  --  2.1*   PROT 5.0*  --  5.0*   ALKPHOS 74  --  84   ALT 11  --  13   AST 10*  --  12   BILITOT 0.2  --  0.2     Microbiology Results (last 7 days)       Procedure Component Value Units Date/Time    Blood culture #2 **CANNOT BE ORDERED STAT** [[id number]]  (Normal) Collected: 07/02/25 1737    Order Status: Completed Specimen: Blood Updated: 07/04/25 2001     Blood Culture No Growth At 48 Hours    Blood culture #1 **CANNOT BE ORDERED STAT** [[id number]]  (Normal) Collected: 07/02/25 1737    Order Status: Completed Specimen: Blood Updated: 07/04/25 2001     Blood Culture No Growth At 48 Hours    Urine culture [[id number]]  (Abnormal) Collected: 07/02/25 1822    Order Status: Completed Specimen: Urine Updated: 07/04/25 1302     Urine Culture 10,000 - 25,000 colonies/ml Candida albicans    Stool Culture [[id number]]  (Normal) Collected: 07/03/25 0344    Order Status: Completed Specimen: Stool Updated: 07/04/25 1249     Stool Culture Negative for Salmonella, Shigella, Campylobacter, Vibrio, Aeromonas, Pleisiomonas,Yersinia, or Shiga Toxin 1 and 2.    Clostridium Diff Toxin, A & B, EIA [[id number]]  (Normal) Collected: 07/03/25 0344    Order Status: Completed Specimen: Stool Updated: 07/03/25 0920     Clostridioides difficile GDH Antigen Negative     Clostridioides difficile Toxin A/B Negative             See below for Radiology    Assessment/Plan:  LUCIA on CKD   History of renal transplant   Hypotension, resolved   History of upper extremity DVT on Eliquis  outpatient   Acute urinary retention   Diarrhea   Chronic microcytic anemia   Thrombocytopenia   Hypocalcemia  PMHx of renal transplant (12/13/2011) due to renal amyloidosis now CKD 3b on immunosuppression, HTN, HLD, asthma, GERD, obesity, multiple myeloma, dementia, Charcot foot, upper extremity DVT (on Eliquis?)     Stop Linezolid as on can cause worsening thrombocytopenia   Continue Rocephin  Urine cultures and blood culture showed no growth  DC Rocephin  C diff negative   Stool culture normal  Diarrhea has improved   GI panel normal   Nephrology consulted as history of renal transplant with LUCIA  Continue normal saline fluids   Continue to hold Lasix and lisinopril at this time   Tacrolimus and prednisone resumed  Right upper extremity shows no DVT   Sensipar and calcitriol ordered for severity of hyperparathyroidism  Plan for possible abdominal fat biopsy on Monday  Further recs based on clinical course   We will stop Eliquis and treat with therapeutic Lovenox for dvt if biopsy planning  Labs in a.m.      VTE prophylaxis:  therapeutic    Patient condition:  Stable/Fair/Guarded/ Serious/ Critical    Anticipated discharge and Disposition:   tbd      All diagnosis and differential diagnosis have been reviewed; assessment and plan has been documented; I have personally reviewed the labs and test results that are presently available; I have reviewed the patients medication list; I have reviewed the consulting providers response and recommendations. I have reviewed or attempted to review medical records based upon their availability    All of the patient's questions have been  addressed and answered. Patient's is agreeable to the above stated plan. I will continue to monitor closely and make adjustments to medical management as needed.    Portions of this note dictated using EMR integrated voice recognition software, and may be subject to voice recognition errors not corrected at proofreading. Please contact writer  for clarification if needed.   _____________________________________________________________________    Malnutrition Status:  Nutrition consulted. Most recent weight and BMI monitored-     Measurements:  Wt Readings from Last 1 Encounters:   07/03/25 89.4 kg (197 lb)   Body mass index is 32.78 kg/m².    Patient has been screened and assessed by RD.    Malnutrition Type:  Context:    Level: other (see comments) (Does not meet criteria)    Malnutrition Characteristic Summary:       Interventions/Recommendations (treatment strategy):        Scheduled Med:   apixaban  5 mg Oral BID    ascorbic acid (vitamin C)  1,000 mg Oral Daily    calcitRIOL  0.5 mcg Oral Daily    cefTRIAXone (Rocephin) IV (PEDS and ADULTS)  1 g Intravenous Q24H    cinacalcet  30 mg Oral Daily with breakfast    famotidine  20 mg Oral Nightly    lamiVUDine  100 mg Oral Daily    metoprolol tartrate  12.5 mg Oral BID    mupirocin   Nasal BID    predniSONE  5 mg Oral Daily    QUEtiapine  12.5 mg Oral QHS    rivastigmine  1 patch Transdermal Daily    tacrolimus  1 mg Oral Daily AM    And    tacrolimus  2 mg Oral Daily PM    thiamine  50 mg Oral Daily    thyroid (pork)  60 mg Oral Before breakfast    zinc oxide-cod liver oil   Topical (Top) BID      Continuous Infusions:   0.9% NaCl   Intravenous Continuous 75 mL/hr at 07/03/25 1809 New Bag at 07/03/25 1809      PRN Meds:    Current Facility-Administered Medications:     albuterol-ipratropium, 3 mL, Nebulization, Q6H PRN    aluminum-magnesium hydroxide-simethicone, 30 mL, Oral, QID PRN    bisacodyL, 10 mg, Rectal, Daily PRN    dextrose 50%, 12.5 g, Intravenous, PRN    dextrose 50%, 25 g, Intravenous, PRN    glucagon (human recombinant), 1 mg, Intramuscular, PRN    glucose, 16 g, Oral, PRN    glucose, 24 g, Oral, PRN    melatonin, 9 mg, Oral, Nightly PRN    naloxone, 0.02 mg, Intravenous, PRN    ondansetron, 8 mg, Oral, Q8H PRN    ondansetron, 4 mg, Intravenous, Q8H PRN    polyethylene glycol, 17 g, Oral,  TID PRN    simethicone, 1 tablet, Oral, QID PRN    sodium chloride 0.9%, 10 mL, Intravenous, PRN     Radiology:  I have personally reviewed the following imaging and agree with the radiologist.     CV Ultrasound doppler venous arm right  Negative for deep and superficial vein thrombosis in the right upper   extremity       Ping Frazier DO  Department of Hospital Medicine  [facility]  07/04/2025

## 2025-07-06 LAB
ALBUMIN SERPL-MCNC: 2.2 G/DL (ref 3.4–4.8)
ALBUMIN/GLOB SERPL: 0.7 RATIO (ref 1.1–2)
ALP SERPL-CCNC: 95 UNIT/L (ref 40–150)
ALT SERPL-CCNC: 17 UNIT/L (ref 0–55)
ANION GAP SERPL CALC-SCNC: 6 MEQ/L
AST SERPL-CCNC: 13 UNIT/L (ref 11–45)
BASOPHILS # BLD AUTO: 0.02 X10(3)/MCL
BASOPHILS NFR BLD AUTO: 0.3 %
BILIRUB SERPL-MCNC: 0.3 MG/DL
BUN SERPL-MCNC: 22.2 MG/DL (ref 9.8–20.1)
CALCIUM SERPL-MCNC: 8.9 MG/DL (ref 8.4–10.2)
CHLORIDE SERPL-SCNC: 113 MMOL/L (ref 98–107)
CO2 SERPL-SCNC: 27 MMOL/L (ref 23–31)
CREAT SERPL-MCNC: 1.68 MG/DL (ref 0.55–1.02)
CREAT/UREA NIT SERPL: 13
EOSINOPHIL # BLD AUTO: 0.04 X10(3)/MCL (ref 0–0.9)
EOSINOPHIL NFR BLD AUTO: 0.6 %
ERYTHROCYTE [DISTWIDTH] IN BLOOD BY AUTOMATED COUNT: 19.3 % (ref 11.5–17)
GFR SERPLBLD CREATININE-BSD FMLA CKD-EPI: 31 ML/MIN/1.73/M2
GLOBULIN SER-MCNC: 3.2 GM/DL (ref 2.4–3.5)
GLUCOSE SERPL-MCNC: 99 MG/DL (ref 82–115)
HCT VFR BLD AUTO: 27.9 % (ref 37–47)
HGB BLD-MCNC: 8.6 G/DL (ref 12–16)
IMM GRANULOCYTES # BLD AUTO: 0.08 X10(3)/MCL (ref 0–0.04)
IMM GRANULOCYTES NFR BLD AUTO: 1.2 %
LYMPHOCYTES # BLD AUTO: 1.41 X10(3)/MCL (ref 0.6–4.6)
LYMPHOCYTES NFR BLD AUTO: 20.9 %
MCH RBC QN AUTO: 26.5 PG (ref 27–31)
MCHC RBC AUTO-ENTMCNC: 30.8 G/DL (ref 33–36)
MCV RBC AUTO: 85.8 FL (ref 80–94)
MONOCYTES # BLD AUTO: 0.43 X10(3)/MCL (ref 0.1–1.3)
MONOCYTES NFR BLD AUTO: 6.4 %
NEUTROPHILS # BLD AUTO: 4.76 X10(3)/MCL (ref 2.1–9.2)
NEUTROPHILS NFR BLD AUTO: 70.6 %
NRBC BLD AUTO-RTO: 0 %
PHOSPHATE SERPL-MCNC: 4.4 MG/DL (ref 2.3–4.7)
PLATELET # BLD AUTO: 86 X10(3)/MCL (ref 130–400)
PLATELETS.RETICULATED NFR BLD AUTO: 5.3 % (ref 0.9–11.2)
PMV BLD AUTO: 12 FL (ref 7.4–10.4)
POTASSIUM SERPL-SCNC: 4.7 MMOL/L (ref 3.5–5.1)
PROT SERPL-MCNC: 5.4 GM/DL (ref 5.8–7.6)
PTH-INTACT SERPL-MCNC: 226.8 PG/ML (ref 8.7–77)
RBC # BLD AUTO: 3.25 X10(6)/MCL (ref 4.2–5.4)
SODIUM SERPL-SCNC: 146 MMOL/L (ref 136–145)
WBC # BLD AUTO: 6.74 X10(3)/MCL (ref 4.5–11.5)

## 2025-07-06 PROCEDURE — 63700000 PHARM REV CODE 250 ALT 637 W/O HCPCS: Performed by: NURSE PRACTITIONER

## 2025-07-06 PROCEDURE — 80053 COMPREHEN METABOLIC PANEL: CPT | Performed by: NURSE PRACTITIONER

## 2025-07-06 PROCEDURE — 63600175 PHARM REV CODE 636 W HCPCS: Performed by: NURSE PRACTITIONER

## 2025-07-06 PROCEDURE — 21400001 HC TELEMETRY ROOM

## 2025-07-06 PROCEDURE — 83970 ASSAY OF PARATHORMONE: CPT | Performed by: INTERNAL MEDICINE

## 2025-07-06 PROCEDURE — 36415 COLL VENOUS BLD VENIPUNCTURE: CPT | Performed by: INTERNAL MEDICINE

## 2025-07-06 PROCEDURE — 85025 COMPLETE CBC W/AUTO DIFF WBC: CPT | Performed by: NURSE PRACTITIONER

## 2025-07-06 PROCEDURE — 0HB7XZX EXCISION OF ABDOMEN SKIN, EXTERNAL APPROACH, DIAGNOSTIC: ICD-10-PCS | Performed by: SURGERY

## 2025-07-06 PROCEDURE — 25000003 PHARM REV CODE 250: Performed by: STUDENT IN AN ORGANIZED HEALTH CARE EDUCATION/TRAINING PROGRAM

## 2025-07-06 PROCEDURE — 25000003 PHARM REV CODE 250: Performed by: INTERNAL MEDICINE

## 2025-07-06 PROCEDURE — 84100 ASSAY OF PHOSPHORUS: CPT | Performed by: INTERNAL MEDICINE

## 2025-07-06 PROCEDURE — 25000003 PHARM REV CODE 250: Performed by: NURSE PRACTITIONER

## 2025-07-06 PROCEDURE — 99223 1ST HOSP IP/OBS HIGH 75: CPT | Mod: ,,, | Performed by: SURGERY

## 2025-07-06 PROCEDURE — 88313 SPECIAL STAINS GROUP 2: CPT

## 2025-07-06 PROCEDURE — 88305 TISSUE EXAM BY PATHOLOGIST: CPT

## 2025-07-06 PROCEDURE — 94640 AIRWAY INHALATION TREATMENT: CPT

## 2025-07-06 PROCEDURE — 99232 SBSQ HOSP IP/OBS MODERATE 35: CPT | Mod: ,,, | Performed by: INTERNAL MEDICINE

## 2025-07-06 PROCEDURE — 94760 N-INVAS EAR/PLS OXIMETRY 1: CPT

## 2025-07-06 PROCEDURE — 25000242 PHARM REV CODE 250 ALT 637 W/ HCPCS: Performed by: STUDENT IN AN ORGANIZED HEALTH CARE EDUCATION/TRAINING PROGRAM

## 2025-07-06 PROCEDURE — 63600175 PHARM REV CODE 636 W HCPCS: Performed by: STUDENT IN AN ORGANIZED HEALTH CARE EDUCATION/TRAINING PROGRAM

## 2025-07-06 RX ORDER — LIDOCAINE HYDROCHLORIDE 10 MG/ML
1 INJECTION, SOLUTION EPIDURAL; INFILTRATION; INTRACAUDAL; PERINEURAL ONCE
Status: DISCONTINUED | OUTPATIENT
Start: 2025-07-06 | End: 2025-07-10 | Stop reason: HOSPADM

## 2025-07-06 RX ORDER — LIDOCAINE HYDROCHLORIDE 10 MG/ML
INJECTION, SOLUTION INFILTRATION; PERINEURAL
Status: DISPENSED
Start: 2025-07-06 | End: 2025-07-07

## 2025-07-06 RX ORDER — SODIUM CHLORIDE 450 MG/100ML
INJECTION, SOLUTION INTRAVENOUS CONTINUOUS
Status: DISCONTINUED | OUTPATIENT
Start: 2025-07-06 | End: 2025-07-10 | Stop reason: HOSPADM

## 2025-07-06 RX ORDER — LIDOCAINE HYDROCHLORIDE AND EPINEPHRINE 10; 10 UG/ML; MG/ML
1 INJECTION, SOLUTION INFILTRATION; PERINEURAL ONCE
Status: DISCONTINUED | OUTPATIENT
Start: 2025-07-06 | End: 2025-07-06

## 2025-07-06 RX ADMIN — METOPROLOL TARTRATE 12.5 MG: 25 TABLET, FILM COATED ORAL at 10:07

## 2025-07-06 RX ADMIN — LEVOTHYROXINE, LIOTHYRONINE 60 MG: 19; 4.5 TABLET ORAL at 05:07

## 2025-07-06 RX ADMIN — PREDNISONE 5 MG: 5 TABLET ORAL at 08:07

## 2025-07-06 RX ADMIN — TACROLIMUS 2 MG: 1 CAPSULE ORAL at 05:07

## 2025-07-06 RX ADMIN — Medication 50 MG: at 08:07

## 2025-07-06 RX ADMIN — MUPIROCIN: 20 OINTMENT TOPICAL at 10:07

## 2025-07-06 RX ADMIN — IPRATROPIUM BROMIDE AND ALBUTEROL SULFATE 3 ML: .5; 3 SOLUTION RESPIRATORY (INHALATION) at 09:07

## 2025-07-06 RX ADMIN — CALCITRIOL 0.5 MCG: 0.5 CAPSULE, LIQUID FILLED ORAL at 08:07

## 2025-07-06 RX ADMIN — Medication: at 10:07

## 2025-07-06 RX ADMIN — SODIUM CHLORIDE: 4.5 INJECTION, SOLUTION INTRAVENOUS at 09:07

## 2025-07-06 RX ADMIN — Medication 1000 MG: at 08:07

## 2025-07-06 RX ADMIN — CINACALCET HYDROCHLORIDE 30 MG: 30 TABLET, FILM COATED ORAL at 08:07

## 2025-07-06 RX ADMIN — FAMOTIDINE 20 MG: 20 TABLET, FILM COATED ORAL at 10:07

## 2025-07-06 RX ADMIN — METOPROLOL TARTRATE 12.5 MG: 25 TABLET, FILM COATED ORAL at 08:07

## 2025-07-06 RX ADMIN — Medication: at 08:07

## 2025-07-06 RX ADMIN — QUETIAPINE FUMARATE 12.5 MG: 25 TABLET ORAL at 10:07

## 2025-07-06 RX ADMIN — FLUCONAZOLE 100 MG: 100 TABLET ORAL at 08:07

## 2025-07-06 RX ADMIN — IPRATROPIUM BROMIDE AND ALBUTEROL SULFATE 3 ML: .5; 3 SOLUTION RESPIRATORY (INHALATION) at 08:07

## 2025-07-06 RX ADMIN — MUPIROCIN: 20 OINTMENT TOPICAL at 08:07

## 2025-07-06 RX ADMIN — RIVASTIGMINE 1 PATCH: 4.6 PATCH, EXTENDED RELEASE TRANSDERMAL at 08:07

## 2025-07-06 RX ADMIN — TACROLIMUS 1 MG: 1 CAPSULE ORAL at 08:07

## 2025-07-06 NOTE — PROGRESS NOTES
Renal  Pt seen and examined  Meds and labs and events reviewed  Intermittent diarrhea  No SOB  No NV  Occ cough  BP 140s  PE  Alert and responsive  Pale  PERRLA., EOMI  Oroph benign  No JVD  Lungs ->scattered rhonchi  RRR  Abd soft  Tr edema    Impression  Amylodosis  CKD3b ( chronic rejection)  Hypovolemic hypernatremia  Diarrhea intermitted ( likely amyloid related)  Anemia, partially related to CKD  Sacral ulcer  SEVERE DECONDITIONING      P  1/2 NSS at 100cc/h  Fat pad biopsy in am  Procrit in am  Pt has a rehab place near [facility], will work on DC in am

## 2025-07-06 NOTE — PROCEDURES
"Jing Philippe is a 75 y.o. female patient.    Temp: 98 °F (36.7 °C) (07/06/25 1234)  Pulse: 78 (07/06/25 1234)  Resp: 18 (07/06/25 0859)  BP: (!) 168/69 (07/06/25 1234)  SpO2: 97 % (07/06/25 1234)  Weight: 89.4 kg (197 lb) (07/03/25 0614)  Height: 5' 5" (165.1 cm) (07/03/25 0614)       Punch biopsy    Date/Time: 7/6/2025 2:19 PM    Performed by: Tricia Samano MD  Authorized by: Tricia Samano MD  Consent: Verbal consent obtained. Written consent obtained  Risks and benefits: risks, benefits and alternatives were discussed  Consent given by: spouse  Procedure consent: procedure consent matches procedure scheduled  Preparation: Patient was prepped and draped in the usual sterile fashion.  Local anesthesia used: yes    Anesthesia:  Local anesthesia used: yes  Local Anesthetic: lidocaine 1% with epinephrine  Anesthetic total: 10 mL    Sedation:  Patient sedated: no    Patient tolerance: patient tolerated the procedure well with no immediate complications  Comments: Punch biopsy sampled from abdomen. Specimen sent to pathology.        7/6/2025    Plan  -Dressing placed. Change as needed.  -General surgery to be available as needed. Please call if you have any questions or concerns. Thank you!  -Rest of care per primary team.    Tricia Samano MD  [employer] General Surgery PGY-1    "

## 2025-07-06 NOTE — CONSULTS
Acute Care Surgery   Consult    Patient Name: Jing Philippe  YOB: 1950  Date: 07/06/2025 12:34 PM  Date of Admission: 7/2/2025  Room#995/995 A   HD#4  POD#* No surgery found *    PRESENTING HISTORY   Chief Complaint/Reason for Admission: LUCIA (acute kidney injury)  History source(s): patient  History of Present Illness:  75F with PMHx of renal transplant (12/13/2011) due to renal amyloidosis now CKD 3b on immunosuppression, HTN, HLD, asthma, GERD, obesity, multiple myeloma, dementia, Charcot foot, upper extremity DVT (on Eliquis?) admitted after presenting to the ED for hypotension and worsening LUCIA with CKD. General surgery was consulted for abdominal fat pad biopsy by nephrology.     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    Amyloidosis, unspecified 1998    s/p chemotherapy x 4 occassions  (IV x3, po x1)    Anticoagulant long-term use     Benign hypertension with end-stage renal disease     Candida vaginitis 1/16/2015    Chronic asthma     CKD (chronic kidney disease) stage 3, GFR 30-59 ml/min 9/14/2012    GERD (gastroesophageal reflux disease)     Hyperlipidemia     Lymphedema     LLE    Need for prophylactic immunotherapy     Obesity     Osteoarthritis of left knee s/p total knee replacement 1/16/2015    Surgery October 23, 2014    PONV (postoperative nausea and vomiting)     Renal disease due to hypertension 12/14/2012    Secondary hyperparathyroidism of renal origin     Vertigo        Past surgical history:  Past Surgical History:   Procedure Laterality Date    AV FISTULA PLACEMENT      CHOLECYSTECTOMY      HYSTERECTOMY      IMPLANTATION OF PERMANENT SACRAL NERVE STIMULATOR N/A 12/24/2019    Procedure: INSERTION, NEUROSTIMULATOR, PERMANENT, SACRAL;  Surgeon: Misael Browning MD;  [facility]: [facility];  Service: Urology;  Laterality: N/A;  1hr  opal Tilley rep confirmed    KIDNEY TRANSPLANT      REMOVAL OF ELECTRODE LEAD OF SACRAL NERVE STIMULATOR         Family history:  Family History   Problem Relation Name Age of Onset    Hypertension Mother      Diabetes Mother      Hypertension Father      Kidney disease Grandchild          recurrent infections, uncertain details       Social history:  Social History     Socioeconomic History    Marital status:    Tobacco Use    Smoking status: Never     Passive exposure: Never    Smokeless tobacco: Never   Substance and Sexual Activity    Alcohol use: Yes     Comment: rare margartia    Drug use: No   Social History Narrative    Inez used to work as , now retired for medical reasons.     Social Drivers of Health     Financial Resource Strain: Low Risk  (5/23/2025)    Overall Financial Resource Strain (CARDIA)     Difficulty of Paying Living Expenses: Not very hard   Food Insecurity: No Food Insecurity (7/3/2025)    Hunger Vital Sign     Worried About Running Out of Food in the Last Year: Never true     Ran Out of Food in the Last Year: Never true   Transportation Needs: No Transportation Needs (7/3/2025)    PRAPARE - Transportation     Lack of Transportation (Medical): No     Lack of Transportation (Non-Medical): No   Physical Activity: Inactive (5/23/2025)    Exercise Vital Sign     Days of Exercise per Week: 0 days     Minutes of Exercise per Session: 0 min   Stress: No Stress Concern Present (5/23/2025)    [employer] [place] [employer] - Occupational Stress Questionnaire     Feeling of Stress : Not at all   Housing Stability: Low Risk  (7/3/2025)    Housing Stability Vital Sign     Unable to Pay for Housing in the Last Year: No     Homeless in the Last Year: No     Tobacco Use History[1]   Social History     Substance and Sexual Activity   Alcohol Use Yes    Comment: rare margartia        MEDICATIONS & ALLERGIES:     No  current facility-administered medications on file prior to encounter.     Current Outpatient Medications on File Prior to Encounter   Medication Sig    ARGINAID 4.5 gram-156 mg/9.2 gram PwPk Take 1 packet by mouth 2 (two) times a day.    ARMOUR THYROID 60 mg Tab Take 60 mg by mouth before breakfast.    ascorbic acid, vitamin C, (VITAMIN C) 1000 MG tablet Take 1,000 mg by mouth once daily.    busPIRone (BUSPAR) 5 MG Tab Take 5 mg by mouth 2 (two) times daily.    ELIQUIS 5 mg Tab Take 5 mg by mouth 2 (two) times daily.    ergocalciferol (VITAMIN D2) 50,000 unit Cap Take 50,000 Units by mouth every 7 days. (Patient not taking: Reported on 7/2/2025)    famotidine (PEPCID) 20 MG tablet Take 20 mg by mouth nightly.    fluticasone propionate (FLONASE) 50 mcg/actuation nasal spray by Each Nostril route. (Patient taking differently: 1 spray by Each Nostril route 2 (two) times a day.)    furosemide (LASIX) 40 MG tablet Take 40 mg by mouth 2 (two) times daily.    k phos di & mono-sod phos mono (K-PHOS-NEUTRAL) 250 mg Tab Take 2 tablets by mouth Three times a day. (Patient not taking: Reported on 7/2/2025)    k phos di & mono-sod phos mono (K-PHOS-NEUTRAL) 250 mg Tab Take 2 tablets by mouth 3 (three) times daily.    lamivudine (EPIVIR) 100 MG Tab TAKE 1 TABLET DAILY.    linezolid (ZYVOX) 600 mg Tab Take 600 mg by mouth 2 (two) times daily.    lisinopriL (PRINIVIL,ZESTRIL) 5 MG tablet Take 1 tablet (5 mg total) by mouth once daily.    meclizine (ANTIVERT) 25 mg tablet Take 1 tablet by mouth Daily. (Patient not taking: Reported on 7/2/2025)    MELATONIN ORAL Take by mouth every evening. (Patient not taking: Reported on 2/27/2025)    metoprolol tartrate (LOPRESSOR) 50 MG tablet Take 50 mg by mouth 2 (two) times daily.    MUCINEX 600 mg 12 hr tablet Take 600 mg by mouth 2 (two) times daily.    multivit with min-folic acid 0.4 mg Tab Take 1 tablet by mouth once daily.    oxybutynin (DITROPAN-XL) 10 MG 24 hr tablet Take 1 tablet (10  mg total) by mouth once daily.    pantoprazole (PROTONIX) 40 MG tablet Take 40 mg by mouth continuous prn. (Patient taking differently: Take 40 mg by mouth once daily.)    potassium chloride SA (K-DUR,KLOR-CON) 20 MEQ tablet Take 20 mEq by mouth once daily.    predniSONE (DELTASONE) 5 MG tablet Take 5 mg by mouth once daily.    pregabalin (LYRICA) 150 MG capsule Take 150 mg by mouth 2 (two) times daily.    QUEtiapine (SEROQUEL) 25 MG Tab Take 25 mg by mouth every evening.    rivastigmine (EXELON) 4.6 mg/24 hour PT24 Place 1 patch onto the skin once daily.    sodium bicarbonate 650 MG tablet Take 2 tablets (1,300 mg total) by mouth 2 (two) times daily. (Patient taking differently: Take 650 mg by mouth 2 (two) times daily.)    tacrolimus (PROGRAF) 1 MG Cap Take 1 capsule (1 mg total) by mouth every morning AND 2 capsules (2 mg total) every evening. (Patient taking differently: Take 1 cap BID)    thiamine (VITAMIN B-1) 50 MG tablet Take 50 mg by mouth once daily.    zinc gluconate 50 mg tablet Take 50 mg by mouth once daily.     Allergies:   Review of patient's allergies indicates:   Allergen Reactions    Anesthetics - jen type- parabens      Pt allergic to parabens- per pharmacist    Antihistamines - ethylenediamine     Codeine      Other reaction(s): Vomiting  Other reaction(s): Nausea    Ethanol (ethyl alcohol)     Hydrocodone      Other reaction(s): Vomiting  Other reaction(s): Nausea    Opioids - morphine analogues     Propylene glycol     Saccharin     Tylenol [acetaminophen]     Yellow dye      Scheduled Meds:   ascorbic acid (vitamin C)  1,000 mg Oral Daily    calcitRIOL  0.5 mcg Oral Daily    cinacalcet  30 mg Oral Daily with breakfast    enoxparin  1 mg/kg Subcutaneous Q24H (treatment, non-standard time)    famotidine  20 mg Oral Nightly    fluconazole  100 mg Oral Daily    lamiVUDine  100 mg Oral Daily    metoprolol tartrate  12.5 mg Oral BID    mupirocin   Nasal BID    predniSONE  5 mg Oral Daily     "QUEtiapine  12.5 mg Oral QHS    rivastigmine  1 patch Transdermal Daily    tacrolimus  1 mg Oral Daily AM    And    tacrolimus  2 mg Oral Daily PM    thiamine  50 mg Oral Daily    thyroid (pork)  60 mg Oral Before breakfast    zinc oxide-cod liver oil   Topical (Top) BID     Continuous Infusions:   0.45% NaCl   Intravenous Continuous 100 mL/hr at 07/06/25 0915 New Bag at 07/06/25 0915     PRN Meds:  Current Facility-Administered Medications:     albuterol-ipratropium, 3 mL, Nebulization, Q6H PRN    aluminum-magnesium hydroxide-simethicone, 30 mL, Oral, QID PRN    bisacodyL, 10 mg, Rectal, Daily PRN    dextrose 50%, 12.5 g, Intravenous, PRN    dextrose 50%, 25 g, Intravenous, PRN    glucagon (human recombinant), 1 mg, Intramuscular, PRN    glucose, 16 g, Oral, PRN    glucose, 24 g, Oral, PRN    melatonin, 9 mg, Oral, Nightly PRN    naloxone, 0.02 mg, Intravenous, PRN    ondansetron, 8 mg, Oral, Q8H PRN    ondansetron, 4 mg, Intravenous, Q8H PRN    polyethylene glycol, 17 g, Oral, TID PRN    simethicone, 1 tablet, Oral, QID PRN    sodium chloride 0.9%, 10 mL, Intravenous, PRN    OBJECTIVE:   Vital Signs:  VITAL SIGNS: 24 HR MIN & MAX LAST   Temp  Min: 96.4 °F (35.8 °C)  Max: 98.1 °F (36.7 °C)  98.1 °F (36.7 °C)   BP  Min: 141/66  Max: 163/70  (!) 155/61    Pulse  Min: 77  Max: 83  82    Resp  Min: 18  Max: 22  18    SpO2  Min: 97 %  Max: 100 %  99 %      HT: 5' 5" (165.1 cm)  WT: 89.4 kg (197 lb)  BMI: 32.8     Intake/output:  Intake/Output - Last 3 Shifts         07/04 0700  07/05 0659 07/05 0700 07/06 0659 07/06 0700 07/07 0659    P.O. 840 960     Total Intake(mL/kg) 840 (9.4) 960 (10.7)     Urine (mL/kg/hr) 3000 (1.4) 1000 (0.5)     Stool 0 0     Total Output 3000 1000     Net -2160 -40            Unmeasured Stool Occurrence 1 x 0 x             Intake/Output Summary (Last 24 hours) at 7/6/2025 1234  Last data filed at 7/5/2025 2100  Gross per 24 hour   Intake 960 ml   Output 1000 ml   Net -40 ml         Physical " "Exam:  General: Ill-appearing, obese, no acute distress  HEENT: Normocephalic, PERRL  CV: RR  Resp: NWOB  GI:  Abdomen soft, non-tender, non-distended, no guarding, no rebound  :  Deferred  MSK: No muscle atrophy, cyanosis, peripheral edema, moving all extremities spontaneously  Skin/wounds:  Pale, no rashes, ulcers, erythema  Neuro:  CNII-XII grossly intact, alert and oriented to person, place, and time    Labs:  Troponin:  No results for input(s): "TROPONINI" in the last 72 hours.  CBC:  Recent Labs     07/05/25  0510 07/06/25  0614   WBC 6.28 6.74   RBC 3.06* 3.25*   HGB 8.2* 8.6*   HCT 26.7* 27.9*   PLT 80* 86*   MCV 87.3 85.8   MCH 26.8* 26.5*   MCHC 30.7* 30.8*     CMP:  Recent Labs     07/04/25  0443 07/05/25  0510 07/06/25  0614   GLU 77* 87 99   CALCIUM 7.0* 8.1* 8.9   ALBUMIN 2.1* 2.1* 2.2*   PROT 5.0* 5.5* 5.4*    143 146*   K 4.2 4.5 4.7   CO2 26 24 27    112* 113*   BUN 24.9* 21.9* 22.2*   CREATININE 1.47* 1.55* 1.68*   ALKPHOS 84 84 95   ALT 13 13 17   AST 12 12 13   BILITOT 0.2 0.2 0.3     Lactic Acid:  No results for input(s): "LACTATE" in the last 72 hours.  ETOH:  No results for input(s): "ETHANOL" in the last 72 hours.   Urine Drug Screen:  No results for input(s): "COCAINE", "OPIATE", "BARBITURATE", "AMPHETAMINE", "FENTANYL", "CANNABINOIDS", "MDMA" in the last 72 hours.    Invalid input(s): "BENZODIAZEPINE", "PHENCYCLIDINE"   ABG:  No results for input(s): "PH", "PO2", "PCO2", "HCO3", "BE" in the last 168 hours.   I have reviewed all pertinent lab results within the past 24 hours.    Diagnostic Results:  Imaging Results              X-Ray Foot Complete Left (Final result)  Result time 07/02/25 18:16:29      Final result by Derrick Reveles MD (07/02/25 18:16:29)                   Impression:      As above.      Electronically signed by: Derrick Reveles  Date:    07/02/2025  Time:    18:16               Narrative:    EXAMINATION:  XR FOOT COMPLETE 3 VIEW LEFT    CLINICAL " HISTORY:  Congenital malformation, unspecified    COMPARISON:  None    FINDINGS:  Three views of the left foot.  No convincing acute fracture or dislocation.  Suspect Charcot joint with severe degenerative changes of the hindfoot.                                       X-Ray Chest AP Portable (Final result)  Result time 07/02/25 17:05:31      Final result by Jeramy Enamorado MD (07/02/25 17:05:31)                   Impression:      No acute cardiopulmonary process identified..      Electronically signed by: Jeramy Enamorado  Date:    07/02/2025  Time:    17:05               Narrative:    EXAMINATION:  XR CHEST AP PORTABLE    CLINICAL HISTORY:  Hypotension, unspecified    TECHNIQUE:  One view    COMPARISON:  February 23, 2025.    FINDINGS:  Cardiopericardial silhouette is within normal limits.  Obscured hilar and the mediastinal structures.  Lungs hypoventilatory changes without dense focal or segmental consolidation, congestive process, pleural effusions or pneumothorax.                                     I have reviewed all pertinent imaging results/findings within the past 24 hours.    ASSESSMENT & PLAN:    75F with extensive medical commodities presented with hypotension and LUCIA. General was consulted for abdominal fat pad biopsy.     -Abdominal fat pad punch biopsy today    Tricia Samano MD  [employer] General Surgery PGY-1                [1]   Social History  Tobacco Use   Smoking Status Never    Passive exposure: Never   Smokeless Tobacco Never

## 2025-07-06 NOTE — PROGRESS NOTES
[facility] Medicine Progress Note        Chief Complaint: Inpatient Follow-up     HPI:   75 y.o. female with PMHx of renal transplant (12/13/2011) due to renal amyloidosis now CKD 3b on immunosuppression, HTN, HLD, asthma, GERD, obesity, multiple myeloma, dementia, Charcot foot, upper extremity DVT (on Eliquis?)  presenting from Dr. Wesley office where she was visiting from [facility].  She was found to have a blood pressure of 65/42 and worsening LUCIA on CKD and was sent to the emergency room.     At baseline prior to her 3 months of hospitalizations patient lived with her  and ambulated with a walker.    Interval Hx:  No changes overnight.  She denies any pain.  Has been at the bedside.  Vital signs stable overnight.    Objective/physical exam:  General: In no acute distress, afebrile  Chest: Clear to auscultation bilaterally  Heart: RRR, +S1, S2, no appreciable murmur  Abdomen: Soft, nontender, BS +  MSK: Warm, no lower extremity edema, no clubbing or cyanosis  Neurologic: Alert and oriented x4, Cranial nerve II-XII intact, Strength 5/5 in all 4 extremities    VITAL SIGNS: 24 HRS MIN & MAX LAST   Temp  Min: 96.4 °F (35.8 °C)  Max: 97.8 °F (36.6 °C) 97.8 °F (36.6 °C)   BP  Min: 139/64  Max: 163/70 (!) 141/66   Pulse  Min: 75  Max: 83  77   Resp  Min: 17  Max: 22 20   SpO2  Min: 97 %  Max: 100 % 99 %       Recent Labs   Lab 07/02/25 1737 07/03/25  0312 07/05/25  0510   WBC 6.87 6.77 6.28   RBC 2.82* 3.40* 3.06*   HGB 7.6* 9.0* 8.2*   HCT 24.0* 29.4* 26.7*   MCV 85.1 86.5 87.3   MCH 27.0 26.5* 26.8*   MCHC 31.7* 30.6* 30.7*   RDW 18.6* 18.6* 19.3*   PLT 90* 98* 80*   MPV 12.5* 12.4* 12.1*       Recent Labs   Lab 07/02/25 1737 07/03/25  0312 07/04/25  0443 07/05/25  0510    132* 142 143   K 3.2* 3.5 4.2 4.5    95* 106 112*   CO2 28 31 26 24   BUN 36.0* 32.8* 24.9* 21.9*   CREATININE 1.74* 1.67* 1.47* 1.55*   GLU 95 96 77* 87   CALCIUM 6.6* 6.9*  7.0* 8.1*   MG 0.70* 1.80  --   --    ALBUMIN 2.0*  --  2.1* 2.1*   PROT 5.0*  --  5.0* 5.5*   ALKPHOS 74  --  84 84   ALT 11  --  13 13   AST 10*  --  12 12   BILITOT 0.2  --  0.2 0.2          Microbiology Results (last 7 days)       Procedure Component Value Units Date/Time    Blood culture #2 **CANNOT BE ORDERED STAT** [[id number]]  (Normal) Collected: 07/02/25 1737    Order Status: Completed Specimen: Blood Updated: 07/05/25 2001     Blood Culture No Growth At 72 Hours    Blood culture #1 **CANNOT BE ORDERED STAT** [[id number]]  (Normal) Collected: 07/02/25 1737    Order Status: Completed Specimen: Blood Updated: 07/05/25 2001     Blood Culture No Growth At 72 Hours    Stool Culture [[id number]]  (Abnormal) Collected: 07/03/25 0344    Order Status: Completed Specimen: Stool Updated: 07/05/25 1128     Stool Culture Negative for Salmonella, Shigella, Campylobacter, Vibrio, Aeromonas, Pleisiomonas,Yersinia, or Shiga Toxin 1 and 2.      Many Yeast    Urine culture [[id number]]  (Abnormal) Collected: 07/02/25 1822    Order Status: Completed Specimen: Urine Updated: 07/04/25 1302     Urine Culture 10,000 - 25,000 colonies/ml Candida albicans    Clostridium Diff Toxin, A & B, EIA [[id number]]  (Normal) Collected: 07/03/25 0344    Order Status: Completed Specimen: Stool Updated: 07/03/25 0920     Clostridioides difficile GDH Antigen Negative     Clostridioides difficile Toxin A/B Negative             Radiology:  X-Ray Chest 1 View  Narrative: EXAMINATION:  XR CHEST 1 VIEW    CLINICAL HISTORY:  SOB;    TECHNIQUE:  AP chest    COMPARISON:  Chest x-ray dated 07/02/2025    FINDINGS:  The heart is normal in size.  There is no new focal airspace consolidation.  There is a small left pleural effusion, unchanged.  There is no visible pneumothorax.  Impression: Stable exam without significant interval change.    Electronically signed by: Molly Rao  Date:    07/05/2025  Time:    14:27        Medications:  Scheduled Meds:    ascorbic acid (vitamin C)  1,000 mg Oral Daily    calcitRIOL  0.5 mcg Oral Daily    cinacalcet  30 mg Oral Daily with breakfast    enoxparin  1 mg/kg Subcutaneous Q24H (treatment, non-standard time)    famotidine  20 mg Oral Nightly    fluconazole  100 mg Oral Daily    lamiVUDine  100 mg Oral Daily    metoprolol tartrate  12.5 mg Oral BID    mupirocin   Nasal BID    predniSONE  5 mg Oral Daily    QUEtiapine  12.5 mg Oral QHS    rivastigmine  1 patch Transdermal Daily    tacrolimus  1 mg Oral Daily AM    And    tacrolimus  2 mg Oral Daily PM    thiamine  50 mg Oral Daily    thyroid (pork)  60 mg Oral Before breakfast    zinc oxide-cod liver oil   Topical (Top) BID     Continuous Infusions:  PRN Meds:.  Current Facility-Administered Medications:     albuterol-ipratropium, 3 mL, Nebulization, Q6H PRN    aluminum-magnesium hydroxide-simethicone, 30 mL, Oral, QID PRN    bisacodyL, 10 mg, Rectal, Daily PRN    dextrose 50%, 12.5 g, Intravenous, PRN    dextrose 50%, 25 g, Intravenous, PRN    glucagon (human recombinant), 1 mg, Intramuscular, PRN    glucose, 16 g, Oral, PRN    glucose, 24 g, Oral, PRN    melatonin, 9 mg, Oral, Nightly PRN    naloxone, 0.02 mg, Intravenous, PRN    ondansetron, 8 mg, Oral, Q8H PRN    ondansetron, 4 mg, Intravenous, Q8H PRN    polyethylene glycol, 17 g, Oral, TID PRN    simethicone, 1 tablet, Oral, QID PRN    sodium chloride 0.9%, 10 mL, Intravenous, PRN    Nutrition:  Nutrition consulted. Most recent weight and BMI monitored-     Measurements:  Wt Readings from Last 1 Encounters:   07/03/25 89.4 kg (197 lb)   Body mass index is 32.78 kg/m².    Patient has been screened and assessed by RD.    Malnutrition Type:  Context:    Level: other (see comments) (Does not meet criteria)    Malnutrition Characteristic Summary:       Interventions/Recommendations (treatment strategy):           Assessment/Plan:   LUCIA on CKD   History of renal transplant   Hypotension, resolved   History of upper extremity  DVT on Eliquis outpatient   Acute urinary retention   Diarrhea   Chronic microcytic anemia   Thrombocytopenia   Hypocalcemia  PMHx of renal transplant (12/13/2011) due to renal amyloidosis now CKD 3b on immunosuppression, HTN, HLD, asthma, GERD, obesity, multiple myeloma, dementia, Charcot foot, upper extremity DVT (on Eliquis?)     Patient is currently off of all antibiotics.  All cultures have remained negative except for some yeast in her stool and urine.  She is on fluconazole 100 mg daily, day 2 of 7.  Nephrology following.  Considering possible abdominal fat pad biopsy tomorrow.  Creatinine 1.5 yesterday.  Will follow up this morning's labs.  Hemoglobin stable  That has mentioned of possible placement at discharge.  Will need PT OT evaluation.    Case Management back tomorrow      Hector Rodriguez MD   07/06/2025     All diagnosis and differential diagnosis have been reviewed; assessment and plan has been documented; I have personally reviewed the labs and test results that are presently available; I have reviewed the patients medication list; I have reviewed the consulting providers response and recommendations. I have reviewed or attempted to review medical records based upon their availability    All of the patient's questions have been  addressed and answered. Patient's is agreeable to the above stated plan. I will continue to monitor closely and make adjustments to medical management as needed.  _____________________________________________________________________

## 2025-07-06 NOTE — PROGRESS NOTES
[facility]  Hospital Medicine Progress Note        Chief Complaint: Inpatient Follow-up     HPI:    75 y.o. female with PMHx of renal transplant (12/13/2011) due to renal amyloidosis now CKD 3b on immunosuppression, HTN, HLD, asthma, GERD, obesity, multiple myeloma, dementia, Charcot foot, upper extremity DVT (on Eliquis?)  presenting from Dr. Wesley office where she was visiting from [facility].  She was found to have a blood pressure of 65/42 and worsening LUCIA on CKD and was sent to the emergency room.     At baseline prior to her 3 months of hospitalizations patient lived with her  and ambulated with a walker.    General surgery took for abdominal fat pad biopsy on 07/06.  Specimens sent to pathology.     Interval Hx:  Bed biopsy yesterday.  Doing well.  Has been at the bedside.  Blood pressure is little elevated overnight       Objective/physical exam:  General: In no acute distress, afebrile  Chest: Clear to auscultation bilaterally  Heart: RRR, +S1, S2, no appreciable murmur  Abdomen: Soft, nontender, BS +  MSK: Warm, no lower extremity edema, no clubbing or cyanosis  Neurologic: Alert and oriented x4, Cranial nerve II-XII intact, Strength 5/5 in all 4 extremities       VITAL SIGNS: 24 HRS MIN & MAX LAST   Temp  Min: 96.4 °F (35.8 °C)  Max: 98.1 °F (36.7 °C) 98 °F (36.7 °C)   BP  Min: 141/66  Max: 168/69 (!) 168/69   Pulse  Min: 77  Max: 83  78   Resp  Min: 18  Max: 22 18   SpO2  Min: 97 %  Max: 100 % 97 %       Recent Labs   Lab 07/05/25  0510 07/06/25  0614 07/07/25  0608   WBC 6.28 6.74 7.38   RBC 3.06* 3.25* 2.98*   HGB 8.2* 8.6* 8.0*   HCT 26.7* 27.9* 25.7*   MCV 87.3 85.8 86.2   MCH 26.8* 26.5* 26.8*   MCHC 30.7* 30.8* 31.1*   RDW 19.3* 19.3* 19.5*   PLT 80* 86* 64*   MPV 12.1* 12.0* 11.7*       Recent Labs   Lab 07/02/25  1737 07/03/25  0312 07/04/25  0443 07/05/25  0510 07/06/25  0614    132* 142 143 146*   K 3.2* 3.5 4.2 4.5 4.7    95* 106  112* 113*   CO2 28 31 26 24 27   BUN 36.0* 32.8* 24.9* 21.9* 22.2*   CREATININE 1.74* 1.67* 1.47* 1.55* 1.68*   GLU 95 96 77* 87 99   CALCIUM 6.6* 6.9* 7.0* 8.1* 8.9   MG 0.70* 1.80  --   --   --    ALBUMIN 2.0*  --  2.1* 2.1* 2.2*   PROT 5.0*  --  5.0* 5.5* 5.4*   ALKPHOS 74  --  84 84 95   ALT 11  --  13 13 17   AST 10*  --  12 12 13   BILITOT 0.2  --  0.2 0.2 0.3          Microbiology Results (last 7 days)       Procedure Component Value Units Date/Time    Blood culture #2 **CANNOT BE ORDERED STAT** [[id number]]  (Normal) Collected: 07/02/25 1737    Order Status: Completed Specimen: Blood Updated: 07/05/25 2001     Blood Culture No Growth At 72 Hours    Blood culture #1 **CANNOT BE ORDERED STAT** [[id number]]  (Normal) Collected: 07/02/25 1737    Order Status: Completed Specimen: Blood Updated: 07/05/25 2001     Blood Culture No Growth At 72 Hours    Stool Culture [[id number]]  (Abnormal) Collected: 07/03/25 0344    Order Status: Completed Specimen: Stool Updated: 07/05/25 1128     Stool Culture Negative for Salmonella, Shigella, Campylobacter, Vibrio, Aeromonas, Pleisiomonas,Yersinia, or Shiga Toxin 1 and 2.      Many Yeast    Urine culture [[id number]]  (Abnormal) Collected: 07/02/25 1822    Order Status: Completed Specimen: Urine Updated: 07/04/25 1302     Urine Culture 10,000 - 25,000 colonies/ml Candida albicans    Clostridium Diff Toxin, A & B, EIA [[id number]]  (Normal) Collected: 07/03/25 0344    Order Status: Completed Specimen: Stool Updated: 07/03/25 0920     Clostridioides difficile GDH Antigen Negative     Clostridioides difficile Toxin A/B Negative             Radiology:  X-Ray Chest 1 View  Narrative: EXAMINATION:  XR CHEST 1 VIEW    CLINICAL HISTORY:  SOB;    TECHNIQUE:  AP chest    COMPARISON:  Chest x-ray dated 07/02/2025    FINDINGS:  The heart is normal in size.  There is no new focal airspace consolidation.  There is a small left pleural effusion, unchanged.  There is no visible  pneumothorax.  Impression: Stable exam without significant interval change.    Electronically signed by: Molly Rao  Date:    07/05/2025  Time:    14:27        Medications:  Scheduled Meds:   ascorbic acid (vitamin C)  1,000 mg Oral Daily    calcitRIOL  0.5 mcg Oral Daily    cinacalcet  30 mg Oral Daily with breakfast    enoxparin  1 mg/kg Subcutaneous Q24H (treatment, non-standard time)    famotidine  20 mg Oral Nightly    fluconazole  100 mg Oral Daily    lamiVUDine  100 mg Oral Daily    metoprolol tartrate  12.5 mg Oral BID    mupirocin   Nasal BID    predniSONE  5 mg Oral Daily    QUEtiapine  12.5 mg Oral QHS    rivastigmine  1 patch Transdermal Daily    tacrolimus  1 mg Oral Daily AM    And    tacrolimus  2 mg Oral Daily PM    thiamine  50 mg Oral Daily    thyroid (pork)  60 mg Oral Before breakfast    zinc oxide-cod liver oil   Topical (Top) BID     Continuous Infusions:   0.45% NaCl   Intravenous Continuous 100 mL/hr at 07/06/25 0915 New Bag at 07/06/25 0915     PRN Meds:.  Current Facility-Administered Medications:     albuterol-ipratropium, 3 mL, Nebulization, Q6H PRN    aluminum-magnesium hydroxide-simethicone, 30 mL, Oral, QID PRN    bisacodyL, 10 mg, Rectal, Daily PRN    dextrose 50%, 12.5 g, Intravenous, PRN    dextrose 50%, 25 g, Intravenous, PRN    glucagon (human recombinant), 1 mg, Intramuscular, PRN    glucose, 16 g, Oral, PRN    glucose, 24 g, Oral, PRN    melatonin, 9 mg, Oral, Nightly PRN    naloxone, 0.02 mg, Intravenous, PRN    ondansetron, 8 mg, Oral, Q8H PRN    ondansetron, 4 mg, Intravenous, Q8H PRN    polyethylene glycol, 17 g, Oral, TID PRN    simethicone, 1 tablet, Oral, QID PRN    sodium chloride 0.9%, 10 mL, Intravenous, PRN    Nutrition:  Nutrition consulted. Most recent weight and BMI monitored-     Measurements:  Wt Readings from Last 1 Encounters:   07/03/25 89.4 kg (197 lb)   Body mass index is 32.78 kg/m².    Patient has been screened and assessed by RD.    Malnutrition  Type:  Context:    Level: other (see comments) (Does not meet criteria)    Malnutrition Characteristic Summary:       Interventions/Recommendations (treatment strategy):           Assessment/Plan:   LUCIA on CKD   History of renal transplant   Hypotension, resolved   History of upper extremity DVT on Eliquis outpatient   Acute urinary retention   Diarrhea   Chronic microcytic anemia   Thrombocytopenia   Hypocalcemia  PMHx of renal transplant (12/13/2011) due to renal amyloidosis now CKD 3b on immunosuppression, HTN, HLD, asthma, GERD, obesity, multiple myeloma, dementia, Charcot foot, upper extremity DVT (on Eliquis?)      Patient is currently off of all antibiotics.  All cultures have remained negative except for some yeast in her stool and urine.  She is on fluconazole 100 mg daily, day 3 of 7.  Nephrology following. Fat pad biopsy yesterday.  Can follow up pathology as an outpatient.   PT/OT eval pending  Possibly needs placement  Case Management back this morning      Hector Rodriguez MD   7/7/25    All diagnosis and differential diagnosis have been reviewed; assessment and plan has been documented; I have personally reviewed the labs and test results that are presently available; I have reviewed the patients medication list; I have reviewed the consulting providers response and recommendations. I have reviewed or attempted to review medical records based upon their availability    All of the patient's questions have been  addressed and answered. Patient's is agreeable to the above stated plan. I will continue to monitor closely and make adjustments to medical management as needed.  _____________________________________________________________________

## 2025-07-07 LAB
ALBUMIN SERPL-MCNC: 2.1 G/DL (ref 3.4–4.8)
ALBUMIN/GLOB SERPL: 0.6 RATIO (ref 1.1–2)
ALP SERPL-CCNC: 90 UNIT/L (ref 40–150)
ALT SERPL-CCNC: 13 UNIT/L (ref 0–55)
ANION GAP SERPL CALC-SCNC: 6 MEQ/L
AST SERPL-CCNC: 10 UNIT/L (ref 11–45)
BACTERIA BLD CULT: NORMAL
BACTERIA BLD CULT: NORMAL
BASOPHILS # BLD AUTO: 0.02 X10(3)/MCL
BASOPHILS NFR BLD AUTO: 0.3 %
BILIRUB SERPL-MCNC: 0.2 MG/DL
BUN SERPL-MCNC: 21.6 MG/DL (ref 9.8–20.1)
CALCIUM SERPL-MCNC: 8.6 MG/DL (ref 8.4–10.2)
CHLORIDE SERPL-SCNC: 114 MMOL/L (ref 98–107)
CO2 SERPL-SCNC: 22 MMOL/L (ref 23–31)
CREAT SERPL-MCNC: 1.52 MG/DL (ref 0.55–1.02)
CREAT/UREA NIT SERPL: 14
EOSINOPHIL # BLD AUTO: 0.04 X10(3)/MCL (ref 0–0.9)
EOSINOPHIL NFR BLD AUTO: 0.5 %
ERYTHROCYTE [DISTWIDTH] IN BLOOD BY AUTOMATED COUNT: 19.5 % (ref 11.5–17)
GFR SERPLBLD CREATININE-BSD FMLA CKD-EPI: 35 ML/MIN/1.73/M2
GLOBULIN SER-MCNC: 3.5 GM/DL (ref 2.4–3.5)
GLUCOSE SERPL-MCNC: 88 MG/DL (ref 82–115)
HCT VFR BLD AUTO: 25.7 % (ref 37–47)
HGB BLD-MCNC: 8 G/DL (ref 12–16)
IMM GRANULOCYTES # BLD AUTO: 0.09 X10(3)/MCL (ref 0–0.04)
IMM GRANULOCYTES NFR BLD AUTO: 1.2 %
LYMPHOCYTES # BLD AUTO: 1.74 X10(3)/MCL (ref 0.6–4.6)
LYMPHOCYTES NFR BLD AUTO: 23.6 %
MCH RBC QN AUTO: 26.8 PG (ref 27–31)
MCHC RBC AUTO-ENTMCNC: 31.1 G/DL (ref 33–36)
MCV RBC AUTO: 86.2 FL (ref 80–94)
MONOCYTES # BLD AUTO: 0.51 X10(3)/MCL (ref 0.1–1.3)
MONOCYTES NFR BLD AUTO: 6.9 %
NEUTROPHILS # BLD AUTO: 4.98 X10(3)/MCL (ref 2.1–9.2)
NEUTROPHILS NFR BLD AUTO: 67.5 %
NRBC BLD AUTO-RTO: 0 %
PHOSPHATE SERPL-MCNC: 3.3 MG/DL (ref 2.3–4.7)
PLATELET # BLD AUTO: 64 X10(3)/MCL (ref 130–400)
PMV BLD AUTO: 11.7 FL (ref 7.4–10.4)
POTASSIUM SERPL-SCNC: 4.2 MMOL/L (ref 3.5–5.1)
PROT SERPL-MCNC: 5.6 GM/DL (ref 5.8–7.6)
RBC # BLD AUTO: 2.98 X10(6)/MCL (ref 4.2–5.4)
SODIUM SERPL-SCNC: 142 MMOL/L (ref 136–145)
WBC # BLD AUTO: 7.38 X10(3)/MCL (ref 4.5–11.5)

## 2025-07-07 PROCEDURE — 80053 COMPREHEN METABOLIC PANEL: CPT | Performed by: INTERNAL MEDICINE

## 2025-07-07 PROCEDURE — 25000003 PHARM REV CODE 250: Performed by: STUDENT IN AN ORGANIZED HEALTH CARE EDUCATION/TRAINING PROGRAM

## 2025-07-07 PROCEDURE — 63600175 PHARM REV CODE 636 W HCPCS: Performed by: STUDENT IN AN ORGANIZED HEALTH CARE EDUCATION/TRAINING PROGRAM

## 2025-07-07 PROCEDURE — 85025 COMPLETE CBC W/AUTO DIFF WBC: CPT | Performed by: HOSPITALIST

## 2025-07-07 PROCEDURE — 84100 ASSAY OF PHOSPHORUS: CPT | Performed by: INTERNAL MEDICINE

## 2025-07-07 PROCEDURE — 63600175 PHARM REV CODE 636 W HCPCS: Mod: EC,TB | Performed by: INTERNAL MEDICINE

## 2025-07-07 PROCEDURE — 63600175 PHARM REV CODE 636 W HCPCS: Performed by: NURSE PRACTITIONER

## 2025-07-07 PROCEDURE — 97166 OT EVAL MOD COMPLEX 45 MIN: CPT

## 2025-07-07 PROCEDURE — 97535 SELF CARE MNGMENT TRAINING: CPT

## 2025-07-07 PROCEDURE — 99232 SBSQ HOSP IP/OBS MODERATE 35: CPT | Mod: ,,, | Performed by: INTERNAL MEDICINE

## 2025-07-07 PROCEDURE — 21400001 HC TELEMETRY ROOM

## 2025-07-07 PROCEDURE — 25000003 PHARM REV CODE 250: Performed by: HOSPITALIST

## 2025-07-07 PROCEDURE — 25000003 PHARM REV CODE 250: Performed by: NURSE PRACTITIONER

## 2025-07-07 PROCEDURE — 25000003 PHARM REV CODE 250: Performed by: INTERNAL MEDICINE

## 2025-07-07 PROCEDURE — 36415 COLL VENOUS BLD VENIPUNCTURE: CPT | Performed by: HOSPITALIST

## 2025-07-07 PROCEDURE — 63700000 PHARM REV CODE 250 ALT 637 W/O HCPCS: Performed by: NURSE PRACTITIONER

## 2025-07-07 PROCEDURE — 97162 PT EVAL MOD COMPLEX 30 MIN: CPT

## 2025-07-07 RX ORDER — GABAPENTIN 100 MG/1
200 CAPSULE ORAL 2 TIMES DAILY
Status: DISCONTINUED | OUTPATIENT
Start: 2025-07-07 | End: 2025-07-09

## 2025-07-07 RX ORDER — AMLODIPINE BESYLATE 5 MG/1
10 TABLET ORAL DAILY
Status: DISCONTINUED | OUTPATIENT
Start: 2025-07-07 | End: 2025-07-09

## 2025-07-07 RX ADMIN — SODIUM CHLORIDE: 4.5 INJECTION, SOLUTION INTRAVENOUS at 05:07

## 2025-07-07 RX ADMIN — QUETIAPINE FUMARATE 12.5 MG: 25 TABLET ORAL at 09:07

## 2025-07-07 RX ADMIN — Medication 1000 MG: at 08:07

## 2025-07-07 RX ADMIN — METOPROLOL TARTRATE 12.5 MG: 25 TABLET, FILM COATED ORAL at 08:07

## 2025-07-07 RX ADMIN — MUPIROCIN: 20 OINTMENT TOPICAL at 08:07

## 2025-07-07 RX ADMIN — GABAPENTIN 200 MG: 100 CAPSULE ORAL at 12:07

## 2025-07-07 RX ADMIN — SODIUM CHLORIDE: 4.5 INJECTION, SOLUTION INTRAVENOUS at 07:07

## 2025-07-07 RX ADMIN — FAMOTIDINE 20 MG: 20 TABLET, FILM COATED ORAL at 09:07

## 2025-07-07 RX ADMIN — METOPROLOL TARTRATE 12.5 MG: 25 TABLET, FILM COATED ORAL at 09:07

## 2025-07-07 RX ADMIN — RIVASTIGMINE 1 PATCH: 4.6 PATCH, EXTENDED RELEASE TRANSDERMAL at 08:07

## 2025-07-07 RX ADMIN — Medication: at 08:07

## 2025-07-07 RX ADMIN — CINACALCET HYDROCHLORIDE 30 MG: 30 TABLET, FILM COATED ORAL at 08:07

## 2025-07-07 RX ADMIN — MUPIROCIN: 20 OINTMENT TOPICAL at 09:07

## 2025-07-07 RX ADMIN — TACROLIMUS 2 MG: 1 CAPSULE ORAL at 05:07

## 2025-07-07 RX ADMIN — AMLODIPINE BESYLATE 10 MG: 5 TABLET ORAL at 08:07

## 2025-07-07 RX ADMIN — GABAPENTIN 200 MG: 100 CAPSULE ORAL at 09:07

## 2025-07-07 RX ADMIN — FLUCONAZOLE 100 MG: 100 TABLET ORAL at 08:07

## 2025-07-07 RX ADMIN — LEVOTHYROXINE, LIOTHYRONINE 60 MG: 19; 4.5 TABLET ORAL at 07:07

## 2025-07-07 RX ADMIN — CALCITRIOL 0.5 MCG: 0.5 CAPSULE, LIQUID FILLED ORAL at 08:07

## 2025-07-07 RX ADMIN — Medication 50 MG: at 08:07

## 2025-07-07 RX ADMIN — EPOETIN ALFA-EPBX 20000 UNITS: 20000 INJECTION, SOLUTION INTRAVENOUS; SUBCUTANEOUS at 12:07

## 2025-07-07 RX ADMIN — TACROLIMUS 1 MG: 1 CAPSULE ORAL at 08:07

## 2025-07-07 RX ADMIN — PREDNISONE 5 MG: 5 TABLET ORAL at 08:07

## 2025-07-07 NOTE — PROGRESS NOTES
[facility]  Hospital Medicine Progress Note        Chief Complaint: Inpatient Follow-up     HPI:   75 y.o. female with PMHx of renal transplant (12/13/2011) due to renal amyloidosis now CKD 3b on immunosuppression, HTN, HLD, asthma, GERD, obesity, multiple myeloma, dementia, Charcot foot, upper extremity DVT (on Eliquis?)  presenting from Dr. Wesley office where she was visiting from [facility].  She was found to have a blood pressure of 65/42 and worsening LUCIA on CKD and was sent to the emergency room.     At baseline prior to her 3 months of hospitalizations patient lived with her  and ambulated with a walker.     General surgery took for abdominal fat pad biopsy on 07/06.  Specimens sent to pathology.     Interval Hx:  Pathology pending.  PT/OT evaluated and recommending placement.  BP a little elevated but asymptomatic.       Objective/physical exam:  General: In no acute distress, afebrile  Chest: Clear to auscultation bilaterally  Heart: RRR, +S1, S2, no appreciable murmur  Abdomen: Soft, nontender, BS +  MSK: Warm, no lower extremity edema, no clubbing or cyanosis  Neurologic: Alert and oriented x4, Cranial nerve II-XII intact, Strength 5/5 in all 4 extremities    VITAL SIGNS: 24 HRS MIN & MAX LAST   Temp  Min: 96.9 °F (36.1 °C)  Max: 98.4 °F (36.9 °C) 98.4 °F (36.9 °C)   BP  Min: 155/68  Max: 169/71 (!) 164/72   Pulse  Min: 72  Max: 98  86   Resp  Min: 14  Max: 21 19   SpO2  Min: 94 %  Max: 99 % 98 %       Recent Labs   Lab 07/05/25  0510 07/06/25  0614 07/07/25  0608   WBC 6.28 6.74 7.38   RBC 3.06* 3.25* 2.98*   HGB 8.2* 8.6* 8.0*   HCT 26.7* 27.9* 25.7*   MCV 87.3 85.8 86.2   MCH 26.8* 26.5* 26.8*   MCHC 30.7* 30.8* 31.1*   RDW 19.3* 19.3* 19.5*   PLT 80* 86* 64*   MPV 12.1* 12.0* 11.7*       Recent Labs   Lab 07/02/25  1737 07/03/25  0312 07/04/25  0443 07/05/25  0510 07/06/25  0614 07/07/25  0608    132*   < > 143 146* 142   K 3.2* 3.5   < > 4.5  4.7 4.2    95*   < > 112* 113* 114*   CO2 28 31   < > 24 27 22*   BUN 36.0* 32.8*   < > 21.9* 22.2* 21.6*   CREATININE 1.74* 1.67*   < > 1.55* 1.68* 1.52*   GLU 95 96   < > 87 99 88   CALCIUM 6.6* 6.9*   < > 8.1* 8.9 8.6   MG 0.70* 1.80  --   --   --   --    ALBUMIN 2.0*  --    < > 2.1* 2.2* 2.1*   PROT 5.0*  --    < > 5.5* 5.4* 5.6*   ALKPHOS 74  --    < > 84 95 90   ALT 11  --    < > 13 17 13   AST 10*  --    < > 12 13 10*   BILITOT 0.2  --    < > 0.2 0.3 0.2    < > = values in this interval not displayed.          Microbiology Results (last 7 days)       Procedure Component Value Units Date/Time    Blood culture #2 **CANNOT BE ORDERED STAT** [[id number]]  (Normal) Collected: 07/02/25 1737    Order Status: Completed Specimen: Blood Updated: 07/06/25 2002     Blood Culture No Growth At 96 Hours    Blood culture #1 **CANNOT BE ORDERED STAT** [[id number]]  (Normal) Collected: 07/02/25 1737    Order Status: Completed Specimen: Blood Updated: 07/06/25 2002     Blood Culture No Growth At 96 Hours    Stool Culture [[id number]]  (Abnormal) Collected: 07/03/25 0344    Order Status: Completed Specimen: Stool Updated: 07/05/25 1128     Stool Culture Negative for Salmonella, Shigella, Campylobacter, Vibrio, Aeromonas, Pleisiomonas,Yersinia, or Shiga Toxin 1 and 2.      Many Yeast    Urine culture [[id number]]  (Abnormal) Collected: 07/02/25 1822    Order Status: Completed Specimen: Urine Updated: 07/04/25 1302     Urine Culture 10,000 - 25,000 colonies/ml Candida albicans    Clostridium Diff Toxin, A & B, EIA [[id number]]  (Normal) Collected: 07/03/25 0344    Order Status: Completed Specimen: Stool Updated: 07/03/25 0920     Clostridioides difficile GDH Antigen Negative     Clostridioides difficile Toxin A/B Negative             Radiology:  X-Ray Chest 1 View  Narrative: EXAMINATION:  XR CHEST 1 VIEW    CLINICAL HISTORY:  SOB;    TECHNIQUE:  AP chest    COMPARISON:  Chest x-ray dated 07/02/2025    FINDINGS:  The heart  is normal in size.  There is no new focal airspace consolidation.  There is a small left pleural effusion, unchanged.  There is no visible pneumothorax.  Impression: Stable exam without significant interval change.    Electronically signed by: Molly Rao  Date:    07/05/2025  Time:    14:27        Medications:  Scheduled Meds:   amLODIPine  10 mg Oral Daily    ascorbic acid (vitamin C)  1,000 mg Oral Daily    calcitRIOL  0.5 mcg Oral Daily    cinacalcet  30 mg Oral Daily with breakfast    famotidine  20 mg Oral Nightly    fluconazole  100 mg Oral Daily    gabapentin  200 mg Oral BID    lamiVUDine  100 mg Oral Daily    LIDOcaine (PF) 10 mg/ml (1%)  1 mL Other Once    metoprolol tartrate  12.5 mg Oral BID    mupirocin   Nasal BID    predniSONE  5 mg Oral Daily    QUEtiapine  12.5 mg Oral QHS    rivastigmine  1 patch Transdermal Daily    tacrolimus  1 mg Oral Daily AM    And    tacrolimus  2 mg Oral Daily PM    thiamine  50 mg Oral Daily    thyroid (pork)  60 mg Oral Before breakfast    zinc oxide-cod liver oil   Topical (Top) BID     Continuous Infusions:   0.45% NaCl   Intravenous Continuous 100 mL/hr at 07/07/25 1716 New Bag at 07/07/25 1716     PRN Meds:.  Current Facility-Administered Medications:     albuterol-ipratropium, 3 mL, Nebulization, Q6H PRN    aluminum-magnesium hydroxide-simethicone, 30 mL, Oral, QID PRN    bisacodyL, 10 mg, Rectal, Daily PRN    dextrose 50%, 12.5 g, Intravenous, PRN    dextrose 50%, 25 g, Intravenous, PRN    glucagon (human recombinant), 1 mg, Intramuscular, PRN    glucose, 16 g, Oral, PRN    glucose, 24 g, Oral, PRN    melatonin, 9 mg, Oral, Nightly PRN    naloxone, 0.02 mg, Intravenous, PRN    ondansetron, 8 mg, Oral, Q8H PRN    ondansetron, 4 mg, Intravenous, Q8H PRN    polyethylene glycol, 17 g, Oral, TID PRN    simethicone, 1 tablet, Oral, QID PRN    sodium chloride 0.9%, 10 mL, Intravenous, PRN    Nutrition:  Nutrition consulted. Most recent weight and BMI monitored-      Measurements:  Wt Readings from Last 1 Encounters:   07/03/25 89.4 kg (197 lb)   Body mass index is 32.78 kg/m².    Patient has been screened and assessed by RD.    Malnutrition Type:  Context:    Level: other (see comments) (Does not meet criteria)    Malnutrition Characteristic Summary:       Interventions/Recommendations (treatment strategy):           Assessment/Plan:   LUCIA on CKD   History of renal transplant   Hypotension, resolved   History of upper extremity DVT on Eliquis outpatient   Acute urinary retention   Diarrhea   Chronic microcytic anemia   Thrombocytopenia   Hypocalcemia  PMHx of renal transplant (12/13/2011) due to renal amyloidosis now CKD 3b on immunosuppression, HTN, HLD, asthma, GERD, obesity, multiple myeloma, dementia, Charcot foot, upper extremity DVT (on Eliquis?)      Fluconazole day 4 of 7 for yeast in stool/urine  Fat pad biopsy pathology pending.   PT/OT moderate intensity therapy  CM consulted for placement.   Renal recommending continued IV fluids while in the hospital. Encouraged increase fluid intake orally.  Renal function stable as of now. Can start spacing out labs while waiting on placement.       Hector Rodriguez MD   7/8/25    All diagnosis and differential diagnosis have been reviewed; assessment and plan has been documented; I have personally reviewed the labs and test results that are presently available; I have reviewed the patients medication list; I have reviewed the consulting providers response and recommendations. I have reviewed or attempted to review medical records based upon their availability    All of the patient's questions have been  addressed and answered. Patient's is agreeable to the above stated plan. I will continue to monitor closely and make adjustments to medical management as needed.  _____________________________________________________________________

## 2025-07-07 NOTE — PT/OT/SLP EVAL
"Occupational Therapy  Evaluation    Name: Jing Philippe  MRN: [id number]  Recent Surgery: * No surgery found *      Recommendations:     Discharge therapy intensity: Moderate Intensity Therapy   Discharge Equipment Recommendations:  to be determined by next level of care  Barriers to discharge:   (medical needs, placement needs)    Assessment:     Jing Philippe is a 75 y.o. female with a medical diagnosis of LUCIA on CKD and hypotension. S/p abdominal fat pad biopsy on 07/06. PMHx significant for renal transplant, HTN, RUE DVT, obesity, dementia, and charcot foot. Pt arrives from SNF where she was participating in therapy; however,  reports she was "taking a few steps" with therapy. Per spouse, pt has transitioned between hospital/rehab for the past 3 months. Prior to recent 3 months, pt was ambulatory with a RW and participating in BADLs with modified independence per spouse.     During today's evaluation, pt A&O to self only. She presents with delayed processing skills and following less than 60% of simple, one-step commands. Current level of function assessment limited due to impaired cognition. She required max A of 1-2 persons for bed mobility, mod Ax2 for sit<>stand (max A to maintain standing 2/2 pt actively sitting back on EOB). She currently requires max A for all toileting/bathing/dressing ADLs. Performance deficits affecting function: weakness, impaired cognition, impaired endurance, impaired self care skills, impaired functional mobility, impaired skin, edema, decreased safety awareness, gait instability, impaired balance, impaired fine motor. Will continue to progress as able during acute stay; recommending pt return to moderate intensity placement to continue addressing current functional deficits.     DME Justification: TBD pending pt's progress with therapy     Rehab Prognosis: Good and Fair; patient would benefit from acute skilled OT services to address these deficits and reach maximum " "level of function.       Plan:     Patient to be seen 4 x/week to address the above listed problems via self-care/home management, therapeutic activities, therapeutic exercises  Plan of Care Expires: 08/04/25  Plan of Care Reviewed with: patient, spouse    Subjective     Chief Complaint: none stated   Patient/Family Comments/goals: get better    Occupational Profile:  Living Environment: arrives from SNF. At baseline, lives with spousein [place], has WIS with chair.   Previous level of function: Pt arrives from SNF where she was participating in therapy; however,  reports she was "taking a few steps" with therapy. Per spouse, pt has transitioned between hospital/rehab for the past 3 months. Prior to recent 3 months, pt was ambulatory with a RW and participating in BADLs with modified independence per spouse.   Roles and Routines: spouse  Equipment Used at Home: wheelchair, cane, straight, walker, rolling, shower chair  Assistance upon Discharge: SNF staff.     Pain/Comfort:  Pain Rating 1: 0/10    Patients cultural, spiritual, Alevism conflicts given the current situation: no    Objective:     OT communicated with RN prior to session.      Patient was found HOB elevated with pulse ox (continuous), telemetry, baeza catheter, peripheral IV upon OT entry to room.    General Precautions: Standard, fall  Orthopedic Precautions: N/A  Braces: N/A    Vital Signs: Blood Pressure: 155/68 s/p brief activity at bedside.   Respiratory Status: on room air      Functional Mobility/Transfers:  Bed mobility:    Rolling Left:  maximal assistance  Rolling Right: maximal assistance  Scooting: maximal assistance  Supine to Sit: maximal assistance and of 2 persons  Sit to Supine: maximal assistance and of 2 persons  Transfers: Sit to Stand: moderate assistance and of 2 persons with hand-held assist and rolling walker  Max A to maintain upright standing posture <10sec as pt actively returns to sitting EOB despite max verbal/tactile " cuing.   Functional Mobility: does not occur    Activities of Daily Living:  Lower Body Dressing: maximal assistance to don socks seated EOB  Toileting: dependence baeza cath in place    [facility] 6 Click ADL:  [facility] Total Score: 13    Functional Cognition:  Orientation: oriented to Person  Attention: Easily distracted  Command Following: Follows simple one-step commands with ~60% accuracy, Requires verbal cues, and Requires visual cues  Communication: limited due to confusion  Safety Awareness: Impaired. Poor.     Visual Perceptual Skills:  Limited formal assessment; appears WFL.     Upper Extremity Function:  Right Upper Extremity:   Limited formal assessment 2/2 impaired cognition   3/5   RUE with fistula - spouse confirms abnormal appearance is chronic.     Left Upper Extremity:  Limited formal assessment 2/2 impaired cognition   3/5     Balance:   Static sitting balance: WFL except initially requiring Ethan 2/2 impaired cognition and poor safety awareness.   Dynamic sitting balance:Impaired.    Static standing balance:Impaired.    Dynamic standing balance:unable to safely assess 2/2 impaired cognition and pt requiring maxA to remain in static standing.    Therapeutic Positioning  Risk for acquired pressure injuries is significantly increased due to impaired mobility, incontinence, altered skin already present, and obesity.    OT interventions performed during the course of today's session:   Education was provided on benefits of and recommendations for therapeutic positioning  Therapeutic positioning was provided at the conclusion of session to offload all bony prominences for the prevention and/or reduction of pressure injuries    Skin assessment: all bony prominences were assessed    Findings: known area of altered skin integrity at sacrum; wound care on board    OT recommendations for therapeutic positioning throughout hospitalization:   Follow [facility] Pressure Injury Prevention Protocol  Geomat recommended  for sacral protection while UIC  Specialty Mattress - immerse mattress ordered this date 7/7    Co-Treatment: Yes, due to Limited activity tolerance    Patient Education:  Patient and spouse were provided with verbal education education regarding OT role/goals/POC, fall prevention, safety awareness, Discharge/DME recommendations, and pressure ulcer prevention.  Understanding was verbalized, however additional teaching warranted.     Patient left HOB elevated with all lines intact, call button in reach, RN notified, and  present.    GOALS:   Multidisciplinary Problems       Occupational Therapy Goals          Problem: Occupational Therapy    Goal Priority Disciplines Outcome Interventions   Occupational Therapy Goal     OT, PT/OT Progressing    Description:             Problem: Occupational Therapy    Goal Priority Disciplines Outcome Interventions   Occupational Therapy Goal     OT, PT/OT Ongoing    Description: Goals to be met by: 8/4/25     Patient will increase functional independence with ADLs by performing:    Feeding with SPV and set-up A as needed.  UE Dressing with Set-up Assistance.  LE Dressing with Bishnu and Assistive Devices as needed.  Grooming while seated at sink with SPV and set-up A as needed.  Toilet transfer to bedside commode with Minimal Assistance, using LRAD.   Increased functional strength to 4/5 (grossly) for active participation in BADLs.                         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    Amyloidosis, unspecified 1998    s/p chemotherapy x 4 occassions  (IV x3, po x1)    Anticoagulant long-term use     Benign hypertension with end-stage renal disease     Candida vaginitis 1/16/2015    Chronic asthma     CKD (chronic kidney disease) stage 3, GFR 30-59 ml/min 9/14/2012    GERD (gastroesophageal reflux disease)     Hyperlipidemia     Lymphedema     LLE    Need for prophylactic  immunotherapy     Obesity     Osteoarthritis of left knee s/p total knee replacement 1/16/2015    Surgery October 23, 2014    PONV (postoperative nausea and vomiting)     Renal disease due to hypertension 12/14/2012    Secondary hyperparathyroidism of renal origin     Vertigo          Past Surgical History:   Procedure Laterality Date    AV FISTULA PLACEMENT      CHOLECYSTECTOMY      HYSTERECTOMY      IMPLANTATION OF PERMANENT SACRAL NERVE STIMULATOR N/A 12/24/2019    Procedure: INSERTION, NEUROSTIMULATOR, PERMANENT, SACRAL;  Surgeon: Misael Browning MD;  Location: [facility] OR [address] [place] [address];  Service: Urology;  Laterality: N/A;  1hr  Ivanai Dejesus, medjillian rep confirmed    KIDNEY TRANSPLANT      REMOVAL OF ELECTRODE LEAD OF SACRAL NERVE STIMULATOR         Time Tracking:     OT Date of Treatment: 07/07/25  OT Start Time: 1113  OT Stop Time: 1141  OT Total Time (min): 28 min    Billable Minutes:Evaluation moderate  Self Care/Home Management 1    7/7/2025

## 2025-07-07 NOTE — PT/OT/SLP EVAL
Physical Therapy Evaluation    Patient Name:  Jing Philippe   MRN:  [id number]    Recommendations:     Discharge therapy intensity: Moderate Intensity Therapy   Discharge Equipment Recommendations: to be determined by next level of care   Barriers to discharge: Impaired mobility and Ongoing medical needs    Assessment:     Jing Philippe is a 75 y.o. female admitted with a medical diagnosis of LUCIA on CKD, acute urinary retention, diarrhea, s/p abdominal fat pad biopsy 7/6. Pt admitted from SNF with multiple hospital admissions in the past 3 months. She presents with the following impairments/functional limitations: weakness, impaired endurance, impaired self care skills, impaired functional mobility, gait instability, impaired balance, decreased lower extremity function, decreased upper extremity function, impaired cognition, decreased safety awareness.    Fairly good tolerance to PT evaluation. Pt oriented to self only with minimal verbalization and impaired command following (<60% of simple, one-step commands). This date, she requires maxAx2 for bed mobility, modAx2 for sit<>stand with RW. Unable to take steps 2/2 poor standing tolerance. Recommending return to moderate intensity therapy upon d/c.     Rehab Prognosis: Good; patient would benefit from acute skilled PT services to address these deficits and reach maximum level of function.    Recent Surgery: * No surgery found *      Plan:     During this hospitalization, patient would benefit from acute PT services 5 x/week to address the identified rehab impairments via gait training, therapeutic activities, therapeutic exercises, neuromuscular re-education and progress toward the following goals:    Plan of Care Expires:  08/07/25    Subjective     Chief Complaint: none stated   Patient/Family Comments/goals: none stated   Pain/Comfort:  Pain Rating 1: 0/10    Patients cultural, spiritual, Catholic conflicts given the current situation: no    Living  Environment:  Pt admitted from SNF. She lives with spouse in [facility] with 4 MAGGIE (B rails).   Prior to admission, patients level of function was Michelle with rollator prior to 3 months ago; recently, requiring kiley lift assist for t/f at SNF, taking a few steps in // bars per .  Equipment used at home: wheelchair, cane, straight, walker, rolling, shower chair.  DME owned (not currently used): none.  Upon discharge, patient will have assistance from spouse.    Objective:     Communicated with NSG prior to session.  Patient found HOB elevated with telemetry, pulse ox (continuous), peripheral IV, baeza catheter  upon PT entry to room.    General Precautions: Standard, fall  Orthopedic Precautions:N/A   Braces: N/A  Respiratory Status: Room air  Blood Pressure: 155/68      Exams:  Cognitive Exam:  Patient is oriented to Person  RLE ROM: WFL  RLE Strength: grossly 4-/5  LLE ROM: WFL  LLE Strength: grossly 4-/5  Skin integrity: known area of altered skin integrity at sacrum; wound care on board       Functional Mobility:  Bed Mobility:     Supine to Sit: maximal assistance and of 2 persons  Sit to Supine: maximal assistance and of 2 persons  Transfers:     Sit to Stand:  moderate assistance and of 2 persons with rolling walker  Gait: unable to take steps 2/2 poor standing tolerance   Balance: Ethan-SBA for static sitting balance; maxA for static standing balance < 10 seconds at RW       AM-PAC 6 CLICK MOBILITY  Total Score:10     Co-Treatment: Yes, due to Limited activity tolerance    Treatment & Education:  Patient and spouse were provided with verbal education education regarding PT role/goals/POC, fall prevention, safety awareness, and discharge/DME recommendations.  Understanding was verbalized, however additional teaching warranted.     Patient left HOB elevated with all lines intact, call button in reach, and nsg notified.      GOALS:   Multidisciplinary Problems       Physical Therapy Goals          Problem:  Physical Therapy    Goal Priority Disciplines Outcome Interventions   Physical Therapy Goal     PT, PT/OT Progressing    Description: Goals to be met by: 2025    Patient will increase functional independence with mobility by performin. Supine to sit with MInimal Assistance  2. Sit to supine with MInimal Assistance  3. Sit to stand transfer with Minimal Assistance  4. Bed to chair transfer with Minimal Assistance using Rolling Walker  5. Gait  x 10 feet with Minimal Assistance using Rolling Walker.   6. Wheelchair propulsion x50 feet with Minimal Assistance.                       History:     Past Medical History:   Diagnosis Date    Acute osteomyelitis of ankle and foot, left     Amyloid kidney 2000    Amyloidosis, unspecified     s/p chemotherapy x 4 occassions  (IV x3, po x1)    Anticoagulant long-term use     Benign hypertension with end-stage renal disease     Candida vaginitis 2015    Chronic asthma     CKD (chronic kidney disease) stage 3, GFR 30-59 ml/min 2012    GERD (gastroesophageal reflux disease)     Hyperlipidemia     Lymphedema     LLE    Need for prophylactic immunotherapy     Obesity     Osteoarthritis of left knee s/p total knee replacement 2015    Surgery 2014    PONV (postoperative nausea and vomiting)     Renal disease due to hypertension 2012    Secondary hyperparathyroidism of renal origin     Vertigo        Past Surgical History:   Procedure Laterality Date    AV FISTULA PLACEMENT      CHOLECYSTECTOMY      HYSTERECTOMY      IMPLANTATION OF PERMANENT SACRAL NERVE STIMULATOR N/A 2019    Procedure: INSERTION, NEUROSTIMULATOR, PERMANENT, SACRAL;  Surgeon: Misael Browning MD;  Location: [facility] OR [address] [place] [address];  Service: Urology;  Laterality: N/A;  1hr  Ivania Dejesus, medtronic rep confirmed    KIDNEY TRANSPLANT      REMOVAL OF ELECTRODE LEAD OF SACRAL NERVE STIMULATOR         Time Tracking:     PT Received On: 25  PT Start Time: 1113      PT Stop Time: 1140  PT Total Time (min): 27 min     Billable Minutes: Evaluation mod      07/07/2025

## 2025-07-07 NOTE — PROGRESS NOTES
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

## 2025-07-07 NOTE — PLAN OF CARE
Problem: Occupational Therapy  Goal: Occupational Therapy Goal  Description: Goals to be met by: 8/4/25     Patient will increase functional independence with ADLs by performing:    Feeding with SPV and set-up A as needed.  UE Dressing with Set-up Assistance.  LE Dressing with Bishnu and Assistive Devices as needed.  Grooming while seated at sink with SPV and set-up A as needed.  Toilet transfer to bedside commode with Minimal Assistance, using LRAD.   Increased functional strength to 4/5 (grossly) for active participation in BADLs.    Outcome: Ongoing

## 2025-07-07 NOTE — PLAN OF CARE
Problem: Physical Therapy  Goal: Physical Therapy Goal  Description: Goals to be met by: 2025    Patient will increase functional independence with mobility by performin. Supine to sit with MInimal Assistance  2. Sit to supine with MInimal Assistance  3. Sit to stand transfer with Minimal Assistance  4. Bed to chair transfer with Minimal Assistance using Rolling Walker  5. Gait  x 10 feet with Minimal Assistance using Rolling Walker.   6. Wheelchair propulsion x50 feet with Minimal Assistance.  Outcome: Progressing

## 2025-07-08 LAB
ALBUMIN SERPL-MCNC: 2.2 G/DL (ref 3.4–4.8)
ALBUMIN/GLOB SERPL: 0.6 RATIO (ref 1.1–2)
ALP SERPL-CCNC: 71 UNIT/L (ref 40–150)
ALT SERPL-CCNC: 13 UNIT/L (ref 0–55)
ANION GAP SERPL CALC-SCNC: 6 MEQ/L
AST SERPL-CCNC: 10 UNIT/L (ref 11–45)
BASOPHILS # BLD AUTO: 0.01 X10(3)/MCL
BASOPHILS NFR BLD AUTO: 0.2 %
BILIRUB SERPL-MCNC: 0.3 MG/DL
BUN SERPL-MCNC: 18.5 MG/DL (ref 9.8–20.1)
CALCIUM SERPL-MCNC: 8.8 MG/DL (ref 8.4–10.2)
CHLORIDE SERPL-SCNC: 115 MMOL/L (ref 98–107)
CO2 SERPL-SCNC: 23 MMOL/L (ref 23–31)
CREAT SERPL-MCNC: 1.37 MG/DL (ref 0.55–1.02)
CREAT/UREA NIT SERPL: 14
EOSINOPHIL # BLD AUTO: 0.06 X10(3)/MCL (ref 0–0.9)
EOSINOPHIL NFR BLD AUTO: 1.1 %
ERYTHROCYTE [DISTWIDTH] IN BLOOD BY AUTOMATED COUNT: 19.5 % (ref 11.5–17)
GFR SERPLBLD CREATININE-BSD FMLA CKD-EPI: 40 ML/MIN/1.73/M2
GLOBULIN SER-MCNC: 3.5 GM/DL (ref 2.4–3.5)
GLUCOSE SERPL-MCNC: 84 MG/DL (ref 82–115)
HCT VFR BLD AUTO: 27.2 % (ref 37–47)
HGB BLD-MCNC: 8.3 G/DL (ref 12–16)
IMM GRANULOCYTES # BLD AUTO: 0.09 X10(3)/MCL (ref 0–0.04)
IMM GRANULOCYTES NFR BLD AUTO: 1.6 %
LYMPHOCYTES # BLD AUTO: 1.88 X10(3)/MCL (ref 0.6–4.6)
LYMPHOCYTES NFR BLD AUTO: 33.5 %
MCH RBC QN AUTO: 26.8 PG (ref 27–31)
MCHC RBC AUTO-ENTMCNC: 30.5 G/DL (ref 33–36)
MCV RBC AUTO: 87.7 FL (ref 80–94)
MONOCYTES # BLD AUTO: 0.54 X10(3)/MCL (ref 0.1–1.3)
MONOCYTES NFR BLD AUTO: 9.6 %
NEUTROPHILS # BLD AUTO: 3.04 X10(3)/MCL (ref 2.1–9.2)
NEUTROPHILS NFR BLD AUTO: 54 %
NRBC BLD AUTO-RTO: 0 %
PATH REV: NORMAL
PLATELET # BLD AUTO: 65 X10(3)/MCL (ref 130–400)
PLATELETS.RETICULATED NFR BLD AUTO: 5.4 % (ref 0.9–11.2)
PMV BLD AUTO: 12.2 FL (ref 7.4–10.4)
POTASSIUM SERPL-SCNC: 4.3 MMOL/L (ref 3.5–5.1)
PROT SERPL-MCNC: 5.7 GM/DL (ref 5.8–7.6)
PSYCHE PATHOLOGY RESULT: NORMAL
RBC # BLD AUTO: 3.1 X10(6)/MCL (ref 4.2–5.4)
SODIUM SERPL-SCNC: 144 MMOL/L (ref 136–145)
WBC # BLD AUTO: 5.62 X10(3)/MCL (ref 4.5–11.5)

## 2025-07-08 PROCEDURE — 80053 COMPREHEN METABOLIC PANEL: CPT | Performed by: INTERNAL MEDICINE

## 2025-07-08 PROCEDURE — 36415 COLL VENOUS BLD VENIPUNCTURE: CPT | Performed by: INTERNAL MEDICINE

## 2025-07-08 PROCEDURE — 63600175 PHARM REV CODE 636 W HCPCS: Performed by: NURSE PRACTITIONER

## 2025-07-08 PROCEDURE — 25000003 PHARM REV CODE 250: Performed by: HOSPITALIST

## 2025-07-08 PROCEDURE — 21400001 HC TELEMETRY ROOM

## 2025-07-08 PROCEDURE — 25000003 PHARM REV CODE 250: Performed by: STUDENT IN AN ORGANIZED HEALTH CARE EDUCATION/TRAINING PROGRAM

## 2025-07-08 PROCEDURE — 25000003 PHARM REV CODE 250: Performed by: NURSE PRACTITIONER

## 2025-07-08 PROCEDURE — 97530 THERAPEUTIC ACTIVITIES: CPT | Mod: CQ

## 2025-07-08 PROCEDURE — 85025 COMPLETE CBC W/AUTO DIFF WBC: CPT | Performed by: INTERNAL MEDICINE

## 2025-07-08 PROCEDURE — 63700000 PHARM REV CODE 250 ALT 637 W/O HCPCS: Performed by: NURSE PRACTITIONER

## 2025-07-08 PROCEDURE — 25000003 PHARM REV CODE 250: Performed by: INTERNAL MEDICINE

## 2025-07-08 PROCEDURE — 63600175 PHARM REV CODE 636 W HCPCS: Performed by: STUDENT IN AN ORGANIZED HEALTH CARE EDUCATION/TRAINING PROGRAM

## 2025-07-08 PROCEDURE — 99232 SBSQ HOSP IP/OBS MODERATE 35: CPT | Mod: ,,, | Performed by: INTERNAL MEDICINE

## 2025-07-08 RX ADMIN — LEVOTHYROXINE, LIOTHYRONINE 60 MG: 19; 4.5 TABLET ORAL at 07:07

## 2025-07-08 RX ADMIN — PREDNISONE 5 MG: 5 TABLET ORAL at 07:07

## 2025-07-08 RX ADMIN — CINACALCET HYDROCHLORIDE 30 MG: 30 TABLET, FILM COATED ORAL at 07:07

## 2025-07-08 RX ADMIN — QUETIAPINE FUMARATE 12.5 MG: 25 TABLET ORAL at 08:07

## 2025-07-08 RX ADMIN — AMLODIPINE BESYLATE 10 MG: 5 TABLET ORAL at 07:07

## 2025-07-08 RX ADMIN — CALCITRIOL 0.5 MCG: 0.5 CAPSULE, LIQUID FILLED ORAL at 07:07

## 2025-07-08 RX ADMIN — Medication 50 MG: at 07:07

## 2025-07-08 RX ADMIN — SODIUM CHLORIDE: 4.5 INJECTION, SOLUTION INTRAVENOUS at 11:07

## 2025-07-08 RX ADMIN — SODIUM CHLORIDE: 4.5 INJECTION, SOLUTION INTRAVENOUS at 01:07

## 2025-07-08 RX ADMIN — TACROLIMUS 1 MG: 1 CAPSULE ORAL at 07:07

## 2025-07-08 RX ADMIN — TACROLIMUS 2 MG: 1 CAPSULE ORAL at 06:07

## 2025-07-08 RX ADMIN — GABAPENTIN 200 MG: 100 CAPSULE ORAL at 07:07

## 2025-07-08 RX ADMIN — FLUCONAZOLE 100 MG: 100 TABLET ORAL at 07:07

## 2025-07-08 RX ADMIN — METOPROLOL TARTRATE 12.5 MG: 25 TABLET, FILM COATED ORAL at 07:07

## 2025-07-08 RX ADMIN — Medication 1000 MG: at 07:07

## 2025-07-08 RX ADMIN — GABAPENTIN 200 MG: 100 CAPSULE ORAL at 08:07

## 2025-07-08 RX ADMIN — RIVASTIGMINE 1 PATCH: 4.6 PATCH, EXTENDED RELEASE TRANSDERMAL at 07:07

## 2025-07-08 RX ADMIN — FAMOTIDINE 20 MG: 20 TABLET, FILM COATED ORAL at 08:07

## 2025-07-08 RX ADMIN — METOPROLOL TARTRATE 12.5 MG: 25 TABLET, FILM COATED ORAL at 08:07

## 2025-07-08 NOTE — PROGRESS NOTES
Renal  Pt seen and examined  Meds and labs and events reviewed  No new complaints  Afeb  Hemod stable  Lungs clear  RRR  Abd soft  Tr edema    Impression  Amyloid  Renal transplant rejection, CKD3  Prerenal azotemia resolved  Deconditioning    P  Awaiting transfer  Pt has a hematologist/oncologist in [place] ( Dr Perry)  Fat pad bx pnd  No new renal recommendations

## 2025-07-08 NOTE — PT/OT/SLP PROGRESS
Physical Therapy Treatment    Patient Name:  Jing Philippe   MRN:  [id number]    Recommendations:     Discharge therapy intensity: Moderate Intensity Therapy   Discharge Equipment Recommendations: to be determined by next level of care  Barriers to discharge: Impaired mobility    Assessment:     Jing Philippe is a 75 y.o. female admitted with a medical diagnosis of  LUCIA on CKD, acute urinary retention, diarrhea, s/p abdominal fat pad biopsy 7/6. Pt admitted from SNF with multiple hospital admissions in the past 3 months .  She presents with the following impairments/functional limitations: weakness, impaired endurance, impaired self care skills, impaired functional mobility, gait instability, impaired balance, decreased lower extremity function, decreased upper extremity function, impaired cognition, decreased safety awareness .    Rehab Prognosis: Good; patient would benefit from acute skilled PT services to address these deficits and reach maximum level of function.    Recent Surgery: * No surgery found *      Plan:     During this hospitalization, patient would benefit from acute PT services 5 x/week to address the identified rehab impairments via gait training, therapeutic exercises, neuromuscular re-education, therapeutic activities and progress toward the following goals:    Plan of Care Expires:  08/07/25    Subjective     Chief Complaint: tired of being in bed  Patient/Family Comments/goals: to get stronger  Pain/Comfort:  Pain Rating 1: 0/10      Objective:     Communicated with pts nurse prior to session.  Patient found HOB elevated with baeza catheter, peripheral IV, pulse ox (continuous), telemetry upon PT entry to room.     General Precautions: Standard, fall  Orthopedic Precautions: N/A  Braces: N/A  Respiratory Status: Room air  Skin Integrity: buttocks chaffed and red, barrier cream applied following cleaning      Functional Mobility:  Bed Mobility:     Rolling Right: minimum  assistance  Supine to Sit: moderate assistance  Transfers:     Sit to Stand:  moderate assistance and of 2 persons with rolling walker and x 4 trials for momo cleaning following BM.  Pt stood for ~ 2 min at longest  Bed to Chair: moderate assistance and of 2 persons with  rolling walker  using  Step Transfer and assisteing w/ wt shift direction change and walker management.   Balance: SBA for sit balance, CG to Min A stand balance w/ RW, Mod A for balance during t/f    Co-Treatment: No    Education:  Patient and spouse were provided with verbal education and demonstrations education regarding PT role/goals/POC, fall prevention, and safety awareness.  Understanding was verbalized, however additional teaching warranted.     Patient left up in chair with all lines intact, call button in reach, geomat cushion, kiley pad in place, nurse notified, and spouse present      GOALS:   Multidisciplinary Problems       Physical Therapy Goals          Problem: Physical Therapy    Goal Priority Disciplines Outcome Interventions   Physical Therapy Goal     PT, PT/OT Progressing    Description: Goals to be met by: 2025    Patient will increase functional independence with mobility by performin. Supine to sit with MInimal Assistance  2. Sit to supine with MInimal Assistance  3. Sit to stand transfer with Minimal Assistance  4. Bed to chair transfer with Minimal Assistance using Rolling Walker  5. Gait  x 10 feet with Minimal Assistance using Rolling Walker.   6. Wheelchair propulsion x50 feet with Minimal Assistance.                       Time Tracking:     PT Received On: 25  PT Start Time:      PT Stop Time: 1649  PT Total Time (min): 31 min     Billable Minutes: Therapeutic Activity 31 min    Treatment Type: Treatment  PT/PTA: PTA     Number of PTA visits since last PT visit: 2025

## 2025-07-09 LAB — TACROLIMUS TROUGH BLD-MCNC: 4.8 NG/ML

## 2025-07-09 PROCEDURE — 25000003 PHARM REV CODE 250: Performed by: INTERNAL MEDICINE

## 2025-07-09 PROCEDURE — 99232 SBSQ HOSP IP/OBS MODERATE 35: CPT | Mod: ,,, | Performed by: INTERNAL MEDICINE

## 2025-07-09 PROCEDURE — 63600175 PHARM REV CODE 636 W HCPCS: Performed by: NURSE PRACTITIONER

## 2025-07-09 PROCEDURE — 25000003 PHARM REV CODE 250: Performed by: STUDENT IN AN ORGANIZED HEALTH CARE EDUCATION/TRAINING PROGRAM

## 2025-07-09 PROCEDURE — 97530 THERAPEUTIC ACTIVITIES: CPT | Mod: CQ

## 2025-07-09 PROCEDURE — 63600175 PHARM REV CODE 636 W HCPCS: Mod: EC,TB | Performed by: INTERNAL MEDICINE

## 2025-07-09 PROCEDURE — 63700000 PHARM REV CODE 250 ALT 637 W/O HCPCS: Performed by: NURSE PRACTITIONER

## 2025-07-09 PROCEDURE — 25000003 PHARM REV CODE 250: Performed by: HOSPITALIST

## 2025-07-09 PROCEDURE — 21400001 HC TELEMETRY ROOM

## 2025-07-09 PROCEDURE — 63600175 PHARM REV CODE 636 W HCPCS: Performed by: STUDENT IN AN ORGANIZED HEALTH CARE EDUCATION/TRAINING PROGRAM

## 2025-07-09 PROCEDURE — 25000003 PHARM REV CODE 250: Performed by: NURSE PRACTITIONER

## 2025-07-09 RX ORDER — AMLODIPINE BESYLATE 5 MG/1
5 TABLET ORAL DAILY
Status: DISCONTINUED | OUTPATIENT
Start: 2025-07-10 | End: 2025-07-09

## 2025-07-09 RX ORDER — AMLODIPINE BESYLATE 2.5 MG/1
2.5 TABLET ORAL DAILY
Status: DISCONTINUED | OUTPATIENT
Start: 2025-07-10 | End: 2025-07-10 | Stop reason: HOSPADM

## 2025-07-09 RX ORDER — GABAPENTIN 100 MG/1
100 CAPSULE ORAL 2 TIMES DAILY
Status: DISCONTINUED | OUTPATIENT
Start: 2025-07-09 | End: 2025-07-10 | Stop reason: HOSPADM

## 2025-07-09 RX ADMIN — METOPROLOL TARTRATE 12.5 MG: 25 TABLET, FILM COATED ORAL at 08:07

## 2025-07-09 RX ADMIN — GABAPENTIN 100 MG: 100 CAPSULE ORAL at 08:07

## 2025-07-09 RX ADMIN — Medication 50 MG: at 08:07

## 2025-07-09 RX ADMIN — Medication: at 08:07

## 2025-07-09 RX ADMIN — Medication 1000 MG: at 10:07

## 2025-07-09 RX ADMIN — QUETIAPINE FUMARATE 12.5 MG: 25 TABLET ORAL at 08:07

## 2025-07-09 RX ADMIN — Medication: at 09:07

## 2025-07-09 RX ADMIN — RIVASTIGMINE 1 PATCH: 4.6 PATCH, EXTENDED RELEASE TRANSDERMAL at 08:07

## 2025-07-09 RX ADMIN — CALCITRIOL 0.5 MCG: 0.5 CAPSULE, LIQUID FILLED ORAL at 08:07

## 2025-07-09 RX ADMIN — TACROLIMUS 2 MG: 1 CAPSULE ORAL at 05:07

## 2025-07-09 RX ADMIN — EPOETIN ALFA-EPBX 20000 UNITS: 20000 INJECTION, SOLUTION INTRAVENOUS; SUBCUTANEOUS at 10:07

## 2025-07-09 RX ADMIN — FLUCONAZOLE 100 MG: 100 TABLET ORAL at 08:07

## 2025-07-09 RX ADMIN — FAMOTIDINE 20 MG: 20 TABLET, FILM COATED ORAL at 08:07

## 2025-07-09 RX ADMIN — TACROLIMUS 1 MG: 1 CAPSULE ORAL at 08:07

## 2025-07-09 RX ADMIN — PREDNISONE 5 MG: 5 TABLET ORAL at 08:07

## 2025-07-09 RX ADMIN — SODIUM CHLORIDE: 4.5 INJECTION, SOLUTION INTRAVENOUS at 08:07

## 2025-07-09 RX ADMIN — GABAPENTIN 200 MG: 100 CAPSULE ORAL at 08:07

## 2025-07-09 RX ADMIN — SODIUM CHLORIDE: 4.5 INJECTION, SOLUTION INTRAVENOUS at 11:07

## 2025-07-09 RX ADMIN — CINACALCET HYDROCHLORIDE 30 MG: 30 TABLET, FILM COATED ORAL at 08:07

## 2025-07-09 RX ADMIN — AMLODIPINE BESYLATE 10 MG: 5 TABLET ORAL at 08:07

## 2025-07-09 NOTE — PT/OT/SLP PROGRESS
Physical Therapy Treatment    Patient Name:  Jing Philippe   MRN:  [id number]    Recommendations:     Discharge therapy intensity: Moderate Intensity Therapy   Discharge Equipment Recommendations: to be determined by next level of care  Barriers to discharge: Impaired mobility    Assessment:     Jing Philippe is a 75 y.o. female admitted with a medical diagnosis of LUCIA on CKD, acute urinary retention, diarrhea, s/p abdominal fat pad biopsy 7/6. Pt admitted from SNF with multiple hospital admissions in the past 3 months.  She presents with the following impairments/functional limitations: weakness, impaired endurance, impaired self care skills, impaired functional mobility, gait instability, impaired balance, decreased lower extremity function, decreased upper extremity function, impaired cognition, decreased safety awareness.    DME Justification:  No DME recommended requiring DME justifications    Rehab Prognosis: Good; patient would benefit from acute skilled PT services to address these deficits and reach maximum level of function.    Recent Surgery: * No surgery found *      Plan:     During this hospitalization, patient would benefit from acute PT services 5 x/week to address the identified rehab impairments via gait training, therapeutic exercises, neuromuscular re-education, therapeutic activities and progress toward the following goals:    Plan of Care Expires:  08/07/25    Subjective     Chief Complaint: tired of being in bed  Patient/Family Comments/goals: states staff used kiley to return to bed yesterday.  Pain/Comfort:  Pain Rating 1: 0/10      Objective:     Communicated with pts nurse prior to session.  Patient found HOB elevated with baeza catheter, peripheral IV, pulse ox (continuous), telemetry upon PT entry to room.     General Precautions: Standard, fall  Orthopedic Precautions: N/A  Braces: N/A  Respiratory Status: Room air  Skin Integrity: bandage to sacral area to protect skin        Functional Mobility:  Bed Mobility:     Scooting: moderate assistance  Supine to Sit: moderate assistance, maximal assistance, of 2 persons, and pt will initiate but has difficulty coming to seated position.  Sit to Supine: maximal assistance and of 2 persons  Transfers:     Sit to Stand:  moderate assistance, of 2 persons, and increasing to Max A x2 w/ reps with rolling walker and pt stood several times to be cleaned following BM.  Bed to Chair: maximal assistance and of 2 persons with  rolling walker  using  Step Transfer and cues for wt shift and turning.  Upon standing from chair, realized pt was having another BM and was t/f'd back to bed.  Staff informed and to clean pt. Also reported to staff, pts request to be OOB, advised using kiley to bring to BS chair.  Balance: good sitting balance, requiring SBA, Min to Mod A for stand balance.     Co-Treatment: No    Education:  Patient and spouse were provided with verbal education and demonstrations education regarding fall prevention, safety awareness, and pressure ulcer prevention.  Understanding was verbalized, however additional teaching warranted.     Patient left HOB elevated with all lines intact, call button in reach, nurse notified, and spouse present      GOALS:   Multidisciplinary Problems       Physical Therapy Goals          Problem: Physical Therapy    Goal Priority Disciplines Outcome Interventions   Physical Therapy Goal     PT, PT/OT Progressing    Description: Goals to be met by: 2025    Patient will increase functional independence with mobility by performin. Supine to sit with MInimal Assistance  2. Sit to supine with MInimal Assistance  3. Sit to stand transfer with Minimal Assistance  4. Bed to chair transfer with Minimal Assistance using Rolling Walker  5. Gait  x 10 feet with Minimal Assistance using Rolling Walker.   6. Wheelchair propulsion x50 feet with Minimal Assistance.                       Time Tracking:     PT  Received On: 07/09/25  PT Start Time: 1054     PT Stop Time: 1121  PT Total Time (min): 27 min     Billable Minutes: Therapeutic Activity 27 min    Treatment Type: Treatment  PT/PTA: PTA     Number of PTA visits since last PT visit: 2     07/09/2025

## 2025-07-09 NOTE — PLAN OF CARE
[facility] sent clinical updates and notified [facility] that pt is cleared for DC. Spoke with  @ [place], who confirms pt is a resident there and their admissions coordinator is in a corporate meeting until 2pm.     1453- spoke to Theona @ [place], who states pt is skilled and they need auth approval. They will submit for auth.

## 2025-07-09 NOTE — PROGRESS NOTES
Renal  No new complaints   No new developments  No SOB  No NV  No abd pain    Meds and labs reviewed  Afebrile  BP 160s but drops to the 100s at time  PERRLA, EOMI  Oroph benign  No jvd  Lungs ant clear  RRR  Abd soft  Tr leg edema      Impression  Systemic amyloid  SP LRT transplant >10 y ago  Anemia  Deconditioning  Decub ulcer ( POA)      P  Awaiting transfer  Pt to  with her HEME/ONC MD in [place]  Labs in am  ( Prefer to ease up in BP meds due to autonomic dysfunction)

## 2025-07-09 NOTE — PROGRESS NOTES
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    Amyloidosis, unspecified 1998    s/p chemotherapy x 4 occassions  (IV x3, po x1)    Anticoagulant long-term use     Benign hypertension with end-stage renal disease     Candida vaginitis 01/16/2015    Chronic asthma     CKD (chronic kidney disease) stage 3, GFR 30-59 ml/min 09/14/2012    GERD (gastroesophageal reflux disease)     Hyperlipidemia     Lymphedema     LLE    Need for prophylactic immunotherapy     Obesity     Osteoarthritis of left knee s/p total knee replacement 01/16/2015    Surgery October 23, 2014    PONV (postoperative nausea and vomiting)     Renal disease due to hypertension 12/14/2012    Secondary hyperparathyroidism of renal origin     Vertigo        Social History[1]    Precautions:     Allergies as of 07/02/2025 - Reviewed 07/02/2025   Allergen Reaction Noted    Anesthetics - jen type- parabens  04/10/2025    Antihistamines - ethylenediamine  04/10/2025    Codeine  06/14/2012    Ethanol (ethyl alcohol)  04/10/2025    Hydrocodone  06/14/2012    Opioids - morphine analogues  04/10/2025    Propylene glycol  04/10/2025    Saccharin  04/10/2025    Tylenol [acetaminophen]  04/10/2025    Yellow dye  04/10/2025       WO Assessment Details/Treatment        07/09/25 0818   WOCN Assessment   Visit Date 07/09/25   Visit Time 0818   Consult Type Follow Up   WO Speciality Wound   Wound pressure;moisture   Intervention applied;chart review   Teaching on-going        Wound 05/22/25 1100 Pressure Injury Sacral spine   Date First Assessed/Time First Assessed: 05/22/25 1100   Present on Original Admission: Yes  Primary Wound Type: Pressure Injury  Location: Sacral spine  Is this injury device related?: No    Wound Image    Pressure Injury Stage 3   Dressing Appearance Dry;Intact;Clean   Drainage Amount Small   Drainage Characteristics/Odor No odor   Appearance Pink;Dry   Tissue loss description Partial thickness   Red (%), Wound Tissue Color 100 %   Periwound Area Dry;Pink   Wound Edges Irregular;Jagged   Care Cleansed with:;Antimicrobial agent;Other (see comments)  (remedy)   Dressing Applied;Other (comment)  (desitin, abd, tape)     WOCN follow up for sacrum. Family at bedside. Continue current  treatment recommendations put into place.  Sacrum: Cleanse with NS. Apply Desitin, cover with ABD pad, secure with medipore tape. BID and PRN. Keep areas clean and dry, no adult briefs while in bed. Nursing to continue with turning every two hours, wedge, and floating heels.  Head of bed elevated, bed in lowest position, and call bell within reach. Wound care will follow up.    07/09/2025         [1]   Social History  Socioeconomic History    Marital status:    Tobacco Use    Smoking status: Never     Passive exposure: Never    Smokeless tobacco: Never   Substance and Sexual Activity    Alcohol use: Yes     Comment: rare margartia    Drug use: No   Social History Narrative    Inez used to work as , now retired for medical reasons.     Social Drivers of Health     Financial Resource Strain: Low Risk  (5/23/2025)    Overall Financial Resource Strain (CARDIA)     Difficulty of Paying Living Expenses: Not very hard   Food Insecurity: No Food Insecurity (7/3/2025)    Hunger Vital Sign     Worried About Running Out of Food in the Last Year: Never true     Ran Out of Food in the Last Year: Never true   Transportation Needs: No Transportation Needs (7/3/2025)    PRAPARE - Transportation     Lack of Transportation (Medical): No     Lack of Transportation (Non-Medical): No   Physical Activity: Inactive (5/23/2025)    Exercise Vital Sign     Days of Exercise per Week: 0 days     Minutes of Exercise per Session: 0  min   Stress: No Stress Concern Present (5/23/2025)    [employer] [place] [employer] - Occupational Stress Questionnaire     Feeling of Stress : Not at all   Housing Stability: Low Risk  (7/3/2025)    Housing Stability Vital Sign     Unable to Pay for Housing in the Last Year: No     Homeless in the Last Year: No

## 2025-07-09 NOTE — PROGRESS NOTES
[facility]  Hospital Medicine Progress Note        Chief Complaint: Inpatient Follow-up for LUCIA on CKD in renal transplant patient    HPI per admitting team: 75 y.o. female with PMHx of renal transplant (12/13/2011) due to renal amyloidosis now CKD 3b on immunosuppression, HTN, HLD, asthma, GERD, obesity, multiple myeloma, dementia, Charcot foot, upper extremity DVT (on Eliquis?)  presenting from Dr. Wesley office where she was visiting from [facility].  She was found to have a blood pressure of 65/42 and worsening LUCIA on CKD and was sent to the emergency room.  At baseline prior to her 3 months of hospitalizations patient lived with her  and ambulated with a walker.  General surgery took for abdominal fat pad biopsy on 07/06.  Specimens is negative for amyloid deposits and no significant pathologic findings on the tissue noted    Interval Hx:   Patient is laying in bed, no major complaints.  No family is at bedside  Case was discussed with patient's nurse and  on the floor.    Objective/physical exam:  General: In no acute distress, afebrile  Chest: Clear to auscultation bilaterally  Heart: RRR, +S1, S2, no appreciable murmur  Abdomen: Soft, nontender, BS +  MSK: Warm, no lower extremity edema, no clubbing or cyanosis  Neurologic: Alert and oriented x4, Cranial nerve II-XII intact    VITAL SIGNS: 24 HRS MIN & MAX LAST   Temp  Min: 97.5 °F (36.4 °C)  Max: 98.1 °F (36.7 °C) 97.5 °F (36.4 °C)   BP  Min: 103/67  Max: 161/66 103/67   Pulse  Min: 70  Max: 78  70   Resp  Min: 17  Max: 18 18   SpO2  Min: 95 %  Max: 99 % 95 %     I reviewed the labs below:  Recent Labs   Lab 07/06/25  0614 07/07/25  0608 07/08/25  0408   WBC 6.74 7.38 5.62   RBC 3.25* 2.98* 3.10*   HGB 8.6* 8.0* 8.3*   HCT 27.9* 25.7* 27.2*   MCV 85.8 86.2 87.7   MCH 26.5* 26.8* 26.8*   MCHC 30.8* 31.1* 30.5*   RDW 19.3* 19.5* 19.5*   PLT 86* 64* 65*   MPV 12.0* 11.7* 12.2*     Recent Labs   Lab  07/02/25  1737 07/03/25  0312 07/04/25  0443 07/06/25  0614 07/07/25  0608 07/08/25  0408    132*   < > 146* 142 144   K 3.2* 3.5   < > 4.7 4.2 4.3    95*   < > 113* 114* 115*   CO2 28 31   < > 27 22* 23   BUN 36.0* 32.8*   < > 22.2* 21.6* 18.5   CREATININE 1.74* 1.67*   < > 1.68* 1.52* 1.37*   GLU 95 96   < > 99 88 84   CALCIUM 6.6* 6.9*   < > 8.9 8.6 8.8   MG 0.70* 1.80  --   --   --   --    ALBUMIN 2.0*  --    < > 2.2* 2.1* 2.2*   PROT 5.0*  --    < > 5.4* 5.6* 5.7*   ALKPHOS 74  --    < > 95 90 71   ALT 11  --    < > 17 13 13   AST 10*  --    < > 13 10* 10*   BILITOT 0.2  --    < > 0.3 0.2 0.3    < > = values in this interval not displayed.     Microbiology Results (last 7 days)       Procedure Component Value Units Date/Time    Blood culture #2 **CANNOT BE ORDERED STAT** [[id number]]  (Normal) Collected: 07/02/25 1737    Order Status: Completed Specimen: Blood Updated: 07/07/25 2002     Blood Culture No Growth at 5 days    Blood culture #1 **CANNOT BE ORDERED STAT** [[id number]]  (Normal) Collected: 07/02/25 1737    Order Status: Completed Specimen: Blood Updated: 07/07/25 2002     Blood Culture No Growth at 5 days    Stool Culture [[id number]]  (Abnormal) Collected: 07/03/25 0344    Order Status: Completed Specimen: Stool Updated: 07/05/25 1128     Stool Culture Negative for Salmonella, Shigella, Campylobacter, Vibrio, Aeromonas, Pleisiomonas,Yersinia, or Shiga Toxin 1 and 2.      Many Yeast    Urine culture [[id number]]  (Abnormal) Collected: 07/02/25 1822    Order Status: Completed Specimen: Urine Updated: 07/04/25 1302     Urine Culture 10,000 - 25,000 colonies/ml Candida albicans    Clostridium Diff Toxin, A & B, EIA [[id number]]  (Normal) Collected: 07/03/25 0344    Order Status: Completed Specimen: Stool Updated: 07/03/25 0920     Clostridioides difficile GDH Antigen Negative     Clostridioides difficile Toxin A/B Negative           See below for Radiology    Intake and Output:  Intake/Output  Summary (Last 24 hours) at 7/9/2025 1159  Last data filed at 7/9/2025 0607  Gross per 24 hour   Intake 600 ml   Output 1400 ml   Net -800 ml       Assessment/Plan:  LUCIA on CKD in renal transplant patient   Severe Hypotension-resolved, suspect autonomic dysfunction  History of upper extremity DVT on Eliquis   Acute urinary retention   Diarrhea - yeast  Chronic microcytic anemia   Thrombocytopenia   Hypocalcemia  PMHx of renal transplant (12/13/2011) due to renal amyloidosis now CKD 3b on immunosuppression, HTN, HLD, asthma, GERD, obesity, multiple myeloma, dementia, Charcot foot, upper extremity DVT (on Eliquis)   Resident of [facility]      Completed 7 days of fluconazole for yeast in stool/urine  7/6- Fat pad biopsy-- skin and deep dermal tissue with no significant pathologic change.  Negative for amyloid deposits  Renal recommending continued IV fluids while in the hospital. Encouraged increase fluid intake orally.  Renal function stable as of now. Can start spacing out labs while waiting on placement.   Renal recommended to Prefer to ease up in BP meds due to autonomic dysfunction--> decrease amlodipine to 2.5 mg daily, also decrease gabapentin to 100 mg daily  PT/OT moderate intensity therapy  CM on board, patient can get scale no therapy at her nursing home.  Case management is in communication with [facility], awaiting their response  Patient is medically cleared for returned back to her nursing home    VTE prophylaxis:     SCDs                    Patient condition:  Stable    Anticipated discharge and Disposition:   Returned to [place] SNF/nursing home      All diagnosis and differential diagnosis have been reviewed; assessment and plan has been documented; I have personally reviewed the labs and test results that are presently available; I have reviewed the patients medication list; I have reviewed the consulting providers response and recommendations. I have reviewed or attempted to  review medical records based upon their availability    All of the patient's questions have been  addressed and answered. Patient's is agreeable to the above stated plan. I will continue to monitor closely and make adjustments to medical management as needed.    Portions of this note dictated using EMR integrated voice recognition software, and may be subject to voice recognition errors not corrected at proofreading. Please contact writer for clarification if needed.   _____________________________________________________________________    Nutrition Status:  Nutrition consulted. Most recent weight and BMI monitored-     Measurements:  Wt Readings from Last 1 Encounters:   07/08/25 92.5 kg (203 lb 14.8 oz)   Body mass index is 33.93 kg/m².    Patient has been screened and assessed by RD.    Malnutrition Type:  Context:    Level: other (see comments) (Does not meet criteria)    Malnutrition Characteristic Summary:       Interventions/Recommendations (treatment strategy):         A minimum of two characteristics is recommended for diagnosis of either severe or non-severe malnutrition.     Current Med:   [START ON 7/10/2025] amLODIPine  2.5 mg Oral Daily    ascorbic acid (vitamin C)  1,000 mg Oral Daily    calcitRIOL  0.5 mcg Oral Daily    cinacalcet  30 mg Oral Daily with breakfast    famotidine  20 mg Oral Nightly    gabapentin  100 mg Oral BID    lamiVUDine  100 mg Oral Daily    LIDOcaine (PF) 10 mg/ml (1%)  1 mL Other Once    metoprolol tartrate  12.5 mg Oral BID    predniSONE  5 mg Oral Daily    QUEtiapine  12.5 mg Oral QHS    rivastigmine  1 patch Transdermal Daily    tacrolimus  1 mg Oral Daily AM    And    tacrolimus  2 mg Oral Daily PM    thiamine  50 mg Oral Daily    thyroid (pork)  60 mg Oral Before breakfast    zinc oxide-cod liver oil   Topical (Top) BID      PRN meds:  Current Facility-Administered Medications:     albuterol-ipratropium, 3 mL, Nebulization, Q6H PRN    aluminum-magnesium  hydroxide-simethicone, 30 mL, Oral, QID PRN    bisacodyL, 10 mg, Rectal, Daily PRN    dextrose 50%, 12.5 g, Intravenous, PRN    dextrose 50%, 25 g, Intravenous, PRN    glucagon (human recombinant), 1 mg, Intramuscular, PRN    glucose, 16 g, Oral, PRN    glucose, 24 g, Oral, PRN    melatonin, 9 mg, Oral, Nightly PRN    naloxone, 0.02 mg, Intravenous, PRN    ondansetron, 8 mg, Oral, Q8H PRN    ondansetron, 4 mg, Intravenous, Q8H PRN    polyethylene glycol, 17 g, Oral, TID PRN    simethicone, 1 tablet, Oral, QID PRN    sodium chloride 0.9%, 10 mL, Intravenous, PRN    Continuous infusion:   0.45% NaCl   Intravenous Continuous 100 mL/hr at 07/09/25 1130 New Bag at 07/09/25 1130        Radiology:   I have personally reviewed the images and agree with radiologist report  X-Ray Chest 1 View  Narrative: EXAMINATION:  XR CHEST 1 VIEW    CLINICAL HISTORY:  SOB;    TECHNIQUE:  AP chest    COMPARISON:  Chest x-ray dated 07/02/2025    FINDINGS:  The heart is normal in size.  There is no new focal airspace consolidation.  There is a small left pleural effusion, unchanged.  There is no visible pneumothorax.  Impression: Stable exam without significant interval change.    Electronically signed by: Molly Rao  Date:    07/05/2025  Time:    14:27        Erasto Chavarria MD  Department of Hospital Medicine   [facility]   07/09/2025

## 2025-07-09 NOTE — PT/OT/SLP PROGRESS
Occupational Therapy      Patient Name:  Jing Philippe   MRN:  [id number]    Patient not seen today secondary to politely declining. Stating that she worked with PT this AM and wants to rest this PM. She declined activity in bed/EOB. Will follow-up as schedule allows.    7/9/2025

## 2025-07-10 VITALS
HEIGHT: 65 IN | DIASTOLIC BLOOD PRESSURE: 66 MMHG | OXYGEN SATURATION: 97 % | RESPIRATION RATE: 18 BRPM | BODY MASS INDEX: 33.98 KG/M2 | HEART RATE: 70 BPM | SYSTOLIC BLOOD PRESSURE: 102 MMHG | TEMPERATURE: 97 F | WEIGHT: 203.94 LBS

## 2025-07-10 LAB
ALBUMIN SERPL-MCNC: 2.2 G/DL (ref 3.4–4.8)
ALBUMIN/GLOB SERPL: 0.6 RATIO (ref 1.1–2)
ALP SERPL-CCNC: 75 UNIT/L (ref 40–150)
ALT SERPL-CCNC: 15 UNIT/L (ref 0–55)
ANION GAP SERPL CALC-SCNC: 6 MEQ/L
AST SERPL-CCNC: 9 UNIT/L (ref 11–45)
BASOPHILS # BLD AUTO: 0.03 X10(3)/MCL
BASOPHILS NFR BLD AUTO: 0.3 %
BILIRUB SERPL-MCNC: 0.3 MG/DL
BUN SERPL-MCNC: 13 MG/DL (ref 9.8–20.1)
CALCIUM SERPL-MCNC: 8.4 MG/DL (ref 8.4–10.2)
CHLORIDE SERPL-SCNC: 114 MMOL/L (ref 98–107)
CO2 SERPL-SCNC: 21 MMOL/L (ref 23–31)
CREAT SERPL-MCNC: 1.17 MG/DL (ref 0.55–1.02)
CREAT/UREA NIT SERPL: 11
EOSINOPHIL # BLD AUTO: 0.08 X10(3)/MCL (ref 0–0.9)
EOSINOPHIL NFR BLD AUTO: 0.9 %
ERYTHROCYTE [DISTWIDTH] IN BLOOD BY AUTOMATED COUNT: 18.9 % (ref 11.5–17)
GFR SERPLBLD CREATININE-BSD FMLA CKD-EPI: 48 ML/MIN/1.73/M2
GLOBULIN SER-MCNC: 3.4 GM/DL (ref 2.4–3.5)
GLUCOSE SERPL-MCNC: 78 MG/DL (ref 82–115)
HCT VFR BLD AUTO: 26.3 % (ref 37–47)
HGB BLD-MCNC: 8.1 G/DL (ref 12–16)
IMM GRANULOCYTES # BLD AUTO: 0.12 X10(3)/MCL (ref 0–0.04)
IMM GRANULOCYTES NFR BLD AUTO: 1.3 %
LYMPHOCYTES # BLD AUTO: 1.78 X10(3)/MCL (ref 0.6–4.6)
LYMPHOCYTES NFR BLD AUTO: 19.4 %
MCH RBC QN AUTO: 26.6 PG (ref 27–31)
MCHC RBC AUTO-ENTMCNC: 30.8 G/DL (ref 33–36)
MCV RBC AUTO: 86.2 FL (ref 80–94)
MONOCYTES # BLD AUTO: 0.82 X10(3)/MCL (ref 0.1–1.3)
MONOCYTES NFR BLD AUTO: 9 %
NEUTROPHILS # BLD AUTO: 6.33 X10(3)/MCL (ref 2.1–9.2)
NEUTROPHILS NFR BLD AUTO: 69.1 %
NRBC BLD AUTO-RTO: 0.8 %
PLATELET # BLD AUTO: 66 X10(3)/MCL (ref 130–400)
PLATELETS.RETICULATED NFR BLD AUTO: 6.3 % (ref 0.9–11.2)
PMV BLD AUTO: 11.7 FL (ref 7.4–10.4)
POTASSIUM SERPL-SCNC: 3.5 MMOL/L (ref 3.5–5.1)
PROT SERPL-MCNC: 5.6 GM/DL (ref 5.8–7.6)
RBC # BLD AUTO: 3.05 X10(6)/MCL (ref 4.2–5.4)
SODIUM SERPL-SCNC: 141 MMOL/L (ref 136–145)
WBC # BLD AUTO: 9.16 X10(3)/MCL (ref 4.5–11.5)

## 2025-07-10 PROCEDURE — 80053 COMPREHEN METABOLIC PANEL: CPT | Performed by: INTERNAL MEDICINE

## 2025-07-10 PROCEDURE — 25000003 PHARM REV CODE 250: Performed by: INTERNAL MEDICINE

## 2025-07-10 PROCEDURE — 25000003 PHARM REV CODE 250: Performed by: NURSE PRACTITIONER

## 2025-07-10 PROCEDURE — 97535 SELF CARE MNGMENT TRAINING: CPT

## 2025-07-10 PROCEDURE — 63600175 PHARM REV CODE 636 W HCPCS: Performed by: NURSE PRACTITIONER

## 2025-07-10 PROCEDURE — 85025 COMPLETE CBC W/AUTO DIFF WBC: CPT | Performed by: INTERNAL MEDICINE

## 2025-07-10 PROCEDURE — 25000003 PHARM REV CODE 250: Performed by: STUDENT IN AN ORGANIZED HEALTH CARE EDUCATION/TRAINING PROGRAM

## 2025-07-10 PROCEDURE — 63600175 PHARM REV CODE 636 W HCPCS: Performed by: STUDENT IN AN ORGANIZED HEALTH CARE EDUCATION/TRAINING PROGRAM

## 2025-07-10 PROCEDURE — 99232 SBSQ HOSP IP/OBS MODERATE 35: CPT | Mod: ,,, | Performed by: INTERNAL MEDICINE

## 2025-07-10 PROCEDURE — 97530 THERAPEUTIC ACTIVITIES: CPT | Mod: CQ

## 2025-07-10 PROCEDURE — 36415 COLL VENOUS BLD VENIPUNCTURE: CPT | Performed by: INTERNAL MEDICINE

## 2025-07-10 RX ORDER — CINACALCET 30 MG/1
30 TABLET, FILM COATED ORAL
Qty: 30 TABLET | Refills: 11 | Status: SHIPPED | OUTPATIENT
Start: 2025-07-11 | End: 2026-07-11

## 2025-07-10 RX ORDER — METOPROLOL TARTRATE 25 MG/1
12.5 TABLET, FILM COATED ORAL 2 TIMES DAILY
Qty: 90 TABLET | Refills: 3 | Status: SHIPPED | OUTPATIENT
Start: 2025-07-10 | End: 2026-07-10

## 2025-07-10 RX ORDER — SODIUM BICARBONATE 650 MG/1
650 TABLET ORAL 2 TIMES DAILY
Qty: 60 TABLET | Refills: 11 | Status: SHIPPED | OUTPATIENT
Start: 2025-07-10 | End: 2026-07-10

## 2025-07-10 RX ORDER — CALCITRIOL 0.5 UG/1
0.5 CAPSULE ORAL DAILY
Qty: 30 CAPSULE | Refills: 1 | Status: SHIPPED | OUTPATIENT
Start: 2025-07-11

## 2025-07-10 RX ORDER — AMLODIPINE BESYLATE 2.5 MG/1
2.5 TABLET ORAL DAILY
Qty: 90 TABLET | Refills: 3 | Status: SHIPPED | OUTPATIENT
Start: 2025-07-11 | End: 2026-07-11

## 2025-07-10 RX ORDER — SODIUM BICARBONATE 650 MG/1
650 TABLET ORAL 2 TIMES DAILY
Status: DISCONTINUED | OUTPATIENT
Start: 2025-07-10 | End: 2025-07-10 | Stop reason: HOSPADM

## 2025-07-10 RX ADMIN — SODIUM BICARBONATE 650 MG: 650 TABLET ORAL at 09:07

## 2025-07-10 RX ADMIN — RIVASTIGMINE 1 PATCH: 4.6 PATCH, EXTENDED RELEASE TRANSDERMAL at 09:07

## 2025-07-10 RX ADMIN — CALCITRIOL 0.5 MCG: 0.5 CAPSULE, LIQUID FILLED ORAL at 09:07

## 2025-07-10 RX ADMIN — METOPROLOL TARTRATE 12.5 MG: 25 TABLET, FILM COATED ORAL at 09:07

## 2025-07-10 RX ADMIN — AMLODIPINE BESYLATE 2.5 MG: 2.5 TABLET ORAL at 09:07

## 2025-07-10 RX ADMIN — Medication 50 MG: at 09:07

## 2025-07-10 RX ADMIN — PREDNISONE 5 MG: 5 TABLET ORAL at 09:07

## 2025-07-10 RX ADMIN — GABAPENTIN 100 MG: 100 CAPSULE ORAL at 09:07

## 2025-07-10 RX ADMIN — TACROLIMUS 1 MG: 1 CAPSULE ORAL at 09:07

## 2025-07-10 RX ADMIN — SODIUM CHLORIDE: 4.5 INJECTION, SOLUTION INTRAVENOUS at 06:07

## 2025-07-10 RX ADMIN — CINACALCET HYDROCHLORIDE 30 MG: 30 TABLET, FILM COATED ORAL at 09:07

## 2025-07-10 RX ADMIN — Medication: at 09:07

## 2025-07-10 RX ADMIN — Medication 1000 MG: at 09:07

## 2025-07-10 NOTE — PLAN OF CARE
[facility] sent all DC information to [facility]. Pt will transport via [facility] van. Awaiting a name for report.     1052- NH van won't be able to leave to  pt until 2 pm. DC packet given to nurse for report.

## 2025-07-10 NOTE — NURSING
1230- Report given to [facility] Alegria.     1303- NH notified nurse that transport was leaving their facility now in route to pick patient up.

## 2025-07-10 NOTE — PT/OT/SLP PROGRESS
Physical Therapy Treatment    Patient Name:  Jing Philippe   MRN:  [id number]    Recommendations:     Discharge therapy intensity: Moderate Intensity Therapy   Discharge Equipment Recommendations: to be determined by next level of care  Barriers to discharge: Impaired mobility and Ongoing medical needs    Assessment:     Jing Philippe is a 75 y.o. female admitted with a medical diagnosis of LUCIA on CKD, acute urinary retention, diarrhea, s/p abdominal fat pad biopsy 7/6. Pt admitted from SNF with multiple hospital admissions in the past 3 months .  She presents with the following impairments/functional limitations: weakness, impaired endurance, impaired functional mobility, gait instability, impaired balance, impaired cognition, decreased safety awareness .    DME Justification:  No DME recommended requiring DME justifications    Rehab Prognosis: Fair; patient would benefit from acute skilled PT services to address these deficits and reach maximum level of function.    Recent Surgery: * No surgery found *      Plan:     During this hospitalization, patient would benefit from acute PT services 5 x/week to address the identified rehab impairments via gait training, therapeutic exercises, neuromuscular re-education, therapeutic activities and progress toward the following goals:    Plan of Care Expires:  08/07/25    Subjective     Chief Complaint: feet pain due to neuropathy  Patient/Family Comments/goals: d/c today  Pain/Comfort:  Pain Rating 1: other (see comments)  Location - Side 1: Bilateral  Location 1: foot  Pain Addressed 1: Reposition, Cessation of Activity      Objective:     Communicated with pts nurse prior to session.  Patient found HOB elevated with baeza catheter, peripheral IV, chair check upon PT entry to room.     General Precautions: Standard, fall  Orthopedic Precautions: N/A  Braces: N/A  Respiratory Status: Room air  Blood Pressure: NT  Skin Integrity: redness buttocks area, applied skin  barrier cream      Functional Mobility:  Bed Mobility:     Rolling Left:  moderate assistance and of 2 persons  Rolling Right: moderate assistance and of 2 persons  Supine to Sit: moderate assistance and of 2 persons  Transfers:     Sit to Stand:  moderate assistance and of 2 persons with rolling walker  Bed to Chair: maximal assistance, of 2 persons, and repeated cues for turns and stepping back  with  rolling walker  using  Step Transfer  Balance: good sit balance EOB, requiring SBA, poor stand balance requiring mod to max A for balance and safety    Therapeutic Activities/Exercises:  Pt was rolled side to side for momo cleaning following BM and to ashish brief to be OOB.  Pt sat EOB to ashish gown w/ Min A.    Co-Treatment: Yes, due to Limited activity tolerance and pt will sometimes refuse second therapy session    Education:  Patient and spouse were provided with verbal education and demonstrations education regarding PT role/goals/POC and pressure ulcer prevention.  Understanding was verbalized.     Patient left up in chair with all lines intact, call button in reach, chair alarm on, geomat cushion, nurse notified, and spouse and CNA present      GOALS:   Multidisciplinary Problems       Physical Therapy Goals          Problem: Physical Therapy    Goal Priority Disciplines Outcome Interventions   Physical Therapy Goal     PT, PT/OT Progressing    Description: Goals to be met by: 2025    Patient will increase functional independence with mobility by performin. Supine to sit with MInimal Assistance  2. Sit to supine with MInimal Assistance  3. Sit to stand transfer with Minimal Assistance  4. Bed to chair transfer with Minimal Assistance using Rolling Walker  5. Gait  x 10 feet with Minimal Assistance using Rolling Walker.   6. Wheelchair propulsion x50 feet with Minimal Assistance.                       Time Tracking:     PT Received On: 07/10/25  PT Start Time: 916     PT Stop Time: 945  PT Total Time  (min): 29 min     Billable Minutes: Therapeutic Activity 29 min    Treatment Type: Treatment  PT/PTA: PTA     Number of PTA visits since last PT visit: 2     07/10/2025

## 2025-07-10 NOTE — PROGRESS NOTES
Renal  Pt seen and examined  Meds and labs and events reviewed  No new complaint  No new physical findings  Fat pad bx congo red negative    Afeb  BP 130s  PERRLA. EOMI  Oroph bening  No JVD  Lungs clear  RRR  Abd soft   Tr edema    Impression  SP LRT  Chronic rejection  AMYLOID  Autonomic dysfunction  ABDOMINAL FAT PAD BX ONLY 85% SENSITIVE ( more specific than sensitive)  Anemia  Deconditioning    P  RENALWISE OK TO DC AS SOON AS CHRONIC FACILITY AVAILABLE  Cont BP on higher side  Will add some po NaHCO3 for acidosis  Needs wound care  and aggressive therapy

## 2025-07-10 NOTE — PLAN OF CARE
07/10/25 0920   Final Note   Assessment Type Final Discharge Note   Anticipated Discharge Disposition SNF  ([facility])   Post-Acute Status   Post-Acute Authorization Placement   Post-Acute Placement Status Set-up Complete/Auth obtained   Discharge Delays (!) Ambulance Transport/Facility Transport

## 2025-07-10 NOTE — PT/OT/SLP PROGRESS
Occupational Therapy   Treatment    Name: Jing Philippe  MRN: [id number]    Recommendations:     Recommended therapy intensity at discharge: Moderate Intensity Therapy   Discharge Equipment Recommendations:  to be determined by next level of care  Barriers to discharge:  None    Assessment:     Jing Philippe is a 75 y.o. female with a medical diagnosis of LUCIA (acute kidney injury). Performance deficits affecting function are weakness, impaired cognition, impaired endurance, impaired self care skills, impaired functional mobility, impaired skin, edema, decreased safety awareness, gait instability, impaired balance, impaired fine motor.       Rehab Prognosis:  Good; patient would benefit from acute skilled OT services to address these deficits and reach maximum level of function.       Plan:     Patient to be seen 4 x/week to address the above listed problems via self-care/home management, therapeutic activities, therapeutic exercises  Plan of Care Expires: 08/04/25  Plan of Care Reviewed with: patient, spouse    Subjective     Pain/Comfort:  Pain Rating 1: 0/10    Objective:     Communicated with: nurse prior to session.  Patient found HOB elevated with peripheral IV, telemetry, pulse ox (continuous), baeza catheter upon OT entry to room.    General Precautions: Standard, fall    Orthopedic Precautions:N/A  Braces: N/A  Respiratory Status: Room air     Occupational Performance:     Functional Mobility/Transfers:  Bed mobility:    Supine to Sit: maximal assistance and of 2 persons  Transfers: Sit to Stand: moderate assistance and of 2 persons with rolling walker  Bed to Chair: moderate assistance and of .2 persons with rolling walker using Stand Pivot    Activities of Daily Living:  Upper Body Dressing: minimum assistance ashish gown  Lower Body Dressing: total assistance ashish brief in supine  Toileting: dependence catheter in place; +BM upon OT arrival      Patient Education:  Patient provided with verbal  education education regarding OT role/goals/POC, fall prevention, safety awareness, Discharge/DME recommendations, and pressure ulcer prevention.  Understanding was verbalized.      Patient left up in chair with all lines intact, call button in reach, chair alarm on, geomat cushion, kiley pad in place, and nurse notified.    GOALS:   Multidisciplinary Problems       Occupational Therapy Goals          Problem: Occupational Therapy    Goal Priority Disciplines Outcome Interventions   Occupational Therapy Goal     OT, PT/OT Progressing    Description:             Problem: Occupational Therapy    Goal Priority Disciplines Outcome Interventions   Occupational Therapy Goal     OT, PT/OT Ongoing    Description: Goals to be met by: 8/4/25     Patient will increase functional independence with ADLs by performing:    Feeding with SPV and set-up A as needed.  UE Dressing with Set-up Assistance.  LE Dressing with Bishnu and Assistive Devices as needed.  Grooming while seated at sink with SPV and set-up A as needed.  Toilet transfer to bedside commode with Minimal Assistance, using LRAD.   Increased functional strength to 4/5 (grossly) for active participation in BADLs.                         Time Tracking:     OT Date of Treatment: 07/10/25  OT Start Time: 0920  OT Stop Time: 0945  OT Total Time (min): 25 min    Billable Minutes:Self Care/Home Management 25    OT/MICHELLE: OT     Number of MICHELLE visits since last OT visit: 3    7/10/2025

## 2025-07-15 NOTE — DISCHARGE SUMMARY
[facility] Hospital Medicine Discharge Summary    Admit Date: 7/2/2025  Discharge Date and Time: 07/10/2025  Admitting Physician:  Team  Discharging Physician: Amilcar Young MD.  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 female with PMHx of renal transplant (12/13/2011) due to renal amyloidosis now CKD 3b on immunosuppression, HTN, HLD, asthma, GERD, obesity, multiple myeloma, dementia, Charcot foot, upper extremity DVT (on Eliquis?)  presenting from Dr. Wesley office where she was visiting from [facility].  She was found to have a blood pressure of 65/42 and worsening LUCIA on CKD and was sent to the emergency room.  At baseline prior to her 3 months of hospitalizations patient lived with her  and ambulated with a walker. Given IVF with improvement in renal indices and BP.  General surgery took for abdominal fat pad biopsy on 07/06.  Specimens is negative for amyloid deposits and no significant pathologic findings on the tissue noted. PT/OT recommended moderate intensity therapy. Nephrology cleared patient for DC. Completed 7 days of Fluconazole in UCX.    Pt was seen and examined on the day of discharge. No new complaints. Plan for DC to SNF. Stopped Zyvox.    Vitals:  VITAL SIGNS: 24 HRS MIN & MAX LAST   No data recorded 96.7 °F (35.9 °C)   No data recorded 102/66   No data recorded  70   No data recorded 18   No data recorded 97 %       Physical Exam:  General: alert  lady lying comfortably in bed, in no acute distress.  HENT: oral and oropharyngeal mucosa moist, pink, with no erythema or exudates, no ear pain or discharge  Neck: normal neck movement, no lymph nodes or swellings, no JVD or Carotid bruit  Respiratory: clear breathing sounds bilaterally, no crackles, rales, ronchi or wheezes  Cardiovascular: clear S1 and S2, no murmurs, rubs or gallops  Peripheral Vascular: no lesions, ulcers or erosions, normal peripheral pulses and no pedal edema  Gastrointestinal: soft, non-tender, non-distended abdomen, no guarding, rigidity or rebound tenderness, normal bowel sounds  Integumentary: normal skin color, no rashes or lesions  Neuro: AAO x 3; motor strength 5/5 in B/L UEs & LEs; sensation intact to gross and fine touch B/L; CN II-XII grossly intact    Procedures Performed: No admission procedures for hospital encounter.     Significant Diagnostic Studies: See Full reports for all details    Recent Labs   Lab 07/10/25  0510   WBC 9.16   RBC 3.05*   HGB 8.1*   HCT 26.3*   MCV 86.2   MCH 26.6*   MCHC 30.8*   RDW 18.9*   PLT 66*   MPV 11.7*       Recent Labs   Lab 07/10/25  0510      K 3.5   *   CO2 21*   BUN 13.0   CREATININE 1.17*   GLU 78*   CALCIUM 8.4   ALBUMIN 2.2*   PROT 5.6*   ALKPHOS 75   ALT 15   AST 9*   BILITOT 0.3        Microbiology Results (last 7 days)       Procedure Component Value Units Date/Time    Blood culture #2 **CANNOT BE ORDERED STAT** [[id number]]  (Normal) Collected: 07/02/25 1737    Order Status: Completed Specimen: Blood Updated: 07/07/25 2002     Blood Culture No Growth at 5 days    Blood culture #1 **CANNOT BE ORDERED STAT** [[id number]]  (Normal) Collected: 07/02/25 1737    Order Status: Completed Specimen: Blood Updated: 07/07/25 2002     Blood Culture No Growth at 5 days    Stool Culture [[id number]]  (Abnormal) Collected: 07/03/25 0344    Order Status: Completed Specimen: Stool Updated: 07/05/25 1128     Stool Culture Negative for  Salmonella, Shigella, Campylobacter, Vibrio, Aeromonas, Pleisiomonas,Yersinia, or Shiga Toxin 1 and 2.      Many Yeast    Urine culture [[id number]]  (Abnormal) Collected: 07/02/25 1822    Order Status: Completed Specimen: Urine Updated: 07/04/25 1302     Urine Culture 10,000 - 25,000 colonies/ml Candida albicans             X-Ray Chest 1 View  Narrative: EXAMINATION:  XR CHEST 1 VIEW    CLINICAL HISTORY:  SOB;    TECHNIQUE:  AP chest    COMPARISON:  Chest x-ray dated 07/02/2025    FINDINGS:  The heart is normal in size.  There is no new focal airspace consolidation.  There is a small left pleural effusion, unchanged.  There is no visible pneumothorax.  Impression: Stable exam without significant interval change.    Electronically signed by: Molly Rao  Date:    07/05/2025  Time:    14:27         Medication List        START taking these medications      amLODIPine 2.5 MG tablet  Commonly known as: NORVASC  Take 1 tablet (2.5 mg total) by mouth once daily.     calcitRIOL 0.5 MCG Cap  Commonly known as: ROCALTROL  Take 1 capsule (0.5 mcg total) by mouth once daily.     cinacalcet 30 MG Tab  Commonly known as: SENSIPAR  Take 1 tablet (30 mg total) by mouth daily with breakfast.            CHANGE how you take these medications      K-Phos-NeutraL 250 mg Tab  Generic drug: k phos di & mono-sod phos mono  What changed: Another medication with the same name was removed. Continue taking this medication, and follow the directions you see here.     metoprolol tartrate 25 MG tablet  Commonly known as: LOPRESSOR  Take 0.5 tablets (12.5 mg total) by mouth 2 (two) times daily.  What changed:   medication strength  how much to take     tacrolimus 1 MG Cap  Commonly known as: PROGRAF  Take 1 capsule (1 mg total) by mouth every morning AND 2 capsules (2 mg total) every evening.  What changed: See the new instructions.            CONTINUE taking these medications      ARGINAID 4.5 gram-156 mg/9.2 gram Pwpk  Generic drug:  arginine-vitamin C-vitamin E     ARMOUR THYROID 60 mg Tab  Generic drug: thyroid (pork)     ascorbic acid (vitamin C) 1000 MG tablet  Commonly known as: VITAMIN C     busPIRone 5 MG Tab  Commonly known as: BUSPAR     ELIQUIS 5 mg Tab  Generic drug: apixaban     famotidine 20 MG tablet  Commonly known as: PEPCID     lamiVUDine 100 MG Tab  Commonly known as: EPIVIR  TAKE 1 TABLET DAILY.     MELATONIN ORAL     MUCINEX 600 mg 12 hr tablet  Generic drug: guaiFENesin     multivit with min-folic acid 0.4 mg Tab     oxybutynin 10 MG 24 hr tablet  Commonly known as: DITROPAN-XL  Take 1 tablet (10 mg total) by mouth once daily.     pantoprazole 40 MG tablet  Commonly known as: PROTONIX     potassium chloride SA 20 MEQ tablet  Commonly known as: K-DUR,KLOR-CON     predniSONE 5 MG tablet  Commonly known as: DELTASONE     pregabalin 150 MG capsule  Commonly known as: LYRICA     QUEtiapine 25 MG Tab  Commonly known as: SEROQUEL     rivastigmine 4.6 mg/24 hour Pt24  Commonly known as: EXELON     sodium bicarbonate 650 MG tablet  Take 1 tablet (650 mg total) by mouth 2 (two) times daily.     VITAMIN B-1 50 MG tablet  Generic drug: thiamine     zinc gluconate 50 mg tablet            STOP taking these medications      fluticasone propionate 50 mcg/actuation nasal spray  Commonly known as: FLONASE     furosemide 40 MG tablet  Commonly known as: LASIX     linezolid 600 mg Tab  Commonly known as: ZYVOX     lisinopriL 5 MG tablet  Commonly known as: PRINIVIL,ZESTRIL     meclizine 25 mg tablet  Commonly known as: ANTIVERT     VITAMIN D2 50,000 unit Cap  Generic drug: ergocalciferol               Where to Get Your Medications        These medications were sent to [facility] - [address] [address]      Phone: [phone]   amLODIPine 2.5 MG tablet  calcitRIOL 0.5 MCG Cap  cinacalcet 30 MG Tab  metoprolol tartrate 25 MG tablet  sodium bicarbonate 650 MG tablet          Explained  in detail to the patient about the discharge plan, medications, and follow-up visits. Pt understands and agrees with the treatment plan  Discharge Disposition: Skilled Nursing Facility   Discharged Condition: stable  Diet-    Medications Per DC med rec  Activities as tolerated   Follow-up Information       Fito Land Jr., MD. Go on 7/31/2025.    Specialty: Family Medicine  Why: @9:45 AM  Contact information:  [address] [place] [address] [address] Diana RICE [id number]  [phone]                           For further questions contact hospitalist office    Discharge time 33 minutes    For worsening symptoms, chest pain, shortness of breath, increased abdominal pain, high grade fever, stroke or stroke like symptoms, immediately go to the nearest Emergency Room or call 911 as soon as possible.

## 2025-07-22 ENCOUNTER — TELEPHONE (OUTPATIENT)
Dept: NEPHROLOGY | Facility: CLINIC | Age: 75
End: 2025-07-22
Payer: MEDICARE

## 2025-07-22 DIAGNOSIS — N18.32 STAGE 3B CHRONIC KIDNEY DISEASE: ICD-10-CM

## 2025-07-22 DIAGNOSIS — N18.32 ACUTE RENAL FAILURE SUPERIMPOSED ON STAGE 3B CHRONIC KIDNEY DISEASE, UNSPECIFIED ACUTE RENAL FAILURE TYPE: ICD-10-CM

## 2025-07-22 DIAGNOSIS — N17.9 ACUTE RENAL FAILURE SUPERIMPOSED ON STAGE 3B CHRONIC KIDNEY DISEASE, UNSPECIFIED ACUTE RENAL FAILURE TYPE: ICD-10-CM

## 2025-07-22 DIAGNOSIS — Z94.0 S/P LIVING-DONOR KIDNEY TRANSPLANTATION: Primary | ICD-10-CM

## 2025-07-22 RX ORDER — TACROLIMUS 1 MG/1
CAPSULE ORAL
Qty: 180 CAPSULE | Refills: 11 | Status: SHIPPED | OUTPATIENT
Start: 2025-07-22

## 2025-07-22 NOTE — TELEPHONE ENCOUNTER
Received refill request for Tacro, her med rec says take 1 cap q am and 2 caps q pm, but at her OV on 7/2 the nursing home med rec was giving 1 cap BID.  She was admitted to [facility] after her visit here (we sent her to ER) and she was D/C'd on 7/10.  The d/c med rec shows the same thing, so I am not sure what dose to send in.  Please let me know how you want pt to take Tacro and also, she was not scheduled a follow up visit here.  When do you want her to follow up with us?    Please advise.  Thanks

## 2025-07-22 NOTE — TELEPHONE ENCOUNTER
Sp[facility] to Leighton[facility]. New orders given.  Will start tacro 1mg PO Q 12 hours and draw tacro level in 3 days (30-60 min prior to am dose) and will fax results to us.

## 2025-07-29 ENCOUNTER — TELEPHONE (OUTPATIENT)
Dept: NEPHROLOGY | Facility: CLINIC | Age: 75
End: 2025-07-29
Payer: MEDICARE

## 2025-07-29 DIAGNOSIS — N17.9 ACUTE RENAL FAILURE SUPERIMPOSED ON STAGE 3B CHRONIC KIDNEY DISEASE, UNSPECIFIED ACUTE RENAL FAILURE TYPE: ICD-10-CM

## 2025-07-29 DIAGNOSIS — E87.5 HYPERKALEMIA: Primary | ICD-10-CM

## 2025-07-29 DIAGNOSIS — N18.32 ACUTE RENAL FAILURE SUPERIMPOSED ON STAGE 3B CHRONIC KIDNEY DISEASE, UNSPECIFIED ACUTE RENAL FAILURE TYPE: ICD-10-CM

## 2025-07-29 DIAGNOSIS — Z94.0 KIDNEY TRANSPLANTED: ICD-10-CM

## 2025-07-29 DIAGNOSIS — Z94.0 S/P LIVING-DONOR KIDNEY TRANSPLANTATION: ICD-10-CM

## 2025-07-29 DIAGNOSIS — N18.32 STAGE 3B CHRONIC KIDNEY DISEASE: ICD-10-CM

## 2025-07-29 DIAGNOSIS — N18.32 STAGE 3B CHRONIC KIDNEY DISEASE: Primary | ICD-10-CM

## 2025-07-29 NOTE — TELEPHONE ENCOUNTER
Spoke with Airam, informed to stop potassium and lisinopril  Increase water intake  Would like labs repeated x2day BMP, Mg, Phos, and tacro level should be 30-60 mins before her morning dose  Also ordered lokelma 10 g daily for 2 days.  Faxed orders for labs and Lokelma to [facility].  Airam verbalized understanding.

## 2025-07-29 NOTE — TELEPHONE ENCOUNTER
Dr. Nguyen at [facility] wants to DC the following due to high potassium level. Nurse Airam called to see if it was ok.  Potassium 20 mg daily  Lisinopril 5 mg daily  Last  potassium was done 07/25 it was 6.28  Please advised.    [place] number: [phone]

## (undated) DEVICE — KIT INTRODUCER W/2 9CM FRN NDL

## (undated) DEVICE — TRAY MINOR GEN SURG

## (undated) DEVICE — DRAPE ABDOMINAL TIBURON 14X11

## (undated) DEVICE — COVERS PROBE NR-48 STERILE

## (undated) DEVICE — DRESSING TELFA STRL 4X3 LF

## (undated) DEVICE — DRAPE C ARM 42 X 120 10/BX

## (undated) DEVICE — CABLE TEST MINI J HOOK

## (undated) DEVICE — DRESSING TRANS 4X4 TEGADERM

## (undated) DEVICE — DRAPE INCISE IOBAN 2 23X17IN

## (undated) DEVICE — SUT MONOCRYL 3-0 SH U/D

## (undated) DEVICE — PROGRAMMER INTERSTIM HANDSET

## (undated) DEVICE — DRAPE C-ARM ELAS CLIP 42X120IN

## (undated) DEVICE — SOL WATER STRL IRR 1000ML

## (undated) DEVICE — DRESSING ANTIMICROBIAL 1 INCH

## (undated) DEVICE — SCREENER ISTIM EXT NEROSTIMLTR

## (undated) DEVICE — DRESSING ANTIMICROBIAL 3/4 IN

## (undated) DEVICE — DRAPE C-ARMOR EQUIPMENT COVER

## (undated) DEVICE — SUT SILK 0 STRANDS 30IN BLK

## (undated) DEVICE — TOWEL OR NONABSORB ADH 17X26

## (undated) DEVICE — TRAY DRY SKIN SCRUB PREP

## (undated) DEVICE — SEE MEDLINE ITEM 157148

## (undated) DEVICE — ADHESIVE DERMABOND ADVANCED

## (undated) DEVICE — TAPE SILK 3IN

## (undated) DEVICE — DRESSING TRANS 2X2 TEGADERM

## (undated) DEVICE — BLADE SURG CARBON STEEL SZ11

## (undated) DEVICE — SUT MCRYL PLUS 4-0 PS2 27IN

## (undated) DEVICE — CABLE TWIST LOCK 64CM

## (undated) DEVICE — DURAPREP SURG SCRUB 26ML

## (undated) DEVICE — SUT VICRYL 3-0 27 SH